# Patient Record
Sex: FEMALE | Race: WHITE | NOT HISPANIC OR LATINO | Employment: UNEMPLOYED | URBAN - METROPOLITAN AREA
[De-identification: names, ages, dates, MRNs, and addresses within clinical notes are randomized per-mention and may not be internally consistent; named-entity substitution may affect disease eponyms.]

---

## 2018-04-09 ENCOUNTER — APPOINTMENT (OUTPATIENT)
Dept: ULTRASOUND IMAGING | Facility: CLINIC | Age: 48
End: 2018-04-09
Attending: INTERNAL MEDICINE
Payer: MEDICAID

## 2018-04-09 ENCOUNTER — HOSPITAL ENCOUNTER (INPATIENT)
Facility: CLINIC | Age: 48
LOS: 6 days | Discharge: HOME | End: 2018-04-15
Attending: EMERGENCY MEDICINE | Admitting: INTERNAL MEDICINE
Payer: MEDICAID

## 2018-04-09 ENCOUNTER — APPOINTMENT (OUTPATIENT)
Dept: GENERAL RADIOLOGY | Facility: CLINIC | Age: 48
End: 2018-04-09
Attending: EMERGENCY MEDICINE
Payer: MEDICAID

## 2018-04-09 DIAGNOSIS — L03.119 CELLULITIS OF HAND: Primary | ICD-10-CM

## 2018-04-09 DIAGNOSIS — L03.114 CELLULITIS OF LEFT UPPER EXTREMITY: ICD-10-CM

## 2018-04-09 LAB
AMPHETAMINES UR QL SCN: POSITIVE
ANION GAP SERPL CALCULATED.3IONS-SCNC: 7 MMOL/L (ref 3–14)
BACTERIA SPEC CULT: NORMAL
BARBITURATES UR QL: NEGATIVE
BASOPHILS # BLD AUTO: 0 10E9/L (ref 0–0.2)
BASOPHILS NFR BLD AUTO: 0.1 %
BENZODIAZ UR QL: NEGATIVE
BUN SERPL-MCNC: 14 MG/DL (ref 7–30)
CALCIUM SERPL-MCNC: 8.8 MG/DL (ref 8.5–10.1)
CANNABINOIDS UR QL SCN: NEGATIVE
CHLORIDE SERPL-SCNC: 106 MMOL/L (ref 94–109)
CO2 SERPL-SCNC: 27 MMOL/L (ref 20–32)
COCAINE UR QL: NEGATIVE
CREAT SERPL-MCNC: 0.62 MG/DL (ref 0.52–1.04)
CRP SERPL-MCNC: 11.7 MG/L (ref 0–8)
DIFFERENTIAL METHOD BLD: ABNORMAL
EOSINOPHIL # BLD AUTO: 0.2 10E9/L (ref 0–0.7)
EOSINOPHIL NFR BLD AUTO: 1.2 %
ERYTHROCYTE [DISTWIDTH] IN BLOOD BY AUTOMATED COUNT: 13.4 % (ref 10–15)
ERYTHROCYTE [SEDIMENTATION RATE] IN BLOOD BY WESTERGREN METHOD: 28 MM/H (ref 0–20)
ETHANOL UR QL SCN: NEGATIVE
GFR SERPL CREATININE-BSD FRML MDRD: >90 ML/MIN/1.7M2
GLUCOSE SERPL-MCNC: 107 MG/DL (ref 70–99)
GRAM STN SPEC: ABNORMAL
GRAM STN SPEC: ABNORMAL
HCT VFR BLD AUTO: 39.3 % (ref 35–47)
HGB BLD-MCNC: 13 G/DL (ref 11.7–15.7)
IMM GRANULOCYTES # BLD: 0 10E9/L (ref 0–0.4)
IMM GRANULOCYTES NFR BLD: 0.1 %
LYMPHOCYTES # BLD AUTO: 1.9 10E9/L (ref 0.8–5.3)
LYMPHOCYTES NFR BLD AUTO: 13.5 %
Lab: ABNORMAL
Lab: NORMAL
MCH RBC QN AUTO: 30.3 PG (ref 26.5–33)
MCHC RBC AUTO-ENTMCNC: 33.1 G/DL (ref 31.5–36.5)
MCV RBC AUTO: 92 FL (ref 78–100)
MONOCYTES # BLD AUTO: 1.1 10E9/L (ref 0–1.3)
MONOCYTES NFR BLD AUTO: 7.7 %
NEUTROPHILS # BLD AUTO: 10.6 10E9/L (ref 1.6–8.3)
NEUTROPHILS NFR BLD AUTO: 77.4 %
NRBC # BLD AUTO: 0 10*3/UL
NRBC BLD AUTO-RTO: 0 /100
OPIATES UR QL SCN: POSITIVE
PLATELET # BLD AUTO: 283 10E9/L (ref 150–450)
POTASSIUM SERPL-SCNC: 4.2 MMOL/L (ref 3.4–5.3)
RBC # BLD AUTO: 4.29 10E12/L (ref 3.8–5.2)
SODIUM SERPL-SCNC: 140 MMOL/L (ref 133–144)
SPECIMEN SOURCE: ABNORMAL
SPECIMEN SOURCE: NORMAL
WBC # BLD AUTO: 13.7 10E9/L (ref 4–11)

## 2018-04-09 PROCEDURE — 76882 US LMTD JT/FCL EVL NVASC XTR: CPT | Mod: LT

## 2018-04-09 PROCEDURE — 85025 COMPLETE CBC W/AUTO DIFF WBC: CPT | Performed by: EMERGENCY MEDICINE

## 2018-04-09 PROCEDURE — 25000132 ZZH RX MED GY IP 250 OP 250 PS 637: Performed by: INTERNAL MEDICINE

## 2018-04-09 PROCEDURE — 90715 TDAP VACCINE 7 YRS/> IM: CPT | Performed by: EMERGENCY MEDICINE

## 2018-04-09 PROCEDURE — 87075 CULTR BACTERIA EXCEPT BLOOD: CPT | Performed by: STUDENT IN AN ORGANIZED HEALTH CARE EDUCATION/TRAINING PROGRAM

## 2018-04-09 PROCEDURE — 96367 TX/PROPH/DG ADDL SEQ IV INF: CPT | Performed by: EMERGENCY MEDICINE

## 2018-04-09 PROCEDURE — 87070 CULTURE OTHR SPECIMN AEROBIC: CPT | Performed by: STUDENT IN AN ORGANIZED HEALTH CARE EDUCATION/TRAINING PROGRAM

## 2018-04-09 PROCEDURE — 25000125 ZZHC RX 250: Performed by: STUDENT IN AN ORGANIZED HEALTH CARE EDUCATION/TRAINING PROGRAM

## 2018-04-09 PROCEDURE — 86140 C-REACTIVE PROTEIN: CPT | Performed by: EMERGENCY MEDICINE

## 2018-04-09 PROCEDURE — 87205 SMEAR GRAM STAIN: CPT | Performed by: STUDENT IN AN ORGANIZED HEALTH CARE EDUCATION/TRAINING PROGRAM

## 2018-04-09 PROCEDURE — 87077 CULTURE AEROBIC IDENTIFY: CPT | Performed by: STUDENT IN AN ORGANIZED HEALTH CARE EDUCATION/TRAINING PROGRAM

## 2018-04-09 PROCEDURE — 80048 BASIC METABOLIC PNL TOTAL CA: CPT | Performed by: EMERGENCY MEDICINE

## 2018-04-09 PROCEDURE — 25000128 H RX IP 250 OP 636: Performed by: INTERNAL MEDICINE

## 2018-04-09 PROCEDURE — 12000001 ZZH R&B MED SURG/OB UMMC

## 2018-04-09 PROCEDURE — 96365 THER/PROPH/DIAG IV INF INIT: CPT | Performed by: EMERGENCY MEDICINE

## 2018-04-09 PROCEDURE — 85652 RBC SED RATE AUTOMATED: CPT | Performed by: EMERGENCY MEDICINE

## 2018-04-09 PROCEDURE — 87186 SC STD MICRODIL/AGAR DIL: CPT | Performed by: STUDENT IN AN ORGANIZED HEALTH CARE EDUCATION/TRAINING PROGRAM

## 2018-04-09 PROCEDURE — 80307 DRUG TEST PRSMV CHEM ANLYZR: CPT | Performed by: EMERGENCY MEDICINE

## 2018-04-09 PROCEDURE — 99285 EMERGENCY DEPT VISIT HI MDM: CPT | Mod: 25 | Performed by: EMERGENCY MEDICINE

## 2018-04-09 PROCEDURE — 99207 ZZC APP CREDIT; MD BILLING SHARED VISIT: CPT | Performed by: INTERNAL MEDICINE

## 2018-04-09 PROCEDURE — 90471 IMMUNIZATION ADMIN: CPT | Performed by: EMERGENCY MEDICINE

## 2018-04-09 PROCEDURE — 25000128 H RX IP 250 OP 636: Performed by: EMERGENCY MEDICINE

## 2018-04-09 PROCEDURE — 99285 EMERGENCY DEPT VISIT HI MDM: CPT | Mod: Z6 | Performed by: EMERGENCY MEDICINE

## 2018-04-09 PROCEDURE — 99223 1ST HOSP IP/OBS HIGH 75: CPT | Mod: AI | Performed by: INTERNAL MEDICINE

## 2018-04-09 PROCEDURE — 73130 X-RAY EXAM OF HAND: CPT | Mod: LT

## 2018-04-09 PROCEDURE — 80320 DRUG SCREEN QUANTALCOHOLS: CPT | Performed by: EMERGENCY MEDICINE

## 2018-04-09 PROCEDURE — 0HDGXZZ EXTRACTION OF LEFT HAND SKIN, EXTERNAL APPROACH: ICD-10-PCS | Performed by: INTERNAL MEDICINE

## 2018-04-09 PROCEDURE — 87040 BLOOD CULTURE FOR BACTERIA: CPT | Performed by: EMERGENCY MEDICINE

## 2018-04-09 RX ORDER — CEFAZOLIN SODIUM 2 G/100ML
2 INJECTION, SOLUTION INTRAVENOUS ONCE
Status: COMPLETED | OUTPATIENT
Start: 2018-04-09 | End: 2018-04-09

## 2018-04-09 RX ORDER — SODIUM CHLORIDE, SODIUM LACTATE, POTASSIUM CHLORIDE, CALCIUM CHLORIDE 600; 310; 30; 20 MG/100ML; MG/100ML; MG/100ML; MG/100ML
INJECTION, SOLUTION INTRAVENOUS CONTINUOUS
Status: DISCONTINUED | OUTPATIENT
Start: 2018-04-09 | End: 2018-04-11

## 2018-04-09 RX ORDER — LIDOCAINE HYDROCHLORIDE 10 MG/ML
10-20 INJECTION, SOLUTION EPIDURAL; INFILTRATION; INTRACAUDAL; PERINEURAL ONCE
Status: COMPLETED | OUTPATIENT
Start: 2018-04-09 | End: 2018-04-09

## 2018-04-09 RX ORDER — OXYCODONE HYDROCHLORIDE 5 MG/1
5-10 TABLET ORAL
Status: DISCONTINUED | OUTPATIENT
Start: 2018-04-09 | End: 2018-04-14

## 2018-04-09 RX ORDER — ONDANSETRON 4 MG/1
4 TABLET, ORALLY DISINTEGRATING ORAL EVERY 6 HOURS PRN
Status: DISCONTINUED | OUTPATIENT
Start: 2018-04-09 | End: 2018-04-15 | Stop reason: HOSPADM

## 2018-04-09 RX ORDER — ACETAMINOPHEN 325 MG/1
650 TABLET ORAL EVERY 4 HOURS PRN
Status: DISCONTINUED | OUTPATIENT
Start: 2018-04-09 | End: 2018-04-15 | Stop reason: HOSPADM

## 2018-04-09 RX ORDER — VANCOMYCIN HYDROCHLORIDE 1 G/200ML
1000 INJECTION, SOLUTION INTRAVENOUS EVERY 12 HOURS
Status: DISCONTINUED | OUTPATIENT
Start: 2018-04-09 | End: 2018-04-11

## 2018-04-09 RX ORDER — ONDANSETRON 2 MG/ML
4 INJECTION INTRAMUSCULAR; INTRAVENOUS EVERY 6 HOURS PRN
Status: DISCONTINUED | OUTPATIENT
Start: 2018-04-09 | End: 2018-04-15 | Stop reason: HOSPADM

## 2018-04-09 RX ORDER — NALOXONE HYDROCHLORIDE 0.4 MG/ML
.1-.4 INJECTION, SOLUTION INTRAMUSCULAR; INTRAVENOUS; SUBCUTANEOUS
Status: DISCONTINUED | OUTPATIENT
Start: 2018-04-09 | End: 2018-04-15 | Stop reason: HOSPADM

## 2018-04-09 RX ADMIN — OXYCODONE HYDROCHLORIDE 10 MG: 5 TABLET ORAL at 17:30

## 2018-04-09 RX ADMIN — LIDOCAINE HYDROCHLORIDE 10 ML: 10 INJECTION, SOLUTION EPIDURAL; INFILTRATION; INTRACAUDAL; PERINEURAL at 15:39

## 2018-04-09 RX ADMIN — CLOSTRIDIUM TETANI TOXOID ANTIGEN (FORMALDEHYDE INACTIVATED), CORYNEBACTERIUM DIPHTHERIAE TOXOID ANTIGEN (FORMALDEHYDE INACTIVATED), BORDETELLA PERTUSSIS TOXOID ANTIGEN (GLUTARALDEHYDE INACTIVATED), BORDETELLA PERTUSSIS FILAMENTOUS HEMAGGLUTININ ANTIGEN (FORMALDEHYDE INACTIVATED), BORDETELLA PERTUSSIS PERTACTIN ANTIGEN, AND BORDETELLA PERTUSSIS FIMBRIAE 2/3 ANTIGEN 0.5 ML: 5; 2; 2.5; 5; 3; 5 INJECTION, SUSPENSION INTRAMUSCULAR at 03:31

## 2018-04-09 RX ADMIN — OXYCODONE HYDROCHLORIDE 10 MG: 5 TABLET ORAL at 14:25

## 2018-04-09 RX ADMIN — VANCOMYCIN HYDROCHLORIDE 1000 MG: 1 INJECTION, SOLUTION INTRAVENOUS at 15:48

## 2018-04-09 RX ADMIN — OXYCODONE HYDROCHLORIDE 5 MG: 5 TABLET ORAL at 08:11

## 2018-04-09 RX ADMIN — ACETAMINOPHEN 650 MG: 325 TABLET ORAL at 11:29

## 2018-04-09 RX ADMIN — OXYCODONE HYDROCHLORIDE 10 MG: 5 TABLET ORAL at 22:25

## 2018-04-09 RX ADMIN — OXYCODONE HYDROCHLORIDE 5 MG: 5 TABLET ORAL at 06:51

## 2018-04-09 RX ADMIN — OXYCODONE HYDROCHLORIDE 10 MG: 5 TABLET ORAL at 11:29

## 2018-04-09 RX ADMIN — ACETAMINOPHEN 650 MG: 325 TABLET ORAL at 15:48

## 2018-04-09 RX ADMIN — CEFAZOLIN SODIUM 2 G: 2 INJECTION, SOLUTION INTRAVENOUS at 03:53

## 2018-04-09 RX ADMIN — SODIUM CHLORIDE, POTASSIUM CHLORIDE, SODIUM LACTATE AND CALCIUM CHLORIDE: 600; 310; 30; 20 INJECTION, SOLUTION INTRAVENOUS at 19:43

## 2018-04-09 RX ADMIN — SODIUM CHLORIDE, POTASSIUM CHLORIDE, SODIUM LACTATE AND CALCIUM CHLORIDE: 600; 310; 30; 20 INJECTION, SOLUTION INTRAVENOUS at 08:08

## 2018-04-09 RX ADMIN — SODIUM CHLORIDE, POTASSIUM CHLORIDE, SODIUM LACTATE AND CALCIUM CHLORIDE: 600; 310; 30; 20 INJECTION, SOLUTION INTRAVENOUS at 14:20

## 2018-04-09 RX ADMIN — VANCOMYCIN HYDROCHLORIDE 1000 MG: 1 INJECTION, SOLUTION INTRAVENOUS at 04:48

## 2018-04-09 ASSESSMENT — ACTIVITIES OF DAILY LIVING (ADL)
TRANSFERRING: 0-->INDEPENDENT
BATHING: 1-->ASSISTIVE EQUIPMENT
FALL_HISTORY_WITHIN_LAST_SIX_MONTHS: NO
TRANSFERRING: 0 - INDEPENDENT
TOILETING: 1-->ASSISTIVE EQUIPMENT
TOILETING: 0 - INDEPENDENT
COGNITION: 0 - NO COGNITION ISSUES REPORTED
DRESS: 0 - INDEPENDENT
BATHING: 0 - INDEPENDENT
DRESS: 1-->ASSISTIVE EQUIPMENT
AMBULATION: 0-->INDEPENDENT
EATING: 0 - INDEPENDENT
AMBULATION: 0 - INDEPENDENT
RETIRED_COMMUNICATION: 0-->UNDERSTANDS/COMMUNICATES WITHOUT DIFFICULTY
SWALLOWING: 0 - SWALLOWS FOODS/LIQUIDS WITHOUT DIFFICULTY
RETIRED_EATING: 1-->ASSISTIVE EQUIPMENT
COMMUNICATION: 0 - UNDERSTANDS/COMMUNICATES WITHOUT DIFFICULTY
SWALLOWING: 0-->SWALLOWS FOODS/LIQUIDS WITHOUT DIFFICULTY

## 2018-04-09 ASSESSMENT — ENCOUNTER SYMPTOMS
CONFUSION: 0
WEAKNESS: 0
CHILLS: 0
BRUISES/BLEEDS EASILY: 0
FEVER: 0
EYE DISCHARGE: 0
MYALGIAS: 0
RHINORRHEA: 0
DYSURIA: 0
NAUSEA: 0
COLOR CHANGE: 1
WOUND: 1
SORE THROAT: 0
HEADACHES: 0
BACK PAIN: 0
FLANK PAIN: 0
ABDOMINAL PAIN: 0
COUGH: 0
VOMITING: 0
SHORTNESS OF BREATH: 0
DIARRHEA: 0

## 2018-04-09 NOTE — PLAN OF CARE
Problem: Patient Care Overview  Goal: Plan of Care/Patient Progress Review  Outcome: No Change  Pt is A&O. VSS and WNL. Taking 10 mg PRN oxycodone for L hand pain which is moderately effective and using ice for comfort. LUE elevated throughout shift. L hand remains red, swollen, and warm to the touch. NADIA with no drainage. Reports slight tingling to fingers which has not changed during shift. US completed this AM. Denies any chest pain or SOB. Lungs CTA. BS present and active x 4. Remains NPO with plan for bedside I&D this afternoon. IVF infusing @ 100/hr. Up independently. Has call light in reach and is able to make needs known. Continue with plan of care.

## 2018-04-09 NOTE — IP AVS SNAPSHOT
MRN:[id number]                      After Visit Summary   4/9/2018    Shelly Cha    MRN: [id number]           Thank you!     Thank you for choosing [facility] for your care. Our goal is always to provide you with excellent care. Hearing back from our patients is one way we can continue to improve our services. Please take a few minutes to complete the written survey that you may receive in the mail after you visit with us. Thank you!        Patient Information     Date Of Birth          1970        Designated Caregiver       Most Recent Value    Caregiver    Will someone help with your care after discharge? yes    Name of designated caregiver Siva    Phone number of caregiver [phone]    Caregiver address [address] [place], [place] [pts home address]      About your hospital stay     You were admitted on:  April 9, 2018 You last received care in the:   8A    You were discharged on:  April 15, 2018        Reason for your hospital stay       Hand Cellulitis                  Who to Call     For medical emergencies, please call 911.  For non-urgent questions about your medical care, please call your primary care provider or clinic, [phone]          Attending Provider     Provider Specialty    Pauline Harrison MD Emergency Medicine    Pierce Durant MD Internal Medicine       Primary Care Provider Office Phone # Fax #    Clinic [facility] [phone]      After Care Instructions     Activity       Your activity upon discharge: activity as tolerated            Diet       Follow this diet upon discharge: Orders Placed This Encounter      Regular Diet Adult            Discharge Instructions       Please call or come back to the hospital for new fevers, chills, increasing pain/swelling, new or increasing drainage from your hand incision, or new numbness. Keep hand elevated as much as possible. Keep hand would clean and dry. Keep dressing clean, dry and intact. Okay to  shower with dressing covered and kept dry.                  Follow-up Appointments     Adult [facility] Follow-up and recommended labs and tests       Please follow up with Hand clinic in 1 week. ( Dr Kirkpatrick)     Appointments on [facility] and/or [facility] (with [facility] provider or service). Call [phone] if you haven't heard regarding these appointments within 7 days of discharge.                  Additional Services     Home infusion referral       Your provider has referred you to: [facility]: [facility] [phone]   [email or web address]    Local Address (if different from home address): N/A    Anticipated Length of Therapy: 6 weeks    Home Infusion Pharmacist to adjust therapy based on labs and clinical assessments: Yes    Labs:  May draw labs from Venous Catheter: Yes  Home Infusion Pharmacist to order labs based on therapy type and clinical assessments: Yes  Call/Fax Lab Results to: Dr. Salazar    Agency Staff to assess nursing needs for Infusion Therapy.    Access Device Management:  IV Access Type: PICC  Flush with Heparin and Normal Saline IVP PRN and routine site care (per agency protocol) to maintain access device? Yes                  Pending Results     Date and Time Order Name Status Description    4/9/2018 1535 Anaerobic bacterial culture Preliminary     4/9/2018 0235 Blood culture Preliminary     4/9/2018 0235 Blood culture Preliminary             Statement of Approval     Ordered          04/14/18 1021  I have reviewed and agree with all the recommendations and orders detailed in this document.  EFFECTIVE NOW     Approved and electronically signed by:  Misti Isaacs MD             Admission Information     Date & Time Provider Department Dept. Phone    4/9/2018 Pierce Durant MD [facility] 8A [phone]      Your Vitals Were     Blood Pressure Pulse Temperature Respirations Height Weight    98/56 79 97.8  F (36.6  C)  "(Oral) 16 1.6 m (5' 3\") 62.3 kg (137 lb 6.4 oz)    Pulse Oximetry BMI (Body Mass Index)                97% 24.34 kg/m2          [employer] Information     [employer] lets you send messages to your doctor, view your test results, renew your prescriptions, schedule appointments and more. To sign up, go to [email or web address] . Click on \"Log in\" on the left side of the screen, which will take you to the Welcome page. Then click on \"Sign up Now\" on the right side of the page.     You will be asked to enter the access code listed below, as well as some personal information. Please follow the directions to create your username and password.     Your access code is: [email or web address]  Expires: 2018 10:53 AM     Your access code will  in 90 days. If you need help or a new code, please call your [facility] clinic or [phone].        Care EveryWhere ID     This is your Care EveryWhere ID. This could be used by other organizations to access your [facility] medical records  [id number]        Equal Access to Services     ANJU YARBROUGH : Hadii estuardo Garsia, warubenda jessica, qaana maríata kaalroya blair, paxton silva . So [facility] [phone].    ATENCIÓN: Si habla español, tiene a kennedy disposición servicios gratuitos de asistencia lingüística. Llame al [phone].    We comply with applicable federal civil rights laws and [place] laws. We do not discriminate on the basis of race, color, national origin, age, disability, sex, sexual orientation, or gender identity.               Review of your medicines      START taking        Dose / Directions    amoxicillin-clavulanate 875-125 MG per tablet   Commonly known as:  AUGMENTIN   Indication:  Infection of the Skin and/or Related Soft Tissue        Dose:  1 tablet   Take 1 tablet by mouth every 12 hours   Quantity:  14 tablet   Refills:  0       oxyCODONE IR 5 MG tablet   Commonly known as:  ROXICODONE        Dose:  5-10 mg   Take 1-2 tablets " (5-10 mg) by mouth every 6 hours as needed for other (pain control or improvement in physical function. Hold dose for analgesic side effects.)   Quantity:  50 tablet   Refills:  0            Where to get your medicines      These medications were sent to [facility] [place], [place] - [address]  [address], [facility] [address]     Phone:  [phone]     amoxicillin-clavulanate 875-125 MG per tablet         Some of these will need a paper prescription and others can be bought over the counter. Ask your nurse if you have questions.     Bring a paper prescription for each of these medications     oxyCODONE IR 5 MG tablet                Protect others around you: Learn how to safely use, store and throw away your medicines at www.disposemymeds.org.        ANTIBIOTIC INSTRUCTION     You've Been Prescribed an Antibiotic - Now What?  Your healthcare team thinks that you or your loved one might have an infection. Some infections can be treated with antibiotics, which are powerful, life-saving drugs. Like all medications, antibiotics have side effects and should only be used when necessary. There are some important things you should know about your antibiotic treatment.      Your healthcare team may run tests before you start taking an antibiotic.    Your team may take samples (e.g., from your blood, urine or other areas) to run tests to look for bacteria. These test can be important to determine if you need an antibiotic at all and, if you do, which antibiotic will work best.      Within a few days, your healthcare team might change or even stop your antibiotic.    Your team may start you on an antibiotic while they are working to find out what is making you sick.    Your team might change your antibiotic because test results show that a different antibiotic would be better to treat your infection.    In some cases, once your team has more information, they learn that you do not need an  antibiotic at all. They may find out that you don't have an infection, or that the antibiotic you're taking won't work against your infection. For example, an infection caused by a virus can't be treated with antibiotics. Staying on an antibiotic when you don't need it is more likely to be harmful than helpful.      You may experience side effects from your antibiotic.    Like all medications, antibiotics have side effects. Some of these can be serious.    Let you healthcare team know if you have any known allergies when you are admitted to the hospital.    One significant side effect of nearly all antibiotics is the risk of severe and sometimes deadly diarrhea caused by Clostridium difficile (C. Difficile). This occurs when a person takes antibiotics because some good germs are destroyed. Antibiotic use allows C. diificile to take over, putting patients at high risk for this serious infection.    As a patient or caregiver, it is important to understand your or your loved one's antibiotic treatment. It is especially important for caregivers to speak up when patients can't speak for themselves. Here are some important questions to ask your healthcare team.    What infection is this antibiotic treating and how do you know I have that infection?    What side effects might occur from this antibiotic?    How long will I need to take this antibiotic?    Is it safe to take this antibiotic with other medications or supplements (e.g., vitamins) that I am taking?     Are there any special directions I need to know about taking this antibiotic? For example, should I take it with food?    How will I be monitored to know whether my infection is responding to the antibiotic?    What tests may help to make sure the right antibiotic is prescribed for me?      Information provided by:  www.cdc.gov/getsmart  U.S. Department of Health and Human Services  Centers for disease Control and Prevention  National Center for Emerging and  Zoonotic Infectious Diseases  Division of Healthcare Quality Promotion        Information about OPIOIDS     PRESCRIPTION OPIOIDS: WHAT YOU NEED TO KNOW    Prescription opioids can be used to help relieve moderate to severe pain and are often prescribed following a surgery or injury, or for certain health conditions. These medications can be an important part of treatment but also come with serious risks. It is important to work with your health care provider to make sure you are getting the safest, most effective care.    WHAT ARE THE RISKS AND SIDE EFFECTS OF OPIOID USE?  Prescription opioids carry serious risks of addiction and overdose, especially with prolonged use. An opioid overdose, often marked by slowed breathing can cause sudden death. The use of prescription opioids can have a number of side effects as well, even when taken as directed:      Tolerance - meaning you might need to take more of a medication for the same pain relief    Physical dependence - meaning you have symptoms of withdrawal when a medication is stopped    Increased sensitivity to pain    Constipation    Nausea, vomiting, and dry mouth    Sleepiness and dizziness    Confusion    Depression    Low levels of testosterone that can result in lower sex drive, energy, and strength    Itching and sweating    RISKS ARE GREATER WITH:    History of drug misuse, substance use disorder, or overdose    Mental health conditions (such as depression or anxiety)    Sleep apnea    Older age (65 years or older)    Pregnancy    Avoid alcohol while taking prescription opioids.   Also, unless specifically advised by your health care provider, medications to avoid include:    Benzodiazepines (such as Xanax or Valium)    Muscle relaxants (such as Soma or Flexeril)    Hypnotics (such as Ambien or Lunesta)    Other prescription opioids    KNOW YOUR OPTIONS:  Talk to your health care provider about ways to manage your pain that do not involve prescription opioids.  Some of these options may actually work better and have fewer risks and side effects:    Pain relievers such as acetaminophen, ibuprofen, and naproxen    Some medications that are also used for depression or seizures    Physical therapy and exercise    Cognitive behavioral therapy, a psychological, goal-directed approach, in which patients learn how to modify physical, behavioral, and emotional triggers of pain and stress    IF YOU ARE PRESCRIBED OPIOIDS FOR PAIN:    Never take opioids in greater amounts or more often than prescribed    Follow up with your primary health care provider and work together to create a plan on how to manage your pain.    Talk about ways to help manage your pain that do not involve prescription opioids    Talk about all concerns and side effects    Help prevent misuse and abuse    Never sell or share prescription opioids    Never use another person's prescription opioids    Store prescription opioids in a secure place and out of reach of others (this may include visitors, children, friends, and family)    Visit www.cdc.gov/drugoverdose to learn about risks of opioid abuse and overdose    If you believe you may be struggling with addiction, tell your health care provider and ask for guidance or call [facility]'s National Helpline at 8-163-773-HELP    LEARN MORE / www.cdc.gov/drugoverdose/prescribing/guideline.html    Safely dispose of unused prescription opioids: Find your local drug take-back programs and more information about the importance of safe disposal at www.doseofreality.[place].gov             Medication List: This is a list of all your medications and when to take them. Check marks below indicate your daily home schedule. Keep this list as a reference.      Medications           Morning Afternoon Evening Bedtime As Needed    amoxicillin-clavulanate 875-125 MG per tablet   Commonly known as:  AUGMENTIN   Take 1 tablet by mouth every 12 hours   Last time this was given:  1 tablet on  4/14/2018  8:35 PM                                oxyCODONE IR 5 MG tablet   Commonly known as:  ROXICODONE   Take 1-2 tablets (5-10 mg) by mouth every 6 hours as needed for other (pain control or improvement in physical function. Hold dose for analgesic side effects.)   Last time this was given:  5 mg on 4/15/2018  2:52 AM

## 2018-04-09 NOTE — PROCEDURES
Bedside irrigation and debridement of left hand abscess    Indication: Left hand abscess    Procedure: After discussing the risks benefits and alternatives to bedside left hand irrigation and debridement under local anesthesia, the patient provided verbal and written consent to proceed with the procedure.  All questions answered.  Skin was prepped with 4 chlorhexidine swabs.  8mL of 1% lidocaine was injected into the dorsum of the patient's right hand to provide local anesthesia via dorsal cutaneous branch blocks.  15 blade was used to make a stab incision over the area of fluctuance on the patient's dorsal left thumb.  There was immediate return of purulent fluid, which was cultured with a sterile culture swab.  All purulent fluid was expressed from the wound manually.  The wound was explored and deloculated with a Ximena clamp.  The wound was irrigated copiously using sterile saline.  The wound was dressed with 4 x 4 gauze and then wrapped with Kerlix followed by an Ace bandage.  The patient tolerated the procedure well with no immediate complications.    Signed,  Ced Odom MD  PGY-1, Orthopaedic Surgery

## 2018-04-09 NOTE — CONSULTS
[facility], Orthopaedic Surgery Consultation    Shelly Cha MRN# [id number]   Age: 48 year old YOB: 1970     Date of Admission:  4/9/2018    Reason for consult:  Left hand cellulitis       Requesting physician:  Dr. Conrad         Assessment and Plan:   Assessment:  48-year-old right-hand-dominant female with left hand cellulitis. No signs of compartment syndrome at the current time. Subjective improvement of symptoms since initiation of IV abx.    Plan:  -Continue IV antibiotics per primary team.  -Will discuss need for operative intervention with on-call hand staff  -Continue n.p.o. status until final plan determined.  -Rest and elevate left upper extremity on pillow.   -Patient advised to immediately report increasing pain, new numbness, or paresthesias of left hand.          History of Present Illness:   Patient was seen and examined by me. History, PMH, Meds, SH, complete ROS (10 organ systems) and PE reviewed with patient and prior medical records.      48-year-old right-hand-dominant female with no significant past medical history who presents with a several hour history of left hand swelling and pain.  Patient states one day ago she was cleaning a bathroom when she scraped her left hand on a sharon metal can.  She denies laceration of the skin or bleeding from the scrape on her hand.  She woke up during the night with left hand pain and swelling, and she presented to the emergency department for further evaluation.  She was admitted to the medicine team for further treatment of left hand cellulitis and was placed on IV vancomycin.  Pain is most prominent on the dorsal aspect of her hand in the vicinity of the thumb MCP.  She states that her hand swelling and range of motion of her index through small fingers has improved since admission.  Endorses some tingling in the tips of all 5 fingers.  Decreased range of motion of her left thumb has been stable since admission.  Denies increasing  "pain, new numbness or paresthesias.            Past Medical History:   History reviewed. No pertinent past medical history.          Past Surgical History:     Past Surgical History:   Procedure Laterality Date     BIOPSY      right breast     CHOLECYSTECTOMY       GYN SURGERY      , tubal             Social History:     Social History     Social History     Marital status: Single     Spouse name: N/A     Number of children: N/A     Years of education: N/A     Social History Main Topics     Smoking status: Light Tobacco Smoker     Smokeless tobacco: Never Used     Alcohol use No     Drug use: No     Sexual activity: Yes     Partners: Male     Other Topics Concern     None     Social History Narrative     None             Family History:   No family history on file.           Medications:     Current Facility-Administered Medications   Medication     vancomycin (VANCOCIN) 1000 mg in dextrose 5% 200 mL PREMIX     naloxone (NARCAN) injection 0.1-0.4 mg     melatonin tablet 1 mg     acetaminophen (TYLENOL) tablet 650 mg     oxyCODONE IR (ROXICODONE) tablet 5-10 mg     ondansetron (ZOFRAN-ODT) ODT tab 4 mg    Or     ondansetron (ZOFRAN) injection 4 mg     lactated ringers infusion             Allergies:      Allergies   Allergen Reactions     Ibuprofen Swelling     Eyes swelling            Review of Systems:   A comprehensive 10 point review of systems (constitutional, ENT, cardiac, peripheral vascular, respiratory, GI, , Musculoskeletal, skin, Neurological) was performed and found to be negative except as described in this note.           Physical Exam:   COMPLETE EXAMINATION:   VITAL SIGNS: /77  Pulse 88  Temp 98.1  F (36.7  C) (Oral)  Resp 16  Ht 1.6 m (5' 3\")  Wt 62.3 kg (137 lb 6.4 oz)  SpO2 98%  BMI 24.34 kg/m2  GENERAL:  No acute distress, calm and cooperative, laying comfortably in bed  RESP: Non labored breathing  ABD: Benign  SKIN: Grossly normal   LYMPHATIC:  Grossly normal  NEURO:  Grossly " normal   VASCULAR: All 4 extremity pulses 2+ present  MUSCULOSKELETAL:   Left hand:  Inspection: Diffuse blanching erythema and swelling about the left hand involving all fingers -most prominently around base of thumb.  Pinpoint excoriation at the dorsal base of the thumb with small overlying scab.  No other abrasions or lacerations noted.  Motor:  Strength 4/5 with FDS/FDP, EDC, FPL, EPL, interossei; testing mostly limited by pain and decreased range of motion. Strength 5/5 with deltoid, biceps, triceps, wrist extensors, wrist flexors.    ROM: Painless flexion/extension of thumb approximately 20 . Painless flexion/extension of index through small fingers approximately 45deg. Able to make approximately 40% of fist  Sensory: SILT to axillary, musculocutaneous, median, ulnar, and radial nerve distributions. Two-point discrimination in radial and ulnar digital nerves of the thumb approximately 5-7 mm, symmetric to contralateral thumb.  Circulation: palpable radial pulse, fingers warm and well perfused            Data:   All pertinent laboratory data reviewed  All imaging studies reviewed by me.    Recent Labs   Lab Test  04/09/18   0308   HGB  13.0   SED  28*   CRP  11.7*   WBC  13.7*     No results for input(s): FTYP, FNEU, FOTH, FCOL, FAPR, FWBC in the last 03690 hours.    Signed:    This consultation has been discussed with Dr. Joel, PGY-4 and will be discussed with Dr. Kirkpatrick, Attending Physician.    Signed,  Ced Odom MD  PGY-1, Orthopaedic Surgery  #: [phone]

## 2018-04-09 NOTE — ED PROVIDER NOTES
"  History     Chief Complaint   Patient presents with     Wound Infection     HPI  Shelly Cha is a 48 year old female who has no significant past medical history who presents emergency department from home with the complaint of left hand pain and swelling since last night.  Patient is a right-handed female, reports that last night she was at a friend's house helping clean and she notes that she scraped her left hand on a piece of metal.  Patient reports she went to bed and then woke up with her hand swollen, warm to touch, and painful.  Patient concerned for an infection.  Patient denies any fever, chills, nausea, vomiting, chest pain, paresthesias.  No other injuries or complaints.  Patient denies any trauma, denies any tobacco, drug, alcohol abuse.  Patient denies any IV drug abuse.    I have reviewed the Medications, Allergies, Past Medical and Surgical History, and Social History in the Epic system.    Review of Systems   Constitutional: Negative for chills and fever.   HENT: Negative for congestion, rhinorrhea and sore throat.    Eyes: Negative for discharge.   Respiratory: Negative for cough and shortness of breath.    Cardiovascular: Negative for chest pain and leg swelling.   Gastrointestinal: Negative for abdominal pain, diarrhea, nausea and vomiting.   Endocrine: Negative for polyuria.   Genitourinary: Negative for dysuria and flank pain.   Musculoskeletal: Negative for back pain and myalgias.   Skin: Positive for color change and wound. Negative for rash.   Allergic/Immunologic: Negative for immunocompromised state.   Neurological: Negative for weakness and headaches.   Hematological: Does not bruise/bleed easily.   Psychiatric/Behavioral: Negative for confusion and suicidal ideas.       Physical Exam   BP: (!) 142/98  Pulse: 95  Heart Rate: 111  Temp: 98.2  F (36.8  C)  Resp: 16  Height: 160 cm (5' 3\")  Weight: 62.3 kg (137 lb 6.4 oz)  SpO2: 98 %      Physical Exam   Constitutional: She is oriented to " person, place, and time. She appears well-developed. No distress.   HENT:   Head: Normocephalic and atraumatic.   Right Ear: External ear normal.   Left Ear: External ear normal.   Mouth/Throat: Oropharynx is clear and moist.   Eyes: Conjunctivae and EOM are normal. Pupils are equal, round, and reactive to light.   Neck: Normal range of motion. Neck supple.   Cardiovascular: Normal rate, regular rhythm and normal heart sounds.    Pulmonary/Chest: Effort normal and breath sounds normal. No respiratory distress. She has no wheezes. She has no rales.   Abdominal: Soft. She exhibits no distension. There is no tenderness. There is no rebound and no guarding.   Musculoskeletal: Normal range of motion. She exhibits no tenderness or deformity.   Neurological: She is alert and oriented to person, place, and time. No cranial nerve deficit. Coordination normal.   Skin: Skin is warm and dry. No rash noted. There is erythema.   Left hand with significant swallowing over the palmar and dorsal aspect of the first phalanx, first and second Metacarpal, no laceration however some superficial abrasions/scraps, decrease ROM 2/2 to swelling, no TTP on examination, radial pulse present bilaterally, compartments soft, no evidence of streaking however patient does have some erythema and warmth on the distal aspect of her left forearm as well   Psychiatric: She has a normal mood and affect. Her behavior is normal.       ED Course     ED Course     Procedures             Critical Care time:  none             Labs Ordered and Resulted from Time of ED Arrival Up to the Time of Departure from the ED   CBC WITH PLATELETS DIFFERENTIAL - Abnormal; Notable for the following:        Result Value    WBC 13.7 (*)     Absolute Neutrophil 10.6 (*)     All other components within normal limits   BASIC METABOLIC PANEL - Abnormal; Notable for the following:     Glucose 107 (*)     All other components within normal limits   CRP INFLAMMATION - Abnormal;  Notable for the following:     CRP Inflammation 11.7 (*)     All other components within normal limits   ERYTHROCYTE SEDIMENTATION RATE AUTO - Abnormal; Notable for the following:     Sed Rate 28 (*)     All other components within normal limits   PERIPHERAL IV CATHETER     .  Results for orders placed or performed during the hospital encounter of 04/09/18   XR Hand Left 3 Views    Narrative    XR HAND LT G/E 3 VW   4/9/2018 3:16 AM     HISTORY: Swelling, infection.     COMPARISON: None.       Impression    IMPRESSION: No acute fracture or dislocation. No radiopaque foreign  body. No soft tissue gas.    NEYMAR TRENT MD   CBC with platelets differential   Result Value Ref Range    WBC 13.7 (H) 4.0 - 11.0 10e9/L    RBC Count 4.29 3.8 - 5.2 10e12/L    Hemoglobin 13.0 11.7 - 15.7 g/dL    Hematocrit 39.3 35.0 - 47.0 %    MCV 92 78 - 100 fl    MCH 30.3 26.5 - 33.0 pg    MCHC 33.1 31.5 - 36.5 g/dL    RDW 13.4 10.0 - 15.0 %    Platelet Count 283 150 - 450 10e9/L    Diff Method Automated Method     % Neutrophils 77.4 %    % Lymphocytes 13.5 %    % Monocytes 7.7 %    % Eosinophils 1.2 %    % Basophils 0.1 %    % Immature Granulocytes 0.1 %    Nucleated RBCs 0 0 /100    Absolute Neutrophil 10.6 (H) 1.6 - 8.3 10e9/L    Absolute Lymphocytes 1.9 0.8 - 5.3 10e9/L    Absolute Monocytes 1.1 0.0 - 1.3 10e9/L    Absolute Eosinophils 0.2 0.0 - 0.7 10e9/L    Absolute Basophils 0.0 0.0 - 0.2 10e9/L    Abs Immature Granulocytes 0.0 0 - 0.4 10e9/L    Absolute Nucleated RBC 0.0    Basic metabolic panel   Result Value Ref Range    Sodium 140 133 - 144 mmol/L    Potassium 4.2 3.4 - 5.3 mmol/L    Chloride 106 94 - 109 mmol/L    Carbon Dioxide 27 20 - 32 mmol/L    Anion Gap 7 3 - 14 mmol/L    Glucose 107 (H) 70 - 99 mg/dL    Urea Nitrogen 14 7 - 30 mg/dL    Creatinine 0.62 0.52 - 1.04 mg/dL    GFR Estimate >90 >60 mL/min/1.7m2    GFR Estimate If Black >90 >60 mL/min/1.7m2    Calcium 8.8 8.5 - 10.1 mg/dL   CRP inflammation   Result Value Ref  Range    CRP Inflammation 11.7 (H) 0.0 - 8.0 mg/L   Erythrocyte sedimentation rate auto   Result Value Ref Range    Sed Rate 28 (H) 0 - 20 mm/h   Drug abuse screen 6 urine (chem dep)   Result Value Ref Range    Amphetamine Qual Urine Positive (A) NEG^Negative    Barbiturates Qual Urine Negative NEG^Negative    Benzodiazepine Qual Urine Negative NEG^Negative    Cannabinoids Qual Urine Negative NEG^Negative    Cocaine Qual Urine Negative NEG^Negative    Ethanol Qual Urine Negative NEG^Negative    Opiates Qualitative Urine Positive (A) NEG^Negative   Blood culture   Result Value Ref Range    Specimen Description Blood LARM     Culture Micro No growth after 3 hours    Blood culture   Result Value Ref Range    Specimen Description Blood Left Arm     Special Requests Aerobic and anaerobic bottles received     Culture Micro No growth after 3 hours             Assessments & Plan (with Medical Decision Making)   Shelly Cha is a 48 year old female who has no significant past medical history who presents emergency department from home with the complaint of left hand pain and swelling since last night after cutting her hand on a metal can.  She denies any trauma and denies any drug abuse or IV drug abuse.  On examination Left hand with significant swallowing over the palmar and dorsal aspect of the first phalanx, first and second Metacarpal, no laceration however some superficial abrasions/scraps, decrease ROM 2/2 to swelling, no TTP on examination, radial pulse present bilaterally, compartments soft, no evidence of streaking however patient does have some erythema and warmth on the distal aspect of her left forearm as well.  Concern for cellulitis or deep space infection in the hands at this time will get labs, blood cultures, x-ray, and will start antibiotics. Reviewed competence of labs which remarkable for leukocytosis of 13.7, CRP of 11.7, ESR 28, x-ray with soft tissue swelling but no signs of foreign body or  osteomyelitis. Will give the patient Ancef and vancomycin in the emergency department and plan on observation admission.  Will get a urine tox due to concern for IV drug abuse.  Understands and agrees with the plan.  Discussed with hospitalist.    I have reviewed the nursing notes.    I have reviewed the findings, diagnosis, plan and need for follow up with the patient.    There are no discharge medications for this patient.      Final diagnoses:   Cellulitis of left upper extremity       4/9/2018   [facility], [place], EMERGENCY DEPARTMENT     Pauline Harrison MD  04/09/18 0735

## 2018-04-09 NOTE — PROGRESS NOTES
Care Coordinator- Discharge Planning     Admission Date/Time:  4/9/2018  Attending MD:  Pierce Durant MD     Data  Date of initial CC assessment:  4/9/2018  Chart reviewed, discussed with interdisciplinary team.   Patient was admitted for:   1. Cellulitis of left upper extremity         Assessment  Concerns with insurance coverage for discharge needs: no insurance coverage.  Current Living Situation: Patient lives alone.  Support System: Involved  Services Involved: TBD  Transportation: Family or Friend will provide  Barriers to Discharge: financial support inadequate and lack of support system/caregiver      Coordination of Care and Referrals: Patient will meet with a financial counselor this afternoon after surgery to fill out MA paperwork. Contacted [facility] for benefit check. I will watch for MA pending status. Patient will need PICC placement prior to dc home and IV infusion teaching. RNCC will continue to follow patient progress and update [facility] with home status and IV antibiotic.       Plan  Anticipated Discharge Date:  TBD  Anticipated Discharge Plan:  Home with home infusion    CTS Handoff completed:  NO    Rafia Delgado RN, BSN  Care Coordinator, 8A  Phone [phone]  Pager [phone]

## 2018-04-09 NOTE — ED NOTES
[facility], [place]   ED Nurse to Floor Handoff     Shelly Cha is a 48 year old female who speaks English and lives with a spouse,  in a home  They arrived in the ED by car from home    ED Chief Complaint: Wound Infection    ED Dx;   Final diagnoses:   Cellulitis of left upper extremity         Needed?: No    Allergies:   Allergies   Allergen Reactions     Ibuprofen Swelling     Eyes swelling   .  Past Medical Hx: History reviewed. No pertinent past medical history.   Baseline Mental status: WDL  Current Mental Status changes: at basesline    Infection present or suspected this encounter: cultures pending  Sepsis suspected: No  Isolation type: No active isolations     Activity level - Baseline/Home:  Independent  Activity Level - Current:   Independent    Bariatric equipment needed?: No    In the ED these meds were given:   Medications   vancomycin (VANCOCIN) 1000 mg in dextrose 5% 200 mL PREMIX (1,000 mg Intravenous New Bag 4/9/18 2644)   Tdap (tetanus-diphtheria-acell pertussis) (ADACEL) injection 0.5 mL (0.5 mLs Intramuscular Given 4/9/18 3379)   ceFAZolin (ANCEF) intermittent infusion 2 g in 100 mL dextrose PRE-MIX (2 g Intravenous Given 4/9/18 3908)       Drips running?  Yes    Home pump  No    Current LDAs  Peripheral IV 04/09/18 Right Lower forearm (Active)   Site Assessment WDL 4/9/2018  3:09 AM   Line Status Saline locked 4/9/2018  3:09 AM   Number of days:0       Labs results:   Labs Ordered and Resulted from Time of ED Arrival Up to the Time of Departure from the ED   CBC WITH PLATELETS DIFFERENTIAL - Abnormal; Notable for the following:        Result Value    WBC 13.7 (*)     Absolute Neutrophil 10.6 (*)     All other components within normal limits   BASIC METABOLIC PANEL - Abnormal; Notable for the following:     Glucose 107 (*)     All other components within normal limits   CRP INFLAMMATION - Abnormal; Notable for the following:     CRP Inflammation 11.7 (*)   "   All other components within normal limits   ERYTHROCYTE SEDIMENTATION RATE AUTO - Abnormal; Notable for the following:     Sed Rate 28 (*)     All other components within normal limits   DRUG ABUSE SCREEN 6 CHEM DEP URINE ([facility])   PERIPHERAL IV CATHETER   BLOOD CULTURE   BLOOD CULTURE       Imaging Studies:   Recent Results (from the past 24 hour(s))   XR Hand Left 3 Views    Narrative    XR HAND LT G/E 3 VW   4/9/2018 3:16 AM     HISTORY: Swelling, infection.     COMPARISON: None.       Impression    IMPRESSION: No acute fracture or dislocation. No radiopaque foreign  body. No soft tissue gas.       Recent vital signs:   /78  Pulse 95  Temp 98.2  F (36.8  C) (Oral)  Resp 18  Ht 1.6 m (5' 3\")  Wt 62.3 kg (137 lb 6.4 oz)  SpO2 100%  BMI 24.34 kg/m2    Cardiac Rhythm: Normal Sinus  Pt needs tele? No  Skin/wound Issues: left arm pricked by metal, swelling, no drainage noted    Code Status: Full Code    Pain control: good    Nausea control: good    Abnormal labs/tests/findings requiring intervention: pending results    Family present during ED course? Yes   Family Comments/Social Situation comments: n/a    Tasks needing completion: None    Catalina Zamorano RN  ascom-- [phone] [facility]  [phone] [facility]    "

## 2018-04-09 NOTE — PROGRESS NOTES
Pt arrived to the unit from ED at 0605 via cart. Pt alert and oriented x4. Pt oriented to room and call light system. Urine collected and sent to lab. Oxycodone given for left hand pan. Hand is swollen and painful. Elevated on the pillow and ice pack applied.  Able slightly wiggle fingers. Denies numbness.

## 2018-04-09 NOTE — PROVIDER NOTIFICATION
07:56  Text pg sent to MALLY Odom MD, ortho resident on call:  ED admit early this morning,  Ortho consult orded.  thank you  campbell reed RN [phone] [facility] [phone]

## 2018-04-09 NOTE — IP AVS SNAPSHOT
[facility] [address]    [address]    [place] [address]    Phone:  [phone]                                       After Visit Summary   4/9/2018    Shelly Cha    MRN: [id number]           After Visit Summary Signature Page     I have received my discharge instructions, and my questions have been answered. I have discussed any challenges I see with this plan with the nurse or doctor.    ..........................................................................................................................................  Patient/Patient Representative Signature      ..........................................................................................................................................  Patient Representative Print Name and Relationship to Patient    ..................................................               ................................................  Date                                            Time    ..........................................................................................................................................  Reviewed by Signature/Title    ...................................................              ..............................................  Date                                                            Time

## 2018-04-09 NOTE — ED NOTES
Scraped hand on piece of metal yesterday.  Now left hand very swollen and painful.  Reddened to touch.  Pt. unsure of tetanus status.

## 2018-04-09 NOTE — H&P
History and Physical     Shelly Cha MRN# [id number]   YOB: 1970 Age: 48 year old      Date of Admission:  4/9/2018      CC:  L hand pain and swelling.     HPI: Obtained from the patient, chart review, ED provider     Shelly Cha is a 48 year old female who has no significant past medical history who presents emergency department from home with the complaint of left hand pain and swelling since last night.  Patient is a right-handed female, reports that last night she was at a friend's house helping clean and she notes that she scraped her left hand on a piece of metal.  Patient reports she went to bed and then woke up with her hand swollen, warm to touch, and painful. Concerning for an infection. Pain and swelling- progressive. Extending towards L forearm. Difficulty to move her fingers. Sight numbness tips of fingers.   Patient denies any fever, chills, nausea, vomiting, chest pain or sob.  No other injuries or complaints.    Patient denies any trauma, denies any tobacco, drug, alcohol abuse.    Patient denies any IV drug abuse.  No h/o diabetes.        Past Medical History:  History reviewed. No pertinent past medical history.    Past Surgical History:  Past Surgical History:   Procedure Laterality Date     BIOPSY      right breast     CHOLECYSTECTOMY       GYN SURGERY      , tubal       Allergies:     Allergies   Allergen Reactions     Ibuprofen Swelling     Eyes swelling       Medications:  None    Social History:  Social History     Social History     Marital status: Single     Spouse name: N/A     Number of children: N/A     Years of education: N/A     Occupational History     Not on file.     Social History Main Topics     Smoking status: Light Tobacco Smoker     Smokeless tobacco: Never Used     Alcohol use No     Drug use: No     Sexual activity: Yes     Partners: Male     Other Topics Concern     Not on file     Social History Narrative     No narrative on file       Family History:    Reviewed.   No pertinent hx reported.     ROS:  The remainder of the complete ROS was negative unless noted in the HPI.      Exam:    /78  Pulse 95  Temp 98.2  F (36.8  C) (Oral)  Resp 18  Wt 62.3 kg (137 lb 6.4 oz)  SpO2 100%    Temp (24hrs), Av.2  F (36.8  C), Min:98.2  F (36.8  C), Max:98.2  F (36.8  C)      Wt Readings from Last 5 Encounters:   18 62.3 kg (137 lb 6.4 oz)       General: Alert, interactive, NAD  HEENT: AT/NC, sclera anicteric, PERRL,moist MM  Neck: Supple, no JVD or lymphadenopathy  Resp/chest: clear to auscultation bilaterally, no crackles or wheezes  Cardiac/Heart: s1s2 regular rate and rhythm, no murmur  GI/Abdomen: Soft, nontender, nondistended. +BS.  No rebound or guarding.  MSK/Extremities: L hand: diffuse tender swelling, more on thenar area, TTP. Mild erythema. Extending to L forearm. Decrease rom of wrist and fingers 2/2 pain, swelling. Distal pulses well felt. Faint superficial scratch+  Skin: Warm and dry, no jaundice or other rash on exposed areas.   Neuro: Alert & oriented x 3, Cns 2-12 intact,   Psychiatry: stable mood.       Labs:    BMP  Recent Labs  Lab 18  0308      POTASSIUM 4.2   CHLORIDE 106   LETTY 8.8   CO2 27   BUN 14   CR 0.62   *     CBC  Recent Labs  Lab 18  0308   WBC 13.7*   RBC 4.29   HGB 13.0   HCT 39.3   MCV 92   MCH 30.3   MCHC 33.1   RDW 13.4        INRNo lab results found in last 7 days.  LFTsNo lab results found in last 7 days.   PANCNo lab results found in last 7 days.    Imaging:   Recent Results (from the past 24 hour(s))   XR Hand Left 3 Views    Narrative    XR HAND LT G/E 3 VW   2018 3:16 AM     HISTORY: Swelling, infection.     COMPARISON: None.       Impression    IMPRESSION: No acute fracture or dislocation. No radiopaque foreign  body. No soft tissue gas.       BC sent: pending.   Utox: positive for Opiate, amphetamine+  CRP: 11.7  Sed rate: 28        Assessment/ Plan:  Admitted with L hand,  forearm acute progressive painful erythematous swelling x 1 day.     # L hand, forearm cellulitis:   ? From metal scraping. Denies ivda. No h/o diabetes or other PMH.   XR as above.   Utox + Opiate (No opiate in ED).   Crp, sed rate mildly elevated.   BC sent, pending.   Hemodynamics stable.     - Empirically given iv Cefazolin and Vancomycin in ED. Continue iv Vancomycin.   - NPO except meds  - Orthopedic surgery consult asap  - US non vascular to look for abscess  - elevate L hand.   - serial check.   - monitor vitals. Trend crp. Wbc.     Pain control: acetaminophen prn, Oxycodone prn.     Denies any other PMH. Not on any medication at home.       # Pain Assessment:   - Shelly is experiencing pain due to above. Pain management was discussed and the plan was created in a collaborative fashion.  Shelly's response to the current recommendations: engaged  - Please see the plan for pain management as documented above        FEN: NPO. Ivf.   Prophylaxis: mechanical   IV Access: piv  CODE: full     Disposition: Disposition Plan   Expected discharge in 2-3 days to prior living arrangement once medically ready.     Entered: Fredy Conrad 04/09/2018, 4:30 AM         D/w RN     Fredy Conrad MD  House Physician  Pager: [phone]    4/9/2018

## 2018-04-10 LAB
ANION GAP SERPL CALCULATED.3IONS-SCNC: 7 MMOL/L (ref 3–14)
BASOPHILS # BLD AUTO: 0 10E9/L (ref 0–0.2)
BASOPHILS NFR BLD AUTO: 0.3 %
BUN SERPL-MCNC: 12 MG/DL (ref 7–30)
CALCIUM SERPL-MCNC: 7.5 MG/DL (ref 8.5–10.1)
CHLORIDE SERPL-SCNC: 109 MMOL/L (ref 94–109)
CK SERPL-CCNC: 47 U/L (ref 30–225)
CO2 SERPL-SCNC: 25 MMOL/L (ref 20–32)
CREAT SERPL-MCNC: 0.51 MG/DL (ref 0.52–1.04)
CRP SERPL-MCNC: 29.8 MG/L (ref 0–8)
DIFFERENTIAL METHOD BLD: ABNORMAL
EOSINOPHIL # BLD AUTO: 0.5 10E9/L (ref 0–0.7)
EOSINOPHIL NFR BLD AUTO: 5.5 %
ERYTHROCYTE [DISTWIDTH] IN BLOOD BY AUTOMATED COUNT: 13.4 % (ref 10–15)
GFR SERPL CREATININE-BSD FRML MDRD: >90 ML/MIN/1.7M2
GLUCOSE SERPL-MCNC: 125 MG/DL (ref 70–99)
HCT VFR BLD AUTO: 34 % (ref 35–47)
HGB BLD-MCNC: 11.5 G/DL (ref 11.7–15.7)
IMM GRANULOCYTES # BLD: 0 10E9/L (ref 0–0.4)
IMM GRANULOCYTES NFR BLD: 0.2 %
LYMPHOCYTES # BLD AUTO: 2.9 10E9/L (ref 0.8–5.3)
LYMPHOCYTES NFR BLD AUTO: 33.3 %
MCH RBC QN AUTO: 30.8 PG (ref 26.5–33)
MCHC RBC AUTO-ENTMCNC: 33.8 G/DL (ref 31.5–36.5)
MCV RBC AUTO: 91 FL (ref 78–100)
MONOCYTES # BLD AUTO: 0.9 10E9/L (ref 0–1.3)
MONOCYTES NFR BLD AUTO: 10 %
NEUTROPHILS # BLD AUTO: 4.4 10E9/L (ref 1.6–8.3)
NEUTROPHILS NFR BLD AUTO: 50.7 %
NRBC # BLD AUTO: 0 10*3/UL
NRBC BLD AUTO-RTO: 0 /100
PLATELET # BLD AUTO: 258 10E9/L (ref 150–450)
POTASSIUM SERPL-SCNC: 4.4 MMOL/L (ref 3.4–5.3)
RBC # BLD AUTO: 3.73 10E12/L (ref 3.8–5.2)
SODIUM SERPL-SCNC: 141 MMOL/L (ref 133–144)
WBC # BLD AUTO: 8.7 10E9/L (ref 4–11)

## 2018-04-10 PROCEDURE — 25000128 H RX IP 250 OP 636: Performed by: EMERGENCY MEDICINE

## 2018-04-10 PROCEDURE — 80048 BASIC METABOLIC PNL TOTAL CA: CPT | Performed by: INTERNAL MEDICINE

## 2018-04-10 PROCEDURE — 36415 COLL VENOUS BLD VENIPUNCTURE: CPT | Performed by: INTERNAL MEDICINE

## 2018-04-10 PROCEDURE — 25000128 H RX IP 250 OP 636: Performed by: INTERNAL MEDICINE

## 2018-04-10 PROCEDURE — 99232 SBSQ HOSP IP/OBS MODERATE 35: CPT | Performed by: INTERNAL MEDICINE

## 2018-04-10 PROCEDURE — 25000132 ZZH RX MED GY IP 250 OP 250 PS 637: Performed by: INTERNAL MEDICINE

## 2018-04-10 PROCEDURE — 12000001 ZZH R&B MED SURG/OB UMMC

## 2018-04-10 PROCEDURE — 85025 COMPLETE CBC W/AUTO DIFF WBC: CPT | Performed by: INTERNAL MEDICINE

## 2018-04-10 PROCEDURE — 86140 C-REACTIVE PROTEIN: CPT | Performed by: INTERNAL MEDICINE

## 2018-04-10 PROCEDURE — 82550 ASSAY OF CK (CPK): CPT | Performed by: INTERNAL MEDICINE

## 2018-04-10 RX ADMIN — OXYCODONE HYDROCHLORIDE 10 MG: 5 TABLET ORAL at 14:25

## 2018-04-10 RX ADMIN — ACETAMINOPHEN 650 MG: 325 TABLET ORAL at 21:25

## 2018-04-10 RX ADMIN — VANCOMYCIN HYDROCHLORIDE 1000 MG: 1 INJECTION, SOLUTION INTRAVENOUS at 04:42

## 2018-04-10 RX ADMIN — SODIUM CHLORIDE, POTASSIUM CHLORIDE, SODIUM LACTATE AND CALCIUM CHLORIDE: 600; 310; 30; 20 INJECTION, SOLUTION INTRAVENOUS at 07:54

## 2018-04-10 RX ADMIN — OXYCODONE HYDROCHLORIDE 10 MG: 5 TABLET ORAL at 11:08

## 2018-04-10 RX ADMIN — OXYCODONE HYDROCHLORIDE 10 MG: 5 TABLET ORAL at 21:25

## 2018-04-10 RX ADMIN — OXYCODONE HYDROCHLORIDE 10 MG: 5 TABLET ORAL at 01:39

## 2018-04-10 RX ADMIN — OXYCODONE HYDROCHLORIDE 10 MG: 5 TABLET ORAL at 07:42

## 2018-04-10 RX ADMIN — OXYCODONE HYDROCHLORIDE 10 MG: 5 TABLET ORAL at 04:42

## 2018-04-10 RX ADMIN — ACETAMINOPHEN 650 MG: 325 TABLET ORAL at 14:25

## 2018-04-10 RX ADMIN — VANCOMYCIN HYDROCHLORIDE 1000 MG: 1 INJECTION, SOLUTION INTRAVENOUS at 16:13

## 2018-04-10 RX ADMIN — OXYCODONE HYDROCHLORIDE 10 MG: 5 TABLET ORAL at 17:54

## 2018-04-10 NOTE — PROGRESS NOTES
"Saw and examined patient.  Feels well this morning.  No fevers or chills./ Eating and drinking well  Underwent bedside debridement by orthopedics yesterday  /67  Pulse 80  Temp 98  F (36.7  C)  Resp 16  Ht 1.6 m (5' 3\")  Wt 62.3 kg (137 lb 6.4 oz)  SpO2 97%  BMI 24.34 kg/m2  CVS: Normal Heart sounds   RS: Clear breath sounds bilaterally   Abdomen: Soft and non tender. Normal bowel sounds.   Neuro: Alert and orientated X 3   Labs reviewed. CRP to 29.8, WBC to 8.7  Impression:  Infection/ abscess of the left hand   S/O bed side I&D (4/9) with dressing changes . Gram stain with rare gram positive cocci  Continue Iv vancomycin till definite cultures are available   Pain control with Oxycodone  Elevation   There is a likely chance that patient may need to go to the OR for further exploration  Appreciate orthopedic input     Dr INOCENCIA Deal MD, [facility]  Hospitalist ( Internal medicine)  Pager: [phone]      "

## 2018-04-10 NOTE — PROGRESS NOTES
Orthopaedic Surgery Progress Note 04/10/2018    E: No acute events overnight.    S: Pain well controlled. Feels swelling and ROM slightly improved. Denies numbness or tingling, chest pain, SOB. Plan of care reviewed and questions answered    O:  Temp: 98  F (36.7  C) Temp src: Oral BP: 112/67 Pulse: 80 Heart Rate: 82 Resp: 16 SpO2: 97 % O2 Device: None (Room air)      Exam:  Gen: No acute distress, resting comfortably in bed.  Resp: Non-labored breathing    MSK:   Left hand:  Inspection: Diffuse blanching erythema and swelling about the left hand involving all fingers -most prominently around dorsal base of thumb.  I&D site with some sanguinous output.   Palpation: diffusely TTP. Expressed few cc of purulent material from I&D site this AM.  Motor: Strength 4/5 with FDS/FDP, EDC, FPL, EPL, interossei; testing mostly limited by pain and decreased range of motion. Strength 5/5 with deltoid, biceps, triceps, wrist extensors, wrist flexors.    ROM: Painless flexion/extension of thumb approximately 30 . Painless flexion/extension of index through small fingers approximately 60deg. Able to make approximately 50% of fist  Sensory: SILT to axillary, musculocutaneous, median, ulnar, and radial nerve distributions.   Circulation: palpable radial pulse, fingers warm and well perfused        Recent Labs  Lab 04/10/18  0525 04/09/18  0308   WBC 8.7 13.7*   HGB 11.5* 13.0    283   CRP 29.8* 11.7*       Culture results:  Abscess cultures: pending  Gram stain: GPCs    Assessment: Shelly Cha is a 48 year old female with left 1st webspace dorsal abscess and cellulitis s/p bedside I&D on 4/9. Continued purulent drainage this morning, although decreasing output.      Plan:  Medicine Primary  Activity: Up with assist. Elevate LUE.  Antibiotics: continue IV abx per medicine .  Diet: regular diet  DVT prophylaxis: recommend mechanical   Elevation: Elevate LUE on pillows as much as possible.  Wound Care: Dressing changed at bedside  today. Will change again tomorrow. Reinforce prn. Notify ortho if significant continued drainage.  Pain management: per primary team  Cultures: Pending, will follow culture results    Ced Odom MD 04/10/2018  Orthopaedic Surgery Resident, PGY-1  Pager: [phone]    For questions about this patient, please attempt to contact me at my pager prior to contacting the orthopaedics resident on call. Thank you!

## 2018-04-10 NOTE — PLAN OF CARE
Problem: Patient Care Overview  Goal: Plan of Care/Patient Progress Review  Outcome: Improving  Pt is A&O. VSS and WNL. Pain well controlled with 10 mg PRN oxycodone q 3 hrs. CMS intact to LUE aside from some ongoing tingling to fingers. Drsg CDI and was changed by ortho this AM. Elevation encouraged. Denies any chest pain or SOB. Lungs CTA. BS present and active x 4. Tolerating diet with no n/v. Eating and drinking well. PIV infusing LR @ 100/hr and can likely be stopped after current bag finishes. Up independently. Has call light in reach and is able to make needs known. Will likely dc home with PICC and home infusion for abx. Continue with plan of care.

## 2018-04-10 NOTE — PLAN OF CARE
Problem: Patient Care Overview  Goal: Plan of Care/Patient Progress Review  Outcome: No Change  Patient A/Ox4. VSS. Denies CP, SOB, dizziness/LH. LSCTA. +fl/BS. Voiding well independently in bathroom. CMS intact ex some tingling in hand. Dressing to left hand CDI. Tolerating regular diet without NV. Activity level is good, pt up independently in room and to bathroom, pt outside to smoke on evenings per report. IV infusing fluids in between antibiotics. Pain rated as comfortably managed throughout shift, pt taking 10mg oxycodone Q3H. Patient has demonstrated ability to call appropriately. Patient is resting with call light within reach. Will continue to monitor.

## 2018-04-10 NOTE — PLAN OF CARE
Problem: Infection, Risk/Actual (Adult)  Goal: Identify Related Risk Factors and Signs and Symptoms  Related risk factors and signs and symptoms are identified upon initiation of Human Response Clinical Practice Guideline (CPG).   Outcome: Improving    VS: Stable.   O2: On RA and stable.   Output: Voiding adequate without difficulty.   Last BM: 4/8 and passing gas.   Activity: Up independently. Pt went outside X2 this shift.   Skin: intact except wound on left hand.   Pain: Pain well managed with prn oxycodone.   CMS: Intact.   Dressing: Has dressing on left hand from I&D. Dressing has moist drainage and reinforced with 4X4 and kerlix.   Diet: On regular diet and tolerating well.   LDA: PIV running at 100 ml/he into right forearm.   Equipment: IV pole.   Plan: TBD. Will continue to monitor.   Additional Info:

## 2018-04-11 LAB
ALBUMIN SERPL-MCNC: 3.5 G/DL (ref 3.4–5)
ALP SERPL-CCNC: 71 U/L (ref 40–150)
ALT SERPL W P-5'-P-CCNC: 11 U/L (ref 0–50)
ANION GAP SERPL CALCULATED.3IONS-SCNC: 5 MMOL/L (ref 3–14)
AST SERPL W P-5'-P-CCNC: 29 U/L (ref 0–45)
BILIRUB SERPL-MCNC: 0.3 MG/DL (ref 0.2–1.3)
BUN SERPL-MCNC: 15 MG/DL (ref 7–30)
CALCIUM SERPL-MCNC: 9 MG/DL (ref 8.5–10.1)
CHLORIDE SERPL-SCNC: 106 MMOL/L (ref 94–109)
CO2 SERPL-SCNC: 30 MMOL/L (ref 20–32)
CREAT SERPL-MCNC: 0.75 MG/DL (ref 0.52–1.04)
GFR SERPL CREATININE-BSD FRML MDRD: 83 ML/MIN/1.7M2
GLUCOSE SERPL-MCNC: 110 MG/DL (ref 70–99)
POTASSIUM SERPL-SCNC: 4.2 MMOL/L (ref 3.4–5.3)
PROT SERPL-MCNC: 7.8 G/DL (ref 6.8–8.8)
SODIUM SERPL-SCNC: 141 MMOL/L (ref 133–144)
VANCOMYCIN SERPL-MCNC: 6.3 MG/L

## 2018-04-11 PROCEDURE — 36415 COLL VENOUS BLD VENIPUNCTURE: CPT | Performed by: INTERNAL MEDICINE

## 2018-04-11 PROCEDURE — 25000132 ZZH RX MED GY IP 250 OP 250 PS 637: Performed by: INTERNAL MEDICINE

## 2018-04-11 PROCEDURE — 12000001 ZZH R&B MED SURG/OB UMMC

## 2018-04-11 PROCEDURE — 25000128 H RX IP 250 OP 636: Performed by: EMERGENCY MEDICINE

## 2018-04-11 PROCEDURE — 80202 ASSAY OF VANCOMYCIN: CPT | Performed by: INTERNAL MEDICINE

## 2018-04-11 PROCEDURE — 25000128 H RX IP 250 OP 636

## 2018-04-11 PROCEDURE — 99232 SBSQ HOSP IP/OBS MODERATE 35: CPT | Performed by: INTERNAL MEDICINE

## 2018-04-11 PROCEDURE — 80053 COMPREHEN METABOLIC PANEL: CPT | Performed by: INTERNAL MEDICINE

## 2018-04-11 RX ORDER — VANCOMYCIN HYDROCHLORIDE 1 G/200ML
1000 INJECTION, SOLUTION INTRAVENOUS EVERY 8 HOURS
Status: DISCONTINUED | OUTPATIENT
Start: 2018-04-11 | End: 2018-04-13

## 2018-04-11 RX ADMIN — OXYCODONE HYDROCHLORIDE 10 MG: 5 TABLET ORAL at 03:42

## 2018-04-11 RX ADMIN — OXYCODONE HYDROCHLORIDE 10 MG: 5 TABLET ORAL at 00:32

## 2018-04-11 RX ADMIN — OXYCODONE HYDROCHLORIDE 10 MG: 5 TABLET ORAL at 13:36

## 2018-04-11 RX ADMIN — VANCOMYCIN HYDROCHLORIDE 1000 MG: 1 INJECTION, SOLUTION INTRAVENOUS at 05:25

## 2018-04-11 RX ADMIN — OXYCODONE HYDROCHLORIDE 10 MG: 5 TABLET ORAL at 23:55

## 2018-04-11 RX ADMIN — VANCOMYCIN HYDROCHLORIDE 1000 MG: 1 INJECTION, SOLUTION INTRAVENOUS at 22:30

## 2018-04-11 RX ADMIN — OXYCODONE HYDROCHLORIDE 10 MG: 5 TABLET ORAL at 10:34

## 2018-04-11 RX ADMIN — VANCOMYCIN HYDROCHLORIDE 1000 MG: 1 INJECTION, SOLUTION INTRAVENOUS at 13:28

## 2018-04-11 RX ADMIN — OXYCODONE HYDROCHLORIDE 10 MG: 5 TABLET ORAL at 07:31

## 2018-04-11 RX ADMIN — OXYCODONE HYDROCHLORIDE 10 MG: 5 TABLET ORAL at 20:44

## 2018-04-11 RX ADMIN — OXYCODONE HYDROCHLORIDE 10 MG: 5 TABLET ORAL at 17:00

## 2018-04-11 NOTE — PHARMACY-ADMISSION MEDICATION HISTORY
Admission medication history interview status for the 4/9/2018 admission is complete. See Epic admission navigator for allergy information, pharmacy, prior to admission medications and immunization status.     Medication history interview sources:  patient, chart review, Care Everywhere review    Changes made to PTA medication list (reason)  Added: none  Deleted: none  Changed: none    Additional medication history information (including reliability of information, actions taken by pharmacist):  Patient seemed to be a reliable historian. She denied taking any prescription medications, OTC meds, birth control, inhalers, creams, vitamins, supplements, etc.    Prior to Admission medications    Not on File     Medication history completed by: Morena Booth, RonaldD, BCPS

## 2018-04-11 NOTE — PROGRESS NOTES
Orthopaedic Surgery Progress Note 04/11/2018    E: No acute events overnight.    S: Pain well controlled. Feels swelling and ROM slightly improved. Denies numbness or tingling, chest pain, SOB. Plan of care reviewed and questions answered    O:  Temp: 96  F (35.6  C) Temp src: Oral BP: 118/73 Pulse: 70 Heart Rate: 67 Resp: 16 SpO2: 99 % O2 Device: None (Room air)      Exam:  Gen: No acute distress, resting comfortably in bed.  Resp: Non-labored breathing    MSK:   Left hand:  Inspection: Diffuse blanching erythema and swelling about the left hand involving all fingers -most prominently around dorsal base of thumb. Skin wrinkles. Swelling improved today.  I&D site with few drops purulent output - improved from prior exam.   Palpation: diffusely TTP improved from prior exam. Expressed few drops of purulent material from I&D site this AM. Woody induration of dorsal first webspace  Motor: Strength 4/5 with FDS/FDP, EDC, FPL, EPL, interossei; testing mostly limited by pain and decreased range of motion. Strength 5/5 with deltoid, biceps, triceps, wrist extensors, wrist flexors.    ROM: Painless flexion/extension of thumb approximately 30 . Painless flexion/extension of index through small fingers approximately 70deg. Able to make approximately 60% of fist  Sensory: SILT to axillary, musculocutaneous, median, ulnar, and radial nerve distributions.   Circulation: palpable radial pulse, fingers warm and well perfused        Recent Labs  Lab 04/10/18  0525 04/09/18  0308   WBC 8.7 13.7*   HGB 11.5* 13.0    283   CRP 29.8* 11.7*       Culture results:  Abscess cultures: pending  Gram stain: GPCs    Assessment: Shelly Cha is a 48 year old female with left 1st webspace dorsal abscess and cellulitis s/p bedside I&D on 4/9. Continued purulent drainage this morning, although decreasing output.      Plan:  Medicine Primary  Activity: Up with assist. Elevate ELIZA.  Antibiotics: continue IV abx per medicine .  Diet: regular  diet  DVT prophylaxis: recommend mechanical   Elevation: Elevate LUE on pillows as much as possible.  Wound Care: Dressing changed at bedside today. Will change again tomorrow. Reinforce prn. Notify ortho if significant continued drainage.  Pain management: per primary team  Cultures: Pending, will follow culture results    Ced Odom MD 04/11/2018  Orthopaedic Surgery Resident, PGY-1  Pager: [phone]    For questions about this patient, please attempt to contact me at my pager prior to contacting the orthopaedics resident on call. Thank you!

## 2018-04-11 NOTE — PLAN OF CARE
Problem: Patient Care Overview  Goal: Plan of Care/Patient Progress Review  Outcome: No Change    VS:    O2:    Output:    Last BM:    Activity:    Skin:    Pain:    CMS:    Dressing:    Diet:    LDA:    Equipment:    Plan:    Additional Info:

## 2018-04-11 NOTE — PLAN OF CARE
Problem: Patient Care Overview  Goal: Plan of Care/Patient Progress Review  Pt A&O x's 4. VSS. Afebrile. 02 sats in the 90s on RA. Lungs clear. Denies SOB, CP and nausea. Tolerating regular diet. Bowel sound active in all quadrants. No BM during the shift but passing gas. Voiding into the toilet without any issues.  Pain well managed with prn oxycodone.  CMS intact, denies N/T. Left hand dressing clean, dry and intact.  PIV patent and infusing. Pt slept between care and is able to make needs known, call light with in reach. Will continue to monitor.

## 2018-04-11 NOTE — PLAN OF CARE
Problem: Patient Care Overview  Goal: Plan of Care/Patient Progress Review  Outcome: No Change  Pt declined INR draw.

## 2018-04-11 NOTE — PROGRESS NOTES
"Saw and examined patient.  Feels well this morning.  No fevers or chills./ Eating and drinking well  2 family members in the room  Patient had her wound examined by the orthopedic team today       /74  Pulse 66  Temp 96.8  F (36  C)  Resp 16  Ht 1.6 m (5' 3\")  Wt 62.3 kg (137 lb 6.4 oz)  SpO2 98%  BMI 24.34 kg/m2  CVS: Normal Heart sounds   RS: Clear breath sounds bilaterally   Abdomen: Soft and non tender. Normal bowel sounds.   Neuro: Alert and orientated X 3   Labs reviewed. CRP to 29.8, WBC to 8.7  Impression:  Infection/ abscess of the left hand   S/P bed side I&D (4/9) with dressing changes . Gram stain with rare gram positive cocci  Ortho would like to do daily wound examination for now and daily dressing changes. There is still some purulent drainage from the incision when examined today ( per orthopedics notes)   Continue IV vancomycin till definite cultures are available. Pharmacist managing Vancomycin   Pain control with Oxycodone  Elevation       Dr INOCENCIA Deal MD, [facility]  Hospitalist ( Internal medicine)  Pager: [phone]      "

## 2018-04-11 NOTE — PHARMACY-VANCOMYCIN DOSING SERVICE
Pharmacy Vancomycin Note  Date of Service 2018  Patient's  1970   48 year old, female    Indication: Skin and Soft Tissue Infection  Goal Trough Level: 10-15 mg/L  Day of Therapy: Started 18  Current Vancomycin regimen:  1000 mg IV q12h    Current estimated CrCl = Estimated Creatinine Clearance: 135.6 mL/min (based on Cr of 0.51).    Creatinine for last 3 days  2018:  3:08 AM Creatinine 0.62 mg/dL  4/10/2018:  5:25 AM Creatinine 0.51 mg/dL    Recent Vancomycin Levels (past 3 days)  2018:  4:50 AM Vancomycin Level 6.3 mg/L    Vancomycin IV Administrations (past 72 hours)                   vancomycin (VANCOCIN) 1000 mg in dextrose 5% 200 mL PREMIX (mg) 1,000 mg New Bag 18 0525     1,000 mg New Bag 04/10/18 1613     1,000 mg New Bag  0442     1,000 mg New Bag 18 1548     1,000 mg New Bag  0448                Nephrotoxins and other renal medications (Future)    Start     Dose/Rate Route Frequency Ordered Stop    18 0431  vancomycin (VANCOCIN) 1000 mg in dextrose 5% 200 mL PREMIX      1,000 mg  200 mL/hr over 1 Hours Intravenous EVERY 12 HOURS 18 0430               Contrast Orders - past 72 hours     None          Interpretation of levels and current regimen:  Trough level is  Subtherapeutic    Has serum creatinine changed > 50% in last 72 hours: No    Urine output:  unable to determine    Renal Function: Stable    Plan:  1.  Increase Dose to 1000 mg IV q 8 hours  2.  Pharmacy will check trough levels as appropriate in 1-3 Days.    3. Serum creatinine levels will be ordered daily for the first week of therapy and at least twice weekly for subsequent weeks.      Patricia Cardona        .

## 2018-04-11 NOTE — PLAN OF CARE
Problem: Patient Care Overview  Goal: Plan of Care/Patient Progress Review  Outcome: Improving    VS: stable   O2: RA   Output: adequate   Last BM: 4/11/18   Activity: Up ad masoud   Skin: Intact except incision   Pain: Managed with Roxicodone 10 mg po    CMS: intact   Dressing: intact   Diet: regular   LDA: IV infusing   Equipment: IV pole   Plan: TBD   Additional Info: Up independently. . Roxicodone 10 mg po for pain. CMS intact.Tolerating diet . Able to make needs known. Will cont. To monitor.

## 2018-04-11 NOTE — PLAN OF CARE
Problem: Infection, Risk/Actual (Adult)  Goal: Identify Related Risk Factors and Signs and Symptoms  Related risk factors and signs and symptoms are identified upon initiation of Human Response Clinical Practice Guideline (CPG).   Outcome: Improving     VS: Stable.   O2: On RA and stable.   Output: Voiding adequate without difficulty.   Last BM: 4/8 and passing gas.   Activity: Up independently. Pt went outside X2 this shift.   Skin: intact except wound on left hand.   Pain: Pain well managed with prn oxycodone and tylenol.   CMS: Has some numbness in left hand after injury per pt report.   Dressing: Has dressing on left hand from I&D. Dressing CDI. Pt has been elevating L hand on a pillow.   Diet: On regular diet and tolerating well.   LDA: PIV SL.    Equipment: IV pole.   Plan: TBD. Will continue to monitor.   Additional Info:

## 2018-04-12 ENCOUNTER — HOME INFUSION (PRE-WILLOW HOME INFUSION) (OUTPATIENT)
Dept: PHARMACY | Facility: CLINIC | Age: 48
End: 2018-04-12

## 2018-04-12 LAB
CREAT SERPL-MCNC: 0.65 MG/DL (ref 0.52–1.04)
CRP SERPL-MCNC: 9.6 MG/L (ref 0–8)
ERYTHROCYTE [DISTWIDTH] IN BLOOD BY AUTOMATED COUNT: 13.3 % (ref 10–15)
GFR SERPL CREATININE-BSD FRML MDRD: >90 ML/MIN/1.7M2
HCT VFR BLD AUTO: 36.9 % (ref 35–47)
HGB BLD-MCNC: 12.2 G/DL (ref 11.7–15.7)
MCH RBC QN AUTO: 30.4 PG (ref 26.5–33)
MCHC RBC AUTO-ENTMCNC: 33.1 G/DL (ref 31.5–36.5)
MCV RBC AUTO: 92 FL (ref 78–100)
PLATELET # BLD AUTO: 309 10E9/L (ref 150–450)
RBC # BLD AUTO: 4.01 10E12/L (ref 3.8–5.2)
WBC # BLD AUTO: 7.9 10E9/L (ref 4–11)

## 2018-04-12 PROCEDURE — 25000128 H RX IP 250 OP 636

## 2018-04-12 PROCEDURE — 86140 C-REACTIVE PROTEIN: CPT | Performed by: INTERNAL MEDICINE

## 2018-04-12 PROCEDURE — 12000001 ZZH R&B MED SURG/OB UMMC

## 2018-04-12 PROCEDURE — 85027 COMPLETE CBC AUTOMATED: CPT | Performed by: INTERNAL MEDICINE

## 2018-04-12 PROCEDURE — 99232 SBSQ HOSP IP/OBS MODERATE 35: CPT | Performed by: INTERNAL MEDICINE

## 2018-04-12 PROCEDURE — 82565 ASSAY OF CREATININE: CPT | Performed by: INTERNAL MEDICINE

## 2018-04-12 PROCEDURE — 36415 COLL VENOUS BLD VENIPUNCTURE: CPT | Performed by: INTERNAL MEDICINE

## 2018-04-12 PROCEDURE — 25000132 ZZH RX MED GY IP 250 OP 250 PS 637: Performed by: INTERNAL MEDICINE

## 2018-04-12 RX ADMIN — OXYCODONE HYDROCHLORIDE 10 MG: 5 TABLET ORAL at 09:45

## 2018-04-12 RX ADMIN — OXYCODONE HYDROCHLORIDE 10 MG: 5 TABLET ORAL at 06:07

## 2018-04-12 RX ADMIN — OXYCODONE HYDROCHLORIDE 10 MG: 5 TABLET ORAL at 13:20

## 2018-04-12 RX ADMIN — VANCOMYCIN HYDROCHLORIDE 1000 MG: 1 INJECTION, SOLUTION INTRAVENOUS at 23:03

## 2018-04-12 RX ADMIN — OXYCODONE HYDROCHLORIDE 10 MG: 5 TABLET ORAL at 03:01

## 2018-04-12 RX ADMIN — OXYCODONE HYDROCHLORIDE 10 MG: 5 TABLET ORAL at 19:28

## 2018-04-12 RX ADMIN — VANCOMYCIN HYDROCHLORIDE 1000 MG: 1 INJECTION, SOLUTION INTRAVENOUS at 06:08

## 2018-04-12 RX ADMIN — OXYCODONE HYDROCHLORIDE 10 MG: 5 TABLET ORAL at 16:09

## 2018-04-12 RX ADMIN — VANCOMYCIN HYDROCHLORIDE 1000 MG: 1 INJECTION, SOLUTION INTRAVENOUS at 13:36

## 2018-04-12 RX ADMIN — OXYCODONE HYDROCHLORIDE 10 MG: 5 TABLET ORAL at 22:43

## 2018-04-12 ASSESSMENT — PAIN DESCRIPTION - DESCRIPTORS: DESCRIPTORS: ACHING

## 2018-04-12 NOTE — PROGRESS NOTES
Therapy: IV abx   [place]  Ded: $3.15 monthly    Co-Insurance: 100%    In reference to admission on 4/9/18 to check IV abx coverage.    Please contact Intake with any questions, [phone] or In Basket pool, [facility] ([phone]).

## 2018-04-12 NOTE — PLAN OF CARE
Problem: Infection, Risk/Actual (Adult)  Goal: Identify Related Risk Factors and Signs and Symptoms  Related risk factors and signs and symptoms are identified upon initiation of Human Response Clinical Practice Guideline (CPG).     VS: VS stable, afebrile, denies CP   O2: 98% on RA, denies SOB   Output: Voiding adequately in bathroom   Last BM: 4/11/2018   Activity: Independent   Skin: Intact except for L hand    Pain: Managed with 10 mg oxycodone q3h   CMS: Intact   Dressing: CDI   Diet: Regular   LDA: PIV in R arm SL b/w abx   Equipment:    Plan: Continue to monitor.    Additional Info:

## 2018-04-12 NOTE — PROGRESS NOTES
"Saw and examined patient.  Feels well this morning.  Continues to feel well. Pain much better  No fevers or chills        /62  Pulse 70  Temp 97.9  F (36.6  C)  Resp 16  Ht 1.6 m (5' 3\")  Wt 62.3 kg (137 lb 6.4 oz)  SpO2 97%  BMI 24.34 kg/m2  CVS: Normal Heart sounds   RS: Clear breath sounds bilaterally   Abdomen: Soft and non tender. Normal bowel sounds.   Neuro: Alert and orientated X 3   Labs reviewed. CRP to 29.8, WBC to 8.7  Impression:  Infection/ abscess of the left hand   S/P bed side I&D (4/9) with dressing changes . Culture with strep intermedius, Awaiting sensitivity   CRP down to 9.6 from 29.8  Ortho would like to do daily wound examination for now and daily dressing changes. There is still some purulent drainage from the incision when examined today ( per orthopedics notes) . They want to re-evaluate the wound tomorrow   Continue IV vancomycin till sensitivities are available. Pharmacist managing Vancomycin   Pain control with Oxycodone  Elevation       Dr INOCENCIA Deal MD, [facility]  Hospitalist ( Internal medicine)  Pager: [phone]      "

## 2018-04-12 NOTE — PLAN OF CARE
Problem: Patient Care Overview  Goal: Plan of Care/Patient Progress Review  Patient A&O x4, denied CP, lightheadedness, dizziness, numbness, tingling and SOB, right arm  PIV SL in between abx, drinking well and voiding without difficulties, CMS intact, pain on left hand tolerable and taking oxycodone, self repositioned and turned in bed, dressing CDI, demonstrates the ability to use call light appropriately, will continue to monitor patient as POC.

## 2018-04-12 NOTE — PROGRESS NOTES
Orthopaedic Surgery Progress Note 04/12/2018    E: No acute events overnight.    S: Pain well controlled. Feels swelling and ROM slightly improved. Denies numbness or tingling, chest pain, SOB. Plan of care reviewed and questions answered    O:  Temp: 97.9  F (36.6  C) Temp src: Oral BP: 101/62 Pulse: 70 Heart Rate: 72 Resp: 16 SpO2: 97 % O2 Device: None (Room air)      Exam:  Gen: No acute distress, resting comfortably in bed.  Resp: Non-labored breathing    MSK:   Left hand:  Inspection: Diffuse blanching erythema and swelling about the left hand involving all fingers -most prominently around dorsal base of thumb. Skin wrinkles. Swelling improved today.  I&D site with minimal purulent output - improved from prior exam.   Palpation: diffusely TTP improved from prior exam. Expressed couple drops of purulent material from I&D site this AM. Induration of dorsal first webspace  Motor: Strength 4/5 with FDS/FDP, EDC, FPL, EPL, interossei; testing mostly limited by pain and decreased range of motion. Strength 5/5 with deltoid, biceps, triceps, wrist extensors, wrist flexors.    ROM: Painless flexion/extension of thumb approximately 40 . Painless flexion/extension of index through small fingers approximately 80deg. Able to make approximately 70% of fist  Sensory: SILT to axillary, musculocutaneous, median, ulnar, and radial nerve distributions.   Circulation: palpable radial pulse, fingers warm and well perfused        Recent Labs  Lab 04/12/18  0533 04/10/18  0525 04/09/18  0308   WBC 7.9 8.7 13.7*   HGB 12.2 11.5* 13.0    258 283   CRP 9.6* 29.8* 11.7*       Culture results:  Abscess cultures: strep intermedius, sensitivities pending  Gram stain: GPCs    Assessment: Shelly Cha is a 48 year old female with left 1st webspace dorsal abscess and cellulitis s/p bedside I&D on 4/9. Continued purulent drainage this morning, although decreasing output.      Plan:  Medicine Primary  Activity: Up with assist. Elevate  LUE.  Antibiotics: recommend narrowing abx to cultures when possible.  Diet: regular diet  DVT prophylaxis: recommend mechanical   Elevation: Elevate LUE on pillows as much as possible.  Wound Care: Dressing changed at bedside today. Will change again tomorrow. Reinforce prn. Notify ortho if significant continued drainage.  Pain management: per primary team  Cultures: Strep intermedius, sensitivities pending    Ced Odom MD 04/12/2018  Orthopaedic Surgery Resident, PGY-1  Pager: [phone]    For questions about this patient, please attempt to contact me at my pager prior to contacting the orthopaedics resident on call. Thank you!

## 2018-04-12 NOTE — PLAN OF CARE
Problem: Patient Care Overview  Goal: Individualization & Mutuality  Pt A/O X 4. Afebrile. VSS. Lungs- Clear bilaterally with both anterior and posterior. Bowels- Hyperactive in all four quadrants. CMS and Neuro's are intact. Denies numbness and tingling in all extremities. Denies nausea, shortness of breath, and chest pain. Has pain in the left hand and given 10mg PO Oxycodone, and is tolerating well. Voids spontaneously without difficulty in the bathroom. Is on a Regular diet and appetite was Good this shift. Left hand incisional dressing is C/D/I and hand is elevated on pillows. Pt up in room independently. PIV is patent in the right arm and saline locked. Bilateral heels are elevated off the bed. Pt is able to make needs known and the call light is within the pt's reach. Continue to monitor.

## 2018-04-13 LAB
BACTERIA SPEC CULT: ABNORMAL
CREAT SERPL-MCNC: 0.49 MG/DL (ref 0.52–1.04)
GFR SERPL CREATININE-BSD FRML MDRD: >90 ML/MIN/1.7M2
Lab: ABNORMAL
SPECIMEN SOURCE: ABNORMAL
VANCOMYCIN SERPL-MCNC: 18.2 MG/L

## 2018-04-13 PROCEDURE — 82565 ASSAY OF CREATININE: CPT | Performed by: INTERNAL MEDICINE

## 2018-04-13 PROCEDURE — 25000128 H RX IP 250 OP 636

## 2018-04-13 PROCEDURE — 80202 ASSAY OF VANCOMYCIN: CPT | Performed by: INTERNAL MEDICINE

## 2018-04-13 PROCEDURE — 12000001 ZZH R&B MED SURG/OB UMMC

## 2018-04-13 PROCEDURE — 99231 SBSQ HOSP IP/OBS SF/LOW 25: CPT | Performed by: INTERNAL MEDICINE

## 2018-04-13 PROCEDURE — 36416 COLLJ CAPILLARY BLOOD SPEC: CPT | Performed by: INTERNAL MEDICINE

## 2018-04-13 PROCEDURE — 25000132 ZZH RX MED GY IP 250 OP 250 PS 637: Performed by: INTERNAL MEDICINE

## 2018-04-13 RX ADMIN — VANCOMYCIN HYDROCHLORIDE 1250 MG: 10 INJECTION, POWDER, LYOPHILIZED, FOR SOLUTION INTRAVENOUS at 10:09

## 2018-04-13 RX ADMIN — OXYCODONE HYDROCHLORIDE 10 MG: 5 TABLET ORAL at 10:15

## 2018-04-13 RX ADMIN — OXYCODONE HYDROCHLORIDE 10 MG: 5 TABLET ORAL at 16:53

## 2018-04-13 RX ADMIN — ACETAMINOPHEN 650 MG: 325 TABLET ORAL at 16:09

## 2018-04-13 RX ADMIN — OXYCODONE HYDROCHLORIDE 10 MG: 5 TABLET ORAL at 06:43

## 2018-04-13 RX ADMIN — OXYCODONE HYDROCHLORIDE 10 MG: 5 TABLET ORAL at 20:25

## 2018-04-13 RX ADMIN — OXYCODONE HYDROCHLORIDE 10 MG: 5 TABLET ORAL at 03:16

## 2018-04-13 RX ADMIN — VANCOMYCIN HYDROCHLORIDE 1250 MG: 10 INJECTION, POWDER, LYOPHILIZED, FOR SOLUTION INTRAVENOUS at 22:58

## 2018-04-13 RX ADMIN — OXYCODONE HYDROCHLORIDE 10 MG: 5 TABLET ORAL at 13:25

## 2018-04-13 NOTE — PLAN OF CARE
Problem: Patient Care Overview  Goal: Plan of Care/Patient Progress Review  Outcome: Improving    VS: stable   O2: RA   Output: adequate   Last BM: 4/12/18   Activity: Up ad masoud   Skin: Intact except incision   Pain: Managed with Roxicodone 10 mg po   CMS: intact   Dressing: CDI   Diet: regular   LDA: IV SL   Equipment: IV pole   Plan: TBD.   Additional Info: Will call for pain medications.

## 2018-04-13 NOTE — PROGRESS NOTES
"Saw and examined patient.  Feels well this morning.  Continues to feel well.   No fevers or chills    No new complaints     /79 (BP Location: Right arm)  Pulse 70  Temp 96.9  F (36.1  C) (Oral)  Resp 16  Ht 1.6 m (5' 3\")  Wt 62.3 kg (137 lb 6.4 oz)  SpO2 99%  BMI 24.34 kg/m2  CVS: Normal Heart sounds   RS: Clear breath sounds bilaterally   Abdomen: Soft and non tender. Normal bowel sounds.   Neuro: Alert and orientated X 3   Labs reviewed. CRP to 29.8, WBC to 8.7  Impression:  Infection/ abscess of the left hand   S/P bed side I&D (4/9) with dressing changes . Culture with strep intermedius, Awaiting sensitivity   CRP down to 9.6 from 29.8  Ortho evaluated patient and comfortable for patient to go on oral antibiotics as soon as the sensitivities are availabl;e  Continue IV vancomycin till sensitivities are available. Pharmacist managing Vancomycin   Pain control with Oxycodone  Elevation  Patient to see Dr Kirkpatrick in 1 week time       Dr INOCENCIA Deal MD, [facility]  Hospitalist ( Internal medicine)  Pager: [phone]      "

## 2018-04-13 NOTE — PROGRESS NOTES
Orthopaedic Surgery Progress Note 04/13/2018    E: No acute events overnight.    S: Pain well controlled. Feels swelling, pain, and ROM continues to improve daily. Denies numbness or tingling, chest pain, SOB. Plan of care reviewed and questions answered    O:  Temp: 96.9  F (36.1  C) Temp src: Oral BP: 125/79   Heart Rate: 82 Resp: 16 SpO2: 99 % O2 Device: None (Room air)      Exam:  Gen: No acute distress, resting comfortably in bed.  Resp: Non-labored breathing    MSK:   Left hand:  Inspection: Mild swelling around dorsal base of thumb. Skin wrinkles. Swelling improved from prior exam.  I&D site granulating with minimal purulent output - improved from prior exam.   Palpation: TTP about base of thumb - improved from prior exam. Expressed couple drops of purulent material from I&D site this AM. Induration of dorsal first webspace  Motor: Strength 5/5 with FDS/FDP, EDC, interossei; 4/5 with FPL, EPL testing mostly limited by pain and decreased range of motion. Strength 5/5 with deltoid, biceps, triceps, wrist extensors, wrist flexors.    ROM: Painless flexion/extension of thumb approximately 50 , flexes thumb MCP today. Painless flexion/extension of index through small fingers approximately 100deg. Able to make approximately 80% of fist  Sensory: SILT to axillary, musculocutaneous, median, ulnar, and radial nerve distributions.   Circulation: palpable radial pulse, fingers warm and well perfused        Recent Labs  Lab 04/12/18  0533 04/10/18  0525 04/09/18  0308   WBC 7.9 8.7 13.7*   HGB 12.2 11.5* 13.0    258 283   CRP 9.6* 29.8* 11.7*       Culture results:   Abscess cultures: strep intermedius, sensitivities pending  Gram stain: GPCs    Assessment: Shelly Cha is a 48 year old female with left 1st webspace dorsal abscess and cellulitis s/p bedside I&D on 4/9. Continued purulent drainage this morning, although decreasing output.      Plan:  Medicine Primary  Activity: Up with assist. Elevate  LUE.  Antibiotics: recommend narrowing abx to cultures and transitioning to PO abx when possible.  Diet: regular diet  DVT prophylaxis: recommend mechanical   Elevation: Elevate LUE on pillows as much as possible.  Wound Care: Dressing changed at bedside today. Order for nursing dressing changes placed (4x4 gauze, followed by loosely wrapped kerlix, followed by ACE bandage - same a currently applied dressing) to continue while in house. Expect scant ss/minimally purulent drainage.  Pain management: per primary team  Cultures: Strep intermedius, sensitivities pending  Dispo: okay to d/c home from ortho perspective once abx transitioned to PO and narrowed to sensitivities. Please provide patient supplies for prn home dressing changes.  Follow-up: in 1 week with Dr. Kirkpatrick. Schedulers emailed.    Ortho will follow peripherally. Please call with any questions or concerns.    Ced Odom MD 04/13/2018  Orthopaedic Surgery Resident, PGY-1  Pager: [phone]    For questions about this patient, please attempt to contact me at my pager prior to contacting the orthopaedics resident on call. Thank you!

## 2018-04-13 NOTE — PLAN OF CARE
Problem: Infection, Risk/Actual (Adult)  Goal: Identify Related Risk Factors and Signs and Symptoms  Related risk factors and signs and symptoms are identified upon initiation of Human Response Clinical Practice Guideline (CPG).   Outcome: No Change  Pt A/O X 4. Afebrile. VSS, except for soft BP(104/55), will continue to monitor. Lungs clear bilaterally. PP+ DP+. CMS and Neuro's are intact. Denies numbness and tingling in all extremities. Denies nausea, SOB, and CP. Pain controlled with 10mg oxycodone. Voids without difficulty in the BR. LBM yesterday. L hand incisional dressing is c/d/i, ortho will change dressing today. Pt up independently. PIV patent in and R hand SL. Pt is able to make needs known and the call light is within the pt's reach. Continue to monitor.

## 2018-04-13 NOTE — PLAN OF CARE
Problem: Patient Care Overview  Goal: Plan of Care/Patient Progress Review  A/O x 4.soft BP at shift change,now improved.reports tolerable pain and adequate pain control with 10mg oxycodone q3hrs.left hand dressing CDI.cms intact.voiding without difficulty.up ad masoud.goes out to smoke.denies other acute complaints.follow POC.

## 2018-04-14 LAB
CREAT SERPL-MCNC: 0.6 MG/DL (ref 0.52–1.04)
GFR SERPL CREATININE-BSD FRML MDRD: >90 ML/MIN/1.7M2

## 2018-04-14 PROCEDURE — 12000001 ZZH R&B MED SURG/OB UMMC

## 2018-04-14 PROCEDURE — 36416 COLLJ CAPILLARY BLOOD SPEC: CPT | Performed by: INTERNAL MEDICINE

## 2018-04-14 PROCEDURE — 25000132 ZZH RX MED GY IP 250 OP 250 PS 637: Performed by: INTERNAL MEDICINE

## 2018-04-14 PROCEDURE — 99238 HOSP IP/OBS DSCHRG MGMT 30/<: CPT | Performed by: INTERNAL MEDICINE

## 2018-04-14 PROCEDURE — 82565 ASSAY OF CREATININE: CPT | Performed by: INTERNAL MEDICINE

## 2018-04-14 RX ORDER — OXYCODONE HYDROCHLORIDE 5 MG/1
5 TABLET ORAL EVERY 6 HOURS PRN
Qty: 25 TABLET | Refills: 0 | Status: SHIPPED | OUTPATIENT
Start: 2018-04-14 | End: 2018-04-14

## 2018-04-14 RX ORDER — OXYCODONE HYDROCHLORIDE 5 MG/1
5-10 TABLET ORAL EVERY 6 HOURS PRN
Qty: 50 TABLET | Refills: 0 | Status: SHIPPED | OUTPATIENT
Start: 2018-04-14 | End: 2021-02-03

## 2018-04-14 RX ORDER — OXYCODONE HYDROCHLORIDE 5 MG/1
5 TABLET ORAL EVERY 6 HOURS PRN
Status: DISCONTINUED | OUTPATIENT
Start: 2018-04-14 | End: 2018-04-15 | Stop reason: HOSPADM

## 2018-04-14 RX ADMIN — OXYCODONE HYDROCHLORIDE 10 MG: 5 TABLET ORAL at 00:03

## 2018-04-14 RX ADMIN — OXYCODONE HYDROCHLORIDE 5 MG: 5 TABLET ORAL at 20:45

## 2018-04-14 RX ADMIN — OXYCODONE HYDROCHLORIDE 10 MG: 5 TABLET ORAL at 09:52

## 2018-04-14 RX ADMIN — AMOXICILLIN AND CLAVULANATE POTASSIUM 1 TABLET: 875; 125 TABLET, FILM COATED ORAL at 20:35

## 2018-04-14 RX ADMIN — OXYCODONE HYDROCHLORIDE 10 MG: 5 TABLET ORAL at 06:55

## 2018-04-14 RX ADMIN — AMOXICILLIN AND CLAVULANATE POTASSIUM 1 TABLET: 875; 125 TABLET, FILM COATED ORAL at 09:59

## 2018-04-14 RX ADMIN — OXYCODONE HYDROCHLORIDE 10 MG: 5 TABLET ORAL at 03:03

## 2018-04-14 RX ADMIN — ACETAMINOPHEN 650 MG: 325 TABLET ORAL at 20:45

## 2018-04-14 NOTE — PLAN OF CARE
VS:       Pt A/O X 4. Afebrile. VSS. Lungs- clear and equal bilaterally with both anterior and posterior. Denies nausea, shortness of breath, and chest pain.     Output:       Bowels- audible and active in all four quadrants. Voids spontaneously without   difficulty in the bathroom.      Activity:       Pt up independently in room. Steady gait.      Skin:   No skin issues observed .     Pain:       Has pain in the left hand and given oxycodone 10mg, and is tolerating well.      CMS:       CMS and Neuro's are intact. Denies numbness and tingling in all extremities.      Dressing:       Left hand dressing is CDI with ACE wrap.      Diet:       Pt is on a Regular diet and appetite was good this shift.       LDA:       PIV is patent in the right forearm and currently infusing IV abx.      Plan:       Pt is able to make needs known and the call light is within the pt's reach. Family at bedside. Continue to monitor.

## 2018-04-14 NOTE — DISCHARGE SUMMARY
[facility] Discharge Summary    Shelly Cha MRN# [id number]   Age: 48 year old YOB: 1970     Date of Admission:  4/9/2018  Date of Discharge::  4/14/2018  Admitting Physician:  Fredy Conrad MD  Discharge Physician:         Dr Wayne Deal MD   Primary Physician: [facility]  Transferring Facility: [facility]            Discharge Diagnosis:   Principle diagnosis:   Left hand cellulitis/ Abscess               Procedures:    Incision and drainage of the left hand abscess          Allergies:      Allergies   Allergen Reactions     Ibuprofen Swelling     Eyes swelling               Discharge Medications:     Current Discharge Medication List      START taking these medications    Details   amoxicillin-clavulanate (AUGMENTIN) 875-125 MG per tablet Take 1 tablet by mouth every 12 hours  Qty: 14 tablet, Refills: 0    Associated Diagnoses: Cellulitis of hand      oxyCODONE IR (ROXICODONE) 5 MG tablet Take 1-2 tablets (5-10 mg) by mouth every 6 hours as needed for other (pain control or improvement in physical function. Hold dose for analgesic side effects.)  Qty: 50 tablet, Refills: 0    Associated Diagnoses: Cellulitis of hand                   Consultations:   Consultation during this admission received from Orthopedics           Brief History of Presenting Illness:   Shelly Cha is a 48 year old female who has no significant past medical history who presented to the  emergency department from home with the complaint of left hand pain and swelling .  Patient is a right-handed female, reported that the  night  Before she was at a friend's house helping clean and she notes that she scraped her left hand on a piece of metal.  Patient reports she went to bed and then woke up with her hand swollen, warm to touch, and painful. Concerning for an infection.   Please see detailed H&P by Dr Conrad on 4/9 for further details        Hospital Course:   S/P bed side  "I&D (4/9) with dressing changes . Culture with strep intermedius, pan sensitive CRP down to 9.6 from 29.8  Patient was on IV vancomycin from 4/9 -4/13  Ortho evaluated patient and comfortable for patient to go on oral antibiotics as soon as the sensitivities are available  On the day of discharge, antibiotics changed to Augmentin for a further 7 days   Pain control with Oxycodone  Elevation  Patient to see Dr Kirkpatrick ( Hand surgery) in 1 week time       PROGRESS ON DISCHARGE DAY     Patient feels well today  Feels ready to go home  No fever or chills  Eating and drinking well     /53 (BP Location: Right arm)  Pulse 91  Temp 97.4  F (36.3  C) (Oral)  Resp 18  Ht 1.6 m (5' 3\")  Wt 62.3 kg (137 lb 6.4 oz)  SpO2 98%  BMI 24.34 kg/m2  CVS: Normal Heart sounds   RS: Clear breath sounds bilaterally   Abdomen: Soft and non tender. Normal bowel sounds.   Neuro: Alert and orientated X 3   MSK: Left hand in dressings. No distal neurovascular deficit          Pending Tests at Discharge:     Unresulted Labs Ordered in the Past 30 Days of this Admission     Date and Time Order Name Status Description    4/9/2018 1535 Anaerobic bacterial culture Preliminary     4/9/2018 0235 Blood culture Preliminary     4/9/2018 0235 Blood culture Preliminary                  Discharge instructions and Follow up:       Discharge Procedure Orders  Home infusion referral     Discharge Instructions   Order Comments: Please call or come back to the hospital for new fevers, chills, increasing pain/swelling, new or increasing drainage from your hand incision, or new numbness. Keep hand elevated as much as possible. Keep hand would clean and dry. Keep dressing clean, dry and intact. Okay to shower with dressing covered and kept dry.     Reason for your hospital stay   Order Comments: Hand Cellulitis     Activity   Order Comments: Your activity upon discharge: activity as tolerated   Order Specific Question Answer Comments   Is discharge " order? Yes      Adult [facility] Follow-up and recommended labs and tests   Order Comments: Please follow up with Hand clinic in 1 week. ( Dr Kirkpatrick)     Appointments on [place] and/or [facility] (with [facility] or [facility] provider or service). Call [phone] if you haven't heard regarding these appointments within 7 days of discharge.     Full Code     Diet   Order Comments: Follow this diet upon discharge: Orders Placed This Encounter     Regular Diet Adult   Order Specific Question Answer Comments   Is discharge order? Yes                Discharge Disposition:   Discharged to home           Time spent : 25 mts total with patient and coordination of care       Dr INOCENCIA Deal MD  Hospitalist ( Internal medicine)  Pager: [phone]

## 2018-04-14 NOTE — PLAN OF CARE
Problem: Patient Care Overview  Goal: Individualization & Mutuality  Patient A&O x4, denied CP, lightheadedness, dizziness, numbness, tingling and SOB, abx changed from iv to oral at 1000 am, pharmacy consulted for 1000 am vancomycin and said it is okay not to give iv instead pt. received oral abx during the shift, drinking well and voiding without difficulties, CMS intact, pain on left hand tolerable and taking oxycodone, self repositioned and turned in bed, dressing changed and CDI, right arm  PIV pulled, discharge paper work printed and passed to the evening nurse, demonstrates the ability to use call light appropriately, will continue to monitor patient as POC.

## 2018-04-14 NOTE — PLAN OF CARE
Problem: Infection, Risk/Actual (Adult)  Goal: Identify Related Risk Factors and Signs and Symptoms  Related risk factors and signs and symptoms are identified upon initiation of Human Response Clinical Practice Guideline (CPG).   Outcome: No Change  Pt A/O X 4. Afebrile. VSS. Lungs clear bilaterally. PP+ DP+. CMS and Neuro's are intact. Denies numbness and tingling in all extremities. Denies nausea, SOB, and CP. Pain controlled with 10mg oxycodone. Voids without difficulty in the BR. LBM 4/12/2018. L hand incisional dressing is c/d/i, ACE wrapped, daily dressing change. Pt up independently. PIV patent in L hand and SL. Bilateral heels are elevated off the bed. Pt is able to make needs known and the call light is within the pt's reach. Continue to monitor.

## 2018-04-15 VITALS
HEART RATE: 79 BPM | TEMPERATURE: 97.8 F | OXYGEN SATURATION: 97 % | DIASTOLIC BLOOD PRESSURE: 56 MMHG | BODY MASS INDEX: 24.34 KG/M2 | HEIGHT: 63 IN | SYSTOLIC BLOOD PRESSURE: 98 MMHG | RESPIRATION RATE: 16 BRPM | WEIGHT: 137.4 LBS

## 2018-04-15 LAB
BACTERIA SPEC CULT: NO GROWTH
BACTERIA SPEC CULT: NO GROWTH
Lab: NORMAL
Lab: NORMAL
SPECIMEN SOURCE: NORMAL
SPECIMEN SOURCE: NORMAL

## 2018-04-15 PROCEDURE — 25000132 ZZH RX MED GY IP 250 OP 250 PS 637: Performed by: INTERNAL MEDICINE

## 2018-04-15 PROCEDURE — 99231 SBSQ HOSP IP/OBS SF/LOW 25: CPT | Performed by: INTERNAL MEDICINE

## 2018-04-15 RX ADMIN — AMOXICILLIN AND CLAVULANATE POTASSIUM 1 TABLET: 875; 125 TABLET, FILM COATED ORAL at 08:25

## 2018-04-15 RX ADMIN — OXYCODONE HYDROCHLORIDE 5 MG: 5 TABLET ORAL at 02:52
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
## 2018-04-15 NOTE — PLAN OF CARE
Problem: Patient Care Overview  Goal: Plan of Care/Patient Progress Review  Pt. A&Ox4. VSS. Afebrile. Lung sounds CTA. Maintaining sats on RA. IS encouraged. Bowel sounds active, LBM 4/14 per pt report. PP+ DP+. CMS and neuro's are intact. Denies numbness and tingling in all extremities. Denies nausea, shortness of breath, and chest pain. Pain managed w/ prn oxycodone. Voids spontaneously without difficulty. Tolerating regular diet. LUE incisional dressing is CDI. Pt up independently. No IV access. PCD's on BLE's. Bilateral heels are elevated off the bed. Call light is within reach, pt able to make needs known. Outside to smoke a few times this shift. Per report provider was aware pt was unable to leave 4/14 as she was waiting on a ride. Will continue to monitor.

## 2018-04-15 NOTE — PLAN OF CARE
Problem: Infection, Risk/Actual (Adult)  Goal: Identify Related Risk Factors and Signs and Symptoms  Related risk factors and signs and symptoms are identified upon initiation of Human Response Clinical Practice Guideline (CPG).   Outcome: Adequate for Discharge Date Met: 04/15/18  VS: VSS   O2: Stable in mid to upper 90's on room air   Output: Voids adequate amounts without issue.    Last BM: 4/14/18   Activity: Independent    Skin: Intact ex incisions and wound scabs   Pain: Declines pain this evening    CMS: Intact no numbness or tingling   Dressing  CDI   Diet: Regular appetite is good - tolerates oral intake   LDA: NA   Equipment:     Plan: DC   Additional Info:       Pt. discharged at 0920 to home. Pt. was accompanied by 2 family members/friends, and left with personal belongings. Prior to discharge, PIV was removed. Pt. received complete discharge paperwork and 2 medications as filled by discharge pharmacy (antibiotic and oxycodone). Pt. was given times of last dose for all discharge medications in writing on discharge medication sheets.  Discharge teaching included medication, pain management, activity restrictions, dressing changes, and signs and symptoms of infection.  Pt. had no further questions at the time of discharge and no unmet needs were identified.
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
## 2018-04-16 ENCOUNTER — TELEPHONE (OUTPATIENT)
Dept: ORTHOPEDICS | Facility: CLINIC | Age: 48
End: 2018-04-16

## 2018-04-16 LAB
BACTERIA SPEC CULT: NORMAL
Lab: NORMAL
SPECIMEN SOURCE: NORMAL

## 2018-04-16 NOTE — TELEPHONE ENCOUNTER
Attempted to reach patient to schedule a follow up appointment with Dr. Kirkpatrick for her left hand cellulitis next Tuesday 4/2418. Unable to leave voicemail.  Message sent to clinic coordinators to continue to try and reach patient to schedule.

## 2018-04-17 ENCOUNTER — TELEPHONE (OUTPATIENT)
Dept: ORTHOPEDICS | Facility: CLINIC | Age: 48
End: 2018-04-17

## 2020-12-22 ENCOUNTER — ANCILLARY PROCEDURE (OUTPATIENT)
Dept: ULTRASOUND IMAGING | Facility: CLINIC | Age: 50
End: 2020-12-22
Attending: PEDIATRICS
Payer: COMMERCIAL

## 2020-12-22 DIAGNOSIS — R10.11 RUQ ABDOMINAL PAIN: ICD-10-CM

## 2020-12-22 PROCEDURE — 76705 ECHO EXAM OF ABDOMEN: CPT | Mod: GC | Performed by: RADIOLOGY

## 2021-01-11 ENCOUNTER — TELEPHONE (OUTPATIENT)
Dept: GASTROENTEROLOGY | Facility: CLINIC | Age: 51
End: 2021-01-11

## 2021-01-11 ENCOUNTER — TRANSCRIBE ORDERS (OUTPATIENT)
Dept: OTHER | Age: 51
End: 2021-01-11

## 2021-01-11 DIAGNOSIS — B18.2 CHRONIC HEPATITIS C WITHOUT HEPATIC COMA (H): ICD-10-CM

## 2021-01-11 DIAGNOSIS — R19.7 DIARRHEA, UNSPECIFIED TYPE: Primary | ICD-10-CM

## 2021-01-11 NOTE — TELEPHONE ENCOUNTER
Health Call Center    Phone Message    May a detailed message be left on voicemail: yes     Reason for Call: Appointment Intake    Referring Provider Name: Referred by Romeo Landin at [facility]  Diagnosis and/or Symptoms: chronic watery diarrhea with intermittent blood of unclear etiology and possibly resolving hepatitis C.    Patient is being referred urgently for above referral. She is scheduled for next available video on 02/9 with Dr. Nolasco, however would like to be seen much sooner. Please call patient to discuss possible sooner options at [phone]. Referral/recs were sent to clinic for review. Thanks!    Action Taken: Message routed to:  [facility] G[facility]enterology    Travel Screening: Not Applicable

## 2021-01-20 NOTE — TELEPHONE ENCOUNTER
REFERRAL INFORMATION:    Referring Provider: Dr. Romeo Landin    Referring Clinic:  [facility]     Reason for Visit/Diagnosis: Chronic watery diarrhea, intermittent blood     FUTURE VISIT INFORMATION:    Appointment Date: 1/26/2021    Appointment Time: 2:20 PM      NOTES STATUS DETAILS   OFFICE NOTE from Referring Provider Care Everywhere 1/6/2021, 12/24/2020 Office visit with Dr. Landin      OFFICE NOTE from Other Specialist Care Everywhere 12/16/2020 Office visit with Dr. Kimberlyn Mcdermott ([facility])     12/15/2020 Office visit with JAMIE Medina CNP ([facility])    HOSPITAL DISCHARGE SUMMARY/  ED VISITS N/A    OPERATIVE REPORT N/A    MEDICATION LIST Internal/ Care Everywhere         ENDOSCOPY  N/A    COLONOSCOPY N/A    ERCP N/A    EUS N/A    STOOL TESTING Care Everywhere 12/17/2020   PERTINENT LABS Care Everywhere    PATHOLOGY REPORTS (RELATED) N/A    IMAGING (CT, MRI, EGD, MRCP, Small Bowel Follow Through/SBT, MR/CT Enterography) Internal US Abdomen: 12/22/2020

## 2021-01-22 ENCOUNTER — HOSPITAL ENCOUNTER (EMERGENCY)
Facility: CLINIC | Age: 51
Discharge: HOME | End: 2021-01-22
Attending: STUDENT IN AN ORGANIZED HEALTH CARE EDUCATION/TRAINING PROGRAM | Admitting: STUDENT IN AN ORGANIZED HEALTH CARE EDUCATION/TRAINING PROGRAM
Payer: COMMERCIAL

## 2021-01-22 VITALS
SYSTOLIC BLOOD PRESSURE: 125 MMHG | OXYGEN SATURATION: 97 % | HEART RATE: 101 BPM | RESPIRATION RATE: 16 BRPM | TEMPERATURE: 97.4 F | DIASTOLIC BLOOD PRESSURE: 88 MMHG

## 2021-01-22 DIAGNOSIS — R19.7 DIARRHEA, UNSPECIFIED TYPE: ICD-10-CM

## 2021-01-22 LAB
ALBUMIN SERPL-MCNC: 1.4 G/DL (ref 3.4–5)
ALP SERPL-CCNC: 123 U/L (ref 40–150)
ALT SERPL W P-5'-P-CCNC: <6 U/L (ref 0–50)
ANION GAP SERPL CALCULATED.3IONS-SCNC: 7 MMOL/L (ref 3–14)
AST SERPL W P-5'-P-CCNC: 13 U/L (ref 0–45)
BASOPHILS # BLD AUTO: 0.1 10E9/L (ref 0–0.2)
BASOPHILS NFR BLD AUTO: 0.8 %
BILIRUB SERPL-MCNC: 0.2 MG/DL (ref 0.2–1.3)
BUN SERPL-MCNC: 9 MG/DL (ref 7–30)
C COLI+JEJUNI+LARI FUSA STL QL NAA+PROBE: NOT DETECTED
C DIFF TOX B STL QL: NEGATIVE
CALCIUM SERPL-MCNC: 7.6 MG/DL (ref 8.5–10.1)
CHLORIDE SERPL-SCNC: 102 MMOL/L (ref 94–109)
CO2 SERPL-SCNC: 30 MMOL/L (ref 20–32)
CREAT SERPL-MCNC: 0.83 MG/DL (ref 0.52–1.04)
DIFFERENTIAL METHOD BLD: ABNORMAL
EC STX1 GENE STL QL NAA+PROBE: NOT DETECTED
EC STX2 GENE STL QL NAA+PROBE: NOT DETECTED
ENTERIC PATHOGEN COMMENT: NORMAL
EOSINOPHIL # BLD AUTO: 0.8 10E9/L (ref 0–0.7)
EOSINOPHIL NFR BLD AUTO: 5.5 %
ERYTHROCYTE [DISTWIDTH] IN BLOOD BY AUTOMATED COUNT: 15.9 % (ref 10–15)
GFR SERPL CREATININE-BSD FRML MDRD: 83 ML/MIN/{1.73_M2}
GLUCOSE SERPL-MCNC: 104 MG/DL (ref 70–99)
HCT VFR BLD AUTO: 34.7 % (ref 35–47)
HGB BLD-MCNC: 11.3 G/DL (ref 11.7–15.7)
IMM GRANULOCYTES # BLD: 0.1 10E9/L (ref 0–0.4)
IMM GRANULOCYTES NFR BLD: 0.6 %
LYMPHOCYTES # BLD AUTO: 2.6 10E9/L (ref 0.8–5.3)
LYMPHOCYTES NFR BLD AUTO: 19 %
MCH RBC QN AUTO: 29.2 PG (ref 26.5–33)
MCHC RBC AUTO-ENTMCNC: 32.6 G/DL (ref 31.5–36.5)
MCV RBC AUTO: 90 FL (ref 78–100)
MONOCYTES # BLD AUTO: 1.1 10E9/L (ref 0–1.3)
MONOCYTES NFR BLD AUTO: 7.9 %
NEUTROPHILS # BLD AUTO: 9.1 10E9/L (ref 1.6–8.3)
NEUTROPHILS NFR BLD AUTO: 66.2 %
NOROV GI+II ORF1-ORF2 JNC STL QL NAA+PR: NOT DETECTED
NRBC # BLD AUTO: 0 10*3/UL
NRBC BLD AUTO-RTO: 0 /100
PLATELET # BLD AUTO: 596 10E9/L (ref 150–450)
POTASSIUM SERPL-SCNC: 3 MMOL/L (ref 3.4–5.3)
PROT SERPL-MCNC: 6 G/DL (ref 6.8–8.8)
RBC # BLD AUTO: 3.87 10E12/L (ref 3.8–5.2)
RVA NSP5 STL QL NAA+PROBE: NOT DETECTED
SALMONELLA SP RPOD STL QL NAA+PROBE: NOT DETECTED
SHIGELLA SP+EIEC IPAH STL QL NAA+PROBE: NOT DETECTED
SODIUM SERPL-SCNC: 139 MMOL/L (ref 133–144)
SPECIMEN SOURCE: NORMAL
V CHOL+PARA RFBL+TRKH+TNAA STL QL NAA+PR: NOT DETECTED
WBC # BLD AUTO: 13.7 10E9/L (ref 4–11)
Y ENTERO RECN STL QL NAA+PROBE: NOT DETECTED

## 2021-01-22 PROCEDURE — 96361 HYDRATE IV INFUSION ADD-ON: CPT | Performed by: STUDENT IN AN ORGANIZED HEALTH CARE EDUCATION/TRAINING PROGRAM

## 2021-01-22 PROCEDURE — 87506 IADNA-DNA/RNA PROBE TQ 6-11: CPT | Performed by: STUDENT IN AN ORGANIZED HEALTH CARE EDUCATION/TRAINING PROGRAM

## 2021-01-22 PROCEDURE — 99283 EMERGENCY DEPT VISIT LOW MDM: CPT | Mod: 25 | Performed by: STUDENT IN AN ORGANIZED HEALTH CARE EDUCATION/TRAINING PROGRAM

## 2021-01-22 PROCEDURE — 80053 COMPREHEN METABOLIC PANEL: CPT | Performed by: STUDENT IN AN ORGANIZED HEALTH CARE EDUCATION/TRAINING PROGRAM

## 2021-01-22 PROCEDURE — 250N000013 HC RX MED GY IP 250 OP 250 PS 637: Performed by: STUDENT IN AN ORGANIZED HEALTH CARE EDUCATION/TRAINING PROGRAM

## 2021-01-22 PROCEDURE — 99283 EMERGENCY DEPT VISIT LOW MDM: CPT | Performed by: STUDENT IN AN ORGANIZED HEALTH CARE EDUCATION/TRAINING PROGRAM

## 2021-01-22 PROCEDURE — 258N000003 HC RX IP 258 OP 636: Performed by: STUDENT IN AN ORGANIZED HEALTH CARE EDUCATION/TRAINING PROGRAM

## 2021-01-22 PROCEDURE — 96360 HYDRATION IV INFUSION INIT: CPT | Performed by: STUDENT IN AN ORGANIZED HEALTH CARE EDUCATION/TRAINING PROGRAM

## 2021-01-22 PROCEDURE — 87493 C DIFF AMPLIFIED PROBE: CPT | Mod: GZ | Performed by: STUDENT IN AN ORGANIZED HEALTH CARE EDUCATION/TRAINING PROGRAM

## 2021-01-22 PROCEDURE — 85025 COMPLETE CBC W/AUTO DIFF WBC: CPT | Performed by: STUDENT IN AN ORGANIZED HEALTH CARE EDUCATION/TRAINING PROGRAM

## 2021-01-22 RX ORDER — CALCIUM GLUCONATE 94 MG/ML
1 INJECTION, SOLUTION INTRAVENOUS ONCE
Status: DISCONTINUED | OUTPATIENT
Start: 2021-01-22 | End: 2021-01-22

## 2021-01-22 RX ORDER — MULTIVIT,TX WITH IRON,MINERALS
500 TABLET, EXTENDED RELEASE ORAL ONCE
Status: COMPLETED | OUTPATIENT
Start: 2021-01-22 | End: 2021-01-22

## 2021-01-22 RX ORDER — LOPERAMIDE HYDROCHLORIDE 2 MG/1
2 TABLET ORAL 4 TIMES DAILY PRN
Qty: 20 TABLET | Refills: 0 | Status: SHIPPED | OUTPATIENT
Start: 2021-01-22 | End: 2021-02-03

## 2021-01-22 RX ORDER — POTASSIUM CHLORIDE 1.5 G/1.58G
40 POWDER, FOR SOLUTION ORAL ONCE
Status: COMPLETED | OUTPATIENT
Start: 2021-01-22 | End: 2021-01-22

## 2021-01-22 RX ORDER — SODIUM CHLORIDE 9 MG/ML
INJECTION, SOLUTION INTRAVENOUS CONTINUOUS
Status: DISCONTINUED | OUTPATIENT
Start: 2021-01-22 | End: 2021-01-22 | Stop reason: HOSPADM

## 2021-01-22 RX ORDER — AZITHROMYCIN 500 MG/1
500 TABLET, FILM COATED ORAL DAILY
Qty: 3 TABLET | Refills: 0 | Status: SHIPPED | OUTPATIENT
Start: 2021-01-22 | End: 2021-01-25

## 2021-01-22 RX ORDER — GABAPENTIN 300 MG/1
300 CAPSULE ORAL 4 TIMES DAILY
COMMUNITY

## 2021-01-22 RX ORDER — MAGNESIUM SULFATE HEPTAHYDRATE 40 MG/ML
4 INJECTION, SOLUTION INTRAVENOUS ONCE
Status: DISCONTINUED | OUTPATIENT
Start: 2021-01-22 | End: 2021-01-22

## 2021-01-22 RX ADMIN — Medication 1 TABLET: at 19:27

## 2021-01-22 RX ADMIN — Medication 500 MG: at 19:27

## 2021-01-22 RX ADMIN — SODIUM CHLORIDE 1000 ML: 9 INJECTION, SOLUTION INTRAVENOUS at 17:15

## 2021-01-22 RX ADMIN — POTASSIUM CHLORIDE 40 MEQ: 1.5 POWDER, FOR SOLUTION ORAL at 18:38

## 2021-01-22 NOTE — DISCHARGE INSTRUCTIONS
You were seen here for diarrhea.  Your labs look okay today but your stool studies will come back later.  Please take the medications I prescribed to try to help with your symptoms and follow-up with your primary care provider.  If you have worsening abdominal pain, vomiting, blood in your stool please come back to the emergency department.

## 2021-01-22 NOTE — ED PROVIDER NOTES
ED Provider Note    History     Chief Complaint   Patient presents with     Diarrhea     Onset for over one month with diarrhea and abdominal pain.     ANTONIO Cha is a 51 year old female with PMH notable for opioid dependence, HCV who presents to the ED with diarrhea for 1 month.    The patient says that she has had diarrhea with several episodes a day of watery nonbloody diarrhea since 1.5 months ago.  She thinks that this is due to some cream and coffee that she was in taking at that time.  She says that she initially had some abdominal pain but that this has since gone away.  She denies any new foods, travel, job, move or any other new exposures that could explain her symptoms.  She denies any vomiting or nausea.  She has no chest pain or shortness of breath or cough or chills.  She does not have any pain with defecation.  She does not have any incontinence.  She says that the diarrhea happens pretty much every time after she eats.    She has been seeing her primary care provider and had a stool sample done which I see in the chart as well as abdominal ultrasound and some blood tests all of which have been fairly unremarkable.  She is unclear why her symptoms keep continuing.  She has never had this before in the past.    Past Medical History  History reviewed. No pertinent past medical history.  Past Surgical History:   Procedure Laterality Date     BIOPSY      right breast     CHOLECYSTECTOMY       GYN SURGERY      , tubal          azithromycin (ZITHROMAX) 500 MG tablet       gabapentin (NEURONTIN) 300 MG capsule       loperamide (IMODIUM A-D) 2 MG tablet       METHADONE HCL PO       amoxicillin-clavulanate (AUGMENTIN) 875-125 MG per tablet       oxyCODONE IR (ROXICODONE) 5 MG tablet      Allergies   Allergen Reactions     Ibuprofen Swelling     Eyes swelling     Past medical history, past surgical history, medications, and allergies were reviewed with the patient. Additional pertinent items:  None    Family History  No family history on file.  Family history was reviewed with the patient. Additional pertinent items: None    Social History  Social History     Tobacco Use     Smoking status: Light Tobacco Smoker     Smokeless tobacco: Never Used   Substance Use Topics     Alcohol use: No     Drug use: No      Social history was reviewed with the patient. Additional pertinent items: None    Review of Systems  A complete review of systems was performed with pertinent positives and negatives noted in the HPI, and all other systems negative.     Physical Exam   BP: 125/88  Pulse: 99  Temp: 97.4  F (36.3  C)  Resp: 16  SpO2: 97 %(Simultaneous filing. User may not have seen previous data.)    Physical Exam  General: no acute distress. Appears stated age.   HENT: MMM, no oropharyngeal lesions  Eyes: PERRL, normal sclerae  Neck: non-tender, supple  Cardio: reg rate. Regular rhythm. Extremities well perfused  Resp: Normal work of breathing, normal respiratory rate.  Chest/Back: no visual signs of trauma, no CVA tenderness  Abdomen: no tenderness, non-distended, no rebound, no guarding  Neuro: alert and fully oriented. CN II-XII grossly intact. Grossly normal strength and sensation in all extremities.   MSK: no deformities. Grossly normal ROM in extremities.   Integumentary/Skin: no rash visualized, normal color  Psych: normal affect, normal behavior    ED Course        Labs Ordered and Resulted from Time of ED Arrival Up to the Time of Departure from the ED   COMPREHENSIVE METABOLIC PANEL - Abnormal; Notable for the following components:       Result Value    Potassium 3.0 (*)     Glucose 104 (*)     Calcium 7.6 (*)     Albumin 1.4 (*)     Protein Total 6.0 (*)     All other components within normal limits   CBC WITH PLATELETS DIFFERENTIAL - Abnormal; Notable for the following components:    WBC 13.7 (*)     Hemoglobin 11.3 (*)     Hematocrit 34.7 (*)     RDW 15.9 (*)     Platelet Count 596 (*)     Absolute  Neutrophil 9.1 (*)     Absolute Eosinophils 0.8 (*)     All other components within normal limits   ENTERIC BACTERIA AND VIRUS PANEL BY MARLENA STOOL   CLOSTRIDIUM DIFFICILE TOXIN B     No orders to display     Assessments & Plan (with Medical Decision Making)     Shelly Cha is a 51 year old female with chronic diarrhea.  Unclear etiology of her symptoms at this time.  Vitals are stable and she has moist mucous membranes.  1 thing that was not checked has been a C. difficile which patient is not high risk for but given that her symptoms are continuing at this point I think we should check.  We obtained a stool sample and I resent an enteric sample as well.  She is not very high risk and has negative testing for Shiga toxin at this time so I think is fairly reasonable to start an antimotility agent as well as a course of antibiotics to see if this helps her symptoms.  She will follow-up with her primary care provider.    C diff came back negative, enteric panel pending still.    Hypokalemic, hypocalcemic. Given electrolyte replacement and she will f/u with GI in 4 days. RTED precautions given.    Impression:  Diarrhea    Dima Beckford MD  Emergency Medicine     Dima Beckford MD  01/22/21 2124

## 2021-01-23 NOTE — ED NOTES
"Sepsis BPA alert has fired and lactate was ordered No   If not, the reason was \" no need, not septic\" and Dr. Beckford was notified.  Vitals 24 hr (last day)     Date/Time Temp Resp Pulse Pulse Rate Source BP    01/22/21 1931  --  16  101  Monitor  --    01/22/21 1516  97.4 (36.3)  16  99  Monitor  125/88            WBC   Date Value Ref Range Status   01/22/2021 13.7 (H) 4.0 - 11.0 10e9/L Final     "

## 2021-01-26 ENCOUNTER — PRE VISIT (OUTPATIENT)
Dept: GASTROENTEROLOGY | Facility: CLINIC | Age: 51
End: 2021-01-26

## 2021-01-26 ENCOUNTER — VIRTUAL VISIT (OUTPATIENT)
Dept: GASTROENTEROLOGY | Facility: CLINIC | Age: 51
End: 2021-01-26
Payer: COMMERCIAL

## 2021-01-26 VITALS — HEIGHT: 63 IN | BODY MASS INDEX: 24.34 KG/M2

## 2021-01-26 DIAGNOSIS — B18.2 CHRONIC HEPATITIS C WITHOUT HEPATIC COMA (H): ICD-10-CM

## 2021-01-26 DIAGNOSIS — R19.7 DIARRHEA, UNSPECIFIED TYPE: ICD-10-CM

## 2021-01-26 PROCEDURE — 99205 OFFICE O/P NEW HI 60 MIN: CPT | Mod: 95 | Performed by: INTERNAL MEDICINE

## 2021-01-26 ASSESSMENT — PATIENT HEALTH QUESTIONNAIRE - PHQ9: SUM OF ALL RESPONSES TO PHQ QUESTIONS 1-9: 18

## 2021-01-26 NOTE — NURSING NOTE
"Chief Complaint   Patient presents with     New Patient     new patient consult       Vitals:    01/26/21 1408   Height: 1.6 m (5' 3\")       Body mass index is 24.34 kg/m .    Domitila Gonzalez CMA            Depression Response    Patient completed the PHQ-9 assessment for depression and scored >9? Yes  Question 9 on the PHQ-9 was positive for suicidality? No  Does patient have current mental health provider? No    Is this a virtual visit? Yes   Does patient have suicidal ideation (positive question 9)? No - offer to place Mental Health Referral.  Patient declined referral/not needed    I personally notified the following: visit provider           "

## 2021-01-26 NOTE — PATIENT INSTRUCTIONS
It was very nice to meet you today during your video visit.  As we discussed we will work-up your symptoms of diarrhea as outlined below.    My office will contact you about getting some additional lab work.    My office will contact you about scheduling some stool studies to look for further evidence of infection or inflammation.    My office will contact you about scheduling an upper endoscopy and colonoscopy with biopsies to further evaluate your symptoms.    In the meantime you can continue to take the Imodium 2 to 4 pills/day.    I would also like you to start Citrucel 3 teaspoons (1 tablespoon daily in a glass of water.    We will have you follow-up in 4 to 6 weeks.      Please call with questions

## 2021-01-26 NOTE — PROGRESS NOTES
"Shelly Cha is a 51 year old female who is being evaluated via a billable video visit.      Please send link to patient's aj phone,  [phone]    The patient has been notified of following:     \"This video visit will be conducted via a call between you and your physician/provider. We have found that certain health care needs can be provided without the need for an in-person physical exam.  This service lets us provide the care you need with a video conversation.  If a prescription is necessary we can send it directly to your pharmacy.  If lab work is needed we can place an order for that and you can then stop by our lab to have the test done at a later time.    If during the course of the call the physician/provider feels a video visit is not appropriate, you will not be charged for this service.\"     Patient confirmed that they are in [place] for today's visit yes    If the video visit is dropped, please send link to:   Text to cell phone: as above    Video-Visit Details  Type of service:  Video Visit    Video Start Time: 2:19 PM  Video End Time:  2:46 PM    Originating Location (pt. Location): Home    Distant Location (provider location):  [facility] [place]     Platform used: Augusto          "

## 2021-01-26 NOTE — PROGRESS NOTES
GI CLINIC VISIT    CC/REFERRING MD:  No ref. provider found  REASON FOR CONSULTATION:   Diarrhea  Hepatitis C    ASSESSMENT/PLAN:    1. Chronic Diarrhea -duration for 6 weeks.  Stool characterized as watery and strictly related to p.o. intake.  No relation to food intake and cessation with fasting does suggest an underlying osmotic diarrhea.  However I am concerned that she is anemic, has a low-grade leukocytosis, and a thrombocytosis.  We will investigate further by rounding out infectious stool studies.  Given that she did receive some antibiotics in the ER we will recheck a C. difficile however I think this is unlikely.  We will check a stool ova and parasites.  That should be good for infectious evaluation.  We will check a stool study for fecal calprotectin to look for any evidence of inflammation.  We will also check a stool study for fecal elastase to see if there is any evidence of pancreatic insufficiency.  We will move forward with a colonoscopy to rule out any evidence of inflammatory bowel disease or microscopic colitis.  I will ask for an evaluation of the ileum as well as colon biopsies of the right colon, left colon, and rectum.  At the time of the colonoscopy we will obtain an upper endoscopy and request duodenal biopsies.  We will round out the laboratory evaluation checking for celiac markers, TSH, iron studies, B12, folate.    If the above evaluation shows any abnormalities on this we dealt with appropriately.  If the above evaluation is unremarkable then she could have underlying irritable bowel syndrome with diarrhea and this will be treated with fiber and antimotility agents.    In the meantime she can continue cautious use of Imodium.  I recommended that she trial Citrucel and a prescription for this was given.    I think we can hold off on any cross-sectional imaging of the abdomen for now.  This can be reconsidered in the future.    2.  Hepatitis C-she did have a positive hepatitis C  antibody with undetectable viral load this did appear low.  We will check a hepatitis C genotype and refer her to hepatology clinic for further discussion.  Her ultrasound showed no evidence of ascites.  Her platelets are not low.  She did have a fatty liver on the ultrasound.      RTC 4-6 weeks    Thank you for this consultation.  It was a pleasure to participate in the care of this patient; please contact us with any further questions.     This note was created with voice recognition software, and while reviewed for accuracy, typos may remain.     Kwasi Nolasco MD  Inflammatory Bowel Disease Program  Division of Gastroenterology, Hepatology and Nutrition  [facility]  Pager: [phone]      HPI:    Ms. Cha is a pleasant 51-year-old female with history of heroin abuse now on methadone 90 mg daily as well as history of hepatitis C who is referred to the GI clinic to discuss 6 weeks of watery diarrhea as well as her history of hepatitis C.    1.  6 weeks of watery diarrhea-patient reports the symptoms came on rather abruptly 6 weeks ago.  Prior to that she has had more irregular bowel habits and actually even tended towards constipation while on the methadone.  Approximate 6 weeks ago she shifted towards having watery stools.  She reports that she will have loose watery stools every time she eats.  If she does not eat at all she essentially will not have any bowel movements.  If she eats 3 times a day she will have 3 watery bowel movements.  Initially she had some epigastric abdominal discomfort and some swelling of her abdomen but this is completely resolved.  She no longer has any of this discomfort.  Because of the association with food she is actually been hesitant to eat.  She thinks she may have lost some weight however she does not have a scale so she has not weighed herself.  She has had no significant nausea, GERD, vomiting or any upper GI symptoms.    After her diarrhea started she is works to get  off the methadone and is tapered the methadone down from 130 mg a day down to 90 mg a day.    She is been seen by her primary care provider.  Enteric stool studies were checked and were negative.  She has been tried on a brat diet which has not offered any benefit.  She denies ever trying any fiber.    Last week her symptoms became so concerning to her she went to the emergency room.  C. difficile and enteric panel were checked and were negative.  The patient did have a mild leukocytosis of 13.  She had a mild anemia with a hemoglobin in the 11's.  Her platelets are mildly increased in the 500s.  Her albumin was quite low at 1.4.  Her total protein was low as well at 6.  The rest of her liver tests were normal.  Her potassium was low at 3.0.  She was given fluids and her electrolytes were replaced.  She was given a 3-day course of azithromycin as well as a trial of Imodium.  She does not think the antibiotics helped her at all.  She thinks the Imodium has slowed her stools down and caused him to be a little more sticky but otherwise her symptoms are unchanged.    She denies any other new medications.  She denies any significant NSAID use.    She denies any family history of colon cancer, colon polyps, ulcerative colitis, or Crohn's disease.    Her only other complaint is that she feels like she is had some swelling in her lower extremities since the ER visit.    ROS:    No fevers or chills  No weight loss  No blurry vision, double vision or change in vision  No sore throat  No lymphadenopathy  No headache, paraesthesias, or weakness in a limb  No shortness of breath or wheezing  No chest pain or pressure  No arthralgias or myalgias  No rashes or skin changes  No odynophagia or dysphagia  No BRBPR, hematochezia, melena  No dysuria, frequency or urgency  No hot/cold intolerance or polyria  No anxiety or depression    PREVIOUS ENDOSCOPY:  None    PERTINENT RELEVANT IMAGING OR LABS:  Labs as outlined above.  Abdominal  ultrasound unremarkable with no ascites.  Some fatty liver seen.    ALLERGIES:     Allergies   Allergen Reactions     Ibuprofen Swelling     Eyes swelling       PERTINENT MEDICATIONS:    Current Outpatient Medications:      amoxicillin-clavulanate (AUGMENTIN) 875-125 MG per tablet, Take 1 tablet by mouth every 12 hours, Disp: 14 tablet, Rfl: 0     gabapentin (NEURONTIN) 300 MG capsule, Take 300 mg by mouth 3 times daily, Disp: , Rfl:      loperamide (IMODIUM A-D) 2 MG tablet, Take 1 tablet (2 mg) by mouth 4 times daily as needed for diarrhea, Disp: 20 tablet, Rfl: 0     METHADONE HCL PO, Take 90 mg by mouth daily, Disp: , Rfl:      oxyCODONE IR (ROXICODONE) 5 MG tablet, Take 1-2 tablets (5-10 mg) by mouth every 6 hours as needed for other (pain control or improvement in physical function. Hold dose for analgesic side effects.), Disp: 50 tablet, Rfl: 0    PROBLEM LIST  Patient Active Problem List    Diagnosis Date Noted     Cellulitis of hand 04/09/2018     Priority: Medium       PERTINENT PAST MEDICAL HISTORY:  No past medical history on file.    PREVIOUS SURGERIES:  Past Surgical History:   Procedure Laterality Date     BIOPSY      right breast     CHOLECYSTECTOMY       GYN SURGERY      , tubal       SOCIAL HISTORY:  Social History     Socioeconomic History     Marital status: Single     Spouse name: Not on file     Number of children: Not on file     Years of education: Not on file     Highest education level: Not on file   Occupational History     Not on file   Social Needs     Financial resource strain: Not on file     Food insecurity     Worry: Not on file     Inability: Not on file     Transportation needs     Medical: Not on file     Non-medical: Not on file   Tobacco Use     Smoking status: Light Tobacco Smoker     Smokeless tobacco: Never Used   Substance and Sexual Activity     Alcohol use: No     Drug use: No     Sexual activity: Yes     Partners: Male   Lifestyle     Physical activity     Days per week:  Not on file     Minutes per session: Not on file     Stress: Not on file   Relationships     Social connections     Talks on phone: Not on file     Gets together: Not on file     Attends [employer] service: Not on file     Active member of club or organization: Not on file     Attends meetings of clubs or organizations: Not on file     Relationship status: Not on file     Intimate partner violence     Fear of current or ex partner: Not on file     Emotionally abused: Not on file     Physically abused: Not on file     Forced sexual activity: Not on file   Other Topics Concern     Not on file   Social History Narrative     Not on file       FAMILY HISTORY:  No family history on file.    Past/family/social history reviewed and no changes    PHYSICAL EXAMINATION:  Constitutional: aaox3, cooperative, pleasant, not dyspneic/diaphoretic, no acute distress  Vitals reviewed: There were no vitals taken for this visit.  Wt:   Wt Readings from Last 2 Encounters:   04/09/18 62.3 kg (137 lb 6.4 oz)      Eyes: Sclera anicteric/injected  Respiratory: Unlabored breathing  Skin: no jaundice  Psych: Normal affect

## 2021-01-26 NOTE — LETTER
1/26/2021         RE: Shelly Cha  [address]  [place] [address]        Dear Colleague,    Thank you for referring your patient, Shelly Cha, to the [facility] [place]. Please see a copy of my visit note below.    GI CLINIC VISIT    CC/REFERRING MD:  No ref. provider found  REASON FOR CONSULTATION:   Diarrhea  Hepatitis C    ASSESSMENT/PLAN:    1. Chronic Diarrhea -duration for 6 weeks.  Stool characterized as watery and strictly related to p.o. intake.  No relation to food intake and cessation with fasting does suggest an underlying osmotic diarrhea.  However I am concerned that she is anemic, has a low-grade leukocytosis, and a thrombocytosis.  We will investigate further by rounding out infectious stool studies.  Given that she did receive some antibiotics in the ER we will recheck a C. difficile however I think this is unlikely.  We will check a stool ova and parasites.  That should be good for infectious evaluation.  We will check a stool study for fecal calprotectin to look for any evidence of inflammation.  We will also check a stool study for fecal elastase to see if there is any evidence of pancreatic insufficiency.  We will move forward with a colonoscopy to rule out any evidence of inflammatory bowel disease or microscopic colitis.  I will ask for an evaluation of the ileum as well as colon biopsies of the right colon, left colon, and rectum.  At the time of the colonoscopy we will obtain an upper endoscopy and request duodenal biopsies.  We will round out the laboratory evaluation checking for celiac markers, TSH, iron studies, B12, folate.    If the above evaluation shows any abnormalities on this we dealt with appropriately.  If the above evaluation is unremarkable then she could have underlying irritable bowel syndrome with diarrhea and this will be treated with fiber and antimotility agents.    In the meantime she can continue cautious use  of Imodium.  I recommended that she trial Citrucel and a prescription for this was given.    I think we can hold off on any cross-sectional imaging of the abdomen for now.  This can be reconsidered in the future.    2.  Hepatitis C-she did have a positive hepatitis C antibody with undetectable viral load this did appear low.  We will check a hepatitis C genotype and refer her to hepatology clinic for further discussion.  Her ultrasound showed no evidence of ascites.  Her platelets are not low.  She did have a fatty liver on the ultrasound.      RTC 4-6 weeks    Thank you for this consultation.  It was a pleasure to participate in the care of this patient; please contact us with any further questions.     This note was created with voice recognition software, and while reviewed for accuracy, typos may remain.     Kwasi Nolasco MD  Inflammatory Bowel Disease Program  Division of Gastroenterology, Hepatology and Nutrition  [facility]  Pager: [phone]      HPI:    Ms. Cha is a pleasant 51-year-old female with history of heroin abuse now on methadone 90 mg daily as well as history of hepatitis C who is referred to the GI clinic to discuss 6 weeks of watery diarrhea as well as her history of hepatitis C.    1.  6 weeks of watery diarrhea-patient reports the symptoms came on rather abruptly 6 weeks ago.  Prior to that she has had more irregular bowel habits and actually even tended towards constipation while on the methadone.  Approximate 6 weeks ago she shifted towards having watery stools.  She reports that she will have loose watery stools every time she eats.  If she does not eat at all she essentially will not have any bowel movements.  If she eats 3 times a day she will have 3 watery bowel movements.  Initially she had some epigastric abdominal discomfort and some swelling of her abdomen but this is completely resolved.  She no longer has any of this discomfort.  Because of the association with food she is  actually been hesitant to eat.  She thinks she may have lost some weight however she does not have a scale so she has not weighed herself.  She has had no significant nausea, GERD, vomiting or any upper GI symptoms.    After her diarrhea started she is works to get off the methadone and is tapered the methadone down from 130 mg a day down to 90 mg a day.    She is been seen by her primary care provider.  Enteric stool studies were checked and were negative.  She has been tried on a brat diet which has not offered any benefit.  She denies ever trying any fiber.    Last week her symptoms became so concerning to her she went to the emergency room.  C. difficile and enteric panel were checked and were negative.  The patient did have a mild leukocytosis of 13.  She had a mild anemia with a hemoglobin in the 11's.  Her platelets are mildly increased in the 500s.  Her albumin was quite low at 1.4.  Her total protein was low as well at 6.  The rest of her liver tests were normal.  Her potassium was low at 3.0.  She was given fluids and her electrolytes were replaced.  She was given a 3-day course of azithromycin as well as a trial of Imodium.  She does not think the antibiotics helped her at all.  She thinks the Imodium has slowed her stools down and caused him to be a little more sticky but otherwise her symptoms are unchanged.    She denies any other new medications.  She denies any significant NSAID use.    She denies any family history of colon cancer, colon polyps, ulcerative colitis, or Crohn's disease.    Her only other complaint is that she feels like she is had some swelling in her lower extremities since the ER visit.    ROS:    No fevers or chills  No weight loss  No blurry vision, double vision or change in vision  No sore throat  No lymphadenopathy  No headache, paraesthesias, or weakness in a limb  No shortness of breath or wheezing  No chest pain or pressure  No arthralgias or myalgias  No rashes or skin  changes  No odynophagia or dysphagia  No BRBPR, hematochezia, melena  No dysuria, frequency or urgency  No hot/cold intolerance or polyria  No anxiety or depression    PREVIOUS ENDOSCOPY:  None    PERTINENT RELEVANT IMAGING OR LABS:  Labs as outlined above.  Abdominal ultrasound unremarkable with no ascites.  Some fatty liver seen.    ALLERGIES:     Allergies   Allergen Reactions     Ibuprofen Swelling     Eyes swelling       PERTINENT MEDICATIONS:    Current Outpatient Medications:      amoxicillin-clavulanate (AUGMENTIN) 875-125 MG per tablet, Take 1 tablet by mouth every 12 hours, Disp: 14 tablet, Rfl: 0     gabapentin (NEURONTIN) 300 MG capsule, Take 300 mg by mouth 3 times daily, Disp: , Rfl:      loperamide (IMODIUM A-D) 2 MG tablet, Take 1 tablet (2 mg) by mouth 4 times daily as needed for diarrhea, Disp: 20 tablet, Rfl: 0     METHADONE HCL PO, Take 90 mg by mouth daily, Disp: , Rfl:      oxyCODONE IR (ROXICODONE) 5 MG tablet, Take 1-2 tablets (5-10 mg) by mouth every 6 hours as needed for other (pain control or improvement in physical function. Hold dose for analgesic side effects.), Disp: 50 tablet, Rfl: 0    PROBLEM LIST  Patient Active Problem List    Diagnosis Date Noted     Cellulitis of hand 04/09/2018     Priority: Medium       PERTINENT PAST MEDICAL HISTORY:  No past medical history on file.    PREVIOUS SURGERIES:  Past Surgical History:   Procedure Laterality Date     BIOPSY      right breast     CHOLECYSTECTOMY       GYN SURGERY      , tubal       SOCIAL HISTORY:  Social History     Socioeconomic History     Marital status: Single     Spouse name: Not on file     Number of children: Not on file     Years of education: Not on file     Highest education level: Not on file   Occupational History     Not on file   Social Needs     Financial resource strain: Not on file     Food insecurity     Worry: Not on file     Inability: Not on file     Transportation needs     Medical: Not on file      "Non-medical: Not on file   Tobacco Use     Smoking status: Light Tobacco Smoker     Smokeless tobacco: Never Used   Substance and Sexual Activity     Alcohol use: No     Drug use: No     Sexual activity: Yes     Partners: Male   Lifestyle     Physical activity     Days per week: Not on file     Minutes per session: Not on file     Stress: Not on file   Relationships     Social connections     Talks on phone: Not on file     Gets together: Not on file     Attends [employer] service: Not on file     Active member of club or organization: Not on file     Attends meetings of clubs or organizations: Not on file     Relationship status: Not on file     Intimate partner violence     Fear of current or ex partner: Not on file     Emotionally abused: Not on file     Physically abused: Not on file     Forced sexual activity: Not on file   Other Topics Concern     Not on file   Social History Narrative     Not on file       FAMILY HISTORY:  No family history on file.    Past/family/social history reviewed and no changes    PHYSICAL EXAMINATION:  Constitutional: aaox3, cooperative, pleasant, not dyspneic/diaphoretic, no acute distress  Vitals reviewed: There were no vitals taken for this visit.  Wt:   Wt Readings from Last 2 Encounters:   04/09/18 62.3 kg (137 lb 6.4 oz)      Eyes: Sclera anicteric/injected  Respiratory: Unlabored breathing  Skin: no jaundice  Psych: Normal affect      Shelly Cha is a 51 year old female who is being evaluated via a billable video visit.      Please send link to patient's aj phone,  [phone]    The patient has been notified of following:     \"This video visit will be conducted via a call between you and your physician/provider. We have found that certain health care needs can be provided without the need for an in-person physical exam.  This service lets us provide the care you need with a video conversation.  If a prescription is necessary we can send it directly to your pharmacy.  If lab " "work is needed we can place an order for that and you can then stop by our lab to have the test done at a later time.    If during the course of the call the physician/provider feels a video visit is not appropriate, you will not be charged for this service.\"     Patient confirmed that they are in [place] for today's visit yes    If the video visit is dropped, please send link to:   Text to cell phone: as above    Video-Visit Details  Type of service:  Video Visit    Video Start Time: 2:19 PM  Video End Time:  2:46 PM    Originating Location (pt. Location): Home    Distant Location (provider location):  [facility]STROENTEROLOG[facility] [place]     Platform used: [employer]      Again, thank you for allowing me to participate in the care of your patient.        Sincerely,        Kwasi Nolasco MD    "

## 2021-01-29 ENCOUNTER — PATIENT OUTREACH (OUTPATIENT)
Dept: GASTROENTEROLOGY | Facility: CLINIC | Age: 51
End: 2021-01-29

## 2021-01-29 DIAGNOSIS — B18.2 CHRONIC HEPATITIS C WITHOUT HEPATIC COMA (H): Primary | ICD-10-CM

## 2021-01-29 NOTE — PROGRESS NOTES
My office will contact you about getting some additional lab work.     Contacted patient to discuss importance of completing labs and stool studies asap.   First openings for mac is March 22  Patient has follow up appt  Patient had no questions about Citrucel   Referral to hepatology placed.                My office will contact you about scheduling some stool studies to look for further evidence of infection or inflammation.     My office will contact you about scheduling an upper endoscopy and colonoscopy with biopsies to further evaluate your symptoms.     In the meantime you can continue to take the Imodium 2 to 4 pills/day.     I would also like you to start Citrucel 3 teaspoons (1 tablespoon daily in a glass of water.     We will have you follow-up in 4 to 6 weeks.

## 2021-02-01 DIAGNOSIS — B18.2 CHRONIC HEPATITIS C WITHOUT HEPATIC COMA (H): ICD-10-CM

## 2021-02-01 DIAGNOSIS — R19.7 DIARRHEA, UNSPECIFIED TYPE: ICD-10-CM

## 2021-02-01 LAB
CRP SERPL-MCNC: 16 MG/L (ref 0–8)
FERRITIN SERPL-MCNC: 50 NG/ML (ref 8–252)
FOLATE SERPL-MCNC: 6.9 NG/ML
HAV IGG SER QL IA: REACTIVE
HBV CORE AB SERPL QL IA: NONREACTIVE
HBV SURFACE AB SERPL IA-ACNC: 1.25 M[IU]/ML
HBV SURFACE AG SERPL QL IA: NONREACTIVE
IRON SATN MFR SERPL: 18 % (ref 15–46)
IRON SERPL-MCNC: 31 UG/DL (ref 35–180)
TIBC SERPL-MCNC: 170 UG/DL (ref 240–430)
TSH SERPL DL<=0.005 MIU/L-ACNC: 3.95 MU/L (ref 0.4–4)
VIT B12 SERPL-MCNC: 3092 PG/ML (ref 193–986)

## 2021-02-01 PROCEDURE — 86140 C-REACTIVE PROTEIN: CPT | Performed by: INTERNAL MEDICINE

## 2021-02-01 PROCEDURE — 82656 EL-1 FECAL QUAL/SEMIQ: CPT | Performed by: INTERNAL MEDICINE

## 2021-02-01 PROCEDURE — 87522 HEPATITIS C REVRS TRNSCRPJ: CPT | Performed by: INTERNAL MEDICINE

## 2021-02-01 PROCEDURE — 86704 HEP B CORE ANTIBODY TOTAL: CPT | Performed by: INTERNAL MEDICINE

## 2021-02-01 PROCEDURE — 87902 NFCT AGT GNTYP ALYS HEP C: CPT | Mod: 90 | Performed by: INTERNAL MEDICINE

## 2021-02-01 PROCEDURE — 83540 ASSAY OF IRON: CPT | Performed by: INTERNAL MEDICINE

## 2021-02-01 PROCEDURE — 84443 ASSAY THYROID STIM HORMONE: CPT | Performed by: INTERNAL MEDICINE

## 2021-02-01 PROCEDURE — 82728 ASSAY OF FERRITIN: CPT | Performed by: INTERNAL MEDICINE

## 2021-02-01 PROCEDURE — 99000 SPECIMEN HANDLING OFFICE-LAB: CPT | Performed by: INTERNAL MEDICINE

## 2021-02-01 PROCEDURE — 83516 IMMUNOASSAY NONANTIBODY: CPT | Performed by: INTERNAL MEDICINE

## 2021-02-01 PROCEDURE — 82746 ASSAY OF FOLIC ACID SERUM: CPT | Performed by: INTERNAL MEDICINE

## 2021-02-01 PROCEDURE — 87209 SMEAR COMPLEX STAIN: CPT | Performed by: INTERNAL MEDICINE

## 2021-02-01 PROCEDURE — 82784 ASSAY IGA/IGD/IGG/IGM EACH: CPT | Performed by: INTERNAL MEDICINE

## 2021-02-01 PROCEDURE — 82705 FATS/LIPIDS FECES QUAL: CPT | Mod: 90 | Performed by: INTERNAL MEDICINE

## 2021-02-01 PROCEDURE — 36415 COLL VENOUS BLD VENIPUNCTURE: CPT | Performed by: INTERNAL MEDICINE

## 2021-02-01 PROCEDURE — 86708 HEPATITIS A ANTIBODY: CPT | Performed by: INTERNAL MEDICINE

## 2021-02-01 PROCEDURE — 87340 HEPATITIS B SURFACE AG IA: CPT | Performed by: INTERNAL MEDICINE

## 2021-02-01 PROCEDURE — 86706 HEP B SURFACE ANTIBODY: CPT | Performed by: INTERNAL MEDICINE

## 2021-02-01 PROCEDURE — 82607 VITAMIN B-12: CPT | Performed by: INTERNAL MEDICINE

## 2021-02-01 PROCEDURE — 83993 ASSAY FOR CALPROTECTIN FECAL: CPT | Performed by: INTERNAL MEDICINE

## 2021-02-01 PROCEDURE — 87177 OVA AND PARASITES SMEARS: CPT | Performed by: INTERNAL MEDICINE

## 2021-02-01 PROCEDURE — 83550 IRON BINDING TEST: CPT | Performed by: INTERNAL MEDICINE

## 2021-02-02 DIAGNOSIS — R19.7 DIARRHEA, UNSPECIFIED TYPE: ICD-10-CM

## 2021-02-02 LAB
IGA SERPL-MCNC: 243 MG/DL (ref 84–499)
TTG IGA SER-ACNC: 1 U/ML
TTG IGG SER-ACNC: 2 U/ML

## 2021-02-02 NOTE — TELEPHONE ENCOUNTER
RECORDS RECEIVED FROM: Internal   Appt Date: 02.03.2021   NOTES STATUS DETAILS   OFFICE NOTE from referring provider Internal 01.29.2021 Kwasi Nolasco MD   OFFICE NOTES from other specialists Care Everywhere 01.06.2021 Romeo Landin MD  [facility]     DISCHARGE SUMMARY from hospital N/A    MEDICATION LIST Internal    LIVER BIOSPY (IF APPLICABLE)      PATHOLOGY REPORTS  N/A    IMAGING     ENDOSCOPY (IF AVAILABLE) N/A    COLONOSCOPY (IF AVAILABLE) N/A    ULTRASOUND LIVER N/A    CT OF ABDOMEN N/A    MRI OF LIVER N/A    FIBROSCAN, US ELASTOGRAPHY, FIBROSIS SCAN, MR ELASTOGRAPHY N/A    LABS     HEPATIC PANEL (LIVER PANEL) N/A    BASIC METABOLIC PANEL Internal 04.10.2018   COMPLETE METABOLIC PANEL Internal 01.22.2021   COMPLETE BLOOD COUNT (CBC) Internal 01.22.2021   INTERNATIONAL NORMALIZED RATIO (INR) N/A    HEPATITIS C ANTIBODY N/A    HEPATITIS C VIRAL LOAD/PCR N/A    HEPATITIS C GENOTYPE N/A    HEPATITIS B SURFACE ANTIGEN Internal 02.01.2021   HEPATITIS B SURFACE ANTIBODY Internal 02.01.2021   HEPATITIS B DNA QUANT LEVEL N/A    HEPATITIS B CORE ANTIBODY Internal 02.01.2021

## 2021-02-03 ENCOUNTER — VIRTUAL VISIT (OUTPATIENT)
Dept: GASTROENTEROLOGY | Facility: CLINIC | Age: 51
End: 2021-02-03
Attending: INTERNAL MEDICINE
Payer: COMMERCIAL

## 2021-02-03 ENCOUNTER — PRE VISIT (OUTPATIENT)
Dept: GASTROENTEROLOGY | Facility: CLINIC | Age: 51
End: 2021-02-03

## 2021-02-03 DIAGNOSIS — B18.2 CHRONIC HEPATITIS C WITHOUT HEPATIC COMA (H): ICD-10-CM

## 2021-02-03 LAB
CALPROTECTIN STL-MCNT: 5430 MG/KG (ref 0–49.9)
ELASTASE PANC STL-MCNT: 110 UG/G
FAT STL QL: NORMAL
NEUTRAL FAT STL QL: NORMAL
O+P STL MICRO: NORMAL
SPECIMEN SOURCE: NORMAL

## 2021-02-03 PROCEDURE — 99214 OFFICE O/P EST MOD 30 MIN: CPT | Mod: 95 | Performed by: STUDENT IN AN ORGANIZED HEALTH CARE EDUCATION/TRAINING PROGRAM

## 2021-02-03 ASSESSMENT — PAIN SCALES - GENERAL: PAINLEVEL: NO PAIN (0)

## 2021-02-03 NOTE — PROGRESS NOTES
"Shelly is a 51 year old who is being evaluated via a billable video visit.      How would you like to obtain your AVS? Mail a copy  If the video visit is dropped, the invitation should be resent by: Send to e-mail at: No e-mail address on record  Will anyone else be joining your video visit? No  {If patient encounters technical issues they should call [phone] :[id number]}    Video Start Time: {video visit start/end time for provider to select:152948}  Video-Visit Details    Type of service:  Video Visit    Video End Time:{video visit start/end time for provider to select:152948}    Originating Location (pt. Location): {video visit patient location:[id number]::\"Home\"}    Distant Location (provider location):  [facility]PATOLOGY [facility] [place]     Platform used for Video Visit: {Virtual Visit Platforms:497123::\"[employer]\"}    "

## 2021-02-03 NOTE — PROGRESS NOTES
Shelly is a 51 year old who is being evaluated via a billable video visit.      How would you like to obtain your AVS? Mail a copy  If the video visit is dropped, the invitation should be resent by: Text to cell phone: [phone] Please send Twiigg link  Will anyone else be joining your video visit? No      Video Start Time: 9:05 AM  Video-Visit Details    Type of service:  Video Visit    Video End Time:9:20    Originating Location (pt. Location): Home    Distant Location (provider location):  [facility] [place]     Platform used for Video Visit: [employer]    [facility] New Patient Visit    Date of Visit: February 3, 2021    Reason for referral: Hepatitis C    Subjective: Ms. Cha is a 51 year old woman with a history of IVDU, who presents for evaluation of hepatitis C.     Follows with Dr. Nolasco for her diarrhea. Planning lab work up for and colonoscopy with random bx, EGD with duodenal bx.     She was first told she had hepatitis C last summer. No history of jaundice, known liver disease, elevated LFTs. She states she last used IVDU 1 year ago, had an exposure to a friend with HCV 3 years ago. Last drink of alcohol 20 years ago, denies heavy use prior to this.     No s/s of liver decompensation.     ROS: 14 point ROS negative except for positives noted in HPI.    PMHx:  - History of IVDU  - HCV    PSHx:  Past Surgical History:   Procedure Laterality Date     BIOPSY      right breast     CHOLECYSTECTOMY       GYN SURGERY      , tubal     FamHx:  No family history of liver disease    SocHx:  Social History     Socioeconomic History     Marital status: Single     Spouse name: Not on file     Number of children: Not on file     Years of education: Not on file     Highest education level: Not on file   Occupational History     Not on file   Social Needs     Financial resource strain: Not on file     Food insecurity     Worry: Not on file     Inability: Not on  file     Transportation needs     Medical: Not on file     Non-medical: Not on file   Tobacco Use     Smoking status: Light Tobacco Smoker     Smokeless tobacco: Never Used   Substance and Sexual Activity     Alcohol use: No     Drug use: No     Sexual activity: Yes     Partners: Male   Lifestyle     Physical activity     Days per week: Not on file     Minutes per session: Not on file     Stress: Not on file   Relationships     Social connections     Talks on phone: Not on file     Gets together: Not on file     Attends Mandaen service: Not on file     Active member of club or organization: Not on file     Attends meetings of clubs or organizations: Not on file     Relationship status: Not on file     Intimate partner violence     Fear of current or ex partner: Not on file     Emotionally abused: Not on file     Physically abused: Not on file     Forced sexual activity: Not on file   Other Topics Concern     Not on file   Social History Narrative     Not on file   Lives alone  Last drink 20 years ago, denies heavy use  IVDU 1 year ago, no other drug use    Medications:  Current Outpatient Medications   Medication     METHADONE HCL PO     methylcellulose (CITRUCEL) powder     gabapentin (NEURONTIN) 300 MG capsule     No current facility-administered medications for this visit.        Allergies:  Allergies   Allergen Reactions     Ibuprofen Swelling     Eyes swelling     Tylenol [Acetaminophen]        Objective:  There were no vitals taken for this visit.  Constitutional: pleasant woman in NAD  Eyes: non icteric  Respiratory: Normal respiratory excursion   MSK: normal range of motion of visualized extremities  Abd: Non distended  Skin: No jaundice  Psychiatric: normal mood and orientation    Labs:  Last Comprehensive Metabolic Panel:  Sodium   Date Value Ref Range Status   01/22/2021 139 133 - 144 mmol/L Final     Potassium   Date Value Ref Range Status   01/22/2021 3.0 (L) 3.4 - 5.3 mmol/L Final     Chloride   Date  Value Ref Range Status   01/22/2021 102 94 - 109 mmol/L Final     Carbon Dioxide   Date Value Ref Range Status   01/22/2021 30 20 - 32 mmol/L Final     Anion Gap   Date Value Ref Range Status   01/22/2021 7 3 - 14 mmol/L Final     Glucose   Date Value Ref Range Status   01/22/2021 104 (H) 70 - 99 mg/dL Final     Urea Nitrogen   Date Value Ref Range Status   01/22/2021 9 7 - 30 mg/dL Final     Creatinine   Date Value Ref Range Status   01/22/2021 0.83 0.52 - 1.04 mg/dL Final     GFR Estimate   Date Value Ref Range Status   01/22/2021 83 >60 mL/min/[1.73_m2] Final     Comment:     Non  GFR Calc  Starting 12/18/2018, serum creatinine based estimated GFR (eGFR) will be   calculated using the Chronic Kidney Disease Epidemiology Collaboration   (CKD-EPI) equation.       Calcium   Date Value Ref Range Status   01/22/2021 7.6 (L) 8.5 - 10.1 mg/dL Final     Bilirubin Total   Date Value Ref Range Status   01/22/2021 0.2 0.2 - 1.3 mg/dL Final     Alkaline Phosphatase   Date Value Ref Range Status   01/22/2021 123 40 - 150 U/L Final     ALT   Date Value Ref Range Status   01/22/2021 <6 0 - 50 U/L Final     AST   Date Value Ref Range Status   01/22/2021 13 0 - 45 U/L Final       Lab Results   Component Value Date    WBC 13.7 01/22/2021     Lab Results   Component Value Date    RBC 3.87 01/22/2021     Lab Results   Component Value Date    HGB 11.3 01/22/2021     Lab Results   Component Value Date    HCT 34.7 01/22/2021     Lab Results   Component Value Date    MCV 90 01/22/2021     Lab Results   Component Value Date    MCH 29.2 01/22/2021     Lab Results   Component Value Date    MCHC 32.6 01/22/2021     Lab Results   Component Value Date    RDW 15.9 01/22/2021     Lab Results   Component Value Date     01/22/2021     Hepatitis B Core Total Ab, Sag negative  Hepatitis B Sab NR    11/2020  Hepatitis C rna 415    10/2020  HIV negative    Imaging:    RUQ US 12/2020    FINDINGS:   Fluid: No evidence of ascites  or pleural effusions.     Liver: The liver demonstrates slightly increased echotexture,  suggestive of mild diffuse hepatic steatosis. There is no focal mass.      Gallbladder: Cholecystectomy.     Bile Ducts: Both the intra- and extrahepatic biliary system are of  normal caliber.  The common bile duct measures 7.9 mm in diameter.     Pancreas: Visualized portions of the head and body of the pancreas are  unremarkable.      Kidney: The right kidney measures 10.5 cm long. There is no  hydronephrosis or hydroureter, no shadowing renal calculi, cystic  lesion or mass.                                                                       IMPRESSION:   1.  Cholecystectomy. No intrahepatic or extrahepatic biliary  dilatation.  2.  Mild diffuse hepatic steatosis.    CT 2015    Findings:  The lung bases are clear.  The liver, spleen, pancreas, and adrenal glands are normal.  The gallbladder is surgically absent.  There is no dilatation of the biliary system.  No bile duct stones are identified.  There is no evidence of bile leak.  There are no inflammatory changes in the gallbladder fossa.  The kidneys are normal in size, shape, and position.  There is no hydronephrosis.  There are no renal masses or focal parenchymal abnormalities.  The abdominal aorta is normal in caliber.  There is no periaortic or retrocrural lymphadenopathy.  The bowel gas pattern is normal.  There are a few diverticula in the sigmoid colon.  Findings of diverticulitis are not present.  The appendix is normal.  The urinary bladder has a smooth contour.  There is no free fluid in the abdomen and pelvis.  There are no acute bony abnormalities.    Impression:  The gallbladder is surgically absent.  There is no evidence of complication following cholecystectomy.  There are a few diverticula in the sigmoid colon.  Results called to Dr. Valenzuela @ 6034 on 5/20/15.    Assessment/Plan: Ms. Cha is a 51 year old woman with a history of IVDU, hepatitis C who  presents to discuss her hepatitis C. She has a very low viral load. Last IVDU 1 year. Her low viral load potentially represents natural clearance of the virus, although this is a longer time frame than I would expect. She does have fatty liver without signs of advanced fibrosis on imaging. APRI low, making advanced fibrosis unlikely. Hepatitis B and HIV negative.     Recommend repeating HCV rna (added on) and if still detected, will recommended treatment. Genotype pending.    Discussed natural history of hepatitis C, risk factors for progression of liver disease, reinfection.     Independently reviewed labs and imaging.     Continue to avoid alcohol.     Orders Placed This Encounter   Procedures     Hepatitis C RNA, quantitative     RTC 3 months.     Cara Guerrero MD MS  Hepatology/Liver Transplant  [facility]

## 2021-02-03 NOTE — LETTER
2/3/2021         RE: Shelly Cha  [address]  [facility] [place] [place] [address]        Dear Colleague,    Thank you for referring your patient, Shelly Cha, to the [facility] [place]. Please see a copy of my visit note below.    Shelly is a 51 year old who is being evaluated via a billable video visit.      How would you like to obtain your AVS? Mail a copy  If the video visit is dropped, the invitation should be resent by: Text to cell phone: [phone] Please send Grow link  Will anyone else be joining your video visit? No      Video Start Time: 9:05 AM  Video-Visit Details    Type of service:  Video Visit    Video End Time:9:20    Originating Location (pt. Location): Home    Distant Location (provider location):  [facility] [place]     Platform used for Video Visit: [employer]    [facility] New Patient Visit    Date of Visit: February 3, 2021    Reason for referral: Hepatitis C    Subjective: Ms. Cha is a 51 year old woman with a history of IVDU, who presents for evaluation of hepatitis C.     Follows with Dr. Nolasco for her diarrhea. Planning lab work up for and colonoscopy with random bx, EGD with duodenal bx.     She was first told she had hepatitis C last summer. No history of jaundice, known liver disease, elevated LFTs. She states she last used IVDU 1 year ago, had an exposure to a friend with HCV 3 years ago. Last drink of alcohol 20 years ago, denies heavy use prior to this.     No s/s of liver decompensation.     ROS: 14 point ROS negative except for positives noted in HPI.    PMHx:  - History of IVDU  - HCV    PSHx:  Past Surgical History:   Procedure Laterality Date     BIOPSY      right breast     CHOLECYSTECTOMY       GYN SURGERY      , tubal     FamHx:  No family history of liver disease    SocHx:  Social History     Socioeconomic History     Marital status: Single     Spouse name: Not on file      Number of children: Not on file     Years of education: Not on file     Highest education level: Not on file   Occupational History     Not on file   Social Needs     Financial resource strain: Not on file     Food insecurity     Worry: Not on file     Inability: Not on file     Transportation needs     Medical: Not on file     Non-medical: Not on file   Tobacco Use     Smoking status: Light Tobacco Smoker     Smokeless tobacco: Never Used   Substance and Sexual Activity     Alcohol use: No     Drug use: No     Sexual activity: Yes     Partners: Male   Lifestyle     Physical activity     Days per week: Not on file     Minutes per session: Not on file     Stress: Not on file   Relationships     Social connections     Talks on phone: Not on file     Gets together: Not on file     Attends Sikh service: Not on file     Active member of club or organization: Not on file     Attends meetings of clubs or organizations: Not on file     Relationship status: Not on file     Intimate partner violence     Fear of current or ex partner: Not on file     Emotionally abused: Not on file     Physically abused: Not on file     Forced sexual activity: Not on file   Other Topics Concern     Not on file   Social History Narrative     Not on file   Lives alone  Last drink 20 years ago, denies heavy use  IVDU 1 year ago, no other drug use    Medications:  Current Outpatient Medications   Medication     METHADONE HCL PO     methylcellulose (CITRUCEL) powder     gabapentin (NEURONTIN) 300 MG capsule     No current facility-administered medications for this visit.        Allergies:  Allergies   Allergen Reactions     Ibuprofen Swelling     Eyes swelling     Tylenol [Acetaminophen]        Objective:  There were no vitals taken for this visit.  Constitutional: pleasant woman in NAD  Eyes: non icteric  Respiratory: Normal respiratory excursion   MSK: normal range of motion of visualized extremities  Abd: Non distended  Skin: No  jaundice  Psychiatric: normal mood and orientation    Labs:  Last Comprehensive Metabolic Panel:  Sodium   Date Value Ref Range Status   01/22/2021 139 133 - 144 mmol/L Final     Potassium   Date Value Ref Range Status   01/22/2021 3.0 (L) 3.4 - 5.3 mmol/L Final     Chloride   Date Value Ref Range Status   01/22/2021 102 94 - 109 mmol/L Final     Carbon Dioxide   Date Value Ref Range Status   01/22/2021 30 20 - 32 mmol/L Final     Anion Gap   Date Value Ref Range Status   01/22/2021 7 3 - 14 mmol/L Final     Glucose   Date Value Ref Range Status   01/22/2021 104 (H) 70 - 99 mg/dL Final     Urea Nitrogen   Date Value Ref Range Status   01/22/2021 9 7 - 30 mg/dL Final     Creatinine   Date Value Ref Range Status   01/22/2021 0.83 0.52 - 1.04 mg/dL Final     GFR Estimate   Date Value Ref Range Status   01/22/2021 83 >60 mL/min/[1.73_m2] Final     Comment:     Non  GFR Calc  Starting 12/18/2018, serum creatinine based estimated GFR (eGFR) will be   calculated using the Chronic Kidney Disease Epidemiology Collaboration   (CKD-EPI) equation.       Calcium   Date Value Ref Range Status   01/22/2021 7.6 (L) 8.5 - 10.1 mg/dL Final     Bilirubin Total   Date Value Ref Range Status   01/22/2021 0.2 0.2 - 1.3 mg/dL Final     Alkaline Phosphatase   Date Value Ref Range Status   01/22/2021 123 40 - 150 U/L Final     ALT   Date Value Ref Range Status   01/22/2021 <6 0 - 50 U/L Final     AST   Date Value Ref Range Status   01/22/2021 13 0 - 45 U/L Final       Lab Results   Component Value Date    WBC 13.7 01/22/2021     Lab Results   Component Value Date    RBC 3.87 01/22/2021     Lab Results   Component Value Date    HGB 11.3 01/22/2021     Lab Results   Component Value Date    HCT 34.7 01/22/2021     Lab Results   Component Value Date    MCV 90 01/22/2021     Lab Results   Component Value Date    MCH 29.2 01/22/2021     Lab Results   Component Value Date    MCHC 32.6 01/22/2021     Lab Results   Component Value  Date    RDW 15.9 01/22/2021     Lab Results   Component Value Date     01/22/2021     Hepatitis B Core Total Ab, Sag negative  Hepatitis B Sab NR    11/2020  Hepatitis C rna 415    10/2020  HIV negative    Imaging:    RUQ US 12/2020    FINDINGS:   Fluid: No evidence of ascites or pleural effusions.     Liver: The liver demonstrates slightly increased echotexture,  suggestive of mild diffuse hepatic steatosis. There is no focal mass.      Gallbladder: Cholecystectomy.     Bile Ducts: Both the intra- and extrahepatic biliary system are of  normal caliber.  The common bile duct measures 7.9 mm in diameter.     Pancreas: Visualized portions of the head and body of the pancreas are  unremarkable.      Kidney: The right kidney measures 10.5 cm long. There is no  hydronephrosis or hydroureter, no shadowing renal calculi, cystic  lesion or mass.                                                                       IMPRESSION:   1.  Cholecystectomy. No intrahepatic or extrahepatic biliary  dilatation.  2.  Mild diffuse hepatic steatosis.    CT 2015    Findings:  The lung bases are clear.  The liver, spleen, pancreas, and adrenal glands are normal.  The gallbladder is surgically absent.  There is no dilatation of the biliary system.  No bile duct stones are identified.  There is no evidence of bile leak.  There are no inflammatory changes in the gallbladder fossa.  The kidneys are normal in size, shape, and position.  There is no hydronephrosis.  There are no renal masses or focal parenchymal abnormalities.  The abdominal aorta is normal in caliber.  There is no periaortic or retrocrural lymphadenopathy.  The bowel gas pattern is normal.  There are a few diverticula in the sigmoid colon.  Findings of diverticulitis are not present.  The appendix is normal.  The urinary bladder has a smooth contour.  There is no free fluid in the abdomen and pelvis.  There are no acute bony abnormalities.    Impression:  The gallbladder  is surgically absent.  There is no evidence of complication following cholecystectomy.  There are a few diverticula in the sigmoid colon.  Results called to Dr. Valenzuela @ 1875 on 5/20/15.    Assessment/Plan: Ms. Cha is a 51 year old woman with a history of IVDU, hepatitis C who presents to discuss her hepatitis C. She has a very low viral load. Last IVDU 1 year. Her low viral load potentially represents natural clearance of the virus, although this is a longer time frame than I would expect. She does have fatty liver without signs of advanced fibrosis on imaging. APRI low, making advanced fibrosis unlikely. Hepatitis B and HIV negative.     Recommend repeating HCV rna (added on) and if still detected, will recommended treatment. Genotype pending.    Discussed natural history of hepatitis C, risk factors for progression of liver disease, reinfection.     Independently reviewed labs and imaging.     Continue to avoid alcohol.     Orders Placed This Encounter   Procedures     Hepatitis C RNA, quantitative     RTC 3 months.     Cara Guerrero MD MS  Hepatology/Liver Transplant  [facility]

## 2021-02-04 LAB
HCV RNA SERPL NAA+PROBE-ACNC: NORMAL [IU]/ML
HCV RNA SERPL NAA+PROBE-LOG IU: NORMAL LOG IU/ML

## 2021-02-05 ENCOUNTER — PATIENT OUTREACH (OUTPATIENT)
Dept: GASTROENTEROLOGY | Facility: CLINIC | Age: 51
End: 2021-02-05

## 2021-02-05 ENCOUNTER — TELEPHONE (OUTPATIENT)
Dept: GASTROENTEROLOGY | Facility: OUTPATIENT CENTER | Age: 51
End: 2021-02-05

## 2021-02-05 DIAGNOSIS — Z11.59 ENCOUNTER FOR SCREENING FOR OTHER VIRAL DISEASES: Primary | ICD-10-CM

## 2021-02-05 NOTE — PROGRESS NOTES
Contacted endoscopy scheduling and will call pt to schedule egd and colonoscopy   next week.    In basket message to Dr. Nolasco.

## 2021-02-05 NOTE — TELEPHONE ENCOUNTER
Caller :[facility]    Reason for cancel? Cx 3/22-Gaurav [facility] due to it being to far out    Rescheduled? Y: 2/12-Kary; confirmed with patient along w/ covid test scheduled    Additional Comments: None

## 2021-02-08 ENCOUNTER — TELEPHONE (OUTPATIENT)
Dept: GASTROENTEROLOGY | Facility: OUTPATIENT CENTER | Age: 51
End: 2021-02-08

## 2021-02-08 DIAGNOSIS — R19.7 DIARRHEA: Primary | ICD-10-CM

## 2021-02-08 RX ORDER — BISACODYL 5 MG
5 TABLET, DELAYED RELEASE (ENTERIC COATED) ORAL ONCE
Qty: 2 TABLET | Refills: 0 | Status: SHIPPED | OUTPATIENT
Start: 2021-02-08 | End: 2021-02-08

## 2021-02-08 NOTE — TELEPHONE ENCOUNTER
Patient taking any blood thinners ? No      Heart disease ? Denies      Lung disease ? Denies      Sleep apnea ? Denies      Diabetic ? Denies      Kidney disease ? Denies      Electronic implanted medical devices ? Denies      PTSD ? N/a      Prep instructions reviewed with patient ? Instructions, policy,MAC sedation plan reviewed. Advised patient to have someone stay with them post exam.     Yes    Pharmacy : [facility]     Indication for procedure : Diarrhea, unspecified type     Referring provider : Romeo Landin      Arrival Time : 8 AM

## 2021-02-09 DIAGNOSIS — Z11.59 ENCOUNTER FOR SCREENING FOR OTHER VIRAL DISEASES: ICD-10-CM

## 2021-02-09 LAB
HCV GENTYP SERPL NAA+PROBE: NORMAL
SARS-COV-2 RNA RESP QL NAA+PROBE: NORMAL
SPECIMEN SOURCE: NORMAL

## 2021-02-09 PROCEDURE — 99207 PR NO CHARGE LOS: CPT

## 2021-02-09 PROCEDURE — U0005 INFEC AGEN DETEC AMPLI PROBE: HCPCS | Performed by: INTERNAL MEDICINE

## 2021-02-09 PROCEDURE — U0003 INFECTIOUS AGENT DETECTION BY NUCLEIC ACID (DNA OR RNA); SEVERE ACUTE RESPIRATORY SYNDROME CORONAVIRUS 2 (SARS-COV-2) (CORONAVIRUS DISEASE [COVID-19]), AMPLIFIED PROBE TECHNIQUE, MAKING USE OF HIGH THROUGHPUT TECHNOLOGIES AS DESCRIBED BY CMS-2020-01-R: HCPCS | Performed by: INTERNAL MEDICINE

## 2021-02-10 LAB
LABORATORY COMMENT REPORT: NORMAL
SARS-COV-2 RNA RESP QL NAA+PROBE: NEGATIVE
SPECIMEN SOURCE: NORMAL

## 2021-02-11 ENCOUNTER — TELEPHONE (OUTPATIENT)
Dept: GASTROENTEROLOGY | Facility: OUTPATIENT CENTER | Age: 51
End: 2021-02-11

## 2021-02-11 NOTE — TELEPHONE ENCOUNTER
Golytely colonoscopy prep instructions reviewed again with patient. Questions answered. After hours number given if questions after hours.

## 2021-02-12 ENCOUNTER — TRANSFERRED RECORDS (OUTPATIENT)
Dept: HEALTH INFORMATION MANAGEMENT | Facility: CLINIC | Age: 51
End: 2021-02-12

## 2021-02-12 ENCOUNTER — APPOINTMENT (OUTPATIENT)
Dept: GASTROENTEROLOGY | Facility: OUTPATIENT CENTER | Age: 51
End: 2021-02-12
Payer: COMMERCIAL

## 2021-02-12 ENCOUNTER — DOCUMENTATION ONLY (OUTPATIENT)
Dept: GASTROENTEROLOGY | Facility: OUTPATIENT CENTER | Age: 51
End: 2021-02-12
Payer: COMMERCIAL

## 2021-02-15 ENCOUNTER — PATIENT OUTREACH (OUTPATIENT)
Dept: GASTROENTEROLOGY | Facility: CLINIC | Age: 51
End: 2021-02-15

## 2021-02-15 DIAGNOSIS — K52.9 IBD (INFLAMMATORY BOWEL DISEASE): ICD-10-CM

## 2021-02-15 DIAGNOSIS — R19.7 DIARRHEA, UNSPECIFIED TYPE: ICD-10-CM

## 2021-02-15 DIAGNOSIS — K52.9 COLITIS: ICD-10-CM

## 2021-02-15 DIAGNOSIS — R19.7 DIARRHEA, UNSPECIFIED TYPE: Primary | ICD-10-CM

## 2021-02-15 PROCEDURE — 86704 HEP B CORE ANTIBODY TOTAL: CPT | Performed by: INTERNAL MEDICINE

## 2021-02-15 PROCEDURE — 86481 TB AG RESPONSE T-CELL SUSP: CPT | Performed by: INTERNAL MEDICINE

## 2021-02-15 PROCEDURE — 99000 SPECIMEN HANDLING OFFICE-LAB: CPT | Performed by: INTERNAL MEDICINE

## 2021-02-15 PROCEDURE — 36415 COLL VENOUS BLD VENIPUNCTURE: CPT | Performed by: INTERNAL MEDICINE

## 2021-02-15 PROCEDURE — 87340 HEPATITIS B SURFACE AG IA: CPT | Performed by: INTERNAL MEDICINE

## 2021-02-15 PROCEDURE — 86706 HEP B SURFACE ANTIBODY: CPT | Performed by: INTERNAL MEDICINE

## 2021-02-15 PROCEDURE — 82657 ENZYME CELL ACTIVITY: CPT | Mod: 90 | Performed by: INTERNAL MEDICINE

## 2021-02-15 RX ORDER — LORAZEPAM 2 MG/1
TABLET ORAL
Qty: 1 TABLET | Refills: 0
Start: 2021-02-15 | End: 2021-03-12

## 2021-02-15 RX ORDER — PREDNISONE 5 MG/1
TABLET ORAL
Qty: 182 TABLET | Refills: 0 | Status: SHIPPED | OUTPATIENT
Start: 2021-02-15 | End: 2021-03-12

## 2021-02-15 NOTE — PROGRESS NOTES
Contacted to discuss labs and mre   Patient informed to discuss prednisone, imaging and labs.   Stressed the importance of scheduling lab appointment at a  [place] lab near her and also to schedule the mre  Order ativan will be called in  Called pharmacist to request that the prednisone taper be discussed in detail with the patient.  Patient aware she will need a  to mre as will bring ativan to the appointment.          Called one time order for ativan.        Please call the patient today and start her on prednisone. Usual taper.     Needs prebiologic labs ASAP.     Needs follow up with me to discuss biologics.     Also needs MRE

## 2021-02-17 ENCOUNTER — TELEPHONE (OUTPATIENT)
Dept: GASTROENTEROLOGY | Facility: CLINIC | Age: 51
End: 2021-02-17

## 2021-02-17 LAB
GAMMA INTERFERON BACKGROUND BLD IA-ACNC: 0.11 IU/ML
HBV CORE AB SERPL QL IA: NONREACTIVE
HBV SURFACE AB SERPL IA-ACNC: 0.37 M[IU]/ML
HBV SURFACE AG SERPL QL IA: NONREACTIVE
M TB IFN-G CD4+ BCKGRND COR BLD-ACNC: 9.89 IU/ML
M TB TUBERC IFN-G BLD QL: NEGATIVE
MITOGEN IGNF BCKGRD COR BLD-ACNC: 0.02 IU/ML
MITOGEN IGNF BCKGRD COR BLD-ACNC: 0.08 IU/ML

## 2021-02-17 NOTE — TELEPHONE ENCOUNTER
[facility] Call Center    Phone Message    May a detailed message be left on voicemail: yes     Reason for Call: Medication Question or concern regarding medication   Prescription Clarification  Name of Medication: LORazepam (ATIVAN) 2 MG tablet  Prescribing Provider: Dr. Nolasco    Pharmacy: [facility]    What on the order needs clarification? Pt states she arrived at the pharmacy and they did not have the Rx for her, pharmacy told her they have not received an Rx from the clinic. Please advise. Thank you!          Action Taken: Message routed to:  [facility] GI    Travel Screening: Not Applicable

## 2021-02-18 LAB — COPATH REPORT: NORMAL

## 2021-02-19 ENCOUNTER — HOSPITAL ENCOUNTER (OUTPATIENT)
Dept: MRI IMAGING | Facility: CLINIC | Age: 51
Discharge: HOME | End: 2021-02-19
Attending: INTERNAL MEDICINE | Admitting: INTERNAL MEDICINE
Payer: COMMERCIAL

## 2021-02-19 DIAGNOSIS — K52.9 COLITIS: ICD-10-CM

## 2021-02-19 DIAGNOSIS — R19.7 DIARRHEA, UNSPECIFIED TYPE: ICD-10-CM

## 2021-02-19 LAB — TPMT BLD-CCNC: 27.4 U/ML (ref 24–44)

## 2021-02-19 PROCEDURE — 74183 MRI ABD W/O CNTR FLWD CNTR: CPT

## 2021-02-19 PROCEDURE — 74183 MRI ABD W/O CNTR FLWD CNTR: CPT | Mod: 26 | Performed by: RADIOLOGY

## 2021-02-19 PROCEDURE — A9585 GADOBUTROL INJECTION: HCPCS | Performed by: INTERNAL MEDICINE

## 2021-02-19 PROCEDURE — 255N000002 HC RX 255 OP 636: Performed by: INTERNAL MEDICINE

## 2021-02-19 PROCEDURE — 250N000011 HC RX IP 250 OP 636: Performed by: INTERNAL MEDICINE

## 2021-02-19 PROCEDURE — 72197 MRI PELVIS W/O & W/DYE: CPT | Mod: 26 | Performed by: RADIOLOGY

## 2021-02-19 RX ORDER — GADOBUTROL 604.72 MG/ML
6 INJECTION INTRAVENOUS ONCE
Status: COMPLETED | OUTPATIENT
Start: 2021-02-19 | End: 2021-02-19

## 2021-02-19 RX ADMIN — GLUCAGON HYDROCHLORIDE 1 MG: 1 INJECTION, POWDER, FOR SOLUTION INTRAMUSCULAR; INTRAVENOUS; SUBCUTANEOUS at 13:58

## 2021-02-19 RX ADMIN — GADOBUTROL 6 ML: 604.72 INJECTION INTRAVENOUS at 13:57

## 2021-03-11 ENCOUNTER — TELEPHONE (OUTPATIENT)
Dept: GASTROENTEROLOGY | Facility: CLINIC | Age: 51
End: 2021-03-11

## 2021-03-11 ENCOUNTER — VIRTUAL VISIT (OUTPATIENT)
Dept: GASTROENTEROLOGY | Facility: CLINIC | Age: 51
End: 2021-03-11
Payer: COMMERCIAL

## 2021-03-11 VITALS — WEIGHT: 162 LBS | BODY MASS INDEX: 28.7 KG/M2 | HEIGHT: 63 IN

## 2021-03-11 DIAGNOSIS — K50.10 CROHN'S COLITIS, WITHOUT COMPLICATIONS (H): Primary | ICD-10-CM

## 2021-03-11 DIAGNOSIS — B96.81 HELICOBACTER PYLORI GASTRITIS: ICD-10-CM

## 2021-03-11 DIAGNOSIS — K51.00 PANCOLITIS (H): ICD-10-CM

## 2021-03-11 DIAGNOSIS — B18.2 CHRONIC HEPATITIS C WITHOUT HEPATIC COMA (H): ICD-10-CM

## 2021-03-11 DIAGNOSIS — K29.70 HELICOBACTER PYLORI GASTRITIS: ICD-10-CM

## 2021-03-11 PROCEDURE — 99215 OFFICE O/P EST HI 40 MIN: CPT | Mod: 95 | Performed by: INTERNAL MEDICINE

## 2021-03-11 RX ORDER — DIPHENHYDRAMINE HCL 25 MG
25 CAPSULE ORAL ONCE
Status: CANCELLED
Start: 2021-03-11 | End: 2021-03-11

## 2021-03-11 RX ORDER — METHYLPREDNISOLONE SODIUM SUCCINATE 125 MG/2ML
125 INJECTION, POWDER, LYOPHILIZED, FOR SOLUTION INTRAMUSCULAR; INTRAVENOUS ONCE
Status: CANCELLED
Start: 2021-03-11 | End: 2021-03-11

## 2021-03-11 RX ORDER — ACETAMINOPHEN 325 MG/1
650 TABLET ORAL ONCE
Status: CANCELLED
Start: 2021-03-11 | End: 2021-03-11

## 2021-03-11 ASSESSMENT — MIFFLIN-ST. JEOR: SCORE: 1328.96

## 2021-03-11 NOTE — LETTER
"    3/11/2021         RE: Shelly Cha  [address]  [facility] [address]        Dear Colleague,    Thank you for referring your patient, Shelly Cha, to the [facility] [place]. Please see a copy of my visit note below.    Shelly Cha is a 51 year old female who is being evaluated via a billable video visit.      Please send link to cell phone:  [phone]    The patient has been notified of following:     \"This video visit will be conducted via a call between you and your physician/provider. We have found that certain health care needs can be provided without the need for an in-person physical exam.  This service lets us provide the care you need with a video conversation.  If a prescription is necessary we can send it directly to your pharmacy.  If lab work is needed we can place an order for that and you can then stop by our lab to have the test done at a later time.    If during the course of the call the physician/provider feels a video visit is not appropriate, you will not be charged for this service.\"     Patient confirmed that they are in [place] for today's visit yes.    Video-Visit Details  Type of service:  Video Visit    Video Start Time: 8:06 AM  Video End Time:  8:26 AM    Originating Location (pt. Location): Home    Distant Location (provider location):  [facility] [place]     Platform used: [employer]      IBD CLINIC VISIT    CC/REFERRING MD:  No ref. provider found    REASON FOR CONSULTATION: follow up severe colitis    ASSESSMENT/PLAN:    1. Severe inflammatory bowel disease (colitis) -colonoscopy showed severe colitis.  There was sparing of the rectosigmoid area.  Biopsies of the rectosigmoid showed chronic changes.  At this time she could have Crohn's colitis but it also could be an atypical variant of ulcerative colitis.  The treatment for either of these will be the same at this time.  I recommend that we " move forward with infliximab infusions.  Risks and benefits and the reasoning of this treatment was discussed in detail with the patient.  All questions were answered to her stated satisfaction.  She agrees with this plan.  We will work to start infliximab 5 mg/kg induction and maintenance.  We will recheck some blood work to see how her inflammatory markers are.  I do note that she had a relatively recent CRP done at the beginning of March which had normalized.  If her inflammatory markers show significant elevation or if her symptoms return we may yet start prednisone to bridge until we can get the eczema have approved.    I recommend that we repeat a flexible sigmoidoscopy versus a full colonoscopy in 6 months to check for mucosal healing.  I do note that she had a CT of pseudopolyps which will make ongoing colon cancer surveillance and polyp surveillance quite difficult.  We will see how things respond to treatment.  Also, given the number of pseudopolyps it is likely that she has had ongoing colitis for several years.  I would likely recommend that we move forward with dysplasia surveillance on her next colonoscopy.    2.  H. pylori gastritis-this is seen on her gastric biopsies.  This will require treatment because of the risk for stomach cancer with chronic H. pylori infections.  However, this is not likely contributing to her symptoms at this time and this is likely an longstanding process.  We will wait for her colitis to be under better control and then we will discuss with our pharmacy team about treating with quadruple therapy.    3.  Hepatitis C-her most recent hepatitis C viral load was undetectable.  This indicates spontaneous clearance.  This therefore means she does not need any treatment.      IBD HISTORY  Age at diagnosis: 51  Extent of disease: colon (pan-colitis - sparing of rectosigmoid - distal 15 cm)  Disease phenotype: inflammatory  Oneida-anal disease: none  Current CD  medications:  -prednisone  Prior IBD surgeries: none  Prior IBD Medications: none    DRUG MONITORING  TPMT enzyme activity: adequate    6-TGN/6-MMPN levels: NA    Biologic concentration: NA    DISEASE ASSESSMENT  Labs  Recent Labs   Lab Test 02/01/21  1513 04/12/18  0533 04/09/18  0308 04/09/18  0308   CRP 16.0* 9.6*   < > 11.7*   SED  --   --   --  28*    < > = values in this interval not displayed.     CRP outside EMR: 5  Fecal calprotectin: 5000s  Endoscopy:   -colonoscopy - severe inflammation colon (distal 15 cm spared). Ileum not seen  - normal EGD but gastric biopsis + h pylori  Enterography: MRE with normal small bowel   C diff: negative 1/22/21    sIBDQ:   No flowsheet data found.    IBD Health Care Maintenance:    Vaccinations:  All patients on biologics should avoid live vaccines.    -- Influenza (every year)  -- TdaP (every 10 years)  -- Pneumococcal Pneumonia (once plus booster at 5 years)  -- Yearly assessment for latent Tb (verbal screening and exam, PPD or QuantiFERON-Tb testing)    One time confirmation of immunity or serologies:  -- Hepatitis A (serologies or immunizations)  -- Hepatitis B (serologies or immunizations)  -- Varicella  -- MMR  -- HPV (all aged 18-26)  -- Meningococcal meningitis (all patients at risk for meningitis)  -- Due to the immunosuppression in this patient, I would not advise administration of live vaccines such as varicella/VZV, intranasal influenza, MMR, or yellow fever vaccine (if travelling).      Bone mineral density screening   -- Recommend all patients supplement with calcium and vitamin D      Cancer Screening:  Colon cancer screening:  Given panncolonic disease, recommend patient undergo regular dysplasia surveillance   Next dysplasia screening is recommended: given pseudopolyps I recommend we start dysplasia surveillance on next colonoscopy in 6-12 months.  Dysplasia surveillance will be difficult given the number of pseudopolyps that she is having.    Cervical  cancer screening: Annual due to immunosupression    Skin cancer screening: Annual visual exam of skin by dermatologist since patient is immunocompromised    Depression Screening:  -- Over the last month, have you felt down, depressed, or hopeless? no  -- Over the last month, have you felt little interest or pleasure doing things? no    Misc:  -- Avoid tobacco use  -- Avoid NSAIDs as there is potentially a 25% chance of causing an IBD flare    Return to clinic in 2 months    Thank you for this consultation.  It was a pleasure to participate in the care of this patient; please contact us with any further questions.      This note was created with voice recognition software, and while reviewed for accuracy, typos may remain.     Kwasi Nolasco MD  Division of Gastroenterology, Hepatology and Nutrition  [facility]  Pager: [phone]      HPI:   Currently, patient is here today to follow-up in IBD clinic.  Her colonoscopy showed severe colitis throughout the cecum/ascending, transverse colon, descending colon, and sigmoid colon.  The distal 15 cm (rectosigmoid region) was spared.  Biopsies showed chronic active colitis.  Biopsies of the rectum showed chronic changes only.  There was a see of pseudopolyps and exudate throughout the entire colon.  This was consistent with inflammatory bowel disease.    We prescribed the patient prednisone, however, she was worried about the side effects of this and did not start it.  She notes that her symptoms have resolved.  She is now having 1 bowel movement every day to every other day.  She gets occasional abdominal discomfort leading to a bowel movement but otherwise is doing well.  She has no blood in her stool.  She has no extraintestinal manifestations of disease.  She has no fevers, chills, night sweats.    MR enterography showed changes consistent with colitis but no evidence of small bowel disease.    She is very interested in starting a maintenance treatment plan for  her inflammatory bowel disease.    She also followed up with hepatology clinic.  Her repeat hepatitis C viral load was undetectable which indicates clearing of the virus.    ROS:    No fevers or chills  No weight loss  No blurry vision, double vision or change in vision  No sore throat  No lymphadenopathy  No headache, paraesthesias, or weakness in a limb  No shortness of breath or wheezing  No chest pain or pressure  No arthralgias or myalgias  No rashes or skin changes  No odynophagia or dysphagia  No BRBPR, hematochezia, melena  No dysuria, frequency or urgency  No hot/cold intolerance or polyria  No anxiety or depression    Extra intestinal manifestations of IBD:  No uveitis/episcleritis  No aphthous ulcers   No arthritis   No erythema nodosum/pyoderma gangrenosum.     PERTINENT PAST MEDICAL HISTORY:  No past medical history on file.    PREVIOUS SURGERIES:  Past Surgical History:   Procedure Laterality Date     BIOPSY      right breast     CHOLECYSTECTOMY       GYN SURGERY      , tubal       PREVIOUS ENDOSCOPY:  No results found for this or any previous visit.]    ALLERGIES:     Allergies   Allergen Reactions     Ibuprofen Swelling     Eyes swelling     Tylenol [Acetaminophen]        PERTINENT MEDICATIONS:    Current Outpatient Medications:      gabapentin (NEURONTIN) 300 MG capsule, Take 300 mg by mouth 3 times daily, Disp: , Rfl:      LORazepam (ATIVAN) 2 MG tablet, Bring to imaging appt. You will be directed when to take by imaging tech YOU WILL NEED A , Disp: 1 tablet, Rfl: 0     METHADONE HCL PO, Take 90 mg by mouth daily, Disp: , Rfl:      methylcellulose (CITRUCEL) powder, Take 0.85 g (3 teaspoonful) by mouth daily, Disp: 90 g, Rfl: 3     polyethylene glycol (GOLYTELY) 236 g suspension, Take 4,000 mLs by mouth See Admin Instructions Mix with water to make 1 gallon day before exam. At 6 PM drink 1 glass every 15 minutes until half gone. Drink the remainder at 3 AM the day of exam, Disp: 4000 mL, Rfl:  "0     predniSONE (DELTASONE) 5 MG tablet, Take 8 tablets (40 mg) by mouth daily for 14 days, THEN 6 tablets (30 mg) daily for 7 days, THEN 4 tablets (20 mg) daily for 7 days., Disp: 182 tablet, Rfl: 0    SOCIAL HISTORY:  Social History     Socioeconomic History     Marital status: Single     Spouse name: Not on file     Number of children: Not on file     Years of education: Not on file     Highest education level: Not on file   Occupational History     Not on file   Social Needs     Financial resource strain: Not on file     Food insecurity     Worry: Not on file     Inability: Not on file     Transportation needs     Medical: Not on file     Non-medical: Not on file   Tobacco Use     Smoking status: Light Tobacco Smoker     Smokeless tobacco: Never Used   Substance and Sexual Activity     Alcohol use: No     Drug use: No     Sexual activity: Yes     Partners: Male   Lifestyle     Physical activity     Days per week: Not on file     Minutes per session: Not on file     Stress: Not on file   Relationships     Social connections     Talks on phone: Not on file     Gets together: Not on file     Attends [employer] service: Not on file     Active member of club or organization: Not on file     Attends meetings of clubs or organizations: Not on file     Relationship status: Not on file     Intimate partner violence     Fear of current or ex partner: Not on file     Emotionally abused: Not on file     Physically abused: Not on file     Forced sexual activity: Not on file   Other Topics Concern     Not on file   Social History Narrative     Not on file       FAMILY HISTORY:  No family history on file.    Past/family/social history reviewed and no changes    PHYSICAL EXAMINATION:  Constitutional: aaox3, cooperative, pleasant, not dyspneic/diaphoretic, no acute distress  Vitals reviewed: Ht 1.6 m (5' 3\")   Wt 73.5 kg (162 lb)   BMI 28.70 kg/m    Wt:   Wt Readings from Last 2 Encounters:   03/11/21 73.5 kg (162 lb) "   04/09/18 62.3 kg (137 lb 6.4 oz)      Eyes: Sclera anicteric/injected  Respiratory: Unlabored breathing  Skin: w no jaundice  Psych: Normal affect      PERTINENT STUDIES:  Most recent CBC:  Recent Labs   Lab Test 01/22/21  1712 04/12/18  0533   WBC 13.7* 7.9   HGB 11.3* 12.2   HCT 34.7* 36.9   * 309     Most recent hepatic panel:  Recent Labs   Lab Test 01/22/21  1712 04/11/18  1810   ALT <6 11   AST 13 29     Most recent creatinine:  Recent Labs   Lab Test 01/22/21 1712 04/14/18  0522   CR 0.83 0.60     Again, thank you for allowing me to participate in the care of your patient.      Sincerely,    Kwasi Nolasco MD

## 2021-03-11 NOTE — PROGRESS NOTES
"Shelly Cha is a 51 year old female who is being evaluated via a billable video visit.      Please send link to cell phone:  [phone]    The patient has been notified of following:     \"This video visit will be conducted via a call between you and your physician/provider. We have found that certain health care needs can be provided without the need for an in-person physical exam.  This service lets us provide the care you need with a video conversation.  If a prescription is necessary we can send it directly to your pharmacy.  If lab work is needed we can place an order for that and you can then stop by our lab to have the test done at a later time.    If during the course of the call the physician/provider feels a video visit is not appropriate, you will not be charged for this service.\"     Patient confirmed that they are in [place] for today's visit yes.    Video-Visit Details  Type of service:  Video Visit    Video Start Time: 8:06 AM  Video End Time:  8:26 AM    Originating Location (pt. Location): Home    Distant Location (provider location):  [facility] [place]     Platform used: [email or web address]            "

## 2021-03-11 NOTE — TELEPHONE ENCOUNTER
Called patient to update her on plan and schedule for a [facility] pharmacy visit with Edwige Goel.   Patient also aware labs are due and will call to make an appointment.

## 2021-03-11 NOTE — TELEPHONE ENCOUNTER
----- Message from Kwasi Nolasco MD sent at 3/11/2021  2:33 PM CST -----  Regarding: RE: start IFX ASAP  That would be great. Can you help arrange?    Also, can we give her info about the [place] study?  ----- Message -----  From: Rosa Redmond RN  Sent: 3/11/2021  11:51 AM CST  To: Kwasi Nolasco MD  Subject: RE: start IFX ASAP                               Orders are in requested an urgent PA she will be infusing in [place], do you have them see [place] prior to the start?    Rosa  ----- Message -----  From: Kwasi Nolasco MD  Sent: 3/11/2021   8:47 AM CST  To: Devora Butler, RN, Rosa Redmond RN  Subject: start IFX ASAP                                   HI gumelania.    THis patient needs to start infliximab 5 mg/kg ASAP. Please help arrange    Also, needs repeat labd (ordered0    See avs    Follow-up 2 months

## 2021-03-11 NOTE — PROGRESS NOTES
IBD CLINIC VISIT    CC/REFERRING MD:  No ref. provider found    REASON FOR CONSULTATION: follow up severe colitis    ASSESSMENT/PLAN:    1. Severe inflammatory bowel disease (colitis) -colonoscopy showed severe colitis.  There was sparing of the rectosigmoid area.  Biopsies of the rectosigmoid showed chronic changes.  At this time she could have Crohn's colitis but it also could be an atypical variant of ulcerative colitis.  The treatment for either of these will be the same at this time.  I recommend that we move forward with infliximab infusions.  Risks and benefits and the reasoning of this treatment was discussed in detail with the patient.  All questions were answered to her stated satisfaction.  She agrees with this plan.  We will work to start infliximab 5 mg/kg induction and maintenance.  We will recheck some blood work to see how her inflammatory markers are.  I do note that she had a relatively recent CRP done at the beginning of March which had normalized.  If her inflammatory markers show significant elevation or if her symptoms return we may yet start prednisone to bridge until we can get the eczema have approved.    I recommend that we repeat a flexible sigmoidoscopy versus a full colonoscopy in 6 months to check for mucosal healing.  I do note that she had a CT of pseudopolyps which will make ongoing colon cancer surveillance and polyp surveillance quite difficult.  We will see how things respond to treatment.  Also, given the number of pseudopolyps it is likely that she has had ongoing colitis for several years.  I would likely recommend that we move forward with dysplasia surveillance on her next colonoscopy.    2.  H. pylori gastritis-this is seen on her gastric biopsies.  This will require treatment because of the risk for stomach cancer with chronic H. pylori infections.  However, this is not likely contributing to her symptoms at this time and this is likely an longstanding process.  We will  wait for her colitis to be under better control and then we will discuss with our pharmacy team about treating with quadruple therapy.    3.  Hepatitis C-her most recent hepatitis C viral load was undetectable.  This indicates spontaneous clearance.  This therefore means she does not need any treatment.      IBD HISTORY  Age at diagnosis: 51  Extent of disease: colon (pan-colitis - sparing of rectosigmoid - distal 15 cm)  Disease phenotype: inflammatory  Oneida-anal disease: none  Current CD medications:  -prednisone  Prior IBD surgeries: none  Prior IBD Medications: none    DRUG MONITORING  TPMT enzyme activity: adequate    6-TGN/6-MMPN levels: NA    Biologic concentration: NA    DISEASE ASSESSMENT  Labs  Recent Labs   Lab Test 02/01/21  1513 04/12/18  0533 04/09/18  0308 04/09/18  0308   CRP 16.0* 9.6*   < > 11.7*   SED  --   --   --  28*    < > = values in this interval not displayed.     CRP outside EMR: 5  Fecal calprotectin: 5000s  Endoscopy:   -colonoscopy - severe inflammation colon (distal 15 cm spared). Ileum not seen  - normal EGD but gastric biopsis + h pylori  Enterography: MRE with normal small bowel   C diff: negative 1/22/21    sIBDQ:   No flowsheet data found.    IBD Health Care Maintenance:    Vaccinations:  All patients on biologics should avoid live vaccines.    -- Influenza (every year)  -- TdaP (every 10 years)  -- Pneumococcal Pneumonia (once plus booster at 5 years)  -- Yearly assessment for latent Tb (verbal screening and exam, PPD or QuantiFERON-Tb testing)    One time confirmation of immunity or serologies:  -- Hepatitis A (serologies or immunizations)  -- Hepatitis B (serologies or immunizations)  -- Varicella  -- MMR  -- HPV (all aged 18-26)  -- Meningococcal meningitis (all patients at risk for meningitis)  -- Due to the immunosuppression in this patient, I would not advise administration of live vaccines such as varicella/VZV, intranasal influenza, MMR, or yellow fever vaccine (if  travelling).      Bone mineral density screening   -- Recommend all patients supplement with calcium and vitamin D      Cancer Screening:  Colon cancer screening:  Given panncolonic disease, recommend patient undergo regular dysplasia surveillance   Next dysplasia screening is recommended: given pseudopolyps I recommend we start dysplasia surveillance on next colonoscopy in 6-12 months.  Dysplasia surveillance will be difficult given the number of pseudopolyps that she is having.    Cervical cancer screening: Annual due to immunosupression    Skin cancer screening: Annual visual exam of skin by dermatologist since patient is immunocompromised    Depression Screening:  -- Over the last month, have you felt down, depressed, or hopeless? no  -- Over the last month, have you felt little interest or pleasure doing things? no    Misc:  -- Avoid tobacco use  -- Avoid NSAIDs as there is potentially a 25% chance of causing an IBD flare    Return to clinic in 2 months    Thank you for this consultation.  It was a pleasure to participate in the care of this patient; please contact us with any further questions.      This note was created with voice recognition software, and while reviewed for accuracy, typos may remain.     Kwasi Nolasco MD  Division of Gastroenterology, Hepatology and Nutrition  [facility]  Pager: [phone]      HPI:   Currently, patient is here today to follow-up in IBD clinic.  Her colonoscopy showed severe colitis throughout the cecum/ascending, transverse colon, descending colon, and sigmoid colon.  The distal 15 cm (rectosigmoid region) was spared.  Biopsies showed chronic active colitis.  Biopsies of the rectum showed chronic changes only.  There was a see of pseudopolyps and exudate throughout the entire colon.  This was consistent with inflammatory bowel disease.    We prescribed the patient prednisone, however, she was worried about the side effects of this and did not start it.  She notes  that her symptoms have resolved.  She is now having 1 bowel movement every day to every other day.  She gets occasional abdominal discomfort leading to a bowel movement but otherwise is doing well.  She has no blood in her stool.  She has no extraintestinal manifestations of disease.  She has no fevers, chills, night sweats.    MR enterography showed changes consistent with colitis but no evidence of small bowel disease.    She is very interested in starting a maintenance treatment plan for her inflammatory bowel disease.    She also followed up with hepatology clinic.  Her repeat hepatitis C viral load was undetectable which indicates clearing of the virus.    ROS:    No fevers or chills  No weight loss  No blurry vision, double vision or change in vision  No sore throat  No lymphadenopathy  No headache, paraesthesias, or weakness in a limb  No shortness of breath or wheezing  No chest pain or pressure  No arthralgias or myalgias  No rashes or skin changes  No odynophagia or dysphagia  No BRBPR, hematochezia, melena  No dysuria, frequency or urgency  No hot/cold intolerance or polyria  No anxiety or depression    Extra intestinal manifestations of IBD:  No uveitis/episcleritis  No aphthous ulcers   No arthritis   No erythema nodosum/pyoderma gangrenosum.     PERTINENT PAST MEDICAL HISTORY:  No past medical history on file.    PREVIOUS SURGERIES:  Past Surgical History:   Procedure Laterality Date     BIOPSY      right breast     CHOLECYSTECTOMY       GYN SURGERY      , tubal       PREVIOUS ENDOSCOPY:  No results found for this or any previous visit.]    ALLERGIES:     Allergies   Allergen Reactions     Ibuprofen Swelling     Eyes swelling     Tylenol [Acetaminophen]        PERTINENT MEDICATIONS:    Current Outpatient Medications:      gabapentin (NEURONTIN) 300 MG capsule, Take 300 mg by mouth 3 times daily, Disp: , Rfl:      LORazepam (ATIVAN) 2 MG tablet, Bring to imaging appt. You will be directed when to take  by [employer] tech YOU WILL NEED A , Disp: 1 tablet, Rfl: 0     METHADONE HCL PO, Take 90 mg by mouth daily, Disp: , Rfl:      methylcellulose (CITRUCEL) powder, Take 0.85 g (3 teaspoonful) by mouth daily, Disp: 90 g, Rfl: 3     polyethylene glycol (GOLYTELY) 236 g suspension, Take 4,000 mLs by mouth See Admin Instructions Mix with water to make 1 gallon day before exam. At 6 PM drink 1 glass every 15 minutes until half gone. Drink the remainder at 3 AM the day of exam, Disp: 4000 mL, Rfl: 0     predniSONE (DELTASONE) 5 MG tablet, Take 8 tablets (40 mg) by mouth daily for 14 days, THEN 6 tablets (30 mg) daily for 7 days, THEN 4 tablets (20 mg) daily for 7 days., Disp: 182 tablet, Rfl: 0    SOCIAL HISTORY:  Social History     Socioeconomic History     Marital status: Single     Spouse name: Not on file     Number of children: Not on file     Years of education: Not on file     Highest education level: Not on file   Occupational History     Not on file   Social Needs     Financial resource strain: Not on file     Food insecurity     Worry: Not on file     Inability: Not on file     Transportation needs     Medical: Not on file     Non-medical: Not on file   Tobacco Use     Smoking status: Light Tobacco Smoker     Smokeless tobacco: Never Used   Substance and Sexual Activity     Alcohol use: No     Drug use: No     Sexual activity: Yes     Partners: Male   Lifestyle     Physical activity     Days per week: Not on file     Minutes per session: Not on file     Stress: Not on file   Relationships     Social connections     Talks on phone: Not on file     Gets together: Not on file     Attends Zoroastrianism service: Not on file     Active member of club or organization: Not on file     Attends meetings of clubs or organizations: Not on file     Relationship status: Not on file     Intimate partner violence     Fear of current or ex partner: Not on file     Emotionally abused: Not on file     Physically abused: Not on file  "    Forced sexual activity: Not on file   Other Topics Concern     Not on file   Social History Narrative     Not on file       FAMILY HISTORY:  No family history on file.    Past/family/social history reviewed and no changes    PHYSICAL EXAMINATION:  Constitutional: aaox3, cooperative, pleasant, not dyspneic/diaphoretic, no acute distress  Vitals reviewed: Ht 1.6 m (5' 3\")   Wt 73.5 kg (162 lb)   BMI 28.70 kg/m    Wt:   Wt Readings from Last 2 Encounters:   03/11/21 73.5 kg (162 lb)   04/09/18 62.3 kg (137 lb 6.4 oz)      Eyes: Sclera anicteric/injected  Respiratory: Unlabored breathing  Skin: w no jaundice  Psych: Normal affect      PERTINENT STUDIES:  Most recent CBC:  Recent Labs   Lab Test 01/22/21  1712 04/12/18  0533   WBC 13.7* 7.9   HGB 11.3* 12.2   HCT 34.7* 36.9   * 309     Most recent hepatic panel:  Recent Labs   Lab Test 01/22/21  1712 04/11/18  1810   ALT <6 11   AST 13 29     Most recent creatinine:  Recent Labs   Lab Test 01/22/21  1712 04/14/18  0522   CR 0.83 0.60           "

## 2021-03-11 NOTE — TELEPHONE ENCOUNTER
Called patient and explained that a prior authorization will be requested through her insurance for the start of the infliximab infusions.  Also discussed timing of first infusion with the patient.  Informed her that she will be contacted to schedule her infusion when there is insurance approval. Patient lives in [place] so she has requested to have her infusion in [place]. Orders have been placed and standing lab orders also entered in the patients chart.

## 2021-03-11 NOTE — PATIENT INSTRUCTIONS
It was very nice to see you again during your virtual visit.  I am glad you are feeling better.    As we discussed your colonoscopy showed you have severe colitis consistent with inflammatory bowel disease.  This needs to be treated.    I recommend that we move forward with treatment with infliximab infusions.  My office will contact you about scheduling this.    My office will also contact you about repeating some labs.    If it takes too long to get your insurance to improve this infliximab we may still yet recommend starting the prednisone.  We will be in touch about this.  Please keep us updated on your symptoms.    The biopsies of your stomach also did show that there is an infection called H. pylori.  This infection can cause ulcers (which you do not have) and over time even lead to stomach cancer.  We do recommend treating this but I recommend that we treat your colitis first.  Once we have that under better control we can talk about treating the H. pylori infection with antibiotics.    Follow-up in 2 months.

## 2021-03-11 NOTE — TELEPHONE ENCOUNTER
----- Message from Kwasi Nolasco MD sent at 3/11/2021  8:47 AM CST -----  Regarding: start IFX ASAP  HI guys.    THis patient needs to start infliximab 5 mg/kg ASAP. Please help arrange    Also, needs repeat labd (ordered0    See avs    Follow-up 2 months

## 2021-03-11 NOTE — NURSING NOTE
"Chief Complaint   Patient presents with     Follow Up     IBD       Vitals:    03/11/21 0736   Weight: 73.5 kg (162 lb)   Height: 1.6 m (5' 3\")       Body mass index is 28.7 kg/m .    Domitila Gonzalez CMA    "

## 2021-03-12 ENCOUNTER — VIRTUAL VISIT (OUTPATIENT)
Dept: PHARMACY | Facility: CLINIC | Age: 51
End: 2021-03-12
Payer: COMMERCIAL

## 2021-03-12 ENCOUNTER — PATIENT OUTREACH (OUTPATIENT)
Dept: GASTROENTEROLOGY | Facility: CLINIC | Age: 51
End: 2021-03-12

## 2021-03-12 DIAGNOSIS — F11.90 CHRONIC, CONTINUOUS USE OF OPIOIDS: ICD-10-CM

## 2021-03-12 DIAGNOSIS — G62.9 NEUROPATHY: ICD-10-CM

## 2021-03-12 DIAGNOSIS — K50.10 CROHN'S COLITIS, WITHOUT COMPLICATIONS (H): Primary | ICD-10-CM

## 2021-03-12 DIAGNOSIS — K50.10 CROHN'S COLITIS, WITHOUT COMPLICATIONS (H): ICD-10-CM

## 2021-03-12 DIAGNOSIS — K50.10 CROHN'S DISEASE OF COLON WITHOUT COMPLICATION (H): Primary | ICD-10-CM

## 2021-03-12 LAB
ALBUMIN SERPL-MCNC: 3.1 G/DL (ref 3.4–5)
ALP SERPL-CCNC: 81 U/L (ref 40–150)
ALT SERPL W P-5'-P-CCNC: 17 U/L (ref 0–50)
ANION GAP SERPL CALCULATED.3IONS-SCNC: 2 MMOL/L (ref 3–14)
AST SERPL W P-5'-P-CCNC: 24 U/L (ref 0–45)
BASOPHILS # BLD AUTO: 0 10E9/L (ref 0–0.2)
BASOPHILS NFR BLD AUTO: 0.1 %
BILIRUB SERPL-MCNC: 0.3 MG/DL (ref 0.2–1.3)
BUN SERPL-MCNC: 11 MG/DL (ref 7–30)
CALCIUM SERPL-MCNC: 8.9 MG/DL (ref 8.5–10.1)
CHLORIDE SERPL-SCNC: 108 MMOL/L (ref 94–109)
CO2 SERPL-SCNC: 27 MMOL/L (ref 20–32)
CREAT SERPL-MCNC: 0.84 MG/DL (ref 0.52–1.04)
CRP SERPL-MCNC: 5.4 MG/L (ref 0–8)
DIFFERENTIAL METHOD BLD: ABNORMAL
EOSINOPHIL # BLD AUTO: 0.4 10E9/L (ref 0–0.7)
EOSINOPHIL NFR BLD AUTO: 4.9 %
ERYTHROCYTE [DISTWIDTH] IN BLOOD BY AUTOMATED COUNT: 16.2 % (ref 10–15)
ERYTHROCYTE [SEDIMENTATION RATE] IN BLOOD BY WESTERGREN METHOD: 46 MM/H (ref 0–20)
GFR SERPL CREATININE-BSD FRML MDRD: 82 ML/MIN/{1.73_M2}
GLUCOSE SERPL-MCNC: 87 MG/DL (ref 70–99)
HCT VFR BLD AUTO: 35 % (ref 35–47)
HGB BLD-MCNC: 10.6 G/DL (ref 11.7–15.7)
LYMPHOCYTES # BLD AUTO: 2.9 10E9/L (ref 0.8–5.3)
LYMPHOCYTES NFR BLD AUTO: 33.4 %
MCH RBC QN AUTO: 28.6 PG (ref 26.5–33)
MCHC RBC AUTO-ENTMCNC: 30.3 G/DL (ref 31.5–36.5)
MCV RBC AUTO: 94 FL (ref 78–100)
MONOCYTES # BLD AUTO: 0.9 10E9/L (ref 0–1.3)
MONOCYTES NFR BLD AUTO: 10.6 %
NEUTROPHILS # BLD AUTO: 4.4 10E9/L (ref 1.6–8.3)
NEUTROPHILS NFR BLD AUTO: 51 %
PLATELET # BLD AUTO: 493 10E9/L (ref 150–450)
POTASSIUM SERPL-SCNC: 4.4 MMOL/L (ref 3.4–5.3)
PROT SERPL-MCNC: 7.7 G/DL (ref 6.8–8.8)
RBC # BLD AUTO: 3.71 10E12/L (ref 3.8–5.2)
SODIUM SERPL-SCNC: 137 MMOL/L (ref 133–144)
WBC # BLD AUTO: 8.7 10E9/L (ref 4–11)

## 2021-03-12 PROCEDURE — 86140 C-REACTIVE PROTEIN: CPT | Performed by: INTERNAL MEDICINE

## 2021-03-12 PROCEDURE — 85025 COMPLETE CBC W/AUTO DIFF WBC: CPT | Performed by: INTERNAL MEDICINE

## 2021-03-12 PROCEDURE — 80053 COMPREHEN METABOLIC PANEL: CPT | Performed by: INTERNAL MEDICINE

## 2021-03-12 PROCEDURE — 36415 COLL VENOUS BLD VENIPUNCTURE: CPT | Performed by: INTERNAL MEDICINE

## 2021-03-12 PROCEDURE — 99607 MTMS BY PHARM ADDL 15 MIN: CPT | Performed by: PHARMACIST

## 2021-03-12 PROCEDURE — 85652 RBC SED RATE AUTOMATED: CPT | Performed by: INTERNAL MEDICINE

## 2021-03-12 PROCEDURE — 99605 MTMS BY PHARM NP 15 MIN: CPT | Performed by: PHARMACIST

## 2021-03-12 NOTE — Clinical Note
Hi Dr. Gaurav Bravo reported some heartburn which is helped by omeprazole. I saw you had noted H pylori gastritis with plan to treat once her colitis is addressed. Given this, would you be okay with a prescription for at least the PPI for now? If so, I can order. Thanks!

## 2021-03-12 NOTE — PROGRESS NOTES
Medication Therapy Management (MTM) Encounter    ASSESSMENT:                            Medication Adherence/Access: No issues identified    Crohn's Colitis: Shelly may benefit from PPI therapy given noted H pylori gastritis until this can be treated to help with symptoms, will discuss with Dr. Nolasco. Given plan for immunosuppression we discussed completion of her hep A/B series, COVID-19 vaccine when able, both pneumonia vaccines, as well as consideration for Shingrix. We discussed the off-label nature of the Shingrix vaccine and implications for this. She will likely need an order for this if she is interested. Also reviewed recommendation that COVID-19 should be spaced from other vaccines by at least 14 days on either side.    Neuropathy: Unchanged.    Opioid Use: Unchanged.    PLAN:                            1. Edwige to confirm Prilosec with Dr. Nolasco    -- prefers order sent to [facility] ([place]    2. Consider the following vaccines:   - Twinirix completion   - COVID-19 when able   - Prevnar-13 followed by Pneumovax-23 at least eight weeks later    - Shingrix (off-label)    Follow-up: 1 month after medication start, 3/2022 for health maintenance review    SUBJECTIVE/OBJECTIVE:                          Shelly Cha is a 51 year old female called for an initial visit. She was referred to me from Dr. Nolasco.      Reason for visit: Potential infliximab start    Allergies/ADRs: None  Tobacco: She reports that she has been smoking. She has never used smokeless tobacco.Tobacco Cessation Action Plan:   Information offered: Patient not interested at this time denies  Alcohol: not currently using    Medication Adherence/Access: no issues reported    Crohn's colitis:   Fiber daily (1 tablet daily)   Infliximab (pending start)    Not taking vitamins right now because she didn't want them to affect her stomach. Reports friend recommended Prilosec for her given her heartburn. She finds this helpful and  would appreciate an order to her pharmacy if Dr. Nolasco is agreeable. Chart notes indicate she has H pylori gastritis which is in the plans for treatment after her colitis is under control. She recently saw Dr. Nolasco on 3/11 for a virtual visit. Recent colonoscopy showed severe colitis. Given this, Dr. Nolasco is recommending she start infliximab therapy. The therapy plan is entered at this time, but not authorized.    We reviewed infliximab today including general dosing, administration, coverage, side effects (both common/serious), as well as precautions and monitoring for both safety and efficacy. Discussed pre-medication to help mitigate infusion related reactions which can be common with infliximab. Reviewed mechanism of action and immunosuppression in general. Provided very broad overview of biosimilars and anti-drug antibodies. Reviewed recommendation to consider holding for signs/symptoms of infection which were reviewed today. Discussed recommended screening and health maintenance as detailed below and avoidance of LIVE vaccines. Notes indicate she had a recent hepatitis C viral load which was undetectable, therefore treatment is not needed at this time.    IBD Health Care Maintenance:    Vaccinations:  All patients on biologics should avoid live vaccines.    -- Influenza (every year) declined by patient   -- TdaP (every 10 years) last 4/2018, due 2028   -- Pneumococcal Pneumonia    -Prevnar-13 not on file   -Pneumovax-23 10/10/2013  -- Yearly assessment for latent Tb (verbal screening and exam, PPD or QuantiFERON-Tb testing)   -negative 2/15/21    One time confirmation of immunity or serologies:  -- Hepatitis A (serologies or immunizations) Twinirix 2/10/21  -- Hepatitis B (serologies or immunizations) Twinirix 2/10/21, serologies 2/15/21 do not indicate immunity  -- Varicella/Zoster did have the chicken pox, consider Shingrix (off-label recommendation)    Due to the immunosuppression in this patient, I  "would not advise administration of live vaccines such as varicella/VZV, intranasal influenza, MMR, or yellow fever vaccine (if traveling).      Bone mineral density screening   -- Recommend all patients supplement with calcium and vitamin D    Cancer Screening:  Colon cancer screening:  Per Dr. Nolasco's note \"I recommend that we repeat a flexible sigmoidoscopy versus a full colonoscopy in 6 months to check for mucosal healing.  I do note that she had a CT of pseudopolyps which will make ongoing colon cancer surveillance and polyp surveillance quite difficult.  We will see how things respond to treatment.  Also, given the number of pseudopolyps it is likely that she has had ongoing colitis for several years.  I would likely recommend that we move forward with dysplasia surveillance on her next colonoscopy.\"    Cervical cancer screening: Annual due to planned immunosupression    Skin cancer screening: Annual visual exam of skin by dermatologist since patient is planning immunosuppression     Misc:  -- Avoid tobacco use  -- Avoid NSAIDs as there is potentially a 25% chance of causing an IBD flare    Neuropathy:   Gabapentin 300 mg three times daily     No reported side effects or concerns.    Opioid Use:   48 mg daily methadone      No reported concerns or side effects reported. Prior dose on file 90 mg and she notes she continues to taper down.    ----------------    I spent 52 minutes with this patient today. I offer these suggestions for consideration by her care team. A copy of the visit note was provided to the patient's referring provider.    The patient was sent via [employer] a summary of these recommendations.     Edwige CardenasD, BCACP  MTM Pharmacist   [facility] Gastroenterology and Rheumatology  Phone: [phone]    Telemedicine Visit Details  Type of service:  Telephone visit  Start Time: 1:09 PM  End Time: 2:01 PM  Originating Location (patient location): Home  Distant Location (provider location):  " [facility]      Medication Therapy Recommendations  Crohn's disease of colon without complication (H)    Rationale: Preventive therapy - Needs additional medication therapy - Indication   Recommendation: Start Medication - pneumococcal Susp injection   Status: Patient Agreed - Adherence/Education   Note: Consider Twinirix completion, COVID-19 vaccine, Prevnar-13 followed by Pneumovax-23 and Shingrix (off-label).          Rationale: Untreated condition - Needs additional medication therapy - Indication   Recommendation: Start Medication - omeprazole 20 MG tablet   Status: Contact Provider - Awaiting Response   Note: Consider starting omeprazole 20 mg daily.

## 2021-03-12 NOTE — LETTER
Patient:  Shelly Cah  :   1970  MRN:     [id number]        Ms.Kerri KEV Cha  [address]  [facility] [address]        March 15, 2021    Dear ,    I am covering for Dr. Nolasco while he is out of office.     Your labs are improved from before, but are not yet normal. No changes are needed at this time. Please make sure to follow-up in the GI office as scheduled.     Glen Stoner MD    [facility]  Inflammatory Bowel Disease Program  Division of Gastroenterology, Hepatology and Nutrition            Resulted Orders   CRP inflammation   Result Value Ref Range    CRP Inflammation 5.4 0.0 - 8.0 mg/L   Erythrocyte sedimentation rate auto   Result Value Ref Range    Sed Rate 46 (H) 0 - 20 mm/h   Comprehensive metabolic panel   Result Value Ref Range    Sodium 137 133 - 144 mmol/L    Potassium 4.4 3.4 - 5.3 mmol/L    Chloride 108 94 - 109 mmol/L    Carbon Dioxide 27 20 - 32 mmol/L    Anion Gap 2 (L) 3 - 14 mmol/L    Glucose 87 70 - 99 mg/dL    Urea Nitrogen 11 7 - 30 mg/dL    Creatinine 0.84 0.52 - 1.04 mg/dL    GFR Estimate 82 >60 mL/min/[1.73_m2]      Comment:      Non  GFR Calc  Starting 2018, serum creatinine based estimated GFR (eGFR) will be   calculated using the Chronic Kidney Disease Epidemiology Collaboration   (CKD-EPI) equation.      GFR Estimate If Black >90 >60 mL/min/[1.73_m2]      Comment:       GFR Calc  Starting 2018, serum creatinine based estimated GFR (eGFR) will be   calculated using the Chronic Kidney Disease Epidemiology Collaboration   (CKD-EPI) equation.      Calcium 8.9 8.5 - 10.1 mg/dL    Bilirubin Total 0.3 0.2 - 1.3 mg/dL    Albumin 3.1 (L) 3.4 - 5.0 g/dL    Protein Total 7.7 6.8 - 8.8 g/dL    Alkaline Phosphatase 81 40 - 150 U/L    ALT 17 0 - 50 U/L    AST 24 0 - 45 U/L   CBC with platelets differential   Result Value Ref Range    WBC 8.7 4.0 - 11.0 10e9/L    RBC Count 3.71 (L) 3.8 -  5.2 10e12/L    Hemoglobin 10.6 (L) 11.7 - 15.7 g/dL    Hematocrit 35.0 35.0 - 47.0 %    MCV 94 78 - 100 fl    MCH 28.6 26.5 - 33.0 pg    MCHC 30.3 (L) 31.5 - 36.5 g/dL    RDW 16.2 (H) 10.0 - 15.0 %    Platelet Count 493 (H) 150 - 450 10e9/L    % Neutrophils 51.0 %    % Lymphocytes 33.4 %    % Monocytes 10.6 %    % Eosinophils 4.9 %    % Basophils 0.1 %    Absolute Neutrophil 4.4 1.6 - 8.3 10e9/L    Absolute Lymphocytes 2.9 0.8 - 5.3 10e9/L    Absolute Monocytes 0.9 0.0 - 1.3 10e9/L    Absolute Eosinophils 0.4 0.0 - 0.7 10e9/L    Absolute Basophils 0.0 0.0 - 0.2 10e9/L    Diff Method Automated Method

## 2021-03-17 ENCOUNTER — PATIENT OUTREACH (OUTPATIENT)
Dept: GASTROENTEROLOGY | Facility: CLINIC | Age: 51
End: 2021-03-17

## 2021-03-17 ENCOUNTER — ANCILLARY PROCEDURE (OUTPATIENT)
Dept: MAMMOGRAPHY | Facility: CLINIC | Age: 51
End: 2021-03-17
Attending: FAMILY MEDICINE
Payer: COMMERCIAL

## 2021-03-17 DIAGNOSIS — K50.10 CROHN'S COLITIS, WITHOUT COMPLICATIONS (H): Primary | ICD-10-CM

## 2021-03-17 DIAGNOSIS — Z12.31 ENCOUNTER FOR SCREENING MAMMOGRAM FOR BREAST CANCER: ICD-10-CM

## 2021-03-17 DIAGNOSIS — Z12.31 VISIT FOR SCREENING MAMMOGRAM: ICD-10-CM

## 2021-03-17 PROCEDURE — 77067 SCR MAMMO BI INCL CAD: CPT | Mod: TC | Performed by: RADIOLOGY

## 2021-03-17 PROCEDURE — 77063 BREAST TOMOSYNTHESIS BI: CPT | Mod: TC | Performed by: RADIOLOGY

## 2021-03-17 RX ORDER — PREDNISONE 5 MG/1
TABLET ORAL
Qty: 182 TABLET | Refills: 0 | Status: SHIPPED | OUTPATIENT
Start: 2021-03-17 | End: 2021-04-14

## 2021-03-17 NOTE — PROGRESS NOTES
Order for prednisone taper sent to pharmacy   Contacted pharmacist to make sure the patient is consulted on how to take the prednisone   Contacted patient to inform that she will be consulted by pharmacist on how to take the prednsione    Patient states she is having bowel movement about evry other day   No blood in stools  Some abdominal pain when eating.            From: [place], Ethan   Sent: 3/15/2021   3:57 PM CDT   To: Devora Butler RN, *   Subject: RE: ANY UPDATE ON THIS PT APPROVAL               Hi Devora,     We have been informed that coverage of Remicade has been DENIED. The rationale is that the patient has not tried/failed any other therapies.

## 2021-03-20 NOTE — PATIENT INSTRUCTIONS
Recommendations from today's MTM visit:                                                    MTM (medication therapy management) is a service provided by a clinical pharmacist designed to help you get the most of out of your medicines.   Today we reviewed what your medicines are for, how to know if they are working, that your medicines are safe and how to make your medicine regimen as easy as possible.      1. Edwige to confirm omeprazole order with Dr. Nolasco    -- prefers order sent to [facility] ([place])    2. Consider the following vaccines:   - Twinirix completion (hepatitis A/B vaccine)   - COVID-19 when able   - Prevnar-13 followed by Pneumovax-23 at least eight weeks later    - Shingrix (this is an off-label recommendation for the Shingles vaccine which is indicated for age 50 years and older)      It was great to speak with you today.  I value your experience and would be very thankful for your time with providing feedback on our clinic survey. You may receive a survey via email or text message in the next few days.     Next MTM visit: 1 month after medication start for check-in, 1 year for health maintenance review    To schedule another MTM appointment, please call the clinic directly or you may call the MTM scheduling line at [phone] or toll-free at [phone].     My Clinical Pharmacist's contact information:                                                      It was a pleasure talking with you today!  Please feel free to contact me with any questions or concerns you have.      Edwige CardenasD, BCACP  MTM Pharmacist   [facility]  Phone: [phone]

## 2021-03-22 ENCOUNTER — PATIENT OUTREACH (OUTPATIENT)
Dept: GASTROENTEROLOGY | Facility: CLINIC | Age: 51
End: 2021-03-22

## 2021-03-22 NOTE — CONFIDENTIAL NOTE
Changed therapy plan to inflectra  Awaiting letter of necessity to submit for inflectra        spoke with the infusion finance team to review the options since Remicade is denied:     1) Her plan has very clear step therapy requirements saying she must try and fail Humira, so we can switch to this if you would like.     2) We can change to Inflectra/Renflexis and try to appeal stating why she should not try Humira first. Remicade itself is step 3, so it is unlikely we can get this covered at this point.       --We are concerned that Shelly may have difficulty with self-injections. Devora/Rosa/ROEL all noted difficulty with tracking and suspect she has a low health literacy which may affect her adherence to a self-injectable

## 2021-03-26 NOTE — PROGRESS NOTES
Kwasi Nolasco MD [place], Edwige Meier, [facility] Edwige,     Yes I am ok with PPI for now. We will treat her h pylori once we get her colitis under better control. I just dont want the antibiotics (which are known to cause GI upset muddy the alvarez.     Thanks!   J

## 2021-04-07 ENCOUNTER — VIRTUAL VISIT (OUTPATIENT)
Dept: GASTROENTEROLOGY | Facility: CLINIC | Age: 51
End: 2021-04-07
Attending: STUDENT IN AN ORGANIZED HEALTH CARE EDUCATION/TRAINING PROGRAM
Payer: COMMERCIAL

## 2021-04-07 DIAGNOSIS — K76.0 HEPATIC STEATOSIS: ICD-10-CM

## 2021-04-07 DIAGNOSIS — K76.0 NAFLD (NONALCOHOLIC FATTY LIVER DISEASE): ICD-10-CM

## 2021-04-07 DIAGNOSIS — B19.20 HEPATITIS C VIRUS INFECTION WITHOUT HEPATIC COMA, UNSPECIFIED CHRONICITY: Primary | ICD-10-CM

## 2021-04-07 PROCEDURE — 99214 OFFICE O/P EST MOD 30 MIN: CPT | Mod: 95 | Performed by: STUDENT IN AN ORGANIZED HEALTH CARE EDUCATION/TRAINING PROGRAM

## 2021-04-07 NOTE — LETTER
4/7/2021         RE: Shelly Cha  [address]  [place] [address]        Dear Colleague,    Thank you for referring your patient, Shelly Cha, to the [facility] [place]. Please see a copy of my visit note below.    [facility] New Patient Visit    Date of Visit: March 7th, 2021    Reason for referral: Hepatitis C    Subjective: Ms. Cha is a 51 year old woman with a history of IVDU, who presents for evaluation of hepatitis C.     She was first told she had hepatitis C last summer. No history of jaundice, known liver disease, elevated LFTs. She states she last used IVDU 1 year ago, had an exposure to a friend with HCV 3 years ago. Last drink of alcohol 20 years ago, denies heavy use prior to this.     No s/s of liver decompensation.     Interval Events:   - Diagnosed with IBD in the interim, plan for remicade, denied by insurance. On prednisone now, feels the best she has felt in a long while  - HCV rna not detected 2/1 - indicating spontaneous clearance, rna was 415 11/2020  - LFTs normal 3/12/2021  - No alcohol use  - Weighs 160 lbs    ROS: 14 point ROS negative except for positives noted in HPI.    PMHx:  - History of IVDU  - HCV    PSHx:  Past Surgical History:   Procedure Laterality Date     BIOPSY      right breast     CHOLECYSTECTOMY       GYN SURGERY      , tubal     FamHx:  No family history of liver disease    SocHx:  Social History     Socioeconomic History     Marital status: Single     Spouse name: Not on file     Number of children: Not on file     Years of education: Not on file     Highest education level: Not on file   Occupational History     Not on file   Social Needs     Financial resource strain: Not on file     Food insecurity     Worry: Not on file     Inability: Not on file     Transportation needs     Medical: Not on file     Non-medical: Not on file   Tobacco Use     Smoking status: Light Tobacco Smoker      Smokeless tobacco: Never Used   Substance and Sexual Activity     Alcohol use: No     Drug use: No     Sexual activity: Yes     Partners: Male   Lifestyle     Physical activity     Days per week: Not on file     Minutes per session: Not on file     Stress: Not on file   Relationships     Social connections     Talks on phone: Not on file     Gets together: Not on file     Attends Jewish service: Not on file     Active member of club or organization: Not on file     Attends meetings of clubs or organizations: Not on file     Relationship status: Not on file     Intimate partner violence     Fear of current or ex partner: Not on file     Emotionally abused: Not on file     Physically abused: Not on file     Forced sexual activity: Not on file   Other Topics Concern     Not on file   Social History Narrative     Not on file   Lives alone  Last drink 20 years ago, denies heavy use  IVDU 1 year ago, no other drug use    Medications:  Current Outpatient Medications   Medication     gabapentin (NEURONTIN) 300 MG capsule     METHADONE HCL PO     methylcellulose (CITRUCEL) powder     omeprazole (PRILOSEC) 20 MG DR capsule     predniSONE (DELTASONE) 5 MG tablet     No current facility-administered medications for this visit.        Allergies:  Allergies   Allergen Reactions     Ibuprofen Swelling     Eyes swelling     Tylenol [Acetaminophen]      Feels like someone punched her in the stomach       Objective:  There were no vitals taken for this visit.  Constitutional: pleasant woman in NAD  Eyes: non icteric  Respiratory: Normal respiratory excursion   MSK: normal range of motion of visualized extremities  Abd: Non distended  Skin: No jaundice  Psychiatric: normal mood and orientation    Labs:  Last Comprehensive Metabolic Panel:  Sodium   Date Value Ref Range Status   03/12/2021 137 133 - 144 mmol/L Final     Potassium   Date Value Ref Range Status   03/12/2021 4.4 3.4 - 5.3 mmol/L Final     Chloride   Date Value Ref  Range Status   03/12/2021 108 94 - 109 mmol/L Final     Carbon Dioxide   Date Value Ref Range Status   03/12/2021 27 20 - 32 mmol/L Final     Anion Gap   Date Value Ref Range Status   03/12/2021 2 (L) 3 - 14 mmol/L Final     Glucose   Date Value Ref Range Status   03/12/2021 87 70 - 99 mg/dL Final     Urea Nitrogen   Date Value Ref Range Status   03/12/2021 11 7 - 30 mg/dL Final     Creatinine   Date Value Ref Range Status   03/12/2021 0.84 0.52 - 1.04 mg/dL Final     GFR Estimate   Date Value Ref Range Status   03/12/2021 82 >60 mL/min/[1.73_m2] Final     Comment:     Non  GFR Calc  Starting 12/18/2018, serum creatinine based estimated GFR (eGFR) will be   calculated using the Chronic Kidney Disease Epidemiology Collaboration   (CKD-EPI) equation.       Calcium   Date Value Ref Range Status   03/12/2021 8.9 8.5 - 10.1 mg/dL Final     Bilirubin Total   Date Value Ref Range Status   03/12/2021 0.3 0.2 - 1.3 mg/dL Final     Alkaline Phosphatase   Date Value Ref Range Status   03/12/2021 81 40 - 150 U/L Final     ALT   Date Value Ref Range Status   03/12/2021 17 0 - 50 U/L Final     AST   Date Value Ref Range Status   03/12/2021 24 0 - 45 U/L Final       Lab Results   Component Value Date    WBC 13.7 01/22/2021     Lab Results   Component Value Date    RBC 3.87 01/22/2021     Lab Results   Component Value Date    HGB 11.3 01/22/2021     Lab Results   Component Value Date    HCT 34.7 01/22/2021     Lab Results   Component Value Date    MCV 90 01/22/2021     Lab Results   Component Value Date    MCH 29.2 01/22/2021     Lab Results   Component Value Date    MCHC 32.6 01/22/2021     Lab Results   Component Value Date    RDW 15.9 01/22/2021     Lab Results   Component Value Date     01/22/2021     Hepatitis B Core Total Ab, Sag negative  Hepatitis B Sab NR    11/2020  Hepatitis C rna 415    10/2020  HIV negative    Imaging:    RUQ US 12/2020    FINDINGS:   Fluid: No evidence of ascites or pleural  effusions.     Liver: The liver demonstrates slightly increased echotexture,  suggestive of mild diffuse hepatic steatosis. There is no focal mass.      Gallbladder: Cholecystectomy.     Bile Ducts: Both the intra- and extrahepatic biliary system are of  normal caliber.  The common bile duct measures 7.9 mm in diameter.     Pancreas: Visualized portions of the head and body of the pancreas are  unremarkable.      Kidney: The right kidney measures 10.5 cm long. There is no  hydronephrosis or hydroureter, no shadowing renal calculi, cystic  lesion or mass.                                                                       IMPRESSION:   1.  Cholecystectomy. No intrahepatic or extrahepatic biliary  dilatation.  2.  Mild diffuse hepatic steatosis.    CT 2015    Findings:  The lung bases are clear.  The liver, spleen, pancreas, and adrenal glands are normal.  The gallbladder is surgically absent.  There is no dilatation of the biliary system.  No bile duct stones are identified.  There is no evidence of bile leak.  There are no inflammatory changes in the gallbladder fossa.  The kidneys are normal in size, shape, and position.  There is no hydronephrosis.  There are no renal masses or focal parenchymal abnormalities.  The abdominal aorta is normal in caliber.  There is no periaortic or retrocrural lymphadenopathy.  The bowel gas pattern is normal.  There are a few diverticula in the sigmoid colon.  Findings of diverticulitis are not present.  The appendix is normal.  The urinary bladder has a smooth contour.  There is no free fluid in the abdomen and pelvis.  There are no acute bony abnormalities.    Impression:  The gallbladder is surgically absent.  There is no evidence of complication following cholecystectomy.  There are a few diverticula in the sigmoid colon.    Assessment/Plan: Ms. Cha is a 51 year old woman with a history of IVDU, hepatitis C exposure, who presents for follow up.     At her initial visit, she  was noted to have a very low viral load. Last IVDU 1 year. Repeat hcv rna negative, indicating spontaneous clearance.     She did have fatty liver on imaging 12/2020 while she had a detectable viral load. LFTs normal in the interim.     APRI score 0.089 (low), and FIB-4 score 0.48 low - making advanced fibrosis unlikely.     Hepatic steatosis on her last imaging likely related to active hepatitis C at the time v. NAFLD. Do not think she has fibrosis at this time, discussed risk factors for progression of this related to NAFLD.     - Recommend 5-10% weight loss for presumed NAFLD, discussed importance of maintaining healthy weight for NAFLD. Recommend Dr. Nolasco or PCP intermittently check LFTs  - Complete vaccination series for hepatitis A/B  - Discussed her hepatitis C antibody will always be positive, if there is concern for hepatitis C, rna should be rechecked. She is not immune to hepatitis C and should     RTC PRN     Cara Guerrero MD MS  Hepatology/Liver Transplant  [facility]      Shelly is a 51 year old who is being evaluated via a billable video visit.      Use [email or web address], patient does not have access to [employer]. Text link to [phone]    How would you like to obtain your AVS? Mail a copy  If the video visit is dropped, the invitation should be resent by: Text to cell phone: [phone]  Will anyone else be joining your video visit? No      Video Start Time: 2:35 PM  Video-Visit Details    Type of service:  Video Visit    Video End Time:2:51 PM    Originating Location (pt. Location): Home    Distant Location (provider location):  [facility] [place]     Platform used for Video Visit: [email or web address]      Ricarda IRWIN CMA      Again, thank you for allowing me to participate in the care of your patient.        Sincerely,        Cara Guerrero MD

## 2021-04-07 NOTE — PROGRESS NOTES
[facility] New Patient Visit    Date of Visit: March 7th, 2021    Reason for referral: Hepatitis C    Subjective: Ms. Cha is a 51 year old woman with a history of IVDU, who presents for evaluation of hepatitis C.     She was first told she had hepatitis C last summer. No history of jaundice, known liver disease, elevated LFTs. She states she last used IVDU 1 year ago, had an exposure to a friend with HCV 3 years ago. Last drink of alcohol 20 years ago, denies heavy use prior to this.     No s/s of liver decompensation.     Interval Events:   - Diagnosed with IBD in the interim, plan for remicade, denied by insurance. On prednisone now, feels the best she has felt in a long while  - HCV rna not detected 2/1 - indicating spontaneous clearance, rna was 415 11/2020  - LFTs normal 3/12/2021  - No alcohol use  - Weighs 160 lbs    ROS: 14 point ROS negative except for positives noted in HPI.    PMHx:  - History of IVDU  - HCV    PSHx:  Past Surgical History:   Procedure Laterality Date     BIOPSY      right breast     CHOLECYSTECTOMY       GYN SURGERY      , tubal     FamHx:  No family history of liver disease    SocHx:  Social History     Socioeconomic History     Marital status: Single     Spouse name: Not on file     Number of children: Not on file     Years of education: Not on file     Highest education level: Not on file   Occupational History     Not on file   Social Needs     Financial resource strain: Not on file     Food insecurity     Worry: Not on file     Inability: Not on file     Transportation needs     Medical: Not on file     Non-medical: Not on file   Tobacco Use     Smoking status: Light Tobacco Smoker     Smokeless tobacco: Never Used   Substance and Sexual Activity     Alcohol use: No     Drug use: No     Sexual activity: Yes     Partners: Male   Lifestyle     Physical activity     Days per week: Not on file     Minutes per session: Not on file     Stress: Not on file    Relationships     Social connections     Talks on phone: Not on file     Gets together: Not on file     Attends Denominational service: Not on file     Active member of club or organization: Not on file     Attends meetings of clubs or organizations: Not on file     Relationship status: Not on file     Intimate partner violence     Fear of current or ex partner: Not on file     Emotionally abused: Not on file     Physically abused: Not on file     Forced sexual activity: Not on file   Other Topics Concern     Not on file   Social History Narrative     Not on file   Lives alone  Last drink 20 years ago, denies heavy use  IVDU 1 year ago, no other drug use    Medications:  Current Outpatient Medications   Medication     gabapentin (NEURONTIN) 300 MG capsule     METHADONE HCL PO     methylcellulose (CITRUCEL) powder     omeprazole (PRILOSEC) 20 MG DR capsule     predniSONE (DELTASONE) 5 MG tablet     No current facility-administered medications for this visit.        Allergies:  Allergies   Allergen Reactions     Ibuprofen Swelling     Eyes swelling     Tylenol [Acetaminophen]      Feels like someone punched her in the stomach       Objective:  There were no vitals taken for this visit.  Constitutional: pleasant woman in NAD  Eyes: non icteric  Respiratory: Normal respiratory excursion   MSK: normal range of motion of visualized extremities  Abd: Non distended  Skin: No jaundice  Psychiatric: normal mood and orientation    Labs:  Last Comprehensive Metabolic Panel:  Sodium   Date Value Ref Range Status   03/12/2021 137 133 - 144 mmol/L Final     Potassium   Date Value Ref Range Status   03/12/2021 4.4 3.4 - 5.3 mmol/L Final     Chloride   Date Value Ref Range Status   03/12/2021 108 94 - 109 mmol/L Final     Carbon Dioxide   Date Value Ref Range Status   03/12/2021 27 20 - 32 mmol/L Final     Anion Gap   Date Value Ref Range Status   03/12/2021 2 (L) 3 - 14 mmol/L Final     Glucose   Date Value Ref Range Status    03/12/2021 87 70 - 99 mg/dL Final     Urea Nitrogen   Date Value Ref Range Status   03/12/2021 11 7 - 30 mg/dL Final     Creatinine   Date Value Ref Range Status   03/12/2021 0.84 0.52 - 1.04 mg/dL Final     GFR Estimate   Date Value Ref Range Status   03/12/2021 82 >60 mL/min/[1.73_m2] Final     Comment:     Non  GFR Calc  Starting 12/18/2018, serum creatinine based estimated GFR (eGFR) will be   calculated using the Chronic Kidney Disease Epidemiology Collaboration   (CKD-EPI) equation.       Calcium   Date Value Ref Range Status   03/12/2021 8.9 8.5 - 10.1 mg/dL Final     Bilirubin Total   Date Value Ref Range Status   03/12/2021 0.3 0.2 - 1.3 mg/dL Final     Alkaline Phosphatase   Date Value Ref Range Status   03/12/2021 81 40 - 150 U/L Final     ALT   Date Value Ref Range Status   03/12/2021 17 0 - 50 U/L Final     AST   Date Value Ref Range Status   03/12/2021 24 0 - 45 U/L Final       Lab Results   Component Value Date    WBC 13.7 01/22/2021     Lab Results   Component Value Date    RBC 3.87 01/22/2021     Lab Results   Component Value Date    HGB 11.3 01/22/2021     Lab Results   Component Value Date    HCT 34.7 01/22/2021     Lab Results   Component Value Date    MCV 90 01/22/2021     Lab Results   Component Value Date    MCH 29.2 01/22/2021     Lab Results   Component Value Date    MCHC 32.6 01/22/2021     Lab Results   Component Value Date    RDW 15.9 01/22/2021     Lab Results   Component Value Date     01/22/2021     Hepatitis B Core Total Ab, Sag negative  Hepatitis B Sab NR    11/2020  Hepatitis C rna 415    10/2020  HIV negative    Imaging:    RUQ US 12/2020    FINDINGS:   Fluid: No evidence of ascites or pleural effusions.     Liver: The liver demonstrates slightly increased echotexture,  suggestive of mild diffuse hepatic steatosis. There is no focal mass.      Gallbladder: Cholecystectomy.     Bile Ducts: Both the intra- and extrahepatic biliary system are of  normal  caliber.  The common bile duct measures 7.9 mm in diameter.     Pancreas: Visualized portions of the head and body of the pancreas are  unremarkable.      Kidney: The right kidney measures 10.5 cm long. There is no  hydronephrosis or hydroureter, no shadowing renal calculi, cystic  lesion or mass.                                                                       IMPRESSION:   1.  Cholecystectomy. No intrahepatic or extrahepatic biliary  dilatation.  2.  Mild diffuse hepatic steatosis.    CT 2015    Findings:  The lung bases are clear.  The liver, spleen, pancreas, and adrenal glands are normal.  The gallbladder is surgically absent.  There is no dilatation of the biliary system.  No bile duct stones are identified.  There is no evidence of bile leak.  There are no inflammatory changes in the gallbladder fossa.  The kidneys are normal in size, shape, and position.  There is no hydronephrosis.  There are no renal masses or focal parenchymal abnormalities.  The abdominal aorta is normal in caliber.  There is no periaortic or retrocrural lymphadenopathy.  The bowel gas pattern is normal.  There are a few diverticula in the sigmoid colon.  Findings of diverticulitis are not present.  The appendix is normal.  The urinary bladder has a smooth contour.  There is no free fluid in the abdomen and pelvis.  There are no acute bony abnormalities.    Impression:  The gallbladder is surgically absent.  There is no evidence of complication following cholecystectomy.  There are a few diverticula in the sigmoid colon.    Assessment/Plan: Ms. Cha is a 51 year old woman with a history of IVDU, hepatitis C exposure, who presents for follow up.     At her initial visit, she was noted to have a very low viral load. Last IVDU 1 year. Repeat hcv rna negative, indicating spontaneous clearance.     She did have fatty liver on imaging 12/2020 while she had a detectable viral load. LFTs normal in the interim.     APRI score 0.089 (low),  and FIB-4 score 0.48 low - making advanced fibrosis unlikely.     Hepatic steatosis on her last imaging likely related to active hepatitis C at the time v. NAFLD. Do not think she has fibrosis at this time, discussed risk factors for progression of this related to NAFLD.     - Recommend 5-10% weight loss for presumed NAFLD, discussed importance of maintaining healthy weight for NAFLD. Recommend Dr. Nolasco or PCP intermittently check LFTs  - Complete vaccination series for hepatitis A/B  - Discussed her hepatitis C antibody will always be positive, if there is concern for hepatitis C, rna should be rechecked. She is not immune to hepatitis C and should     RTC PRN     Cara Guerrero MD MS  Hepatology/Liver Transplant  [facility]

## 2021-04-07 NOTE — PROGRESS NOTES
Shelly is a 51 year old who is being evaluated via a billable video visit.      Use [employer], patient does not have access to [address]. Text link to [phone]    How would you like to obtain your AVS? Mail a copy  If the video visit is dropped, the invitation should be resent by: Text to cell phone: [phone]  Will anyone else be joining your video visit? No      Video Start Time: 2:35 PM  Video-Visit Details    Type of service:  Video Visit    Video End Time:2:51 PM    Originating Location (pt. Location): Home    Distant Location (provider location):  [facility] [place]     Platform used for Video Visit: [employer]      Ricarda IRWIN CMA

## 2021-04-22 ENCOUNTER — TRANSCRIBE ORDERS (OUTPATIENT)
Dept: GASTROENTEROLOGY | Facility: CLINIC | Age: 51
End: 2021-04-22

## 2021-04-22 ENCOUNTER — PATIENT OUTREACH (OUTPATIENT)
Dept: GASTROENTEROLOGY | Facility: CLINIC | Age: 51
End: 2021-04-22

## 2021-04-22 DIAGNOSIS — Z20.822 ENCOUNTER FOR LABORATORY TESTING FOR COVID-19 VIRUS: Primary | ICD-10-CM

## 2021-04-22 NOTE — CONFIDENTIAL NOTE
Patient left a message that she tried calling the number that showed up on her phone and not the number that was left to call infusion   Patient given direct number to call.     Patient  now scheduled on May 5.

## 2021-04-22 NOTE — CONFIDENTIAL NOTE
Gina Fowler, Kwasi Jo MD; Devora Butler RN; [facility]    Cc: [facility]              Prior authorization approved.  recommends moving forward with dosing     Left a message on her voice mail as she was in a cab and did not have anything to write the number down  Also send a my chart message  Also will have infusion center reach out to her.

## 2021-05-01 DIAGNOSIS — Z20.822 ENCOUNTER FOR LABORATORY TESTING FOR COVID-19 VIRUS: ICD-10-CM

## 2021-05-01 LAB
SARS-COV-2 RNA RESP QL NAA+PROBE: NORMAL
SPECIMEN SOURCE: NORMAL

## 2021-05-01 PROCEDURE — U0003 INFECTIOUS AGENT DETECTION BY NUCLEIC ACID (DNA OR RNA); SEVERE ACUTE RESPIRATORY SYNDROME CORONAVIRUS 2 (SARS-COV-2) (CORONAVIRUS DISEASE [COVID-19]), AMPLIFIED PROBE TECHNIQUE, MAKING USE OF HIGH THROUGHPUT TECHNOLOGIES AS DESCRIBED BY CMS-2020-01-R: HCPCS | Performed by: INTERNAL MEDICINE

## 2021-05-01 PROCEDURE — U0005 INFEC AGEN DETEC AMPLI PROBE: HCPCS | Performed by: INTERNAL MEDICINE

## 2021-05-05 ENCOUNTER — INFUSION THERAPY VISIT (OUTPATIENT)
Dept: INFUSION THERAPY | Facility: CLINIC | Age: 51
End: 2021-05-05
Attending: INTERNAL MEDICINE
Payer: COMMERCIAL

## 2021-05-05 VITALS
RESPIRATION RATE: 16 BRPM | TEMPERATURE: 98.1 F | DIASTOLIC BLOOD PRESSURE: 85 MMHG | SYSTOLIC BLOOD PRESSURE: 130 MMHG | HEART RATE: 74 BPM | WEIGHT: 165.6 LBS | OXYGEN SATURATION: 97 % | BODY MASS INDEX: 29.33 KG/M2

## 2021-05-05 DIAGNOSIS — K51.00 PANCOLITIS (H): Primary | ICD-10-CM

## 2021-05-05 LAB
ALBUMIN SERPL-MCNC: 3.6 G/DL (ref 3.4–5)
ALP SERPL-CCNC: 95 U/L (ref 40–150)
ALT SERPL W P-5'-P-CCNC: 15 U/L (ref 0–50)
AST SERPL W P-5'-P-CCNC: 16 U/L (ref 0–45)
BASOPHILS # BLD AUTO: 0 10E9/L (ref 0–0.2)
BASOPHILS NFR BLD AUTO: 0.4 %
BILIRUB DIRECT SERPL-MCNC: <0.1 MG/DL (ref 0–0.2)
BILIRUB SERPL-MCNC: 0.3 MG/DL (ref 0.2–1.3)
CRP SERPL-MCNC: 4 MG/L (ref 0–8)
DIFFERENTIAL METHOD BLD: ABNORMAL
EOSINOPHIL # BLD AUTO: 0.3 10E9/L (ref 0–0.7)
EOSINOPHIL NFR BLD AUTO: 3.8 %
ERYTHROCYTE [DISTWIDTH] IN BLOOD BY AUTOMATED COUNT: 14.2 % (ref 10–15)
ERYTHROCYTE [SEDIMENTATION RATE] IN BLOOD BY WESTERGREN METHOD: 50 MM/H (ref 0–20)
HCT VFR BLD AUTO: 35.5 % (ref 35–47)
HGB BLD-MCNC: 11.3 G/DL (ref 11.7–15.7)
IMM GRANULOCYTES # BLD: 0 10E9/L (ref 0–0.4)
IMM GRANULOCYTES NFR BLD: 0.2 %
LYMPHOCYTES # BLD AUTO: 2.4 10E9/L (ref 0.8–5.3)
LYMPHOCYTES NFR BLD AUTO: 27.7 %
MCH RBC QN AUTO: 26.7 PG (ref 26.5–33)
MCHC RBC AUTO-ENTMCNC: 31.8 G/DL (ref 31.5–36.5)
MCV RBC AUTO: 84 FL (ref 78–100)
MONOCYTES # BLD AUTO: 0.7 10E9/L (ref 0–1.3)
MONOCYTES NFR BLD AUTO: 7.9 %
NEUTROPHILS # BLD AUTO: 5.1 10E9/L (ref 1.6–8.3)
NEUTROPHILS NFR BLD AUTO: 60 %
NRBC # BLD AUTO: 0 10*3/UL
NRBC BLD AUTO-RTO: 0 /100
PLATELET # BLD AUTO: 416 10E9/L (ref 150–450)
PROT SERPL-MCNC: 8 G/DL (ref 6.8–8.8)
RBC # BLD AUTO: 4.23 10E12/L (ref 3.8–5.2)
WBC # BLD AUTO: 8.5 10E9/L (ref 4–11)

## 2021-05-05 PROCEDURE — 85025 COMPLETE CBC W/AUTO DIFF WBC: CPT | Performed by: INTERNAL MEDICINE

## 2021-05-05 PROCEDURE — 250N000011 HC RX IP 250 OP 636: Performed by: INTERNAL MEDICINE

## 2021-05-05 PROCEDURE — 85652 RBC SED RATE AUTOMATED: CPT | Performed by: INTERNAL MEDICINE

## 2021-05-05 PROCEDURE — 96365 THER/PROPH/DIAG IV INF INIT: CPT

## 2021-05-05 PROCEDURE — 86140 C-REACTIVE PROTEIN: CPT | Performed by: INTERNAL MEDICINE

## 2021-05-05 PROCEDURE — 96366 THER/PROPH/DIAG IV INF ADDON: CPT

## 2021-05-05 PROCEDURE — 258N000003 HC RX IP 258 OP 636: Performed by: INTERNAL MEDICINE

## 2021-05-05 PROCEDURE — 80076 HEPATIC FUNCTION PANEL: CPT | Performed by: INTERNAL MEDICINE

## 2021-05-05 RX ORDER — METHYLPREDNISOLONE SODIUM SUCCINATE 125 MG/2ML
125 INJECTION, POWDER, LYOPHILIZED, FOR SOLUTION INTRAMUSCULAR; INTRAVENOUS ONCE
Status: CANCELLED
Start: 2021-05-19 | End: 2021-05-19

## 2021-05-05 RX ORDER — ACETAMINOPHEN 325 MG/1
650 TABLET ORAL ONCE
Status: CANCELLED
Start: 2021-05-19 | End: 2021-05-19

## 2021-05-05 RX ORDER — DIPHENHYDRAMINE HCL 25 MG
25 CAPSULE ORAL ONCE
Status: CANCELLED
Start: 2021-05-19 | End: 2021-05-19

## 2021-05-05 RX ADMIN — SODIUM CHLORIDE 400 MG: 9 INJECTION, SOLUTION INTRAVENOUS at 14:50

## 2021-05-05 ASSESSMENT — PAIN SCALES - GENERAL: PAINLEVEL: NO PAIN (0)

## 2021-05-05 NOTE — PROGRESS NOTES
Nursing Note  Shelly Cha presents today to [facility] for:   Chief Complaint   Patient presents with     Infusion     remicade     During [facility]'s [facility] appointment, orders from Dr. Kwasi Nolasco were completed.  Frequency: weeks 0, 2, 6 then every 8 weeks. Today is patient's first dose.    Progress note:  Patient identification verified by name and date of birth.  Assessment completed.  Vitals recorded in Doc Flowsheets.  Patient was provided with education regarding medication/procedure and possible side effects.  Patient verbalized understanding.     present during visit today: Not Applicable.    Treatment Conditions: See note per LEO Hughes RN. Negative bio checklist.    Premedications: were not administered, as today is patient's first dose.  Drug Waste Record: No  Infusion length and rate:  infusion given over approximately 2 hours at 157 ml/hr.  Labs: were drawn per orders.   Vascular access: peripheral IV placed today.    Is the next appt scheduled? 5/19  Asymptomatic COVID test completed? Completed 5/1    Report given to KAMERON Hughes at 1515. Care transferred at that time.  Nurse report given to KAMERON Bryan at 1612. Care transferred at this time.    Discharge Plan:   Follow up plan of care with: ongoing infusions at [facility]., ordering provider as scheduled. and after visit summary given to patient  Discharge instructions were reviewed with patient.  Patient/representative verbalized understanding of discharge instructions and all questions answered.  Patient discharged from [facility] in stable condition.    Zaynab Manzo RN    Administrations This Visit     inFLIXimab-dyyb (INFLECTRA) 400 mg in sodium chloride 0.9 % 315 mL infusion     Admin Date  05/05/2021 Action  New Bag Dose  400 mg Rate  157.5 mL/hr Route  Intravenous Administered By  Estella Reynolds, KAMERON                Administrations This Visit     inFLIXimab-dyyb (INFLECTRA) 400 mg in sodium chloride 0.9 % 315 mL infusion     Admin Date  05/05/2021 Action  New Bag Dose  400 mg Rate  157.5 mL/hr Route  Intravenous Administered By  Estella Reynolds, RN                /85   Pulse 74   Temp 98.1  F (36.7  C) (Oral)   Resp 16   Wt 75.1 kg (165 lb 9.6 oz)   SpO2 97%   BMI 29.33 kg/m

## 2021-05-05 NOTE — PATIENT INSTRUCTIONS
Dear Shelly Cha    Thank you for choosing [facility] ([facility]) for your Infliximab (inflectra) infusion.  The following information is a summary of our appointment as well as important reminders.      Patient Education     Infliximab Solution for injection  What is this medicine?  INFLIXIMAB (in FLIX i mab) is used to treat Crohn's disease and ulcerative colitis. It is also used to treat ankylosing spondylitis, psoriasis, and some forms of arthritis.  This medicine may be used for other purposes; ask your health care provider or pharmacist if you have questions.  What should I tell my health care provider before I take this medicine?  They need to know if you have any of these conditions:    diabetes    exposure to tuberculosis    heart failure    hepatitis or liver disease    immune system problems    infection    lung or breathing disease, like COPD    multiple sclerosis    current or past resident of [place]    seizure disorder    an unusual or allergic reaction to infliximab, mouse proteins, other medicines, foods, dyes, or preservatives    pregnant or trying to get pregnant    breast-feeding  How should I use this medicine?  This medicine is for injection into a vein. It is usually given by a health care professional in a hospital or clinic setting.  A special MedGuide will be given to you by the pharmacist with each prescription and refill. Be sure to read this information carefully each time.  Talk to your pediatrician regarding the use of this medicine in children. Special care may be needed.  Overdosage: If you think you have taken too much of this medicine contact a poison control center or emergency room at once.  NOTE: This medicine is only for you. Do not share this medicine with others.  What if I miss a dose?  It is important not to miss your dose. Call your doctor or health care professional if you are unable to  keep an appointment.  What may interact with this medicine?  Do not take this medicine with any of the following medications:    anakinra    rilonacept  This medicine may also interact with the following medications:    vaccines  This list may not describe all possible interactions. Give your health care provider a list of all the medicines, herbs, non-prescription drugs, or dietary supplements you use. Also tell them if you smoke, drink alcohol, or use illegal drugs. Some items may interact with your medicine.  What should I watch for while using this medicine?  Visit your doctor or health care professional for regular checks on your progress.  If you get a cold or other infection while receiving this medicine, call your doctor or health care professional. Do not treat yourself. This medicine may decrease your body's ability to fight infections. Before beginning therapy, your doctor may do a test to see if you have been exposed to tuberculosis.  This medicine may make the symptoms of heart failure worse in some patients. If you notice symptoms such as increased shortness of breath or swelling of the ankles or legs, contact your health care provider right away.  If you are going to have surgery or dental work, tell your health care professional or dentist that you have received this medicine.  If you take this medicine for plaque psoriasis, stay out of the sun. If you cannot avoid being in the sun, wear protective clothing and use sunscreen. Do not use sun lamps or tanning beds/booths.  What side effects may I notice from receiving this medicine?  Side effects that you should report to your doctor or health care professional as soon as possible:    allergic reactions like skin rash, itching or hives, swelling of the face, lips, or tongue    chest pain    fever or chills, usually related to the infusion    muscle or joint pain    red, scaly patches or raised bumps on the skin    signs of infection - fever or chills,  cough, sore throat, pain or difficulty passing urine    swollen lymph nodes in the neck, underarm, or groin areas    unexplained weight loss    unusual bleeding or bruising    unusually weak or tired    yellowing of the eyes or skin  Side effects that usually do not require medical attention (report to your doctor or health care professional if they continue or are bothersome):    headache    heartburn or stomach pain    nausea, vomiting  This list may not describe all possible side effects. Call your doctor for medical advice about side effects. You may report side effects to FDA at 9-754-ZPT-1849.  Where should I keep my medicine?  This drug is given in a hospital or clinic and will not be stored at home.  NOTE:This sheet is a summary. It may not cover all possible information. If you have questions about this medicine, talk to your doctor, pharmacist, or health care provider. Copyright  2016 Gold Standard    EDUCATION POST BIOLOGICAL/CHEMOTHERAPY INFUSION  Call the triage nurse at your clinic or seek medical attention if you have chills and/or temperature greater than or equal to 100.5, uncontrolled nausea/vomiting, diarrhea, constipation, dizziness, shortness of breath, chest pain, heart palpitations, weakness or any other new or concerning symptoms, questions or concerns.  You can not have any live virus vaccines prior to or during treatment or up to 6 months post infusion.  If you have an upcoming surgery, medical procedure or dental procedure during treatment, this should be discussed with your ordering physician and your surgeon/dentist.  If you are having any concerning symptom, if you are unsure if you should get your next infusion or wish to speak to a provider before your next infusion, please call your care coordinator or triage nurse at your clinic to notify them so we can adequately serve you.         We look forward in seeing you on your next appointment here at [facility] ([facility]).  Please don t hesitate to call us at [phone] to reschedule any of your appointments or to speak with one of the [facility] registered nurses.  It was a pleasure taking care of you today.    Sincerely,    [facility]  [facility]  [address] [place] [address]  [address]  Phone:  [phone]

## 2021-05-05 NOTE — PROGRESS NOTES
Nursing Note  Shelly Cha presents today to [facility] for:   Chief Complaint   Patient presents with     Infusion     remicade     During [facility]'s [facility] appointment, orders from Dr. Kwasi Nolasco were completed.  Frequency: weeks 0, 2, 6 then every 8 weeks. Today is patient's first dose.    Progress note:  Patient identification verified by name and date of birth.  Assessment completed.  Vitals recorded in Doc Flowsheets.  Patient was provided with education regarding medication/procedure and possible side effects.  Patient verbalized understanding.     present during visit today: Not Applicable.    Treatment Conditions: See note per LEO Hughes RN. Negative bio checklist.    Premedications: were not administered, as today is patient's first dose.  Drug Waste Record: No  Infusion length and rate:  infusion given over approximately 2 hours at 157 ml/hr.  Labs: were drawn per orders.   Vascular access: peripheral IV placed today.    Is the next appt scheduled? 5/19  Asymptomatic COVID test completed? Completed 5/1    Report given to KAMERON Hughes at 1515. Care transferred at that time.  Nurse report given to KAMERON Bryan at 1612. Care transferred at this time.    Discharge Plan:   Follow up plan of care with: ongoing infusions at [facility]., ordering provider as scheduled. and after visit summary given to patient  Discharge instructions were reviewed with patient.  Patient/representative verbalized understanding of discharge instructions and all questions answered.  Patient discharged from [facility] in stable condition.    Estella Jensen RN       Administrations This Visit     inFLIXimab-dyyb (INFLECTRA) 400 mg in sodium chloride 0.9 % 315 mL infusion     Admin Date  05/05/2021 Action  New Bag Dose  400 mg Rate  157.5 mL/hr Route  Intravenous Administered By  Estella Jensen, KAMERON                 /85   Pulse 74   Temp 98.1  F (36.7  C) (Oral)   Resp 16   Wt 75.1 kg (165 lb 9.6 oz)   SpO2 97%   BMI 29.33 kg/m

## 2021-05-05 NOTE — PROGRESS NOTES
Treatment Conditions: ~~~ NOTE: If the patient answers yes to any of the questions below, hold the infusion and contact ordering provider or on-call provider.    1. Have you recently had an elevated temperature, fever, chills, productive cough, coughing for 3 weeks or longer or hemoptysis, abnormal vital signs, night sweats,  chest pain or have you noticed a decrease in your appetite, unexplained weight loss or fatigue? No  2. Do you have any open wounds or new incisions? No  3. Do you have any recent or upcoming hospitalizations, surgeries or dental procedures? No  4. Do you currently have or recently have had any signs of illness or infection or are you on any antibiotics? No  5. Have you had any new, sudden or worsening abdominal pain? No  6. Have you or anyone in your household received a live vaccination in the past 4 weeks? Please note:  No live vaccines while on biologic/chemotherapy until 6 months after the last treatment.  Patient can receive the flu vaccine (shot only) and the pneumovax.  It is optimal for the patient to get these vaccines mid cycle, but they can be given at any time as long as it is not on the day of the infusion. No  7. Have you recently been diagnosed with any new nervous system diseases (ie. Multiple sclerosis, Guillain [place], seizures, neurological changes) or cancer diagnosis? No  8. Are you on any form of radiation or chemotherapy? No  9. Are you pregnant or breast feeding or do you have plans of pregnancy in the future? No  10. Have you been having any signs of worsening depression or suicidal ideations?  (benlysta only) N/A  11. Have there been any other new onset medical symptoms? No

## 2021-05-05 NOTE — LETTER
5/5/2021         RE: Shelly Cha  [address]  [place] [address]        Dear Colleague,    Thank you for referring your patient, Shelly Cha, to the [facility]. Please see a copy of my visit note below.    Treatment Conditions: ~~~ NOTE: If the patient answers yes to any of the questions below, hold the infusion and contact ordering provider or on-call provider.    1. Have you recently had an elevated temperature, fever, chills, productive cough, coughing for 3 weeks or longer or hemoptysis, abnormal vital signs, night sweats,  chest pain or have you noticed a decrease in your appetite, unexplained weight loss or fatigue? No  2. Do you have any open wounds or new incisions? No  3. Do you have any recent or upcoming hospitalizations, surgeries or dental procedures? No  4. Do you currently have or recently have had any signs of illness or infection or are you on any antibiotics? No  5. Have you had any new, sudden or worsening abdominal pain? No  6. Have you or anyone in your household received a live vaccination in the past 4 weeks? Please note:  No live vaccines while on biologic/chemotherapy until 6 months after the last treatment.  Patient can receive the flu vaccine (shot only) and the pneumovax.  It is optimal for the patient to get these vaccines mid cycle, but they can be given at any time as long as it is not on the day of the infusion. No  7. Have you recently been diagnosed with any new nervous system diseases (ie. Multiple sclerosis, Guillain [place], seizures, neurological changes) or cancer diagnosis? No  8. Are you on any form of radiation or chemotherapy? No  9. Are you pregnant or breast feeding or do you have plans of pregnancy in the future? No  10. Have you been having any signs of worsening depression or suicidal ideations?  (benlysta only) N/A  11. Have there been any other new onset medical symptoms? No    Nursing Note  Shelly ANGULO  Semaj presents [facility] for:   Chief Complaint   Patient presents with     Infusion     remicade     During [facility] appointment, orders from Dr. Kwasi Nolasco were completed.  Frequency: weeks 0, 2, 6 then every 8 weeks. Today is patient's first dose.    Progress note:  Patient identification verified by name and date of birth.  Assessment completed.  Vitals recorded in Doc Flowsheets.  Patient was provided with education regarding medication/procedure and possible side effects.  Patient verbalized understanding.     present during visit today: Not Applicable.    Treatment Conditions: See note per LEO Hughes RN. Negative bio checklist.    Premedications: were not administered, as today is patient's first dose.  Drug Waste Record: No  Infusion length and rate:  infusion given over approximately 2 hours at 157 ml/hr.  Labs: were drawn per orders.   Vascular access: peripheral IV placed today.    Is the next appt scheduled? 5/19  Asymptomatic COVID test completed? Completed 5/1    Report given to KAMERON Hughes at 1515. Care transferred at that time.  Nurse report given to KAMERON Bryan at 1612. Care transferred at this time.    Discharge Plan:   Follow up plan of care with: ongoing infusions at [facility]., ordering provider as scheduled. and after visit summary given to patient  Discharge instructions were reviewed with patient.  Patient/representative verbalized understanding of discharge instructions and all questions answered.  Patient discharged from [facility] in stable condition.    Estella Jensen RN       Administrations This Visit     inFLIXimab-dyyb (INFLECTRA) 400 mg in sodium chloride 0.9 % 315 mL infusion     Admin Date  05/05/2021 Action  New Bag Dose  400 mg Rate  157.5 mL/hr Route  Intravenous Administered By  Estella Jensen RN                /85   Pulse  74   Temp 98.1  F (36.7  C) (Oral)   Resp 16   Wt 75.1 kg (165 lb 9.6 oz)   SpO2 97%   BMI 29.33 kg/m          Again, thank you for allowing me to participate in the care of your patient.        Sincerely,        [facility]

## 2021-05-08 ENCOUNTER — HEALTH MAINTENANCE LETTER (OUTPATIENT)
Age: 51
End: 2021-05-08

## 2021-05-19 ENCOUNTER — INFUSION THERAPY VISIT (OUTPATIENT)
Dept: INFUSION THERAPY | Facility: CLINIC | Age: 51
End: 2021-05-19
Attending: INTERNAL MEDICINE
Payer: COMMERCIAL

## 2021-05-19 VITALS
DIASTOLIC BLOOD PRESSURE: 77 MMHG | WEIGHT: 165.7 LBS | SYSTOLIC BLOOD PRESSURE: 118 MMHG | RESPIRATION RATE: 16 BRPM | TEMPERATURE: 98.3 F | HEART RATE: 78 BPM | BODY MASS INDEX: 29.35 KG/M2 | OXYGEN SATURATION: 98 %

## 2021-05-19 DIAGNOSIS — K51.00 PANCOLITIS (H): Primary | ICD-10-CM

## 2021-05-19 LAB
ALBUMIN SERPL-MCNC: 3.6 G/DL (ref 3.4–5)
ALP SERPL-CCNC: 90 U/L (ref 40–150)
ALT SERPL W P-5'-P-CCNC: 13 U/L (ref 0–50)
AST SERPL W P-5'-P-CCNC: 18 U/L (ref 0–45)
BASOPHILS # BLD AUTO: 0 10E9/L (ref 0–0.2)
BASOPHILS NFR BLD AUTO: 0.4 %
BILIRUB DIRECT SERPL-MCNC: <0.1 MG/DL (ref 0–0.2)
BILIRUB SERPL-MCNC: 0.4 MG/DL (ref 0.2–1.3)
CRP SERPL-MCNC: <2.9 MG/L (ref 0–8)
DIFFERENTIAL METHOD BLD: ABNORMAL
EOSINOPHIL # BLD AUTO: 0.6 10E9/L (ref 0–0.7)
EOSINOPHIL NFR BLD AUTO: 7 %
ERYTHROCYTE [DISTWIDTH] IN BLOOD BY AUTOMATED COUNT: 14.7 % (ref 10–15)
ERYTHROCYTE [SEDIMENTATION RATE] IN BLOOD BY WESTERGREN METHOD: 29 MM/H (ref 0–20)
HCT VFR BLD AUTO: 35.5 % (ref 35–47)
HGB BLD-MCNC: 11.3 G/DL (ref 11.7–15.7)
IMM GRANULOCYTES # BLD: 0 10E9/L (ref 0–0.4)
IMM GRANULOCYTES NFR BLD: 0.2 %
LYMPHOCYTES # BLD AUTO: 3.4 10E9/L (ref 0.8–5.3)
LYMPHOCYTES NFR BLD AUTO: 40.7 %
MCH RBC QN AUTO: 26.7 PG (ref 26.5–33)
MCHC RBC AUTO-ENTMCNC: 31.8 G/DL (ref 31.5–36.5)
MCV RBC AUTO: 84 FL (ref 78–100)
MONOCYTES # BLD AUTO: 0.7 10E9/L (ref 0–1.3)
MONOCYTES NFR BLD AUTO: 8.6 %
NEUTROPHILS # BLD AUTO: 3.6 10E9/L (ref 1.6–8.3)
NEUTROPHILS NFR BLD AUTO: 43.1 %
NRBC # BLD AUTO: 0 10*3/UL
NRBC BLD AUTO-RTO: 0 /100
PLATELET # BLD AUTO: 367 10E9/L (ref 150–450)
PLATELET # BLD EST: ABNORMAL 10*3/UL
PROT SERPL-MCNC: 7.6 G/DL (ref 6.8–8.8)
RBC # BLD AUTO: 4.24 10E12/L (ref 3.8–5.2)
WBC # BLD AUTO: 8.4 10E9/L (ref 4–11)

## 2021-05-19 PROCEDURE — 86140 C-REACTIVE PROTEIN: CPT | Performed by: INTERNAL MEDICINE

## 2021-05-19 PROCEDURE — 258N000003 HC RX IP 258 OP 636: Performed by: INTERNAL MEDICINE

## 2021-05-19 PROCEDURE — 85652 RBC SED RATE AUTOMATED: CPT | Performed by: INTERNAL MEDICINE

## 2021-05-19 PROCEDURE — 96365 THER/PROPH/DIAG IV INF INIT: CPT

## 2021-05-19 PROCEDURE — 96366 THER/PROPH/DIAG IV INF ADDON: CPT

## 2021-05-19 PROCEDURE — 250N000011 HC RX IP 250 OP 636: Performed by: INTERNAL MEDICINE

## 2021-05-19 PROCEDURE — 85025 COMPLETE CBC W/AUTO DIFF WBC: CPT | Performed by: INTERNAL MEDICINE

## 2021-05-19 PROCEDURE — 80076 HEPATIC FUNCTION PANEL: CPT | Performed by: INTERNAL MEDICINE

## 2021-05-19 RX ORDER — METHYLPREDNISOLONE SODIUM SUCCINATE 125 MG/2ML
125 INJECTION, POWDER, LYOPHILIZED, FOR SOLUTION INTRAMUSCULAR; INTRAVENOUS ONCE
Status: CANCELLED
Start: 2021-06-02 | End: 2021-06-02

## 2021-05-19 RX ORDER — DIPHENHYDRAMINE HCL 25 MG
25 CAPSULE ORAL ONCE
Status: CANCELLED
Start: 2021-06-02 | End: 2021-06-02

## 2021-05-19 RX ORDER — ACETAMINOPHEN 325 MG/1
650 TABLET ORAL ONCE
Status: CANCELLED
Start: 2021-06-02 | End: 2021-06-02

## 2021-05-19 RX ADMIN — SODIUM CHLORIDE 400 MG: 9 INJECTION, SOLUTION INTRAVENOUS at 14:11

## 2021-05-19 NOTE — LETTER
5/19/2021         RE: Shelly Cha  [address]  [place] [address]        Dear Colleague,    Thank you for referring your patient, Shelly Cha, to the [facility]. Please see a copy of my visit note below.    Nursing Note  Shelly Cha presents [facility] for:   Chief Complaint   Patient presents with     Infusion     Remicade     During [facility]'s [facility] appointment, orders from Dr. Kwasi Nolasco were completed.  Frequency: this is dose 2, then at week 6 then every 8 weeks    Progress note:  Patient identification verified by name and date of birth.  Assessment completed.  Vitals recorded in Doc Flowsheets.  Patient was provided with education regarding medication/procedure and possible side effects.  Patient verbalized understanding.     present during visit today: Not Applicable.    Treatment Conditions: ~~~ NOTE: If the patient answers yes to any of the questions below, hold the infusion and contact ordering provider or on-call provider.    1. Have you recently had an elevated temperature, fever, chills, productive cough, coughing for 3 weeks or longer or hemoptysis, abnormal vital signs, night sweats,  chest pain or have you noticed a decrease in your appetite, unexplained weight loss or fatigue? No  2. Do you have any open wounds or new incisions? No  3. Do you have any recent or upcoming hospitalizations, surgeries or dental procedures? No  4. Do you currently have or recently have had any signs of illness or infection or are you on any antibiotics? No  5. Have you had any new, sudden or worsening abdominal pain? No  6. Have you or anyone in your household received a live vaccination in the past 4 weeks? Please note:  No live vaccines while on biologic/chemotherapy until 6 months after the last treatment.  Patient can receive the flu vaccine (shot only) and the  pneumovax.  It is optimal for the patient to get these vaccines mid cycle, but they can be given at any time as long as it is not on the day of the infusion. No  7. Have you recently been diagnosed with any new nervous system diseases (ie. Multiple sclerosis, Guillain [place], seizures, neurological changes) or cancer diagnosis? No  8. Are you on any form of radiation or chemotherapy? No  9. Are you pregnant or breast feeding or do you have plans of pregnancy in the future? No  10. Have you been having any signs of worsening depression or suicidal ideations?  (benlysta only) No  11. Have there been any other new onset medical symptoms? No      Premedications: historically patient has not taken pre-medications prior to infusion.    Drug Waste Record: No    Infusion length and rate:  infusion given over approximately 2 hours    Labs: were drawn per orders.     Vascular access: peripheral IV placed today.    Is the next appt scheduled? Message sent to scheduling team  Asymptomatic COVID test completed? No    Report given to Sirena JAUREGUI Rn at 1525. Care transferred at this time      POST-INFUSION OF BIOLOGICAL MEDICATION:     Reviewed with patient.  Given biologic medication or medication hand-out. Inform patient if any fever, chills or signs of infection, new symptoms, abdominal pain, heart palpitations, shortness of breath, reaction, weakness, neurological changes, seek medical attention immediately and should not receive infusions. No live virus vaccines prior to or during treatment or up to 6 months post infusion. If the patient has an upcoming procedure or surgery, this should be discussed with the rheumatologist and surgeon or provider.    Post Infusion Assessment:  Patient tolerated infusion without incident.  Blood return noted pre and post infusion.  Site patent and intact, free from redness, edema or discomfort.  No evidence of extravasations.  Access discontinued per protocol.     Discharge Plan:   Follow up plan  of care with: ongoing infusions at [facility].  Discharge instructions were reviewed with patient.  Patient/representative verbalized understanding of discharge instructions and all questions answered.  Patient discharged from [facility] in stable condition.    Abby Aviles RN       Administrations This Visit     inFLIXimab-dyyb (INFLECTRA) 400 mg in sodium chloride 0.9 % 315 mL infusion     Admin Date  05/19/2021 Action  New Bag Dose  400 mg Rate  157.5 mL/hr Route  Intravenous Administered By  Abby Aviles RN                /77   Pulse 78   Temp 98.3  F (36.8  C) (Oral)   Resp 16   Wt 75.2 kg (165 lb 11.2 oz)   SpO2 98%   BMI 29.35 kg/m          Again, thank you for allowing me to participate in the care of your patient.        Sincerely,        [facility] [place]

## 2021-05-19 NOTE — PATIENT INSTRUCTIONS
Dear Shelly Cha    Thank you for choosing [facility] ([facility]) for your infusion.  The following information is a summary of our appointment as well as important reminders.      EDUCATION POST BIOLOGICAL/CHEMOTHERAPY INFUSION  Call the triage nurse at your clinic or seek medical attention if you have chills and/or temperature greater than or equal to 100.5, uncontrolled nausea/vomiting, diarrhea, constipation, dizziness, shortness of breath, chest pain, heart palpitations, weakness or any other new or concerning symptoms, questions or concerns.  You can not have any live virus vaccines prior to or during treatment or up to 6 months post infusion.  If you have an upcoming surgery, medical procedure or dental procedure during treatment, this should be discussed with your ordering physician and your surgeon/dentist.  If you are having any concerning symptom, if you are unsure if you should get your next infusion or wish to speak to a provider before your next infusion, please call your care coordinator or triage nurse at your clinic to notify them so we can adequately serve you.    Patient Education     Inflectra Injection  Uses  This medicine is used for the following purposes:    arthritis    inflammatory bowel disease    psoriasis    sarcoidosis    skin wound  Instructions  This is an IV medicine. It is given through a sterile tube directly into the vein by a healthcare provider.  This medicine is given gradually through the IV line.  Carefully follow the instructions for preparing this medicine before injection.  Dilute with 0.9% sodium chloride, as directed.  Do not dilute the medicine until ready to use.  Do not mix this medicine with other solutions.  Always inspect the medicine before using.  The liquid should be clear or light yellow.  Do not use the medicine if it contains any particles or if it has changed color.  Check the medicine before each  use. If the liquid medicine has any particles in it, appears discolored, or if the vial appears damaged, do not use it.  Do not shake the medicine before using.  Keep this medicine in the refrigerator. Do not freeze.  Keep the medicine in its original container.  Speak with your nurse or pharmacist about how long the medicine can be stored safely at room temperature or in the refrigerator before it needs to be discarded.  Never use any medicine that has .  Please ask your doctor, nurse, or pharmacist how to discard unused medicines safely.  This medicine should be given by a trained health care provider.  Wash your hands before and after handling this medicine.  This medicine is to be given continuously. Contact your doctor or home care provider right away if the medicine is interrupted.  It is important that you keep taking each dose of this medicine on time even if you are feeling well.  If you miss a dose, contact your doctor for instructions.  Please tell your doctor and pharmacist about all the medicines you take. Include both prescription and over-the-counter medicines. Also tell them about any vitamins, herbal medicines, or anything else you take for your health.  If your symptoms do not improve or they worsen while on this medicine, contact your doctor.  Your doctor may prescribe other medications to reduce side effects. Follow instructions carefully.  Talk to your doctor before taking other medicines, including aspirins and ibuprofen containing products. Speak to your doctor about which medicines are safe to use while you are on this medicine.  Do not suddenly stop taking this medicine. Check with your doctor before stopping.  It is very important that you follow your doctor's instructions for all blood tests.  It is very important that you keep all appointments for medical exams and tests while on this medicine.  Do not take the medicine more than once during 24 hours.  Cautions  Tell your doctor  and pharmacist if you ever had an allergic reaction to a medicine. Symptoms of an allergic reaction can include trouble breathing, skin rash, itching, swelling, or severe dizziness.  Your doctor should check you for tuberculosis (TB) before you start this medicine and while you are using it. Tell your doctor if you are being treated for TB or any other infection.  Some patients with weak hearts may have worsening of symptoms. If you notice difficulty breathing, weight gain, or swelling of your legs or ankles, let your doctor know right away.  Some patients on this medicine have developed severe, life-threatening infections. Please speak with your doctor about the risks and benefits of using this medicine.  Some patients taking this medicine have experienced serious side effects. Please speak with your doctor to understand the risks and benefits associated with this medicine.  This medicine may increase the risk of some types of cancer. Please speak with your doctor about the risks and benefits of using this medicine.  This medicine is associated with a rare, but serious problem of the liver. Speak to your doctor about the early signs of liver problems and the benefits and risks of using this medicine.  This medicine may reduce your body's ability to fight infections. Try to avoid contact with people with colds, flu or other infections.  Contact your doctor if you develop any signs of a new infection such as fever, cough, sore throat, or chills.  Wash your hands often and avoid close contact with people with infections such as colds and flu.  Speak with your health care provider before receiving any vaccinations.  Tell the doctor or pharmacist if you are pregnant, planning to be pregnant, or breastfeeding.  Ask your pharmacist if this medicine can interact with any of your other medicines. Be sure to tell them about all the medicines you take.  Always carry an ID card or wear a medical alert bracelet indicating your  medical condition.  Please tell all your doctors and dentists that you are on this medicine before they provide care.  Do not start or stop any other medicines without first speaking to your doctor or pharmacist.  Do not share this medicine with anyone who has not been prescribed this medicine.  This medicine can cause serious side effects in some patients. Important information from the [place] Food and Drug Administration (FDA) is available from your pharmacist. Please review it carefully with your pharmacist to understand the risks associated with this medicine.  Side Effects  The following is a list of some common side effects from this medicine. Please speak with your doctor about what you should do if you experience these or other side effects.    headaches    nausea    stomach pain  Call your doctor or get medical help right away if you notice any of these more serious side effects:    increased risk of bleeding    unusual bruising or discoloration on skin    increased risk of cancer    chest pain    confusion    feeling of heat or flushing    fever or chills    pain, redness, swelling or blistering on hands, feet or elbows    fast or irregular heart beats    slow heartbeat    pain in the joints    symptoms of liver damage (such as yellowing of skin or eyes, dark urine, unusual tiredness or weakness; severe stomach or back pain)    muscle pain    muscle weakness    feeling of numbness or tingling in your hands and feet    pain near injection site    skin irritation such as redness, itching, rash, or burning    butterfly-shaped rash on nose and cheeks    seizures    shortness of breath    sweating at night    blurring or changes of vision  A few people may have an allergic reactions to this medicine. Symptoms can include difficulty breathing, skin rash, itching, swelling, or severe dizziness. If you notice any of these symptoms, seek medical help quickly.  Extra  Please speak with your doctor, nurse, or  pharmacist if you have any questions about this medicine.  [email or web address]  IMPORTANT NOTE: This document tells you briefly how to take your medicine, but it does not tell you all there is to know about it.Your doctor or pharmacist may give you other documents about your medicine. Please talk to them if you have any questions.Always follow their advice. There is a more complete description of this medicine available in English.Scan this code on your smartphone or tablet or use the web address below. You can also ask your pharmacist for a printout. If you have any questions, please ask your pharmacist.     2021 [employer].             We look forward in seeing you on your next appointment here at [facility] ([facility]).  Please don t hesitate to call us at [phone] to reschedule any of your appointments or to speak with one of the [facility] registered nurses.  It was a pleasure taking care of you today.    Sincerely,    [facility]  [facility]  [address] [place] [address]  [address]  Phone:  [phone]

## 2021-05-19 NOTE — PROGRESS NOTES
Nursing Note  Shelly Cha presents [facility] for:   Chief Complaint   Patient presents with     Infusion     Remicade     During [facility]'s [facility] appointment, orders from Dr. Kwasi Nolasco were completed.  Frequency: this is dose 2, then at week 6 then every 8 weeks    Progress note:  Patient identification verified by name and date of birth.  Assessment completed.  Vitals recorded in Doc Flowsheets.  Patient was provided with education regarding medication/procedure and possible side effects.  Patient verbalized understanding.     present during visit today: Not Applicable.    Treatment Conditions: ~~~ NOTE: If the patient answers yes to any of the questions below, hold the infusion and contact ordering provider or on-call provider.    1. Have you recently had an elevated temperature, fever, chills, productive cough, coughing for 3 weeks or longer or hemoptysis, abnormal vital signs, night sweats,  chest pain or have you noticed a decrease in your appetite, unexplained weight loss or fatigue? No  2. Do you have any open wounds or new incisions? No  3. Do you have any recent or upcoming hospitalizations, surgeries or dental procedures? No  4. Do you currently have or recently have had any signs of illness or infection or are you on any antibiotics? No  5. Have you had any new, sudden or worsening abdominal pain? No  6. Have you or anyone in your household received a live vaccination in the past 4 weeks? Please note:  No live vaccines while on biologic/chemotherapy until 6 months after the last treatment.  Patient can receive the flu vaccine (shot only) and the pneumovax.  It is optimal for the patient to get these vaccines mid cycle, but they can be given at any time as long as it is not on the day of the infusion. No  7. Have you recently been diagnosed with any new nervous system diseases (ie. Multiple sclerosis, Guillain [place],  seizures, neurological changes) or cancer diagnosis? No  8. Are you on any form of radiation or chemotherapy? No  9. Are you pregnant or breast feeding or do you have plans of pregnancy in the future? No  10. Have you been having any signs of worsening depression or suicidal ideations?  (benlysta only) No  11. Have there been any other new onset medical symptoms? No      Premedications: historically patient has not taken pre-medications prior to infusion.    Drug Waste Record: No    Infusion length and rate:  infusion given over approximately 2 hours    Labs: were drawn per orders.     Vascular access: peripheral IV placed today.    Is the next appt scheduled? Message sent to scheduling team  Asymptomatic COVID test completed? No    Report given to Sirena JAUREGUI Rn at 1525. Care transferred at this time      POST-INFUSION OF BIOLOGICAL MEDICATION:     Reviewed with patient.  Given biologic medication or medication hand-out. Inform patient if any fever, chills or signs of infection, new symptoms, abdominal pain, heart palpitations, shortness of breath, reaction, weakness, neurological changes, seek medical attention immediately and should not receive infusions. No live virus vaccines prior to or during treatment or up to 6 months post infusion. If the patient has an upcoming procedure or surgery, this should be discussed with the rheumatologist and surgeon or provider.    Post Infusion Assessment:  Patient tolerated infusion without incident.  Blood return noted pre and post infusion.  Site patent and intact, free from redness, edema or discomfort.  No evidence of extravasations.  Access discontinued per protocol.     Discharge Plan:   Follow up plan of care with: ongoing infusions at [facility].  Discharge instructions were reviewed with patient.  Patient/representative verbalized understanding of discharge instructions and all questions answered.  [facility] in stable condition.    Abby Aviles RN       Administrations This Visit     inFLIXimab-dyyb (INFLECTRA) 400 mg in sodium chloride 0.9 % 315 mL infusion     Admin Date  05/19/2021 Action  New Bag Dose  400 mg Rate  157.5 mL/hr Route  Intravenous Administered By  Abby Aviles RN                /77   Pulse 78   Temp 98.3  F (36.8  C) (Oral)   Resp 16   Wt 75.2 kg (165 lb 11.2 oz)   SpO2 98%   BMI 29.35 kg/m

## 2021-05-21 ENCOUNTER — CARE COORDINATION (OUTPATIENT)
Dept: GASTROENTEROLOGY | Facility: CLINIC | Age: 51
End: 2021-05-21

## 2021-05-26 DIAGNOSIS — G47.00 INSOMNIA: Primary | ICD-10-CM

## 2021-06-16 ENCOUNTER — INFUSION THERAPY VISIT (OUTPATIENT)
Dept: INFUSION THERAPY | Facility: CLINIC | Age: 51
End: 2021-06-16
Attending: INTERNAL MEDICINE
Payer: COMMERCIAL

## 2021-06-16 VITALS — WEIGHT: 171.1 LBS | BODY MASS INDEX: 30.31 KG/M2

## 2021-06-16 DIAGNOSIS — K51.00 PANCOLITIS (H): Primary | ICD-10-CM

## 2021-06-16 LAB
ALBUMIN SERPL-MCNC: 3.6 G/DL (ref 3.4–5)
ALP SERPL-CCNC: 78 U/L (ref 40–150)
ALT SERPL W P-5'-P-CCNC: 19 U/L (ref 0–50)
AST SERPL W P-5'-P-CCNC: 26 U/L (ref 0–45)
BASOPHILS # BLD AUTO: 0 10E9/L (ref 0–0.2)
BASOPHILS NFR BLD AUTO: 0.4 %
BILIRUB DIRECT SERPL-MCNC: 0.1 MG/DL (ref 0–0.2)
BILIRUB SERPL-MCNC: 0.5 MG/DL (ref 0.2–1.3)
CRP SERPL-MCNC: <2.9 MG/L (ref 0–8)
DIFFERENTIAL METHOD BLD: ABNORMAL
EOSINOPHIL # BLD AUTO: 0.6 10E9/L (ref 0–0.7)
EOSINOPHIL NFR BLD AUTO: 5.4 %
ERYTHROCYTE [DISTWIDTH] IN BLOOD BY AUTOMATED COUNT: 16.7 % (ref 10–15)
ERYTHROCYTE [SEDIMENTATION RATE] IN BLOOD BY WESTERGREN METHOD: 26 MM/H (ref 0–20)
HCT VFR BLD AUTO: 34.8 % (ref 35–47)
HGB BLD-MCNC: 11.2 G/DL (ref 11.7–15.7)
IMM GRANULOCYTES # BLD: 0 10E9/L (ref 0–0.4)
IMM GRANULOCYTES NFR BLD: 0.3 %
LYMPHOCYTES # BLD AUTO: 3.4 10E9/L (ref 0.8–5.3)
LYMPHOCYTES NFR BLD AUTO: 32.3 %
MCH RBC QN AUTO: 26.9 PG (ref 26.5–33)
MCHC RBC AUTO-ENTMCNC: 32.2 G/DL (ref 31.5–36.5)
MCV RBC AUTO: 84 FL (ref 78–100)
MONOCYTES # BLD AUTO: 1.2 10E9/L (ref 0–1.3)
MONOCYTES NFR BLD AUTO: 11.9 %
NEUTROPHILS # BLD AUTO: 5.2 10E9/L (ref 1.6–8.3)
NEUTROPHILS NFR BLD AUTO: 49.7 %
NRBC # BLD AUTO: 0 10*3/UL
NRBC BLD AUTO-RTO: 0 /100
PLATELET # BLD AUTO: 328 10E9/L (ref 150–450)
PROT SERPL-MCNC: 7.7 G/DL (ref 6.8–8.8)
RBC # BLD AUTO: 4.17 10E12/L (ref 3.8–5.2)
WBC # BLD AUTO: 10.4 10E9/L (ref 4–11)

## 2021-06-16 PROCEDURE — 85652 RBC SED RATE AUTOMATED: CPT | Performed by: INTERNAL MEDICINE

## 2021-06-16 PROCEDURE — 80076 HEPATIC FUNCTION PANEL: CPT | Performed by: INTERNAL MEDICINE

## 2021-06-16 PROCEDURE — 85025 COMPLETE CBC W/AUTO DIFF WBC: CPT | Performed by: INTERNAL MEDICINE

## 2021-06-16 PROCEDURE — 96365 THER/PROPH/DIAG IV INF INIT: CPT

## 2021-06-16 PROCEDURE — 250N000011 HC RX IP 250 OP 636: Performed by: INTERNAL MEDICINE

## 2021-06-16 PROCEDURE — 86140 C-REACTIVE PROTEIN: CPT | Performed by: INTERNAL MEDICINE

## 2021-06-16 PROCEDURE — 258N000003 HC RX IP 258 OP 636: Performed by: INTERNAL MEDICINE

## 2021-06-16 PROCEDURE — 96366 THER/PROPH/DIAG IV INF ADDON: CPT

## 2021-06-16 RX ORDER — METHYLPREDNISOLONE SODIUM SUCCINATE 125 MG/2ML
125 INJECTION, POWDER, LYOPHILIZED, FOR SOLUTION INTRAMUSCULAR; INTRAVENOUS ONCE
Status: CANCELLED
Start: 2021-06-30 | End: 2021-06-30

## 2021-06-16 RX ORDER — ACETAMINOPHEN 325 MG/1
650 TABLET ORAL ONCE
Status: CANCELLED
Start: 2021-06-30 | End: 2021-06-30

## 2021-06-16 RX ORDER — DIPHENHYDRAMINE HCL 25 MG
25 CAPSULE ORAL ONCE
Status: CANCELLED
Start: 2021-06-30 | End: 2021-06-30

## 2021-06-16 RX ADMIN — SODIUM CHLORIDE 400 MG: 9 INJECTION, SOLUTION INTRAVENOUS at 13:07

## 2021-06-16 NOTE — PATIENT INSTRUCTIONS
Dear Shelly Cha    Thank you for choosing [facility] ([facility]) for your infusion.  The following information is a summary of our appointment as well as important reminders.      We look forward in seeing you on your next appointment here at [facility] ([facility]).  Please don t hesitate to call us at [phone] to reschedule any of your appointments or to speak with one of the [facility] registered nurses.  It was a pleasure taking care of you today.    Sincerely,    [facility]  [facility]  [address] [place] [address]  [address]  Phone:  [phone]

## 2021-06-16 NOTE — PROGRESS NOTES
Nursing Note  Shelly Cha presents [facility] for: Remicade  During today's [facility] appointment, orders from Dr. Nolasco were completed.  Frequency: dose #6; next dose in 8 weeks    Progress note:  Patient identification verified by name and date of birth.  Assessment completed.  Vitals recorded in Doc Flowsheets.  Patient was provided with education regarding medication/procedure and possible side effects.  Patient verbalized understanding.     present during visit today: Not Applicable.    Treatment Conditions: ~~~ NOTE: If the patient answers yes to any of the questions below, hold the infusion and contact ordering provider or on-call provider.    1. Have you recently had an elevated temperature, fever, chills, productive cough, coughing for 3 weeks or longer or hemoptysis, abnormal vital signs, night sweats,  chest pain or have you noticed a decrease in your appetite, unexplained weight loss or fatigue? No  2. Do you have any open wounds or new incisions? No  3. Do you have any recent or upcoming hospitalizations, surgeries or dental procedures? No  4. Do you currently have or recently have had any signs of illness or infection or are you on any antibiotics? No  5. Have you had any new, sudden or worsening abdominal pain? No  6. Have you or anyone in your household received a live vaccination in the past 4 weeks? Please note:  No live vaccines while on biologic/chemotherapy until 6 months after the last treatment.  Patient can receive the flu vaccine (shot only) and the pneumovax.  It is optimal for the patient to get these vaccines mid cycle, but they can be given at any time as long as it is not on the day of the infusion. No  7. Have you recently been diagnosed with any new nervous system diseases (ie. Multiple sclerosis, Guillain [place], seizures, neurological changes) or cancer diagnosis? No  8. Are you on any form of radiation  or chemotherapy? No  9. Are you pregnant or breast feeding or do you have plans of pregnancy in the future? No  10. Have you been having any signs of worsening depression or suicidal ideations?  (benlysta only) No  11. Have there been any other new onset medical symptoms? No      Premedications: were not ordered.    Drug Waste Record: No    Infusion length and rate:  infusion given over approximately 2 hours    Labs: were drawn per orders.     Vascular access: peripheral IV placed today.    Is the next appt scheduled? Yes; sent message to scheduling  Asymptomatic COVID test completed? no    Post Infusion Assessment:  Patient tolerated infusion without incident.     Discharge Plan:   Follow up plan of care with: ongoing infusions at [facility].  Discharge instructions were reviewed with patient.  Patient/representative verbalized understanding of discharge instructions and all questions answered.  Patient discharged from [facility] in stable condition.    Sirena Melgoza RN      Administrations This Visit     inFLIXimab-dyyb (INFLECTRA) 400 mg in sodium chloride 0.9 % 315 mL infusion     Admin Date  06/16/2021 Action  New Bag Dose  400 mg Rate  157.5 mL/hr Route  Intravenous Administered By  Sirena Melgoza RN

## 2021-06-16 NOTE — LETTER
6/16/2021         RE: Shelly Cha  [address]  [place] [address]        Dear Colleague,    Thank you for referring your patient, Shelly Cha, to the [facility]. Please see a copy of my visit note below.    Nursing Note  Shelly Cha presents [facility] for: Remicade  During [facility]'s [facility] appointment, orders from Dr. Nloasco were completed.  Frequency: dose #6; next dose in 8 weeks    Progress note:  Patient identification verified by name and date of birth.  Assessment completed.  Vitals recorded in Doc Flowsheets.  Patient was provided with education regarding medication/procedure and possible side effects.  Patient verbalized understanding.     present during visit today: Not Applicable.    Treatment Conditions: ~~~ NOTE: If the patient answers yes to any of the questions below, hold the infusion and contact ordering provider or on-call provider.    1. Have you recently had an elevated temperature, fever, chills, productive cough, coughing for 3 weeks or longer or hemoptysis, abnormal vital signs, night sweats,  chest pain or have you noticed a decrease in your appetite, unexplained weight loss or fatigue? No  2. Do you have any open wounds or new incisions? No  3. Do you have any recent or upcoming hospitalizations, surgeries or dental procedures? No  4. Do you currently have or recently have had any signs of illness or infection or are you on any antibiotics? No  5. Have you had any new, sudden or worsening abdominal pain? No  6. Have you or anyone in your household received a live vaccination in the past 4 weeks? Please note:  No live vaccines while on biologic/chemotherapy until 6 months after the last treatment.  Patient can receive the flu vaccine (shot only) and the pneumovax.  It is optimal for the patient to get these vaccines mid cycle, but they can be  given at any time as long as it is not on the day of the infusion. No  7. Have you recently been diagnosed with any new nervous system diseases (ie. Multiple sclerosis, Guillain [place], seizures, neurological changes) or cancer diagnosis? No  8. Are you on any form of radiation or chemotherapy? No  9. Are you pregnant or breast feeding or do you have plans of pregnancy in the future? No  10. Have you been having any signs of worsening depression or suicidal ideations?  (benlysta only) No  11. Have there been any other new onset medical symptoms? No      Premedications: were not ordered.    Drug Waste Record: No    Infusion length and rate:  infusion given over approximately 2 hours    Labs: were drawn per orders.     Vascular access: peripheral IV placed today.    Is the next appt scheduled? Yes; sent message to scheduling  Asymptomatic COVID test completed? no    Post Infusion Assessment:  Patient tolerated infusion without incident.     Discharge Plan:   Follow up plan of care with: ongoing infusions at [facility].  Discharge instructions were reviewed with patient.  Patient/representative verbalized understanding of discharge instructions and all questions answered.  Patient discharged from [facility] in stable condition.    Sirena Melgoza RN      Administrations This Visit     inFLIXimab-dyyb (INFLECTRA) 400 mg in sodium chloride 0.9 % 315 mL infusion     Admin Date  06/16/2021 Action  New Bag Dose  400 mg Rate  157.5 mL/hr Route  Intravenous Administered By  Sirena Melgoza RN                Again, thank you for allowing me to participate in the care of your patient.        Sincerely,        [facility]

## 2021-06-17 ENCOUNTER — PATIENT OUTREACH (OUTPATIENT)
Dept: GASTROENTEROLOGY | Facility: CLINIC | Age: 51
End: 2021-06-17

## 2021-06-17 NOTE — PROGRESS NOTES
Contacted patient per Dr Nolasco to arrange follow up appt   Pt scheduled on June 28 with Ms Brooke

## 2021-06-28 ENCOUNTER — VIRTUAL VISIT (OUTPATIENT)
Dept: GASTROENTEROLOGY | Facility: CLINIC | Age: 51
End: 2021-06-28
Payer: COMMERCIAL

## 2021-06-28 VITALS — HEIGHT: 62 IN | BODY MASS INDEX: 31.29 KG/M2

## 2021-06-28 DIAGNOSIS — K50.10 CROHN'S COLITIS, WITHOUT COMPLICATIONS (H): Primary | ICD-10-CM

## 2021-06-28 PROCEDURE — 99215 OFFICE O/P EST HI 40 MIN: CPT | Mod: 95 | Performed by: PHYSICIAN ASSISTANT

## 2021-06-28 RX ORDER — DIPHENHYDRAMINE HYDROCHLORIDE 25 MG/1
CAPSULE ORAL
COMMUNITY
Start: 2021-06-10

## 2021-06-28 RX ORDER — CALCIUM CARBONATE/VITAMIN D3 600 MG-10
1 TABLET ORAL DAILY
COMMUNITY
Start: 2021-06-17

## 2021-06-28 RX ORDER — FOLIC ACID 0.8 MG
800 TABLET ORAL DAILY
COMMUNITY
Start: 2021-06-19

## 2021-06-28 RX ORDER — ALBUTEROL SULFATE 1.25 MG/3ML
1.25 SOLUTION RESPIRATORY (INHALATION)
COMMUNITY
Start: 2021-06-25

## 2021-06-28 RX ORDER — PSYLLIUM HUSK 3.4G/5.8G
POWDER (GRAM) ORAL
COMMUNITY
Start: 2021-06-17

## 2021-06-28 RX ORDER — MULTIVITAMIN WITH FOLIC ACID 400 MCG
1 TABLET ORAL DAILY
COMMUNITY
Start: 2021-06-22

## 2021-06-28 RX ORDER — CHOLECALCIFEROL (VITAMIN D3) 50 MCG
1 TABLET ORAL DAILY
COMMUNITY
Start: 2021-06-17

## 2021-06-28 ASSESSMENT — PAIN SCALES - GENERAL: PAINLEVEL: NO PAIN (0)

## 2021-06-28 NOTE — PROGRESS NOTES
IBD CLINIC VISIT    CC/REFERRING MD:  No ref. provider found    REASON FOR CONSULTATION: follow up severe colitis    ASSESSMENT/PLAN:    1. Severe inflammatory bowel disease (colitis):  Current medication: infliximab (induction)  Current clinical disease activity: none   Current endoscopic disease activity: severe colitis (2/2021) sparing of the rectosigmoid area.  Biopsies of the rectosigmoid showed chronic changes.  Crohns colitis vs. Atypical variant of UC.  -- Continue infliximab induction followed by every 8 week infusions at 5mg/kg.  -- Repeat endoscopic assessment after 6 months of infliximab (Oct/Nov 2021) to ensure mucosal healing. If not achieved, may need infliximab level and may need to alter dose.  -- note that her noted pseudopolyps which will make ongoing colon cancer surveillance and polyp surveillance quite difficult.  We will see how things respond to treatment.  Also, given the number of pseudopolyps it is likely that she has had ongoing colitis for several years.  Per Dr. Nolasco's recommendations, we will proceed with dysplasia surveillance on her next colonoscopy this Fall.    2.  H. pylori gastritis-this is seen on her gastric biopsies.  This will require treatment because of the risk for stomach cancer with chronic H. pylori infections.  However, this is not likely contributing to her symptoms at this time and this is likely an longstanding process.  We will wait for her colitis to be under better control and then we will discuss with our pharmacy team about treating with quadruple therapy.    3.  Hepatitis C-her most recent hepatitis C viral load was undetectable.  This indicates spontaneous clearance.  This therefore means she does not need any treatment.    IBD HISTORY  Age at diagnosis: 51  Extent of disease: colon (pan-colitis - sparing of rectosigmoid - distal 15 cm)  Disease phenotype: inflammatory  Oneida-anal disease: none  Current CD medications: infliximab induction  Prior IBD  surgeries: none  Prior IBD Medications: none    DRUG MONITORING  TPMT enzyme activity: adequate    6-TGN/6-MMPN levels: NA    Biologic concentration: NA    DISEASE ASSESSMENT  Labs  Recent Labs   Lab Test 06/16/21  1240 05/19/21  1357   CRP <2.9 <2.9   SED 26* 29*     CRP outside EMR: 5  Fecal calprotectin: 5000s  Endoscopy:   -colonoscopy - severe inflammation colon (distal 15 cm spared). Ileum not seen  - normal EGD but gastric biopsis + h pylori  Enterography: MRE with normal small bowel   C diff: negative 1/22/21    sIBDQ:   No flowsheet data found.    IBD Health Care Maintenance:    Vaccinations:  All patients on biologics should avoid live vaccines.    -- Influenza (every year)  -- TdaP (every 10 years)  -- Pneumococcal Pneumonia (once plus booster at 5 years)  -- Yearly assessment for latent Tb (verbal screening and exam, PPD or QuantiFERON-Tb testing)    One time confirmation of immunity or serologies:  -- Hepatitis A (serologies or immunizations)  -- Hepatitis B (serologies or immunizations)  -- Varicella  -- MMR  -- HPV (all aged 18-26)  -- Meningococcal meningitis (all patients at risk for meningitis)  -- Due to the immunosuppression in this patient, I would not advise administration of live vaccines such as varicella/VZV, intranasal influenza, MMR, or yellow fever vaccine (if travelling).      Bone mineral density screening   -- Recommend all patients supplement with calcium and vitamin D    Cancer Screening:  Colon cancer screening:  Given panncolonic disease, recommend patient undergo regular dysplasia surveillance   Next dysplasia screening is recommended: given pseudopolyps I recommend we start dysplasia surveillance on next colonoscopy in 6-12 months.  Dysplasia surveillance will be difficult given the number of pseudopolyps that she is having.    Cervical cancer screening: Annual due to immunosupression    Skin cancer screening: Annual visual exam of skin by dermatologist since patient is  immunocompromised    Depression Screening:  -- Over the last month, have you felt down, depressed, or hopeless? no  -- Over the last month, have you felt little interest or pleasure doing things? no    Misc:  -- Avoid tobacco use  -- Avoid NSAIDs as there is potentially a 25% chance of causing an IBD flare    Return to clinic in 3 months    Thank you for this consultation.  48 minutes spent on the date of the encounter doing chart review, history and exam, documentation and further activities as noted above.  It was a pleasure to participate in the care of this patient; please contact us with any further questions.      Adam Brooke PA-C  Division of Gastroenterology, Hepatology and Nutrition  [facility]    HPI:   Currently, patient is here today to follow-up in IBD clinic.  Her colonoscopy showed severe colitis throughout the cecum/ascending, transverse colon, descending colon, and sigmoid colon.  The distal 15 cm (rectosigmoid region) was spared.  Biopsies showed chronic active colitis.  Biopsies of the rectum showed chronic changes only.  There was a see of pseudopolyps and exudate throughout the entire colon.  This was consistent with inflammatory bowel disease. MR enterography showed changes consistent with colitis but no evidence of small bowel disease.    We prescribed the patient prednisone, however, she was worried about the side effects of this and did not start it.  Her symptoms resolved.  She is current having 1 bowel movement every day to every other day without blood in her stool.  No EIM at this time.     She started infliximab 5/5/21 and has had a total of 3 infusions at this time.     She has also been followed in the hepatology clinic.  Her repeat hepatitis C viral load was undetectable which indicates clearing of the virus.    ROS:    No fevers or chills  No weight loss  No blurry vision, double vision or change in vision  No sore throat  No lymphadenopathy  No headache,  paraesthesias, or weakness in a limb  No shortness of breath or wheezing  No chest pain or pressure  No arthralgias or myalgias  No rashes or skin changes  No odynophagia or dysphagia  No BRBPR, hematochezia, melena  No dysuria, frequency or urgency  No hot/cold intolerance or polyria  No anxiety or depression    Extra intestinal manifestations of IBD:  No uveitis/episcleritis  No aphthous ulcers   No arthritis   No erythema nodosum/pyoderma gangrenosum.     PERTINENT PAST MEDICAL HISTORY:  Past Medical History:   Diagnosis Date     Pancolitis (H) 3/11/2021     Pancolitis (H) 3/11/2021       PREVIOUS SURGERIES:  Past Surgical History:   Procedure Laterality Date     BIOPSY      right breast     CHOLECYSTECTOMY       GYN SURGERY      , tubal       PREVIOUS ENDOSCOPY:  No results found for this or any previous visit.]    ALLERGIES:     Allergies   Allergen Reactions     Ibuprofen Swelling     Eyes swelling     Tylenol [Acetaminophen]      Feels like someone punched her in the stomach       PERTINENT MEDICATIONS:    Current Outpatient Medications:      gabapentin (NEURONTIN) 300 MG capsule, Take 300 mg by mouth 3 times daily, Disp: , Rfl:      METHADONE HCL PO, Take 48 mg by mouth daily , Disp: , Rfl:      methylcellulose (CITRUCEL) powder, Take 0.85 g (3 teaspoonful) by mouth daily, Disp: 90 g, Rfl: 3     omeprazole (PRILOSEC) 20 MG DR capsule, Take 1 capsule (20 mg) by mouth daily (Patient not taking: Reported on 5/5/2021), Disp: 30 capsule, Rfl: 1    SOCIAL HISTORY:  Social History     Socioeconomic History     Marital status: Single     Spouse name: Not on file     Number of children: Not on file     Years of education: Not on file     Highest education level: Not on file   Occupational History     Not on file   Social Needs     Financial resource strain: Not on file     Food insecurity     Worry: Not on file     Inability: Not on file     Transportation needs     Medical: Not on file     Non-medical: Not on file    Tobacco Use     Smoking status: Former Smoker     Quit date: 2021     Years since quittin.4     Smokeless tobacco: Never Used   Substance and Sexual Activity     Alcohol use: No     Drug use: No     Sexual activity: Yes     Partners: Male   Lifestyle     Physical activity     Days per week: Not on file     Minutes per session: Not on file     Stress: Not on file   Relationships     Social connections     Talks on phone: Not on file     Gets together: Not on file     Attends Baptism service: Not on file     Active member of club or organization: Not on file     Attends meetings of clubs or organizations: Not on file     Relationship status: Not on file     Intimate partner violence     Fear of current or ex partner: Not on file     Emotionally abused: Not on file     Physically abused: Not on file     Forced sexual activity: Not on file   Other Topics Concern     Not on file   Social History Narrative     Not on file       FAMILY HISTORY:  No family history on file.    Past/family/social history reviewed and no changes    PHYSICAL EXAMINATION:  Constitutional: aaox3, cooperative, pleasant, not dyspneic/diaphoretic, no acute distress  Vitals reviewed: There were no vitals taken for this visit.  Wt:   Wt Readings from Last 2 Encounters:   21 77.6 kg (171 lb 1.6 oz)   21 75.2 kg (165 lb 11.2 oz)      Eyes: Sclera anicteric/injected  Respiratory: Unlabored breathing  Skin: w no jaundice  Psych: Normal affect      PERTINENT STUDIES:  Most recent CBC:  Recent Labs   Lab Test 21  1240 21  1357   WBC 10.4 8.4   HGB 11.2* 11.3*   HCT 34.8* 35.5    367     Most recent hepatic panel:  Recent Labs   Lab Test 21  1240 21  1357   ALT 19 13   AST 26 18     Most recent creatinine:  Recent Labs   Lab Test 21  1450 21  1712   CR 0.84 0.83

## 2021-06-28 NOTE — PROGRESS NOTES
Shelly is a 51 year old who is being evaluated via a billable video visit.      How would you like to obtain your AVS? MyChart  If the video visit is dropped, the invitation should be resent by: Text to cell phone: [phone]  Will anyone else be joining your video visit? No      Video Start Time: 7:50 AM  Video-Visit Details    Type of service:  telephone Visit (patient unable to connect to video)    Video End Time:0815 AM    Originating Location (pt. Location): Home    Distant Location (provider location):  [facility] [place]     Platform used for Video Visit: [employer]

## 2021-06-28 NOTE — NURSING NOTE
"Chief Complaint   Patient presents with     RECHECK     IBD follow up, Dr. Nolasco pt.       Vitals:    06/28/21 0710   Height: 1.575 m (5' 2\")       Body mass index is 31.29 kg/m .                          Adrianna Castlilo, EMT  "

## 2021-06-28 NOTE — PATIENT INSTRUCTIONS
It was a pleasure taking care of you today.  I've included a brief summary of our discussion and care plan from today's visit below.  Please review this information with your primary care provider.  ______________________________________________________________________    My recommendations are summarized as follows:    -- Continue remicade infusions   -- Labs with infusions  -- Next endoscopic assessment: Colonoscopy this fall   -- Patient with IBD we recommend supplementation vitamin D 1000 units daily and calcium 500 mg twice daily.  -- Vaccines/immunizations to be updated: Recommend yearly flu shot, pneumonia vaccines (Prevnar 13 then 8 weeks later Pneumovax 23 then 5 years later Pneumovax 23), tetanus every 10 years.  -- Yearly Dermatology visit for skin check while on immunosuppressive therapy. Can call [phone] to schedule.  -- Yearly pap smear while on immunosuppressive therapy  -- No NSAIDs (ibuprofen, or anything containing ibuprofen)     For additional resources about inflammatory bowel disease visit [email or web address]    Return to GI Clinic in 3 months to review your progress.    ______________________________________________________________________    How do I schedule labs, imaging studies, or procedures that were ordered in clinic today?     Labs: To schedule lab appointment at the [facility], use my chart or call [phone]. If you have a [place] lab closer to home where you are regularly seen you can give them a call.     Procedures: If a colonoscopy, upper endoscopy, breath test, esophageal manometry, or pH impedence was ordered today, our endoscopy team will call you to schedule this. If you have not heard from our endoscopy team within a week, please call [phone] to schedule.     Imaging Studies: If you were scheduled for a CT scan, X-ray, MRI, ultrasound, HIDA scan or other imaging study, please call [phone] to have this scheduled.      Referral: If a referral to another specialty was ordered, expect a phone call or follow instructions above. If you have not heard from anyone regarding your referral in a week, please call our clinic to check the status.     Who do I call with any questions after my visit?  Please be in touch if there are any further questions that arise following today's visit.  There are multiple ways to contact your gastroenterology care team.        During business hours, you may reach a Gastroenterology nurse at [phone]      To schedule or reschedule an appointment, please call [phone].       You can always send a secure message through [employer].  [employer] messages are answered by your nurse or doctor typically within 24 hours.  Please allow extra time on weekends and holidays.        For urgent/emergent questions after business hours, you may reach the on-call GI Fellow by contacting the [facility]  at [phone].     How will I get the results of any tests ordered?    You will receive all of your results.  If you have signed up for [employer], any tests ordered at your visit will be available to you after your physician reviews them.  Typically this takes 1-2 weeks.  If there are urgent results that require a change in your care plan, your physician or nurse will call you to discuss the next steps.      What is [employer]?  [employer] is a secure way for you to access all of your healthcare records from the [facility].  It is a web based computer program, so you can sign on to it from any location.  It also allows you to send secure messages to your care team.  I recommend signing up for [employer] access if you have not already done so and are comfortable with using a computer.         Sincerely,    Adam Brooke PA-C  [facility]  Division of Gastroenterology

## 2021-06-28 NOTE — LETTER
6/28/2021         RE: Shelly Cha  [address]  [facility] [address]        Dear Colleague,    Thank you for referring your patient, Shelly Cha, to the [facility] [place]. Please see a copy of my visit note below.    IBD CLINIC VISIT    CC/REFERRING MD:  No ref. provider found    REASON FOR CONSULTATION: follow up severe colitis    ASSESSMENT/PLAN:    1. Severe inflammatory bowel disease (colitis):  Current medication: infliximab (induction)  Current clinical disease activity: none   Current endoscopic disease activity: severe colitis (2/2021) sparing of the rectosigmoid area.  Biopsies of the rectosigmoid showed chronic changes.  Crohns colitis vs. Atypical variant of UC.  -- Continue infliximab induction followed by every 8 week infusions at 5mg/kg.  -- Repeat endoscopic assessment after 6 months of infliximab (Oct/Nov 2021) to ensure mucosal healing. If not achieved, may need infliximab level and may need to alter dose.  -- note that her noted pseudopolyps which will make ongoing colon cancer surveillance and polyp surveillance quite difficult.  We will see how things respond to treatment.  Also, given the number of pseudopolyps it is likely that she has had ongoing colitis for several years.  Per Dr. Nolasco's recommendations, we will proceed with dysplasia surveillance on her next colonoscopy this Fall.    2.  H. pylori gastritis-this is seen on her gastric biopsies.  This will require treatment because of the risk for stomach cancer with chronic H. pylori infections.  However, this is not likely contributing to her symptoms at this time and this is likely an longstanding process.  We will wait for her colitis to be under better control and then we will discuss with our pharmacy team about treating with quadruple therapy.    3.  Hepatitis C-her most recent hepatitis C viral load was undetectable.  This indicates spontaneous clearance.  This therefore  means she does not need any treatment.    IBD HISTORY  Age at diagnosis: 51  Extent of disease: colon (pan-colitis - sparing of rectosigmoid - distal 15 cm)  Disease phenotype: inflammatory  Oneida-anal disease: none  Current CD medications: infliximab induction  Prior IBD surgeries: none  Prior IBD Medications: none    DRUG MONITORING  TPMT enzyme activity: adequate    6-TGN/6-MMPN levels: NA    Biologic concentration: NA    DISEASE ASSESSMENT  Labs  Recent Labs   Lab Test 06/16/21  1240 05/19/21  1357   CRP <2.9 <2.9   SED 26* 29*     CRP outside EMR: 5  Fecal calprotectin: 5000s  Endoscopy:   -colonoscopy - severe inflammation colon (distal 15 cm spared). Ileum not seen  - normal EGD but gastric biopsis + h pylori  Enterography: MRE with normal small bowel   C diff: negative 1/22/21    sIBDQ:   No flowsheet data found.    IBD Health Care Maintenance:    Vaccinations:  All patients on biologics should avoid live vaccines.    -- Influenza (every year)  -- TdaP (every 10 years)  -- Pneumococcal Pneumonia (once plus booster at 5 years)  -- Yearly assessment for latent Tb (verbal screening and exam, PPD or QuantiFERON-Tb testing)    One time confirmation of immunity or serologies:  -- Hepatitis A (serologies or immunizations)  -- Hepatitis B (serologies or immunizations)  -- Varicella  -- MMR  -- HPV (all aged 18-26)  -- Meningococcal meningitis (all patients at risk for meningitis)  -- Due to the immunosuppression in this patient, I would not advise administration of live vaccines such as varicella/VZV, intranasal influenza, MMR, or yellow fever vaccine (if travelling).      Bone mineral density screening   -- Recommend all patients supplement with calcium and vitamin D    Cancer Screening:  Colon cancer screening:  Given panncolonic disease, recommend patient undergo regular dysplasia surveillance   Next dysplasia screening is recommended: given pseudopolyps I recommend we start dysplasia surveillance on next  colonoscopy in 6-12 months.  Dysplasia surveillance will be difficult given the number of pseudopolyps that she is having.    Cervical cancer screening: Annual due to immunosupression    Skin cancer screening: Annual visual exam of skin by dermatologist since patient is immunocompromised    Depression Screening:  -- Over the last month, have you felt down, depressed, or hopeless? no  -- Over the last month, have you felt little interest or pleasure doing things? no    Misc:  -- Avoid tobacco use  -- Avoid NSAIDs as there is potentially a 25% chance of causing an IBD flare    Return to clinic in 3 months    Thank you for this consultation.  48 minutes spent on the date of the encounter doing chart review, history and exam, documentation and further activities as noted above.  It was a pleasure to participate in the care of this patient; please contact us with any further questions.      Adam Brooke PA-C  Division of Gastroenterology, Hepatology and Nutrition  [facility]    HPI:   Currently, patient is here today to follow-up in IBD clinic.  Her colonoscopy showed severe colitis throughout the cecum/ascending, transverse colon, descending colon, and sigmoid colon.  The distal 15 cm (rectosigmoid region) was spared.  Biopsies showed chronic active colitis.  Biopsies of the rectum showed chronic changes only.  There was a see of pseudopolyps and exudate throughout the entire colon.  This was consistent with inflammatory bowel disease. MR enterography showed changes consistent with colitis but no evidence of small bowel disease.    We prescribed the patient prednisone, however, she was worried about the side effects of this and did not start it.  Her symptoms resolved.  She is current having 1 bowel movement every day to every other day without blood in her stool.  No EIM at this time.     She started infliximab 5/5/21 and has had a total of 3 infusions at this time.     She has also been followed in the  hepatology clinic.  Her repeat hepatitis C viral load was undetectable which indicates clearing of the virus.    ROS:    No fevers or chills  No weight loss  No blurry vision, double vision or change in vision  No sore throat  No lymphadenopathy  No headache, paraesthesias, or weakness in a limb  No shortness of breath or wheezing  No chest pain or pressure  No arthralgias or myalgias  No rashes or skin changes  No odynophagia or dysphagia  No BRBPR, hematochezia, melena  No dysuria, frequency or urgency  No hot/cold intolerance or polyria  No anxiety or depression    Extra intestinal manifestations of IBD:  No uveitis/episcleritis  No aphthous ulcers   No arthritis   No erythema nodosum/pyoderma gangrenosum.     PERTINENT PAST MEDICAL HISTORY:  Past Medical History:   Diagnosis Date     Pancolitis (H) 3/11/2021     Pancolitis (H) 3/11/2021       PREVIOUS SURGERIES:  Past Surgical History:   Procedure Laterality Date     BIOPSY      right breast     CHOLECYSTECTOMY       GYN SURGERY      , tubal       PREVIOUS ENDOSCOPY:  No results found for this or any previous visit.]    ALLERGIES:     Allergies   Allergen Reactions     Ibuprofen Swelling     Eyes swelling     Tylenol [Acetaminophen]      Feels like someone punched her in the stomach       PERTINENT MEDICATIONS:    Current Outpatient Medications:      gabapentin (NEURONTIN) 300 MG capsule, Take 300 mg by mouth 3 times daily, Disp: , Rfl:      METHADONE HCL PO, Take 48 mg by mouth daily , Disp: , Rfl:      methylcellulose (CITRUCEL) powder, Take 0.85 g (3 teaspoonful) by mouth daily, Disp: 90 g, Rfl: 3     omeprazole (PRILOSEC) 20 MG DR capsule, Take 1 capsule (20 mg) by mouth daily (Patient not taking: Reported on 5/5/2021), Disp: 30 capsule, Rfl: 1    SOCIAL HISTORY:  Social History     Socioeconomic History     Marital status: Single     Spouse name: Not on file     Number of children: Not on file     Years of education: Not on file     Highest education level:  Not on file   Occupational History     Not on file   Social Needs     Financial resource strain: Not on file     Food insecurity     Worry: Not on file     Inability: Not on file     Transportation needs     Medical: Not on file     Non-medical: Not on file   Tobacco Use     Smoking status: Former Smoker     Quit date: 2021     Years since quittin.4     Smokeless tobacco: Never Used   Substance and Sexual Activity     Alcohol use: No     Drug use: No     Sexual activity: Yes     Partners: Male   Lifestyle     Physical activity     Days per week: Not on file     Minutes per session: Not on file     Stress: Not on file   Relationships     Social connections     Talks on phone: Not on file     Gets together: Not on file     Attends Yarsani service: Not on file     Active member of club or organization: Not on file     Attends meetings of clubs or organizations: Not on file     Relationship status: Not on file     Intimate partner violence     Fear of current or ex partner: Not on file     Emotionally abused: Not on file     Physically abused: Not on file     Forced sexual activity: Not on file   Other Topics Concern     Not on file   Social History Narrative     Not on file       FAMILY HISTORY:  No family history on file.    Past/family/social history reviewed and no changes    PHYSICAL EXAMINATION:  Constitutional: aaox3, cooperative, pleasant, not dyspneic/diaphoretic, no acute distress  Vitals reviewed: There were no vitals taken for this visit.  Wt:   Wt Readings from Last 2 Encounters:   21 77.6 kg (171 lb 1.6 oz)   21 75.2 kg (165 lb 11.2 oz)      Eyes: Sclera anicteric/injected  Respiratory: Unlabored breathing  Skin: w no jaundice  Psych: Normal affect      PERTINENT STUDIES:  Most recent CBC:  Recent Labs   Lab Test 21  1240 21  1357   WBC 10.4 8.4   HGB 11.2* 11.3*   HCT 34.8* 35.5    367     Most recent hepatic panel:  Recent Labs   Lab Test 21  1240  05/19/21  1357   ALT 19 13   AST 26 18     Most recent creatinine:  Recent Labs   Lab Test 03/12/21  1450 01/22/21  1712   CR 0.84 0.83             Shelly is a 51 year old who is being evaluated via a billable video visit.      How would you like to obtain your AVS? MyChart  If the video visit is dropped, the invitation should be resent by: Text to cell phone: [phone]  Will anyone else be joining your video visit? No      Video Start Time: 7:50 AM  Video-Visit Details    Type of service:  telephone Visit (patient unable to connect to video)    Video End Time:0815 AM    Originating Location (pt. Location): Home    Distant Location (provider location):  [facility] [place]     Platform used for Video Visit: Markus      Again, thank you for allowing me to participate in the care of your patient.        Sincerely,        Adam Brooke PA-C

## 2021-06-29 ENCOUNTER — VIRTUAL VISIT (OUTPATIENT)
Dept: SLEEP MEDICINE | Facility: CLINIC | Age: 51
End: 2021-06-29
Payer: COMMERCIAL

## 2021-06-29 DIAGNOSIS — F51.04 CHRONIC INSOMNIA: ICD-10-CM

## 2021-06-29 NOTE — PATIENT INSTRUCTIONS

## 2021-07-01 ENCOUNTER — TELEPHONE (OUTPATIENT)
Dept: GASTROENTEROLOGY | Facility: OUTPATIENT CENTER | Age: 51
End: 2021-07-01

## 2021-07-01 ENCOUNTER — PATIENT OUTREACH (OUTPATIENT)
Dept: GASTROENTEROLOGY | Facility: CLINIC | Age: 51
End: 2021-07-01

## 2021-07-01 NOTE — TELEPHONE ENCOUNTER
"[facility] @ [facility]- not yet open for Oct/Nov. Will defer order to Late August to call patient back to schedule then.       Screening Questions  1. Are you active on mychart?y    2. What insurance is in the chart? AMX    2.  Ordering/Referring Provider:     3. BMI \" 5'2\"/171=31.3    4. Are you on daily home oxygen? n    5. Do you have a history of difficult airway? n    6. Have you had a heart, lung, or liver transplant? n    7. Are you currently on dialysis? n    8. Have you had a stroke or Transient ischemic atttack (TIA) within 6 months? N    9. In the past 6 months, have you had any heart related issues including cardiomyopathy or heart attack?         If yes, did it require cardiac stenting or other implantable device? N    10. Do you have any implantable devices in your body (pacemaker, defib, LVAD)? N    11. Do you take nitroglycerin? If yes, how often? N    12. Are you currently taking any blood thinners?N    13. Are you a diabetic? N    14. (Females) Are you currently pregnant? N  If yes, how many weeks?    15. Have you had a procedure in the past that was difficult to tolerate with conscious sedation? Any allergies to Fentanyl or Versed N    16. Are you taking any scheduled prescription narcotics more than once daily? N    17. Do you have any chemical dependencies such as alcohol, street drugs, or methadone? N    18. Do you have any history of post-traumatic stress syndrome or mental health issues? N    19. Do you transfer independently? Y    20.  Do you have any issues with constipation? N    21. Preferred Pharmacy for Pre Prescription : [id number] - [place], [place] - [address] AT [facility] [address]      "

## 2021-07-13 ENCOUNTER — PATIENT OUTREACH (OUTPATIENT)
Dept: GASTROENTEROLOGY | Facility: CLINIC | Age: 51
End: 2021-07-13

## 2021-07-13 NOTE — PROGRESS NOTES
Returned  voice mail message from patient   Patient forgot her my chart password and reset  Await for mac schedule to be out so can schedule colonoscopy. Pt will be contacted by endoscopy schedulers.

## 2021-08-05 ENCOUNTER — VIRTUAL VISIT (OUTPATIENT)
Dept: GASTROENTEROLOGY | Facility: CLINIC | Age: 51
End: 2021-08-05
Payer: COMMERCIAL

## 2021-08-05 DIAGNOSIS — D64.9 ANEMIA, UNSPECIFIED TYPE: ICD-10-CM

## 2021-08-05 DIAGNOSIS — K29.70 HELICOBACTER PYLORI GASTRITIS: ICD-10-CM

## 2021-08-05 DIAGNOSIS — K50.10 CROHN'S COLITIS, WITHOUT COMPLICATIONS (H): Primary | ICD-10-CM

## 2021-08-05 DIAGNOSIS — K59.00 CONSTIPATION, UNSPECIFIED CONSTIPATION TYPE: ICD-10-CM

## 2021-08-05 DIAGNOSIS — B19.20 HEPATITIS C VIRUS INFECTION WITHOUT HEPATIC COMA, UNSPECIFIED CHRONICITY: ICD-10-CM

## 2021-08-05 DIAGNOSIS — B96.81 HELICOBACTER PYLORI GASTRITIS: ICD-10-CM

## 2021-08-05 PROCEDURE — 99214 OFFICE O/P EST MOD 30 MIN: CPT | Mod: 95 | Performed by: INTERNAL MEDICINE

## 2021-08-05 RX ORDER — POLYETHYLENE GLYCOL 3350 17 G/17G
POWDER, FOR SOLUTION ORAL
Qty: 507 G | Refills: 3 | Status: SHIPPED | OUTPATIENT
Start: 2021-08-05

## 2021-08-05 NOTE — NURSING NOTE
Chief Complaint   Patient presents with     Follow Up       There were no vitals filed for this visit.    There is no height or weight on file to calculate BMI.    Domitila Gonzalez CMA

## 2021-08-05 NOTE — PATIENT INSTRUCTIONS
It was nice to meet with you at the time of your visit. Keep up the good work!    Continue your inflximab infusions    Schedule colonoscopy for November    Check stool study for inflammation    Start miralax 1 capful daily in a glass of water. Goal is 1-2 soft BMs daily. If you do not have adequate results with once daily dosing you can increase to 2 capfuls or even 3 capfuls daily. If you develop diarrhea at a certain dose you can also lower the dose. You can increase or decrease the dose of miralax to get your desired stools per day (which is 1-2 per day).    Follow up after colonoscopy    Call or mychart with questions

## 2021-08-05 NOTE — PROGRESS NOTES
"Shelly Cha is a 51 year old female who is being evaluated via a billable video visit.      Please send to cell phone:  [phone]    The patient has been notified of following:     \"This video visit will be conducted via a call between you and your physician/provider. We have found that certain health care needs can be provided without the need for an in-person physical exam.  This service lets us provide the care you need with a video conversation.  If a prescription is necessary we can send it directly to your pharmacy.  If lab work is needed we can place an order for that and you can then stop by our lab to have the test done at a later time.    If during the course of the call the physician/provider feels a video visit is not appropriate, you will not be charged for this service.\"     Patient confirmed that they are in [place] for today's visit yes.    Video-Visit Details  Telephone visit - pt could not get [employer] video visit or [employer] video to work    Originating Location (pt. Location): Home    Distant Location (provider location):  [facility] [place]     Platform used: Augusto            "

## 2021-08-05 NOTE — LETTER
8/5/2021         RE: Shelly Cha  [address]  [facility] [address]        Dear Colleague,    Thank you for referring your patient, Shelly Cha, to the [facility] [place]. Please see a copy of my visit note below.    IBD CLINIC VISIT    CC/REFERRING MD:  No ref. provider found    REASON FOR CONSULTATION: follow-up colitis    ASSESSMENT/PLAN:    #. Severe colitis: Crohn's colitis versus patchy ulcerative colitis.  She is responded very well to infliximab.  She actually is now constipated.  I think her intermittent abdominal discomfort is likely related to her poorly controlled constipation.  See constipation section below.  She is due for her first infliximab infusion after 8 weeks on August 11.  We will arrange for a trough infliximab level.  We will also check a fecal calprotectin and if this is still significantly elevated we will discuss adjusting her dose of the infliximab.  We will get her scheduled for a colonoscopy at the end of October beginning of November to check for mucosal healing.  If mucosal healing is not obtained we will also adjust her infliximab level.  Surveillance biopsies will be obtained at that time.    #. Constipation: Much of this may be driven by her chronic methadone therapy.  Start on MiraLAX 1 capful daily.  This can be adjusted as needed for a goal of 1-2 soft bowel movements per day.  She can take up to 3 capfuls daily if needed.    #. H pylori gastritis: Status post triple therapy.  We will check a stool H. pylori antigen at her follow-up to ensure clearance.    #. Anemia: This has improved but a mild anemia persists.  We will continue to follow this on treatment.  We are checking for activity of the colitis as outlined above.    #. Hepatitis C: Spontaneously cleared.  No further evaluation or treatment is needed.    IBD HISTORY  Age at diagnosis: 51  Extent of disease: colon (pan-colitis - sparing of rectosigmoid - distal 15  cm)  Disease phenotype: inflammatory  Oneida-anal disease: none  Current CD medications: infliximab induction  Prior IBD surgeries: none  Prior IBD Medications: none    DRUG MONITORING  TPMT enzyme activity: adequate     6-TGN/6-MMPN levels: NA     Biologic concentration: NA    DISEASE ASSESSMENT  Labs  Recent Labs   Lab Test 06/16/21  1240 05/19/21  1357   CRP <2.9 <2.9   SED 26* 29*     CRP outside EMR: 5  Fecal calprotectin: 5000s  Endoscopy:   -colonoscopy - severe inflammation colon (distal 15 cm spared). Ileum not seen  - normal EGD but gastric biopsis + h pylori  Enterography: MRE with normal small bowel   C diff: negative 1/22/21    sIBDQ:   No flowsheet data found.    IBD Health Care Maintenance:    Vaccinations:  All patients on biologics should avoid live vaccines.    -- Influenza (every year)  -- TdaP (every 10 years)  -- Pneumococcal Pneumonia (once plus booster at 5 years)  -- Yearly assessment for latent Tb (verbal screening and exam, PPD or QuantiFERON-Tb testing)     One time confirmation of immunity or serologies:  -- Hepatitis A (serologies or immunizations)  -- Hepatitis B (serologies or immunizations)  -- Varicella  -- MMR  -- HPV (all aged 18-26)  -- Meningococcal meningitis (all patients at risk for meningitis)  -- Due to the immunosuppression in this patient, I would not advise administration of live vaccines such as varicella/VZV, intranasal influenza, MMR, or yellow fever vaccine (if travelling).       Bone mineral density screening   -- Recommend all patients supplement with calcium and vitamin D     Cancer Screening:  Colon cancer screening:  Given panncolonic disease, recommend patient undergo regular dysplasia surveillance   Next dysplasia screening is recommended: given pseudopolyps I recommend we start dysplasia surveillance on next colonoscopy in 6-12 months.  Dysplasia surveillance will be difficult given the number of pseudopolyps that she is having.     Cervical cancer screening:  Annual due to immunosupression     Skin cancer screening: Annual visual exam of skin by dermatologist since patient is immunocompromised     Depression Screening:  -- Over the last month, have you felt down, depressed, or hopeless? no  -- Over the last month, have you felt little interest or pleasure doing things? no     Misc:  -- Avoid tobacco use  -- Avoid NSAIDs as there is potentially a 25% chance of causing an IBD flare  Return to clinic in 3 months    Thank you for this consultation.  It was a pleasure to participate in the care of this patient; please contact us with any further questions.  I spent a total of 33 minutes, face to face, was spent with this patient, >50% of which was counseling regarding the above delineated issues.    This note was created with voice recognition software, and while reviewed for accuracy, typos may remain.     Kwasi Nolasco MD  Division of Gastroenterology, Hepatology and Nutrition  [facility]  Pager: [phone]    HPI:   Shelly is here today for routine follow-up for her colitis.  She is responded quite well to the infliximab.  She has now actually returned to her status of chronic constipation.  She notes that for much of her life she has had only 1-2 bowel movements per week.  She has now returned at his baseline.  She notes that if she does pass a harder larger stool she may have a small amount of blood on that stool but otherwise she is not having any blood.  She does note that after not having a bowel movement for quite some time she will experience some cramping abdominal discomfort leading up to a bowel movement that is relieved by passing a bowel movement.    Otherwise she is doing quite well.  She was actually seen by her primary care provider in June and was prescribed 2-week course of triple therapy for her H. pylori.  She does not remember the names of the exact medications but does note that she took 3 of them for the H. pylori.  She completed the therapy  without missing any doses.    She is tolerating the infusions of infliximab well.  She is having no side effects that she is aware.    She denies any extraintestinal manifestations of IBD    ROS:    No fevers or chills  No weight loss  No blurry vision, double vision or change in vision  No sore throat  No lymphadenopathy  No headache, paraesthesias, or weakness in a limb  No shortness of breath or wheezing  No chest pain or pressure  No arthralgias or myalgias  No rashes or skin changes  No odynophagia or dysphagia  No BRBPR, hematochezia, melena  No dysuria, frequency or urgency  No hot/cold intolerance or polyria  No anxiety or depression    Extra intestinal manifestations of IBD:  No uveitis/episcleritis  No aphthous ulcers   No arthritis   No erythema nodosum/pyoderma gangrenosum.     PERTINENT PAST MEDICAL HISTORY:  Past Medical History:   Diagnosis Date     Pancolitis (H) 3/11/2021     Pancolitis (H) 3/11/2021     PREVIOUS SURGERIES:  Past Surgical History:   Procedure Laterality Date     BIOPSY      right breast     CHOLECYSTECTOMY       GYN SURGERY      , tubal     PREVIOUS ENDOSCOPY:  No results found for this or any previous visit.]    ALLERGIES:  Allergies   Allergen Reactions     Ibuprofen Swelling     Eyes swelling     Tylenol [Acetaminophen]      Feels like someone punched her in the stomach     PERTINENT MEDICATIONS:  Current Outpatient Medications:      albuterol (ACCUNEB) 1.25 MG/3ML neb solution, Inhale 1.25 mg into the lungs, Disp: , Rfl:      BANOPHEN 25 MG capsule, TAKE 2 CAPSULES BY MOUTH EVERY NIGHT AT BEDTIME AS NEEDED FOR SLEEP, Disp: , Rfl:      Cholecalciferol (VITAMIN D3) 50 MCG (2000 UT) TABS, Take 1 tablet by mouth daily, Disp: , Rfl:      FLOVENT DISKUS 250 MCG/BLIST inhaler, INHALE 1 PUFF INTO THE LUNGS TWICE DAILY, Disp: , Rfl:      folic acid 800 MCG tablet, Take 800 mcg by mouth daily, Disp: , Rfl:      gabapentin (NEURONTIN) 300 MG capsule, Take 300 mg by mouth 4 times daily ,  Disp: , Rfl:      melatonin 5 MG tablet, Take 5 mg by mouth daily, Disp: , Rfl:      METHADONE HCL PO, Take 48 mg by mouth daily , Disp: , Rfl:      methylcellulose (CITRUCEL) powder, Take 0.85 g (3 teaspoonful) by mouth daily, Disp: 90 g, Rfl: 3     minoxidil (ROGAINE) 2 % external solution, , Disp: , Rfl:      Multiple Vitamin (DAILY-JUSTIN MULTIVITAMIN) TABS, Take 1 tablet by mouth daily, Disp: , Rfl:      omeprazole (PRILOSEC) 20 MG DR capsule, Take 1 capsule (20 mg) by mouth daily, Disp: 30 capsule, Rfl: 1     Thiamine Mononitrate (B1) 100 MG TABS, Take 1 tablet by mouth daily, Disp: , Rfl:      WAL-DRYL ALLERGY 25 MG tablet, TAKE 2 TABLETS BY MOUTH EVERY NIGHT AT BEDTIME AS NEEDED SLEEPLESSNESS, Disp: , Rfl:      WAL-MUCIL 58.6 % powder, TAKE 2.5 GRAMS TWICE DAILY, Disp: , Rfl:     SOCIAL HISTORY:  Social History     Socioeconomic History     Marital status: Single     Spouse name: Not on file     Number of children: Not on file     Years of education: Not on file     Highest education level: Not on file   Occupational History     Not on file   Tobacco Use     Smoking status: Current Some Day Smoker     Last attempt to quit: 2021     Years since quittin.5     Smokeless tobacco: Never Used   Substance and Sexual Activity     Alcohol use: No     Drug use: No     Sexual activity: Yes     Partners: Male   Other Topics Concern     Not on file   Social History Narrative     Not on file     Social Determinants of Health     Financial Resource Strain:      Difficulty of Paying Living Expenses:    Food Insecurity:      Worried About Running Out of Food in the Last Year:      Ran Out of Food in the Last Year:    Transportation Needs:      Lack of Transportation (Medical):      Lack of Transportation (Non-Medical):    Physical Activity:      Days of Exercise per Week:      Minutes of Exercise per Session:    Stress:      Feeling of Stress :    Social Connections:      Frequency of Communication with Friends and  Family:      Frequency of Social Gatherings with Friends and Family:      Attends [employer] Services:      Active Member of Clubs or Organizations:      Attends Club or Organization Meetings:      Marital Status:    Intimate Partner Violence:      Fear of Current or Ex-Partner:      Emotionally Abused:      Physically Abused:      Sexually Abused:        FAMILY HISTORY:  No family history on file.    Past/family/social history reviewed and no changes    PHYSICAL EXAMINATION:  Constitutional: aaox3, cooperative, pleasant, not dyspneic/diaphoretic, no acute distress  Vitals reviewed: There were no vitals taken for this visit.  Wt:   Wt Readings from Last 2 Encounters:   06/16/21 77.6 kg (171 lb 1.6 oz)   05/19/21 75.2 kg (165 lb 11.2 oz)      Eyes: Sclera anicteric/injected  Respiratory: Unlabored breathing  Skin: no jaundice  Psych: Normal affect      PERTINENT STUDIES:  Most recent CBC:  Recent Labs   Lab Test 06/16/21  1240 05/19/21  1357   WBC 10.4 8.4   HGB 11.2* 11.3*   HCT 34.8* 35.5    367     Most recent hepatic panel:  Recent Labs   Lab Test 06/16/21  1240 05/19/21  1357   ALT 19 13   AST 26 18     Most recent creatinine:  Recent Labs   Lab Test 03/12/21  1450 01/22/21  1712   CR 0.84 0.83         Again, thank you for allowing me to participate in the care of your patient.        Sincerely,    Kwasi Nolasco MD

## 2021-08-05 NOTE — PROGRESS NOTES
IBD CLINIC VISIT    CC/REFERRING MD:  No ref. provider found    REASON FOR CONSULTATION: follow-up colitis    ASSESSMENT/PLAN:    #. Severe colitis: Crohn's colitis versus patchy ulcerative colitis.  She is responded very well to infliximab.  She actually is now constipated.  I think her intermittent abdominal discomfort is likely related to her poorly controlled constipation.  See constipation section below.  She is due for her first infliximab infusion after 8 weeks on August 11.  We will arrange for a trough infliximab level.  We will also check a fecal calprotectin and if this is still significantly elevated we will discuss adjusting her dose of the infliximab.  We will get her scheduled for a colonoscopy at the end of October beginning of November to check for mucosal healing.  If mucosal healing is not obtained we will also adjust her infliximab level.  Surveillance biopsies will be obtained at that time.    #. Constipation: Much of this may be driven by her chronic methadone therapy.  Start on MiraLAX 1 capful daily.  This can be adjusted as needed for a goal of 1-2 soft bowel movements per day.  She can take up to 3 capfuls daily if needed.    #. H pylori gastritis: Status post triple therapy.  We will check a stool H. pylori antigen at her follow-up to ensure clearance.    #. Anemia: This has improved but a mild anemia persists.  We will continue to follow this on treatment.  We are checking for activity of the colitis as outlined above.    #. Hepatitis C: Spontaneously cleared.  No further evaluation or treatment is needed.          IBD HISTORY  Age at diagnosis: 51  Extent of disease: colon (pan-colitis - sparing of rectosigmoid - distal 15 cm)  Disease phenotype: inflammatory  Oneida-anal disease: none  Current CD medications: infliximab induction  Prior IBD surgeries: none  Prior IBD Medications: none    DRUG MONITORING  TPMT enzyme activity: adequate     6-TGN/6-MMPN levels: NA     Biologic  concentration: NA    DISEASE ASSESSMENT  Labs  Recent Labs   Lab Test 06/16/21  1240 05/19/21  1357   CRP <2.9 <2.9   SED 26* 29*     CRP outside EMR: 5  Fecal calprotectin: 5000s  Endoscopy:   -colonoscopy - severe inflammation colon (distal 15 cm spared). Ileum not seen  - normal EGD but gastric biopsis + h pylori  Enterography: MRE with normal small bowel   C diff: negative 1/22/21    sIBDQ:   No flowsheet data found.    IBD Health Care Maintenance:    Vaccinations:  All patients on biologics should avoid live vaccines.    -- Influenza (every year)  -- TdaP (every 10 years)  -- Pneumococcal Pneumonia (once plus booster at 5 years)  -- Yearly assessment for latent Tb (verbal screening and exam, PPD or QuantiFERON-Tb testing)     One time confirmation of immunity or serologies:  -- Hepatitis A (serologies or immunizations)  -- Hepatitis B (serologies or immunizations)  -- Varicella  -- MMR  -- HPV (all aged 18-26)  -- Meningococcal meningitis (all patients at risk for meningitis)  -- Due to the immunosuppression in this patient, I would not advise administration of live vaccines such as varicella/VZV, intranasal influenza, MMR, or yellow fever vaccine (if travelling).       Bone mineral density screening   -- Recommend all patients supplement with calcium and vitamin D     Cancer Screening:  Colon cancer screening:  Given panncolonic disease, recommend patient undergo regular dysplasia surveillance   Next dysplasia screening is recommended: given pseudopolyps I recommend we start dysplasia surveillance on next colonoscopy in 6-12 months.  Dysplasia surveillance will be difficult given the number of pseudopolyps that she is having.     Cervical cancer screening: Annual due to immunosupression     Skin cancer screening: Annual visual exam of skin by dermatologist since patient is immunocompromised     Depression Screening:  -- Over the last month, have you felt down, depressed, or hopeless? no  -- Over the last  month, have you felt little interest or pleasure doing things? no     Misc:  -- Avoid tobacco use  -- Avoid NSAIDs as there is potentially a 25% chance of causing an IBD flare  Return to clinic in 3 months    Thank you for this consultation.  It was a pleasure to participate in the care of this patient; please contact us with any further questions.  I spent a total of 33 minutes, face to face, was spent with this patient, >50% of which was counseling regarding the above delineated issues.    This note was created with voice recognition software, and while reviewed for accuracy, typos may remain.     Kwasi Nolasco MD  Division of Gastroenterology, Hepatology and Nutrition  [facility]  Pager: [phone]      HPI:   Shelly is here today for routine follow-up for her colitis.  She is responded quite well to the infliximab.  She has now actually returned to her status of chronic constipation.  She notes that for much of her life she has had only 1-2 bowel movements per week.  She has now returned at his baseline.  She notes that if she does pass a harder larger stool she may have a small amount of blood on that stool but otherwise she is not having any blood.  She does note that after not having a bowel movement for quite some time she will experience some cramping abdominal discomfort leading up to a bowel movement that is relieved by passing a bowel movement.    Otherwise she is doing quite well.  She was actually seen by her primary care provider in June and was prescribed 2-week course of triple therapy for her H. pylori.  She does not remember the names of the exact medications but does note that she took 3 of them for the H. pylori.  She completed the therapy without missing any doses.    She is tolerating the infusions of infliximab well.  She is having no side effects that she is aware.    She denies any extraintestinal manifestations of IBD    ROS:    No fevers or chills  No weight loss  No blurry vision,  double vision or change in vision  No sore throat  No lymphadenopathy  No headache, paraesthesias, or weakness in a limb  No shortness of breath or wheezing  No chest pain or pressure  No arthralgias or myalgias  No rashes or skin changes  No odynophagia or dysphagia  No BRBPR, hematochezia, melena  No dysuria, frequency or urgency  No hot/cold intolerance or polyria  No anxiety or depression    Extra intestinal manifestations of IBD:  No uveitis/episcleritis  No aphthous ulcers   No arthritis   No erythema nodosum/pyoderma gangrenosum.     PERTINENT PAST MEDICAL HISTORY:  Past Medical History:   Diagnosis Date     Pancolitis (H) 3/11/2021     Pancolitis (H) 3/11/2021       PREVIOUS SURGERIES:  Past Surgical History:   Procedure Laterality Date     BIOPSY      right breast     CHOLECYSTECTOMY       GYN SURGERY      , tubal       PREVIOUS ENDOSCOPY:  No results found for this or any previous visit.]    ALLERGIES:     Allergies   Allergen Reactions     Ibuprofen Swelling     Eyes swelling     Tylenol [Acetaminophen]      Feels like someone punched her in the stomach       PERTINENT MEDICATIONS:    Current Outpatient Medications:      albuterol (ACCUNEB) 1.25 MG/3ML neb solution, Inhale 1.25 mg into the lungs, Disp: , Rfl:      BANOPHEN 25 MG capsule, TAKE 2 CAPSULES BY MOUTH EVERY NIGHT AT BEDTIME AS NEEDED FOR SLEEP, Disp: , Rfl:      Cholecalciferol (VITAMIN D3) 50 MCG (2000 UT) TABS, Take 1 tablet by mouth daily, Disp: , Rfl:      FLOVENT DISKUS 250 MCG/BLIST inhaler, INHALE 1 PUFF INTO THE LUNGS TWICE DAILY, Disp: , Rfl:      folic acid 800 MCG tablet, Take 800 mcg by mouth daily, Disp: , Rfl:      gabapentin (NEURONTIN) 300 MG capsule, Take 300 mg by mouth 4 times daily , Disp: , Rfl:      melatonin 5 MG tablet, Take 5 mg by mouth daily, Disp: , Rfl:      METHADONE HCL PO, Take 48 mg by mouth daily , Disp: , Rfl:      methylcellulose (CITRUCEL) powder, Take 0.85 g (3 teaspoonful) by mouth daily, Disp: 90 g, Rfl:  3     minoxidil (ROGAINE) 2 % external solution, , Disp: , Rfl:      Multiple Vitamin (DAILY-JUSTIN MULTIVITAMIN) TABS, Take 1 tablet by mouth daily, Disp: , Rfl:      omeprazole (PRILOSEC) 20 MG DR capsule, Take 1 capsule (20 mg) by mouth daily, Disp: 30 capsule, Rfl: 1     Thiamine Mononitrate (B1) 100 MG TABS, Take 1 tablet by mouth daily, Disp: , Rfl:      WAL-DRYL ALLERGY 25 MG tablet, TAKE 2 TABLETS BY MOUTH EVERY NIGHT AT BEDTIME AS NEEDED SLEEPLESSNESS, Disp: , Rfl:      WAL-MUCIL 58.6 % powder, TAKE 2.5 GRAMS TWICE DAILY, Disp: , Rfl:     SOCIAL HISTORY:  Social History     Socioeconomic History     Marital status: Single     Spouse name: Not on file     Number of children: Not on file     Years of education: Not on file     Highest education level: Not on file   Occupational History     Not on file   Tobacco Use     Smoking status: Current Some Day Smoker     Last attempt to quit: 2021     Years since quittin.5     Smokeless tobacco: Never Used   Substance and Sexual Activity     Alcohol use: No     Drug use: No     Sexual activity: Yes     Partners: Male   Other Topics Concern     Not on file   Social History Narrative     Not on file     Social Determinants of Health     Financial Resource Strain:      Difficulty of Paying Living Expenses:    Food Insecurity:      Worried About Running Out of Food in the Last Year:      Ran Out of Food in the Last Year:    Transportation Needs:      Lack of Transportation (Medical):      Lack of Transportation (Non-Medical):    Physical Activity:      Days of Exercise per Week:      Minutes of Exercise per Session:    Stress:      Feeling of Stress :    Social Connections:      Frequency of Communication with Friends and Family:      Frequency of Social Gatherings with Friends and Family:      Attends [employer] Services:      Active Member of Clubs or Organizations:      Attends Club or Organization Meetings:      Marital Status:    Intimate Partner Violence:       Fear of Current or Ex-Partner:      Emotionally Abused:      Physically Abused:      Sexually Abused:        FAMILY HISTORY:  No family history on file.    Past/family/social history reviewed and no changes    PHYSICAL EXAMINATION:  Constitutional: aaox3, cooperative, pleasant, not dyspneic/diaphoretic, no acute distress  Vitals reviewed: There were no vitals taken for this visit.  Wt:   Wt Readings from Last 2 Encounters:   06/16/21 77.6 kg (171 lb 1.6 oz)   05/19/21 75.2 kg (165 lb 11.2 oz)      Eyes: Sclera anicteric/injected  Respiratory: Unlabored breathing  Skin: no jaundice  Psych: Normal affect      PERTINENT STUDIES:  Most recent CBC:  Recent Labs   Lab Test 06/16/21  1240 05/19/21  1357   WBC 10.4 8.4   HGB 11.2* 11.3*   HCT 34.8* 35.5    367     Most recent hepatic panel:  Recent Labs   Lab Test 06/16/21  1240 05/19/21  1357   ALT 19 13   AST 26 18     Most recent creatinine:  Recent Labs   Lab Test 03/12/21  1450 01/22/21  1712   CR 0.84 0.83

## 2021-08-11 ENCOUNTER — PATIENT OUTREACH (OUTPATIENT)
Dept: GASTROENTEROLOGY | Facility: CLINIC | Age: 51
End: 2021-08-11

## 2021-08-11 ENCOUNTER — MEDICAL CORRESPONDENCE (OUTPATIENT)
Dept: HEALTH INFORMATION MANAGEMENT | Facility: CLINIC | Age: 51
End: 2021-08-11

## 2021-08-11 ENCOUNTER — INFUSION THERAPY VISIT (OUTPATIENT)
Dept: INFUSION THERAPY | Facility: CLINIC | Age: 51
End: 2021-08-11
Attending: INTERNAL MEDICINE
Payer: COMMERCIAL

## 2021-08-11 VITALS
WEIGHT: 172.7 LBS | SYSTOLIC BLOOD PRESSURE: 114 MMHG | OXYGEN SATURATION: 97 % | BODY MASS INDEX: 31.59 KG/M2 | DIASTOLIC BLOOD PRESSURE: 77 MMHG | TEMPERATURE: 98.2 F | HEART RATE: 75 BPM | RESPIRATION RATE: 16 BRPM

## 2021-08-11 DIAGNOSIS — K51.00 PANCOLITIS (H): Primary | ICD-10-CM

## 2021-08-11 LAB
ALBUMIN SERPL-MCNC: 3.7 G/DL (ref 3.4–5)
ALP SERPL-CCNC: 77 U/L (ref 40–150)
ALT SERPL W P-5'-P-CCNC: 19 U/L (ref 0–50)
ANION GAP SERPL CALCULATED.3IONS-SCNC: 5 MMOL/L (ref 3–14)
AST SERPL W P-5'-P-CCNC: 32 U/L (ref 0–45)
BASOPHILS # BLD AUTO: 0 10E3/UL (ref 0–0.2)
BASOPHILS NFR BLD AUTO: 0 %
BILIRUB DIRECT SERPL-MCNC: 0.1 MG/DL (ref 0–0.2)
BILIRUB SERPL-MCNC: 0.4 MG/DL (ref 0.2–1.3)
BUN SERPL-MCNC: 16 MG/DL (ref 7–30)
CALCIUM SERPL-MCNC: 9.1 MG/DL (ref 8.5–10.1)
CHLORIDE BLD-SCNC: 107 MMOL/L (ref 94–109)
CO2 SERPL-SCNC: 27 MMOL/L (ref 20–32)
CREAT SERPL-MCNC: 0.72 MG/DL (ref 0.52–1.04)
CRP SERPL-MCNC: 3.8 MG/L (ref 0–8)
EOSINOPHIL # BLD AUTO: 0.6 10E3/UL (ref 0–0.7)
EOSINOPHIL NFR BLD AUTO: 8 %
ERYTHROCYTE [DISTWIDTH] IN BLOOD BY AUTOMATED COUNT: 15.9 % (ref 10–15)
ERYTHROCYTE [SEDIMENTATION RATE] IN BLOOD BY WESTERGREN METHOD: 30 MM/HR (ref 0–20)
GFR SERPL CREATININE-BSD FRML MDRD: >90 ML/MIN/1.73M2
GLUCOSE BLD-MCNC: 129 MG/DL (ref 70–99)
HCT VFR BLD AUTO: 34.9 % (ref 35–47)
HGB BLD-MCNC: 11.3 G/DL (ref 11.7–15.7)
IMM GRANULOCYTES # BLD: 0 10E3/UL
IMM GRANULOCYTES NFR BLD: 0 %
LYMPHOCYTES # BLD AUTO: 2.5 10E3/UL (ref 0.8–5.3)
LYMPHOCYTES NFR BLD AUTO: 36 %
MCH RBC QN AUTO: 27 PG (ref 26.5–33)
MCHC RBC AUTO-ENTMCNC: 32.4 G/DL (ref 31.5–36.5)
MCV RBC AUTO: 83 FL (ref 78–100)
MONOCYTES # BLD AUTO: 0.5 10E3/UL (ref 0–1.3)
MONOCYTES NFR BLD AUTO: 7 %
NEUTROPHILS # BLD AUTO: 3.4 10E3/UL (ref 1.6–8.3)
NEUTROPHILS NFR BLD AUTO: 49 %
NRBC # BLD AUTO: 0 10E3/UL
NRBC BLD AUTO-RTO: 0 /100
PLATELET # BLD AUTO: 354 10E3/UL (ref 150–450)
POTASSIUM BLD-SCNC: 4 MMOL/L (ref 3.4–5.3)
PROT SERPL-MCNC: 7.7 G/DL (ref 6.8–8.8)
RBC # BLD AUTO: 4.19 10E6/UL (ref 3.8–5.2)
SODIUM SERPL-SCNC: 139 MMOL/L (ref 133–144)
WBC # BLD AUTO: 7 10E3/UL (ref 4–11)

## 2021-08-11 PROCEDURE — 999N000127 HC STATISTIC PERIPHERAL IV START W US GUIDANCE

## 2021-08-11 PROCEDURE — 86140 C-REACTIVE PROTEIN: CPT

## 2021-08-11 PROCEDURE — 96365 THER/PROPH/DIAG IV INF INIT: CPT

## 2021-08-11 PROCEDURE — 85652 RBC SED RATE AUTOMATED: CPT

## 2021-08-11 PROCEDURE — 96366 THER/PROPH/DIAG IV INF ADDON: CPT

## 2021-08-11 PROCEDURE — 80053 COMPREHEN METABOLIC PANEL: CPT | Performed by: INTERNAL MEDICINE

## 2021-08-11 PROCEDURE — 36415 COLL VENOUS BLD VENIPUNCTURE: CPT | Performed by: INTERNAL MEDICINE

## 2021-08-11 PROCEDURE — 85025 COMPLETE CBC W/AUTO DIFF WBC: CPT

## 2021-08-11 PROCEDURE — 82248 BILIRUBIN DIRECT: CPT

## 2021-08-11 PROCEDURE — 250N000011 HC RX IP 250 OP 636: Performed by: INTERNAL MEDICINE

## 2021-08-11 PROCEDURE — 82310 ASSAY OF CALCIUM: CPT | Performed by: INTERNAL MEDICINE

## 2021-08-11 PROCEDURE — 258N000003 HC RX IP 258 OP 636: Performed by: INTERNAL MEDICINE

## 2021-08-11 RX ORDER — METHYLPREDNISOLONE SODIUM SUCCINATE 125 MG/2ML
125 INJECTION, POWDER, LYOPHILIZED, FOR SOLUTION INTRAMUSCULAR; INTRAVENOUS ONCE
Status: CANCELLED
Start: 2021-08-25 | End: 2021-08-25

## 2021-08-11 RX ORDER — DIPHENHYDRAMINE HCL 25 MG
25 CAPSULE ORAL ONCE
Status: CANCELLED
Start: 2021-08-25 | End: 2021-08-25

## 2021-08-11 RX ORDER — ACETAMINOPHEN 325 MG/1
650 TABLET ORAL ONCE
Status: CANCELLED
Start: 2021-08-25 | End: 2021-08-25

## 2021-08-11 RX ADMIN — SODIUM CHLORIDE 400 MG: 9 INJECTION, SOLUTION INTRAVENOUS at 13:42

## 2021-08-11 ASSESSMENT — PAIN SCALES - GENERAL: PAINLEVEL: NO PAIN (0)

## 2021-08-11 NOTE — PROGRESS NOTES
Nursing Note  Shelly Cha presents [facility] for:   Chief Complaint   Patient presents with     Infusion     inflectra     During today's [facility] appointment, orders from  were completed.  Frequency: every 8 weeks    Progress note:  Patient identification verified by name and date of birth.  Assessment completed.  Vitals recorded in Doc Flowsheets.  Patient was provided with education regarding medication/procedure and possible side effects.  Patient verbalized understanding.     present during visit today: Not Applicable.    Treatment Conditions: ~~~ NOTE: If the patient answers yes to any of the questions below, hold the infusion and contact ordering provider or on-call provider.    1. Have you recently had an elevated temperature, fever, chills, productive cough, coughing for 3 weeks or longer or hemoptysis, abnormal vital signs, night sweats,  chest pain or have you noticed a decrease in your appetite, unexplained weight loss or fatigue? No  2. Do you have any open wounds or new incisions? No  3. Do you have any recent or upcoming hospitalizations, surgeries or dental procedures? No  4. Do you currently have or recently have had any signs of illness or infection or are you on any antibiotics? Patient states she completed antibitoics for h pylori 1 week ago. Call placed to Dr.Howard Bigg kaye to proceed with infusion today.   5. Have you had any new, sudden or worsening abdominal pain? No  6. Have you or anyone in your household received a live vaccination in the past 4 weeks? Please note:  No live vaccines while on biologic/chemotherapy until 6 months after the last treatment.  Patient can receive the flu vaccine (shot only) and the pneumovax.  It is optimal for the patient to get these vaccines mid cycle, but they can be given at any time as long as it is not on the day of the infusion. No  7. Have you recently been  diagnosed with any new nervous system diseases (ie. Multiple sclerosis, Guillain [place], seizures, neurological changes) or cancer diagnosis? No  8. Are you on any form of radiation or chemotherapy? No  9. Are you pregnant or breast feeding or do you have plans of pregnancy in the future? No  10. Have you been having any signs of worsening depression or suicidal ideations?  (benlysta only) No  11. Have there been any other new onset medical symptoms? No      Premedications: historically patient has not taken pre-medications prior to infusion.    Drug Waste Record: No    Infusion length and rate:  infusion given over approximately 2 hours    Labs: No orders in EMR for this infusion but patient states ordering provider told her at last appointment she needed labs. Call placed to ordering provider, telephone with read back orders received from  for CBC, BMP, hepatic panel, CRP, ESR, infliximab level. Labs drawn.   Specimen cup sent with patient for stool sample collection.     Vascular access: peripheral IV placed today. IV noted to be infiltrated about 45 minutes into infusion. IV removed, ice and compress applied as patient states there is some burning to area. Vascular access paged and new IV started, infusion resumed.   Patient educated on care for infiltrated IV site.    Is the next appt scheduled? Message sent to scheduling  Asymptomatic COVID test completed?    Nurse report given to KAMERON Pack at 1545. Care transferred at this time.     Post Infusion Assessment:  Patient tolerated infusion without incident.  Blood return noted pre and post infusion.  Site patent and intact, free from redness, edema or discomfort.  No evidence of extravasations.  Access discontinued per protocol.     POST-INFUSION OF BIOLOGICAL MEDICATION:     Reviewed with patient.  Given biologic medication or medication hand-out. Inform patient if any fever, chills or signs of infection, new symptoms, abdominal pain, heart palpitations,  shortness of breath, reaction, weakness, neurological changes, seek medical attention immediately and should not receive infusions. No live virus vaccines prior to or during treatment or up to 6 months post infusion. If the patient has an upcoming procedure or surgery, this should be discussed with the rheumatologist and surgeon or provider.    Discharge Plan:   Follow up plan of care with: ongoing infusions at [facility].  Discharge instructions were reviewed with patient.  Patient/representative verbalized understanding of discharge instructions and all questions answered.  Patient discharged from [facility] in stable condition.    Ellen Villegas RN       Administrations This Visit     inFLIXimab-dyyb (INFLECTRA) 400 mg in sodium chloride 0.9 % 315 mL infusion     Admin Date  08/11/2021 Action  New Bag Dose  400 mg Rate  157.5 mL/hr Route  Intravenous Administered By  Ricarda Azevedo RN                /77   Pulse 75   Temp 98.2  F (36.8  C) (Oral)   Resp 16   SpO2 97%

## 2021-08-11 NOTE — LETTER
8/11/2021         RE: Shelly Cha  [address]  [place] [address]        Dear Colleague,    Thank you for referring your patient, Shelly Cha, to the [facility]. Please see a copy of my visit note below.    Nursing Note  Shelly Cha presents [facility] for:   Chief Complaint   Patient presents with     Infusion     inflectra     During [facility]'s [facility] appointment, orders from  were completed.  Frequency: every 8 weeks    Progress note:  Patient identification verified by name and date of birth.  Assessment completed.  Vitals recorded in Doc Flowsheets.  Patient was provided with education regarding medication/procedure and possible side effects.  Patient verbalized understanding.     present during visit today: Not Applicable.    Treatment Conditions: ~~~ NOTE: If the patient answers yes to any of the questions below, hold the infusion and contact ordering provider or on-call provider.    1. Have you recently had an elevated temperature, fever, chills, productive cough, coughing for 3 weeks or longer or hemoptysis, abnormal vital signs, night sweats,  chest pain or have you noticed a decrease in your appetite, unexplained weight loss or fatigue? No  2. Do you have any open wounds or new incisions? No  3. Do you have any recent or upcoming hospitalizations, surgeries or dental procedures? No  4. Do you currently have or recently have had any signs of illness or infection or are you on any antibiotics? Patient states she completed antibitoics for h pylori 1 week ago. Call placed to Dr.Howard Bigg kaye to proceed with infusion today.   5. Have you had any new, sudden or worsening abdominal pain? No  6. Have you or anyone in your household received a live vaccination in the past 4 weeks? Please note:  No live vaccines while on biologic/chemotherapy until 6 months  after the last treatment.  Patient can receive the flu vaccine (shot only) and the pneumovax.  It is optimal for the patient to get these vaccines mid cycle, but they can be given at any time as long as it is not on the day of the infusion. No  7. Have you recently been diagnosed with any new nervous system diseases (ie. Multiple sclerosis, Guillain [place], seizures, neurological changes) or cancer diagnosis? No  8. Are you on any form of radiation or chemotherapy? No  9. Are you pregnant or breast feeding or do you have plans of pregnancy in the future? No  10. Have you been having any signs of worsening depression or suicidal ideations?  (benlysta only) No  11. Have there been any other new onset medical symptoms? No      Premedications: historically patient has not taken pre-medications prior to infusion.    Drug Waste Record: No    Infusion length and rate:  infusion given over approximately 2 hours    Labs: No orders in EMR for this infusion but patient states ordering provider told her at last appointment she needed labs. Call placed to ordering provider, telephone with read back orders received from  for CBC, BMP, hepatic panel, CRP, ESR, infliximab level. Labs drawn.   Specimen cup sent with patient for stool sample collection.     Vascular access: peripheral IV placed today. IV noted to be infiltrated about 45 minutes into infusion. IV removed, ice and compress applied as patient states there is some burning to area. Vascular access paged and new IV started, infusion resumed.   Patient educated on care for infiltrated IV site.    Is the next appt scheduled? Message sent to scheduling  Asymptomatic COVID test completed?    Nurse report given to KAMERON Pack at 1542. Care transferred at this time.     Post Infusion Assessment:  Patient tolerated infusion without incident.  Blood return noted pre and post infusion.  Site patent and intact, free from redness, edema or discomfort.  No evidence of  extravasations.  Access discontinued per protocol.     POST-INFUSION OF BIOLOGICAL MEDICATION:     Reviewed with patient.  Given biologic medication or medication hand-out. Inform patient if any fever, chills or signs of infection, new symptoms, abdominal pain, heart palpitations, shortness of breath, reaction, weakness, neurological changes, seek medical attention immediately and should not receive infusions. No live virus vaccines prior to or during treatment or up to 6 months post infusion. If the patient has an upcoming procedure or surgery, this should be discussed with the rheumatologist and surgeon or provider.    Discharge Plan:   Follow up plan of care with: ongoing infusions at [facility].  Discharge instructions were reviewed with patient.  Patient/representative verbalized understanding of discharge instructions and all questions answered.  Patient discharged from [facility] in stable condition.    Ellen Villegas RN       Administrations This Visit     inFLIXimab-dyyb (INFLECTRA) 400 mg in sodium chloride 0.9 % 315 mL infusion     Admin Date  08/11/2021 Action  New Bag Dose  400 mg Rate  157.5 mL/hr Route  Intravenous Administered By  Ricarda Azevedo RN                /77   Pulse 75   Temp 98.2  F (36.8  C) (Oral)   Resp 16   SpO2 97%         Again, thank you for allowing me to participate in the care of your patient.        Sincerely,        [facility]

## 2021-08-11 NOTE — PROGRESS NOTES
Pt calls to state that she is at the infusion center and team is wondering if she can receive her infusion due to having teeth pulled two weeks ago. Informed pt yes and I will call the infusion team  Also pt wondering if covid vaccine caused her hair loss told it was most likely from her overall health and can be further discussed at her next visit  Contacted infusion team and the question was that pt was given antibiotics h pylori  Go ahead given to proceed with infusion.

## 2021-08-11 NOTE — PATIENT INSTRUCTIONS
Dear Shelly Cha    Thank you for choosing [facility] for your infusion.  The following information is a summary of our appointment as well as important reminders.      Patient Education     Infliximab Solution for injection  What is this medicine?  INFLIXIMAB (in FLIX i mab) is used to treat Crohn's disease and ulcerative colitis. It is also used to treat ankylosing spondylitis, psoriasis, and some forms of arthritis.  This medicine may be used for other purposes; ask your health care provider or pharmacist if you have questions.  What should I tell my health care provider before I take this medicine?  They need to know if you have any of these conditions:    diabetes    exposure to tuberculosis    heart failure    hepatitis or liver disease    immune system problems    infection    lung or breathing disease, like COPD    multiple sclerosis    current or past resident of [place]    seizure disorder    an unusual or allergic reaction to infliximab, mouse proteins, other medicines, foods, dyes, or preservatives    pregnant or trying to get pregnant    breast-feeding  How should I use this medicine?  This medicine is for injection into a vein. It is usually given by a health care professional in a hospital or clinic setting.  A special MedGuide will be given to you by the pharmacist with each prescription and refill. Be sure to read this information carefully each time.  Talk to your pediatrician regarding the use of this medicine in children. Special care may be needed.  Overdosage: If you think you have taken too much of this medicine contact a poison control center or emergency room at once.  NOTE: This medicine is only for you. Do not share this medicine with others.  What if I miss a dose?  It is important not to miss your dose. Call your doctor or health care professional if you are unable to keep an  appointment.  What may interact with this medicine?  Do not take this medicine with any of the following medications:    anakinra    rilonacept  This medicine may also interact with the following medications:    vaccines  This list may not describe all possible interactions. Give your health care provider a list of all the medicines, herbs, non-prescription drugs, or dietary supplements you use. Also tell them if you smoke, drink alcohol, or use illegal drugs. Some items may interact with your medicine.  What should I watch for while using this medicine?  Visit your doctor or health care professional for regular checks on your progress.  If you get a cold or other infection while receiving this medicine, call your doctor or health care professional. Do not treat yourself. This medicine may decrease your body's ability to fight infections. Before beginning therapy, your doctor may do a test to see if you have been exposed to tuberculosis.  This medicine may make the symptoms of heart failure worse in some patients. If you notice symptoms such as increased shortness of breath or swelling of the ankles or legs, contact your health care provider right away.  If you are going to have surgery or dental work, tell your health care professional or dentist that you have received this medicine.  If you take this medicine for plaque psoriasis, stay out of the sun. If you cannot avoid being in the sun, wear protective clothing and use sunscreen. Do not use sun lamps or tanning beds/booths.  What side effects may I notice from receiving this medicine?  Side effects that you should report to your doctor or health care professional as soon as possible:    allergic reactions like skin rash, itching or hives, swelling of the face, lips, or tongue    chest pain    fever or chills, usually related to the infusion    muscle or joint pain    red, scaly patches or raised bumps on the skin    signs of infection - fever or chills, cough,  sore throat, pain or difficulty passing urine    swollen lymph nodes in the neck, underarm, or groin areas    unexplained weight loss    unusual bleeding or bruising    unusually weak or tired    yellowing of the eyes or skin  Side effects that usually do not require medical attention (report to your doctor or health care professional if they continue or are bothersome):    headache    heartburn or stomach pain    nausea, vomiting  This list may not describe all possible side effects. Call your doctor for medical advice about side effects. You may report side effects to FDA at 8-580-HRG-2602.  Where should I keep my medicine?  This drug is given in a hospital or clinic and will not be stored at home.  NOTE:This sheet is a summary. It may not cover all possible information. If you have questions about this medicine, talk to your doctor, pharmacist, or health care provider. Copyright  2016 Gold Standard    EDUCATION POST BIOLOGICAL/CHEMOTHERAPY INFUSION  Call the triage nurse at your clinic or seek medical attention if you have chills and/or temperature greater than or equal to 100.5, uncontrolled nausea/vomiting, diarrhea, constipation, dizziness, shortness of breath, chest pain, heart palpitations, weakness or any other new or concerning symptoms, questions or concerns.  You can not have any live virus vaccines prior to or during treatment or up to 6 months post infusion.  If you have an upcoming surgery, medical procedure or dental procedure during treatment, this should be discussed with your ordering physician and your surgeon/dentist.  If you are having any concerning symptom, if you are unsure if you should get your next infusion or wish to speak to a provider before your next infusion, please call your care coordinator or triage nurse at your clinic to notify them so we can adequately serve you.           We look forward in seeing you on your next appointment here at [facility]  ([facility]).  Please don t hesitate to call us at [phone] to reschedule any of your appointments or to speak with one of the [facility] registered nurses.  It was a pleasure taking care of you today.    Sincerely,    [facility]  [facility]  [address] [place] [address]  [address]  Phone:  [phone]

## 2021-08-12 ENCOUNTER — LAB (OUTPATIENT)
Dept: LAB | Facility: CLINIC | Age: 51
End: 2021-08-12
Payer: COMMERCIAL

## 2021-08-12 DIAGNOSIS — K50.10 CROHN'S COLITIS, WITHOUT COMPLICATIONS (H): ICD-10-CM

## 2021-08-12 PROCEDURE — 83993 ASSAY FOR CALPROTECTIN FECAL: CPT

## 2021-08-13 LAB — CALPROTECTIN STL-MCNT: 113 MG/KG (ref 0–49.9)

## 2021-08-17 LAB — SCANNED LAB RESULT: NORMAL

## 2021-08-18 ENCOUNTER — PATIENT OUTREACH (OUTPATIENT)
Dept: GASTROENTEROLOGY | Facility: CLINIC | Age: 51
End: 2021-08-18

## 2021-08-18 NOTE — PROGRESS NOTES
Devora Butler, RN  Pt's IFX level is low. Current dose 5 mg/kg q 8 weeks     Please request insurance approval for 7.5 mg/kg q 6 weeks       Contacted Infusion finance team Rey to notify change in interval and dosing.

## 2021-09-03 ENCOUNTER — PATIENT OUTREACH (OUTPATIENT)
Dept: GASTROENTEROLOGY | Facility: CLINIC | Age: 51
End: 2021-09-03

## 2021-09-03 NOTE — TELEPHONE ENCOUNTER
the approval for the 7.5 mg/kg every six week dosing for Shelly.     Last dose was August 11   Contacted patient to inform her that the every 6 weeks is approved  Patient request that team call her to schedule   In basket to [facility] scheduling team to move up to around September 22

## 2021-09-20 NOTE — PROGRESS NOTES
Nursing Note  Shelly Cha presents [facility] for:   Chief Complaint   Patient presents with     Infusion     remicade     During [facility]'s [facility] appointment, orders from Dr. Kwasi Nolasco were completed.  Frequency: weeks 0, 2, 6 then every 8 weeks. Today is patient's first dose.    Progress note:  Patient identification verified by name and date of birth.  Assessment completed.  Vitals recorded in Doc Flowsheets.  Patient was provided with education regarding medication/procedure and possible side effects.  Patient verbalized understanding.     present during visit today: Not Applicable.    Treatment Conditions: See note per LEO Hughes RN. Negative bio checklist.    Premedications: were not administered, as today is patient's first dose.  Drug Waste Record: No  Infusion length and rate:  infusion given over approximately 2 hours at 157 ml/hr.  Labs: were drawn per orders.   Vascular access: peripheral IV placed today.    Is the next appt scheduled? 5/19  Asymptomatic COVID test completed? Completed 5/1    Report given to KAMERON Hughes at 1515. Care transferred at that time.  Nurse report given to KAMERON Bryan at 1612. Care transferred at this time.    Discharge Plan:   Follow up plan of care with: ongoing infusions at [facility]., ordering provider as scheduled. and after visit summary given to patient  Discharge instructions were reviewed with patient.  Patient/representative verbalized understanding of discharge instructions and all questions answered.  Patient discharged from [facility] in stable condition.    Zaynab Manzo RN    Administrations This Visit     inFLIXimab-dyyb (INFLECTRA) 400 mg in sodium chloride 0.9 % 315 mL infusion     Admin Date  05/05/2021 Action  New Bag Dose  400 mg Rate  157.5 mL/hr Route  Intravenous Administered By  Estella Reynolds, KAMERON                Administrations This Visit     inFLIXimab-dyyb (INFLECTRA) 400 mg in sodium chloride 0.9 % 315 mL infusion     Admin Date  05/05/2021 Action  New Bag Dose  400 mg Rate  157.5 mL/hr Route  Intravenous Administered By  Estella Reynolds, RN                /85   Pulse 74   Temp 98.1  F (36.7  C) (Oral)   Resp 16   Wt 75.1 kg (165 lb 9.6 oz)   SpO2 97%   BMI 29.33 kg/m       Patient unable to complete

## 2021-09-22 ENCOUNTER — INFUSION THERAPY VISIT (OUTPATIENT)
Dept: INFUSION THERAPY | Facility: CLINIC | Age: 51
End: 2021-09-22
Attending: INTERNAL MEDICINE
Payer: COMMERCIAL

## 2021-09-22 VITALS
WEIGHT: 173.6 LBS | DIASTOLIC BLOOD PRESSURE: 67 MMHG | RESPIRATION RATE: 16 BRPM | SYSTOLIC BLOOD PRESSURE: 105 MMHG | BODY MASS INDEX: 31.75 KG/M2 | TEMPERATURE: 98 F | OXYGEN SATURATION: 97 % | HEART RATE: 66 BPM

## 2021-09-22 DIAGNOSIS — K51.00 PANCOLITIS (H): Primary | ICD-10-CM

## 2021-09-22 PROCEDURE — 258N000003 HC RX IP 258 OP 636: Performed by: INTERNAL MEDICINE

## 2021-09-22 PROCEDURE — 96365 THER/PROPH/DIAG IV INF INIT: CPT

## 2021-09-22 PROCEDURE — 250N000011 HC RX IP 250 OP 636: Performed by: INTERNAL MEDICINE

## 2021-09-22 RX ORDER — METHYLPREDNISOLONE SODIUM SUCCINATE 125 MG/2ML
125 INJECTION, POWDER, LYOPHILIZED, FOR SOLUTION INTRAMUSCULAR; INTRAVENOUS ONCE
Status: CANCELLED
Start: 2021-10-27 | End: 2021-10-27

## 2021-09-22 RX ORDER — ACETAMINOPHEN 325 MG/1
650 TABLET ORAL ONCE
Status: CANCELLED
Start: 2021-10-27 | End: 2021-10-27

## 2021-09-22 RX ORDER — DIPHENHYDRAMINE HCL 25 MG
25 CAPSULE ORAL ONCE
Status: CANCELLED
Start: 2021-10-27 | End: 2021-10-27

## 2021-09-22 RX ADMIN — SODIUM CHLORIDE 600 MG: 9 INJECTION, SOLUTION INTRAVENOUS at 16:00

## 2021-09-22 NOTE — LETTER
9/22/2021         RE: Shelly Cha  [address]  [facility] [address]        Dear Colleague,    Thank you for referring your patient, Shelly Cha, to the [facility]. Please see a copy of my visit note below.    Nursing Note  Shelly Cha [facility] for:   Chief Complaint   Patient presents with     Infusion     Remicade     During today's [facility] appointment, orders from Dr Nolasco were completed.  Frequency: every 6 weeks    Progress note:  Patient identification verified by name and date of birth.  Assessment completed.  Vitals recorded in Doc Flowsheets.  Patient was provided with education regarding medication/procedure and possible side effects.  Patient verbalized understanding.     present during visit today: Not Applicable.    Treatment Conditions: ~~~ NOTE: If the patient answers yes to any of the questions below, hold the infusion and contact ordering provider or on-call provider.    1. Have you recently had an elevated temperature, fever, chills, productive cough, coughing for 3 weeks or longer or hemoptysis, abnormal vital signs, night sweats,  chest pain or have you noticed a decrease in your appetite, unexplained weight loss or fatigue? No  2. Do you have any open wounds or new incisions? No  3. Do you have any recent or upcoming hospitalizations, surgeries or dental procedures? No  4. Do you currently have or recently have had any signs of illness or infection or are you on any antibiotics? No  5. Have you had any new, sudden or worsening abdominal pain? No  6. Have you or anyone in your household received a live vaccination in the past 4 weeks? Please note:  No live vaccines while on biologic/chemotherapy until 6 months after the last treatment.  Patient can receive the flu vaccine (shot only) and the pneumovax.  It is optimal for the patient to  get these vaccines mid cycle, but they can be given at any time as long as it is not on the day of the infusion. No  7. Have you recently been diagnosed with any new nervous system diseases (ie. Multiple sclerosis, Guillain [place], seizures, neurological changes) or cancer diagnosis? No  8. Are you on any form of radiation or chemotherapy? No  9. Are you pregnant or breast feeding or do you have plans of pregnancy in the future? No  10. Have you been having any signs of worsening depression or suicidal ideations?  (benlysta only) No  11. Have there been any other new onset medical symptoms? No      Premedications: were not ordered.    Drug Waste Record: No    Infusion length and rate:  infusion given over approximately 1 hours    Labs: were not ordered for this appointment.    Vascular access: peripheral IV placed today.    Is the next appt scheduled? No,  messaged  Asymptomatic COVID test completed? no    Post Infusion Assessment:  Patient tolerated infusion without incident.     Discharge Plan:   Follow up plan of care with: ongoing infusions at [facility]. and ordering provider as scheduled.  Discharge instructions were reviewed with patient.  Patient/representative verbalized understanding of discharge instructions and all questions answered.  Patient discharged from [facility] in stable condition.    Zaynab Manzo, KAMERON       Administrations This Visit     inFLIXimab-dyyb (INFLECTRA) 600 mg in sodium chloride 0.9 % 335 mL infusion     Admin Date  09/22/2021 Action  New Bag Dose  600 mg Rate  335 mL/hr Route  Intravenous Administered By  Zaynab Manzo, RN                /83   Pulse 83   Temp 98  F (36.7  C) (Oral)   Resp 16   Wt 78.7 kg (173 lb 9.6 oz)   SpO2 97%   BMI 31.75 kg/m            Again, thank you for allowing me to participate in the care of your patient.        Sincerely,        [facility]

## 2021-09-22 NOTE — PROGRESS NOTES
Nursing Note  Shelly Cha presents today to [facility] for:   Chief Complaint   Patient presents with     Infusion     Remicade     During [facility]'s [facility] appointment, orders from Dr Nolasco were completed.  Frequency: every 6 weeks    Progress note:  Patient identification verified by name and date of birth.  Assessment completed.  Vitals recorded in Doc Flowsheets.  Patient was provided with education regarding medication/procedure and possible side effects.  Patient verbalized understanding.     present during visit today: Not Applicable.    Treatment Conditions: ~~~ NOTE: If the patient answers yes to any of the questions below, hold the infusion and contact ordering provider or on-call provider.    1. Have you recently had an elevated temperature, fever, chills, productive cough, coughing for 3 weeks or longer or hemoptysis, abnormal vital signs, night sweats,  chest pain or have you noticed a decrease in your appetite, unexplained weight loss or fatigue? No  2. Do you have any open wounds or new incisions? No  3. Do you have any recent or upcoming hospitalizations, surgeries or dental procedures? No  4. Do you currently have or recently have had any signs of illness or infection or are you on any antibiotics? No  5. Have you had any new, sudden or worsening abdominal pain? No  6. Have you or anyone in your household received a live vaccination in the past 4 weeks? Please note:  No live vaccines while on biologic/chemotherapy until 6 months after the last treatment.  Patient can receive the flu vaccine (shot only) and the pneumovax.  It is optimal for the patient to get these vaccines mid cycle, but they can be given at any time as long as it is not on the day of the infusion. No  7. Have you recently been diagnosed with any new nervous system diseases (ie. Multiple sclerosis, Guillain [place], seizures, neurological changes) or cancer  diagnosis? No  8. Are you on any form of radiation or chemotherapy? No  9. Are you pregnant or breast feeding or do you have plans of pregnancy in the future? No  10. Have you been having any signs of worsening depression or suicidal ideations?  (benlysta only) No  11. Have there been any other new onset medical symptoms? No      Premedications: were not ordered.    Drug Waste Record: No    Infusion length and rate:  infusion given over approximately 1 hours    Labs: were not ordered for this appointment.    Vascular access: peripheral IV placed today.    Is the next appt scheduled? No,  messaged  Asymptomatic COVID test completed? no    Post Infusion Assessment:  Patient tolerated infusion without incident.     Discharge Plan:   Follow up plan of care with: ongoing infusions at [facility]. and ordering provider as scheduled.  Discharge instructions were reviewed with patient.  Patient/representative verbalized understanding of discharge instructions and all questions answered.  Patient discharged from [facility] in stable condition.    Zaynab Manzo RN       Administrations This Visit     inFLIXimab-dyyb (INFLECTRA) 600 mg in sodium chloride 0.9 % 335 mL infusion     Admin Date  09/22/2021 Action  New Bag Dose  600 mg Rate  335 mL/hr Route  Intravenous Administered By  Zaynab Manzo, RN                /83   Pulse 83   Temp 98  F (36.7  C) (Oral)   Resp 16   Wt 78.7 kg (173 lb 9.6 oz)   SpO2 97%   BMI 31.75 kg/m

## 2021-10-05 ENCOUNTER — OFFICE VISIT (OUTPATIENT)
Dept: GASTROENTEROLOGY | Facility: CLINIC | Age: 51
End: 2021-10-05
Payer: COMMERCIAL

## 2021-10-05 ENCOUNTER — LAB (OUTPATIENT)
Dept: LAB | Facility: CLINIC | Age: 51
End: 2021-10-05

## 2021-10-05 VITALS
DIASTOLIC BLOOD PRESSURE: 85 MMHG | HEART RATE: 73 BPM | SYSTOLIC BLOOD PRESSURE: 124 MMHG | BODY MASS INDEX: 32.23 KG/M2 | OXYGEN SATURATION: 98 % | WEIGHT: 176.2 LBS

## 2021-10-05 DIAGNOSIS — K50.10 CROHN'S COLITIS, WITHOUT COMPLICATIONS (H): ICD-10-CM

## 2021-10-05 DIAGNOSIS — B19.20 HEPATITIS C VIRUS INFECTION WITHOUT HEPATIC COMA, UNSPECIFIED CHRONICITY: ICD-10-CM

## 2021-10-05 DIAGNOSIS — B96.81 HELICOBACTER PYLORI GASTRITIS: Primary | ICD-10-CM

## 2021-10-05 DIAGNOSIS — R10.13 ABDOMINAL PAIN, EPIGASTRIC: ICD-10-CM

## 2021-10-05 DIAGNOSIS — K29.70 HELICOBACTER PYLORI GASTRITIS: Primary | ICD-10-CM

## 2021-10-05 DIAGNOSIS — B96.81 HELICOBACTER PYLORI GASTRITIS: ICD-10-CM

## 2021-10-05 DIAGNOSIS — K29.70 HELICOBACTER PYLORI GASTRITIS: ICD-10-CM

## 2021-10-05 DIAGNOSIS — K59.00 CONSTIPATION, UNSPECIFIED CONSTIPATION TYPE: ICD-10-CM

## 2021-10-05 PROCEDURE — 99214 OFFICE O/P EST MOD 30 MIN: CPT | Performed by: INTERNAL MEDICINE

## 2021-10-05 NOTE — NURSING NOTE
Chief Complaint   Patient presents with     Follow Up     3 MONTHS FOLLOW UP       Vitals:    10/05/21 1231   BP: 124/85   Pulse: 73   SpO2: 98%   Weight: 79.9 kg (176 lb 3.2 oz)       Body mass index is 32.23 kg/m .    Domitila Gonzalez CMA

## 2021-10-05 NOTE — PROGRESS NOTES
/IBD CLINIC VISIT    CC/REFERRING MD:  No ref. provider found    REASON FOR CONSULTATION: follow up IBD colitis    ASSESSMENT/PLAN:    #. Severe colitis:     -seems to be doing well. Fecal calpro decreased from 5000s-->>110s.  -follow on IFX dose increase (7.5 mg/kg q 6 wks)  -repeat colonoscopy in 6 months (April/May 2022)     #. Constipation: Likely drive by methadone  -miralax 2 capfuls daily - titrated to one BM per day ideally (at least every other day)  -hold on fiber for now  -taper methadone as able (this will help constipation) - defer to addiction clinic     #. H pylori gastritis: Status post triple therapy.  check stool antigen to confirm eradication (she states she is off PPI for at least 3 weeks now)     #. Anemia: This has improved but a mild anemia persists.  We will continue to follow this on treatment.       #. Hepatitis C: Spontaneously cleared.  No further evaluation or treatment is needed.    #. Epigastric/generalized abdominal pain - ddx includes constipation vs dyspepsia vs ongoing h pylori infection vs functional vs visceral hypersensitivity vs pancreas/biliary (felt to be much less likely)  -check for h pylori eradication (if still present will re-treat with bismuth quad therapy)  -maximize constipation management  -pending above may trial course of PPI for functional dyspepsia (not until after check h pylori status)      IBD HISTORY  Age at diagnosis: 51  Extent of disease: colon (pan-colitis - sparing of rectosigmoid - distal 15 cm)  Disease phenotype: inflammatory  [place]-anal disease: none  Current CD medications: infliximab 7.5 mg/kg q 6 weeks (dose increase 9/2021)  Prior IBD surgeries: none  Prior IBD Medications: none    DRUG MONITORING  TPMT enzyme activity: adequate    6-TGN/6-MMPN levels: none    Biologic concentration:  -IFX 8/11/21 - 3 with no antibodies    DISEASE ASSESSMENT  Labs  Recent Labs   Lab Test 08/11/21  1236 06/16/21  1240   CRP 3.8 <2.9   SED 30* 26*     Fecal  calprotectin: 5000 prior to treatment -->>113 (8/12/21)  -colonoscopy - severe inflammation colon (distal 15 cm spared). Ileum not seen  - normal EGD but gastric biopsis + h pylori  Enterography: MRE with normal small bowel   C diff: negative 1/22/21       sIBDQ:   No flowsheet data found.    IBD Health Care Maintenance:    Vaccinations:  All patients on biologics should avoid live vaccines.    -- Influenza (every year)  -- TdaP (every 10 years)  -- Pneumococcal Pneumonia (once plus booster at 5 years)  -- Yearly assessment for latent Tb (verbal screening and exam, PPD or QuantiFERON-Tb testing)     One time confirmation of immunity or serologies:  -- Hepatitis A (serologies or immunizations)  -- Hepatitis B (serologies or immunizations)  -- Varicella  -- MMR  -- HPV (all aged 18-26)  -- Meningococcal meningitis (all patients at risk for meningitis)  -- Due to the immunosuppression in this patient, I would not advise administration of live vaccines such as varicella/VZV, intranasal influenza, MMR, or yellow fever vaccine (if travelling).       Bone mineral density screening   -- Recommend all patients supplement with calcium and vitamin D     Cancer Screening:  Colon cancer screening:  Given panncolonic disease, recommend patient undergo regular dysplasia surveillance   Next dysplasia screening is recommended: given pseudopolyps I recommend we start dysplasia surveillance on next colonoscopy in 6-12 months.  Dysplasia surveillance will be difficult given the number of pseudopolyps that she is having.     Cervical cancer screening: Annual due to immunosupression     Skin cancer screening: Annual visual exam of skin by dermatologist since patient is immunocompromised     Depression Screening:  -- Over the last month, have you felt down, depressed, or hopeless? no  -- Over the last month, have you felt little interest or pleasure doing things? no     Misc:  -- Avoid tobacco use  -- Avoid NSAIDs as there is potentially  "a 25% chance of causing an IBD flare    Return to clinic in 3 months with Adam FRANNY    Thank you for this consultation.  It was a pleasure to participate in the care of this patient; please contact us with any further questions.     This note was created with voice recognition software, and while reviewed for accuracy, typos may remain.     Kwasi Nolasco MD  Division of Gastroenterology, Hepatology and Nutrition  [facility]  Pager: [phone]      HPI:   Currently, here to follow up. Recently increased to 7.5 mg/kg q 6 weeks.    Overall doing well.     Colitis - feels good. Having 2 BMs per week. She has long standing constipation. She is taking the miralax 3-5 times per week. Did not like citrucel. Gave her heartburn.    Constipation - as above    Abdominal pain - she describes a long standing discomfort. For years. Starts epigastric and can radiate to remainder of abdomen. Worse with spicy foods and coffee. Also sometimes worse when she is sitting and feels her abdomen is \"scrunched.\" Last a few minutes and then resolves. Happens most days of the week.    She is on methadone 60 mg daily (for history of opioid abuse).    ROS:    No fevers or chills  No weight loss  No blurry vision, double vision or change in vision  No sore throat  No lymphadenopathy  No headache, paraesthesias, or weakness in a limb  No shortness of breath or wheezing  No chest pain or pressure  No arthralgias or myalgias  No rashes or skin changes  No odynophagia or dysphagia  No BRBPR, hematochezia, melena  No dysuria, frequency or urgency  No hot/cold intolerance or polyria  No anxiety or depression    Extra intestinal manifestations of IBD:  No uveitis/episcleritis  No aphthous ulcers   No arthritis   No erythema nodosum/pyoderma gangrenosum.     PERTINENT PAST MEDICAL HISTORY:  Past Medical History:   Diagnosis Date     Pancolitis (H) 3/11/2021     Pancolitis (H) 3/11/2021       PREVIOUS SURGERIES:  Past Surgical History:   Procedure " Laterality Date     BIOPSY      right breast     CHOLECYSTECTOMY       GYN SURGERY      , tubal       PREVIOUS ENDOSCOPY:  No results found for this or any previous visit.]    ALLERGIES:     Allergies   Allergen Reactions     Ibuprofen Swelling     Eyes swelling     Tylenol [Acetaminophen]      Feels like someone punched her in the stomach       PERTINENT MEDICATIONS:    Current Outpatient Medications:      albuterol (ACCUNEB) 1.25 MG/3ML neb solution, Inhale 1.25 mg into the lungs, Disp: , Rfl:      BANOPHEN 25 MG capsule, TAKE 2 CAPSULES BY MOUTH EVERY NIGHT AT BEDTIME AS NEEDED FOR SLEEP, Disp: , Rfl:      Cholecalciferol (VITAMIN D3) 50 MCG (2000 UT) TABS, Take 1 tablet by mouth daily, Disp: , Rfl:      FLOVENT DISKUS 250 MCG/BLIST inhaler, INHALE 1 PUFF INTO THE LUNGS TWICE DAILY, Disp: , Rfl:      folic acid 800 MCG tablet, Take 800 mcg by mouth daily, Disp: , Rfl:      gabapentin (NEURONTIN) 300 MG capsule, Take 300 mg by mouth 4 times daily , Disp: , Rfl:      melatonin 5 MG tablet, Take 5 mg by mouth daily, Disp: , Rfl:      METHADONE HCL PO, Take 48 mg by mouth daily , Disp: , Rfl:      methylcellulose (CITRUCEL) powder, Take 0.85 g (3 teaspoonful) by mouth daily, Disp: 90 g, Rfl: 3     minoxidil (ROGAINE) 2 % external solution, , Disp: , Rfl:      Multiple Vitamin (DAILY-JUSTIN MULTIVITAMIN) TABS, Take 1 tablet by mouth daily, Disp: , Rfl:      omeprazole (PRILOSEC) 20 MG DR capsule, Take 1 capsule (20 mg) by mouth daily, Disp: 30 capsule, Rfl: 1     polyethylene glycol (MIRALAX) 17 GM/Dose powder, Take 1 capful daily to have 1-2 soft BMs per day. Can increase up to 3 capfuls per day if needed to get desired result, Disp: 507 g, Rfl: 3     Thiamine Mononitrate (B1) 100 MG TABS, Take 1 tablet by mouth daily, Disp: , Rfl:      WAL-DRYL ALLERGY 25 MG tablet, TAKE 2 TABLETS BY MOUTH EVERY NIGHT AT BEDTIME AS NEEDED SLEEPLESSNESS, Disp: , Rfl:      WAL-MUCIL 58.6 % powder, TAKE 2.5 GRAMS TWICE DAILY, Disp: ,  Rfl:     SOCIAL HISTORY:  Social History     Socioeconomic History     Marital status: Single     Spouse name: Not on file     Number of children: Not on file     Years of education: Not on file     Highest education level: Not on file   Occupational History     Not on file   Tobacco Use     Smoking status: Current Some Day Smoker     Last attempt to quit: 2021     Years since quittin.7     Smokeless tobacco: Never Used   Substance and Sexual Activity     Alcohol use: No     Drug use: No     Sexual activity: Yes     Partners: Male   Other Topics Concern     Not on file   Social History Narrative     Not on file     Social Determinants of Health     Financial Resource Strain:      Difficulty of Paying Living Expenses:    Food Insecurity:      Worried About Running Out of Food in the Last Year:      Ran Out of Food in the Last Year:    Transportation Needs:      Lack of Transportation (Medical):      Lack of Transportation (Non-Medical):    Physical Activity:      Days of Exercise per Week:      Minutes of Exercise per Session:    Stress:      Feeling of Stress :    Social Connections:      Frequency of Communication with Friends and Family:      Frequency of Social Gatherings with Friends and Family:      Attends [employer] Services:      Active Member of Clubs or Organizations:      Attends Club or Organization Meetings:      Marital Status:    Intimate Partner Violence:      Fear of Current or Ex-Partner:      Emotionally Abused:      Physically Abused:      Sexually Abused:        FAMILY HISTORY:  No family history on file.    Past/family/social history reviewed and no changes    PHYSICAL EXAMINATION:  Constitutional: aaox3, cooperative, pleasant, not dyspneic/diaphoretic, no acute distress  Vitals reviewed: Wt 79.9 kg (176 lb 3.2 oz)   BMI 32.23 kg/m    Wt:   Wt Readings from Last 2 Encounters:   10/05/21 79.9 kg (176 lb 3.2 oz)   21 78.7 kg (173 lb 9.6 oz)      Eyes: Sclera  anicteric/injected  Ears/nose/mouth/throat: Normal oropharynx without ulcers or exudate, mucus membranes moist, hearing intact  Neck: supple, thyroid normal size  CV: No edema  Respiratory: Unlabored breathing  Lymph: No axillary, submandibular, supraclavicular or inguinal lymphadenopathy  Abd:  Nondistended, +bs, no hepatosplenomegaly, nontender, no peritoneal signs  Skin: warm, perfused, no jaundice  Psych: Normal affect  MSK: Normal gait      PERTINENT STUDIES:  Most recent CBC:  Recent Labs   Lab Test 08/11/21  1236 06/16/21  1240   WBC 7.0 10.4   HGB 11.3* 11.2*   HCT 34.9* 34.8*    328     Most recent hepatic panel:  Recent Labs   Lab Test 08/11/21  1236 06/16/21  1240   ALT 19 19   AST 32 26     Most recent creatinine:  Recent Labs   Lab Test 08/11/21  1236 03/12/21  1450   CR 0.72 0.84

## 2021-10-05 NOTE — LETTER
10/5/2021         RE: Shelly Cha  [address]  [facility] [address]        Dear Colleague,    Thank you for referring your patient, Shelly Cha, to the [facility] [place]. Please see a copy of my visit note below.    /IBD CLINIC VISIT    CC/REFERRING MD:  No ref. provider found    REASON FOR CONSULTATION: follow up IBD colitis    ASSESSMENT/PLAN:    #. Severe colitis:     -seems to be doing well. Fecal calpro decreased from 5000s-->>110s.  -follow on IFX dose increase (7.5 mg/kg q 6 wks)  -repeat colonoscopy in 6 months (April/May 2022)     #. Constipation: Likely drive by methadone  -miralax 2 capfuls daily - titrated to one BM per day ideally (at least every other day)  -hold on fiber for now  -taper methadone as able (this will help constipation) - defer to addiction clinic     #. H pylori gastritis: Status post triple therapy.  check stool antigen to confirm eradication (she states she is off PPI for at least 3 weeks now)     #. Anemia: This has improved but a mild anemia persists.  We will continue to follow this on treatment.       #. Hepatitis C: Spontaneously cleared.  No further evaluation or treatment is needed.    #. Epigastric/generalized abdominal pain - ddx includes constipation vs dyspepsia vs ongoing h pylori infection vs functional vs visceral hypersensitivity vs pancreas/biliary (felt to be much less likely)  -check for h pylori eradication (if still present will re-treat with bismuth quad therapy)  -maximize constipation management  -pending above may trial course of PPI for functional dyspepsia (not until after check h pylori status)      IBD HISTORY  Age at diagnosis: 51  Extent of disease: colon (pan-colitis - sparing of rectosigmoid - distal 15 cm)  Disease phenotype: inflammatory  Oneida-anal disease: none  Current CD medications: infliximab 7.5 mg/kg q 6 weeks (dose increase 9/2021)  Prior IBD surgeries: none  Prior IBD Medications:  none    DRUG MONITORING  TPMT enzyme activity: adequate    6-TGN/6-MMPN levels: none    Biologic concentration:  -IFX 8/11/21 - 3 with no antibodies    DISEASE ASSESSMENT  Labs  Recent Labs   Lab Test 08/11/21  1236 06/16/21  1240   CRP 3.8 <2.9   SED 30* 26*     Fecal calprotectin: 5000 prior to treatment -->>113 (8/12/21)  -colonoscopy - severe inflammation colon (distal 15 cm spared). Ileum not seen  - normal EGD but gastric biopsis + h pylori  Enterography: MRE with normal small bowel   C diff: negative 1/22/21       sIBDQ:   No flowsheet data found.    IBD Health Care Maintenance:    Vaccinations:  All patients on biologics should avoid live vaccines.    -- Influenza (every year)  -- TdaP (every 10 years)  -- Pneumococcal Pneumonia (once plus booster at 5 years)  -- Yearly assessment for latent Tb (verbal screening and exam, PPD or QuantiFERON-Tb testing)     One time confirmation of immunity or serologies:  -- Hepatitis A (serologies or immunizations)  -- Hepatitis B (serologies or immunizations)  -- Varicella  -- MMR  -- HPV (all aged 18-26)  -- Meningococcal meningitis (all patients at risk for meningitis)  -- Due to the immunosuppression in this patient, I would not advise administration of live vaccines such as varicella/VZV, intranasal influenza, MMR, or yellow fever vaccine (if travelling).       Bone mineral density screening   -- Recommend all patients supplement with calcium and vitamin D     Cancer Screening:  Colon cancer screening:  Given panncolonic disease, recommend patient undergo regular dysplasia surveillance   Next dysplasia screening is recommended: given pseudopolyps I recommend we start dysplasia surveillance on next colonoscopy in 6-12 months.  Dysplasia surveillance will be difficult given the number of pseudopolyps that she is having.     Cervical cancer screening: Annual due to immunosupression     Skin cancer screening: Annual visual exam of skin by dermatologist since patient is  "immunocompromised     Depression Screening:  -- Over the last month, have you felt down, depressed, or hopeless? no  -- Over the last month, have you felt little interest or pleasure doing things? no     Misc:  -- Avoid tobacco use  -- Avoid NSAIDs as there is potentially a 25% chance of causing an IBD flare    Return to clinic in 3 months with Adam P    Thank you for this consultation.  It was a pleasure to participate in the care of this patient; please contact us with any further questions.     This note was created with voice recognition software, and while reviewed for accuracy, typos may remain.     Kwasi Nolasco MD  Division of Gastroenterology, Hepatology and Nutrition  [facility]  Pager: [phone]      HPI:   Currently, here to follow up. Recently increased to 7.5 mg/kg q 6 weeks.    Overall doing well.     Colitis - feels good. Having 2 BMs per week. She has long standing constipation. She is taking the miralax 3-5 times per week. Did not like citrucel. Gave her heartburn.    Constipation - as above    Abdominal pain - she describes a long standing discomfort. For years. Starts epigastric and can radiate to remainder of abdomen. Worse with spicy foods and coffee. Also sometimes worse when she is sitting and feels her abdomen is \"scrunched.\" Last a few minutes and then resolves. Happens most days of the week.    She is on methadone 60 mg daily (for history of opioid abuse).    ROS:    No fevers or chills  No weight loss  No blurry vision, double vision or change in vision  No sore throat  No lymphadenopathy  No headache, paraesthesias, or weakness in a limb  No shortness of breath or wheezing  No chest pain or pressure  No arthralgias or myalgias  No rashes or skin changes  No odynophagia or dysphagia  No BRBPR, hematochezia, melena  No dysuria, frequency or urgency  No hot/cold intolerance or polyria  No anxiety or depression    Extra intestinal manifestations of IBD:  No " uveitis/episcleritis  No aphthous ulcers   No arthritis   No erythema nodosum/pyoderma gangrenosum.     PERTINENT PAST MEDICAL HISTORY:  Past Medical History:   Diagnosis Date     Pancolitis (H) 3/11/2021     Pancolitis (H) 3/11/2021       PREVIOUS SURGERIES:  Past Surgical History:   Procedure Laterality Date     BIOPSY      right breast     CHOLECYSTECTOMY       GYN SURGERY      , tubal       PREVIOUS ENDOSCOPY:  No results found for this or any previous visit.]    ALLERGIES:     Allergies   Allergen Reactions     Ibuprofen Swelling     Eyes swelling     Tylenol [Acetaminophen]      Feels like someone punched her in the stomach       PERTINENT MEDICATIONS:    Current Outpatient Medications:      albuterol (ACCUNEB) 1.25 MG/3ML neb solution, Inhale 1.25 mg into the lungs, Disp: , Rfl:      BANOPHEN 25 MG capsule, TAKE 2 CAPSULES BY MOUTH EVERY NIGHT AT BEDTIME AS NEEDED FOR SLEEP, Disp: , Rfl:      Cholecalciferol (VITAMIN D3) 50 MCG (2000 UT) TABS, Take 1 tablet by mouth daily, Disp: , Rfl:      FLOVENT DISKUS 250 MCG/BLIST inhaler, INHALE 1 PUFF INTO THE LUNGS TWICE DAILY, Disp: , Rfl:      folic acid 800 MCG tablet, Take 800 mcg by mouth daily, Disp: , Rfl:      gabapentin (NEURONTIN) 300 MG capsule, Take 300 mg by mouth 4 times daily , Disp: , Rfl:      melatonin 5 MG tablet, Take 5 mg by mouth daily, Disp: , Rfl:      METHADONE HCL PO, Take 48 mg by mouth daily , Disp: , Rfl:      methylcellulose (CITRUCEL) powder, Take 0.85 g (3 teaspoonful) by mouth daily, Disp: 90 g, Rfl: 3     minoxidil (ROGAINE) 2 % external solution, , Disp: , Rfl:      Multiple Vitamin (DAILY-JUSTIN MULTIVITAMIN) TABS, Take 1 tablet by mouth daily, Disp: , Rfl:      omeprazole (PRILOSEC) 20 MG DR capsule, Take 1 capsule (20 mg) by mouth daily, Disp: 30 capsule, Rfl: 1     polyethylene glycol (MIRALAX) 17 GM/Dose powder, Take 1 capful daily to have 1-2 soft BMs per day. Can increase up to 3 capfuls per day if needed to get desired result,  Disp: 507 g, Rfl: 3     Thiamine Mononitrate (B1) 100 MG TABS, Take 1 tablet by mouth daily, Disp: , Rfl:      WAL-DRYL ALLERGY 25 MG tablet, TAKE 2 TABLETS BY MOUTH EVERY NIGHT AT BEDTIME AS NEEDED SLEEPLESSNESS, Disp: , Rfl:      WAL-MUCIL 58.6 % powder, TAKE 2.5 GRAMS TWICE DAILY, Disp: , Rfl:     SOCIAL HISTORY:  Social History     Socioeconomic History     Marital status: Single     Spouse name: Not on file     Number of children: Not on file     Years of education: Not on file     Highest education level: Not on file   Occupational History     Not on file   Tobacco Use     Smoking status: Current Some Day Smoker     Last attempt to quit: 2021     Years since quittin.7     Smokeless tobacco: Never Used   Substance and Sexual Activity     Alcohol use: No     Drug use: No     Sexual activity: Yes     Partners: Male   Other Topics Concern     Not on file   Social History Narrative     Not on file     Social Determinants of Health     Financial Resource Strain:      Difficulty of Paying Living Expenses:    Food Insecurity:      Worried About Running Out of Food in the Last Year:      Ran Out of Food in the Last Year:    Transportation Needs:      Lack of Transportation (Medical):      Lack of Transportation (Non-Medical):    Physical Activity:      Days of Exercise per Week:      Minutes of Exercise per Session:    Stress:      Feeling of Stress :    Social Connections:      Frequency of Communication with Friends and Family:      Frequency of Social Gatherings with Friends and Family:      Attends [employer] Services:      Active Member of Clubs or Organizations:      Attends Club or Organization Meetings:      Marital Status:    Intimate Partner Violence:      Fear of Current or Ex-Partner:      Emotionally Abused:      Physically Abused:      Sexually Abused:        FAMILY HISTORY:  No family history on file.    Past/family/social history reviewed and no changes    PHYSICAL EXAMINATION:  Constitutional:  aaox3, cooperative, pleasant, not dyspneic/diaphoretic, no acute distress  Vitals reviewed: Wt 79.9 kg (176 lb 3.2 oz)   BMI 32.23 kg/m    Wt:   Wt Readings from Last 2 Encounters:   10/05/21 79.9 kg (176 lb 3.2 oz)   09/22/21 78.7 kg (173 lb 9.6 oz)      Eyes: Sclera anicteric/injected  Ears/nose/mouth/throat: Normal oropharynx without ulcers or exudate, mucus membranes moist, hearing intact  Neck: supple, thyroid normal size  CV: No edema  Respiratory: Unlabored breathing  Lymph: No axillary, submandibular, supraclavicular or inguinal lymphadenopathy  Abd:  Nondistended, +bs, no hepatosplenomegaly, nontender, no peritoneal signs  Skin: warm, perfused, no jaundice  Psych: Normal affect  MSK: Normal gait      PERTINENT STUDIES:  Most recent CBC:  Recent Labs   Lab Test 08/11/21  1236 06/16/21  1240   WBC 7.0 10.4   HGB 11.3* 11.2*   HCT 34.9* 34.8*    328     Most recent hepatic panel:  Recent Labs   Lab Test 08/11/21  1236 06/16/21  1240   ALT 19 19   AST 32 26     Most recent creatinine:  Recent Labs   Lab Test 08/11/21  1236 03/12/21  1450   CR 0.72 0.84         Again, thank you for allowing me to participate in the care of your patient.      Sincerely,    Kwasi Nolasco MD

## 2021-10-05 NOTE — PATIENT INSTRUCTIONS
It is good to see you today! My recommendations are outlined below.    Continue the infliximab (Remicade) infusions every 6 weeks.    Start miralax 2 capfuls daily in a glass of water. The goal is to have 1 BM per day (at least every other day). If this dose is not enough you can increase to 3 capfuls daily. If you get diarrhea (more than 2 loose BMs per day you can decrease the dose.    Please submit a stool sample so we can check to make sure the h pylori is gone.    Colonoscopy to check for healing of the colitis in 6 months    Follow up in clinic with Adam BLANTON in 3 months    Call with questions

## 2021-10-08 ENCOUNTER — APPOINTMENT (OUTPATIENT)
Dept: LAB | Facility: CLINIC | Age: 51
End: 2021-10-08
Payer: COMMERCIAL

## 2021-10-08 PROCEDURE — 87338 HPYLORI STOOL AG IA: CPT | Mod: 90 | Performed by: PATHOLOGY

## 2021-10-11 LAB — H PYLORI AG STL QL IA: NEGATIVE

## 2021-10-23 ENCOUNTER — HEALTH MAINTENANCE LETTER (OUTPATIENT)
Age: 51
End: 2021-10-23

## 2021-11-03 ENCOUNTER — INFUSION THERAPY VISIT (OUTPATIENT)
Dept: INFUSION THERAPY | Facility: CLINIC | Age: 51
End: 2021-11-03
Attending: INTERNAL MEDICINE
Payer: COMMERCIAL

## 2021-11-03 VITALS
SYSTOLIC BLOOD PRESSURE: 125 MMHG | WEIGHT: 179 LBS | HEART RATE: 85 BPM | BODY MASS INDEX: 32.74 KG/M2 | OXYGEN SATURATION: 100 % | DIASTOLIC BLOOD PRESSURE: 83 MMHG | TEMPERATURE: 99.5 F

## 2021-11-03 DIAGNOSIS — K51.00 PANCOLITIS (H): Primary | ICD-10-CM

## 2021-11-03 LAB
ALBUMIN SERPL-MCNC: 3.4 G/DL (ref 3.4–5)
ALP SERPL-CCNC: 77 U/L (ref 40–150)
ALT SERPL W P-5'-P-CCNC: 20 U/L (ref 0–50)
AST SERPL W P-5'-P-CCNC: 25 U/L (ref 0–45)
BASOPHILS # BLD AUTO: 0 10E3/UL (ref 0–0.2)
BASOPHILS NFR BLD AUTO: 0 %
BILIRUB DIRECT SERPL-MCNC: 0.2 MG/DL (ref 0–0.2)
BILIRUB SERPL-MCNC: 0.8 MG/DL (ref 0.2–1.3)
CRP SERPL-MCNC: 13 MG/L (ref 0–8)
EOSINOPHIL # BLD AUTO: 0.6 10E3/UL (ref 0–0.7)
EOSINOPHIL NFR BLD AUTO: 4 %
ERYTHROCYTE [DISTWIDTH] IN BLOOD BY AUTOMATED COUNT: 17.3 % (ref 10–15)
ERYTHROCYTE [SEDIMENTATION RATE] IN BLOOD BY WESTERGREN METHOD: 33 MM/HR (ref 0–20)
HCT VFR BLD AUTO: 35.5 % (ref 35–47)
HGB BLD-MCNC: 11.7 G/DL (ref 11.7–15.7)
IMM GRANULOCYTES # BLD: 0.1 10E3/UL
IMM GRANULOCYTES NFR BLD: 0 %
LYMPHOCYTES # BLD AUTO: 3.1 10E3/UL (ref 0.8–5.3)
LYMPHOCYTES NFR BLD AUTO: 20 %
MCH RBC QN AUTO: 27.4 PG (ref 26.5–33)
MCHC RBC AUTO-ENTMCNC: 33 G/DL (ref 31.5–36.5)
MCV RBC AUTO: 83 FL (ref 78–100)
MONOCYTES # BLD AUTO: 1.3 10E3/UL (ref 0–1.3)
MONOCYTES NFR BLD AUTO: 8 %
NEUTROPHILS # BLD AUTO: 10.3 10E3/UL (ref 1.6–8.3)
NEUTROPHILS NFR BLD AUTO: 68 %
NRBC # BLD AUTO: 0 10E3/UL
NRBC BLD AUTO-RTO: 0 /100
PLATELET # BLD AUTO: 354 10E3/UL (ref 150–450)
PROT SERPL-MCNC: 7.6 G/DL (ref 6.8–8.8)
RBC # BLD AUTO: 4.27 10E6/UL (ref 3.8–5.2)
WBC # BLD AUTO: 15.4 10E3/UL (ref 4–11)

## 2021-11-03 PROCEDURE — 85652 RBC SED RATE AUTOMATED: CPT

## 2021-11-03 PROCEDURE — 36415 COLL VENOUS BLD VENIPUNCTURE: CPT

## 2021-11-03 PROCEDURE — 250N000011 HC RX IP 250 OP 636: Performed by: INTERNAL MEDICINE

## 2021-11-03 PROCEDURE — 85025 COMPLETE CBC W/AUTO DIFF WBC: CPT

## 2021-11-03 PROCEDURE — 86140 C-REACTIVE PROTEIN: CPT

## 2021-11-03 PROCEDURE — 258N000003 HC RX IP 258 OP 636: Performed by: INTERNAL MEDICINE

## 2021-11-03 PROCEDURE — 80076 HEPATIC FUNCTION PANEL: CPT

## 2021-11-03 PROCEDURE — 96365 THER/PROPH/DIAG IV INF INIT: CPT

## 2021-11-03 RX ORDER — METHYLPREDNISOLONE SODIUM SUCCINATE 125 MG/2ML
125 INJECTION, POWDER, LYOPHILIZED, FOR SOLUTION INTRAMUSCULAR; INTRAVENOUS ONCE
Status: CANCELLED
Start: 2021-12-15 | End: 2021-12-15

## 2021-11-03 RX ORDER — ACETAMINOPHEN 325 MG/1
650 TABLET ORAL ONCE
Status: CANCELLED
Start: 2021-12-15 | End: 2021-12-15

## 2021-11-03 RX ORDER — DIPHENHYDRAMINE HCL 25 MG
25 CAPSULE ORAL ONCE
Status: CANCELLED
Start: 2021-12-15 | End: 2021-12-15

## 2021-11-03 RX ADMIN — SODIUM CHLORIDE 600 MG: 9 INJECTION, SOLUTION INTRAVENOUS at 15:43

## 2021-11-03 ASSESSMENT — PAIN SCALES - GENERAL: PAINLEVEL: NO PAIN (0)

## 2021-11-03 NOTE — PROGRESS NOTES
Nursing Note  Shelly Cha presents [facility] for:   Chief Complaint   Patient presents with     Infusion     remicade     During today's [facility] appointment, orders from  were completed.  Frequency: every 42 days     Progress note:  Patient identification verified by name and date of birth.  Assessment completed.  Vitals recorded in Doc Flowsheets.  Patient was provided with education regarding medication/procedure and possible side effects.  Patient verbalized understanding.     present during visit today: Not Applicable.    Treatment Conditions: ~~~ NOTE: If the patient answers yes to any of the questions below, hold the infusion and contact ordering provider or on-call provider.    1. Have you recently had an elevated temperature, fever, chills, productive cough, coughing for 3 weeks or longer or hemoptysis, abnormal vital signs, night sweats,  chest pain or have you noticed a decrease in your appetite, unexplained weight loss or fatigue? No  2. Do you have any open wounds or new incisions? No  3. Do you have any recent or upcoming hospitalizations, surgeries or dental procedures? No  4. Do you currently have or recently have had any signs of illness or infection or are you on any antibiotics? No  5. Have you had any new, sudden or worsening abdominal pain? No  6. Have you or anyone in your household received a live vaccination in the past 4 weeks? Please note:  No live vaccines while on biologic/chemotherapy until 6 months after the last treatment.  Patient can receive the flu vaccine (shot only) and the pneumovax.  It is optimal for the patient to get these vaccines mid cycle, but they can be given at any time as long as it is not on the day of the infusion. No  7. Have you recently been diagnosed with any new nervous system diseases (ie. Multiple sclerosis, Guillain Monette, seizures, neurological changes) or cancer  diagnosis? No  8. Are you on any form of radiation or chemotherapy? No  9. Are you pregnant or breast feeding or do you have plans of pregnancy in the future? No  10. Have you been having any signs of worsening depression or suicidal ideations?  (benlysta only) N/A  11. Have there been any other new onset medical symptoms? No      Premedications: historically patient has not taken pre-medications prior to infusion.    Drug Waste Record: No    Infusion length and rate:  infusion given over approximately 60 minutes    Labs: were drawn per orders.     Vascular access: peripheral IV placed today.    Is the next appt scheduled? Message sent to scheduling  Asymptomatic COVID test completed? no    Post Infusion Assessment:  Patient tolerated infusion without incident.  Blood return noted pre and post infusion.  Site patent and intact, free from redness, edema or discomfort.  No evidence of extravasations.  Access discontinued per protocol.     Discharge Plan:   Follow up plan of care with: ongoing infusions at [facility].  Discharge instructions were reviewed with patient.  Patient/representative verbalized understanding of discharge instructions and all questions answered.  Patient discharged from [facility] in stable condition.    Ellen Villegas RN       Administrations This Visit     inFLIXimab-dyyb (INFLECTRA) 600 mg in sodium chloride 0.9 % 335 mL infusion     Admin Date  11/03/2021 Action  New Bag Dose  600 mg Rate  335 mL/hr Route  Intravenous Administered By  Ellen Villegas, RN                  /74   Pulse 80   Temp 99.5  F (37.5  C) (Oral)   Wt 81.2 kg (179 lb)   SpO2 100%   BMI 32.74 kg/m

## 2021-11-03 NOTE — PATIENT INSTRUCTIONS
Dear Shelly Cha    Thank you for choosing [facility] for your infusion.  The following information is a summary of our appointment as well as important reminders.      Patient Education     Infliximab Solution for injection  What is this medicine?  INFLIXIMAB (in FLIX i mab) is used to treat Crohn's disease and ulcerative colitis. It is also used to treat ankylosing spondylitis, psoriasis, and some forms of arthritis.  This medicine may be used for other purposes; ask your health care provider or pharmacist if you have questions.  What should I tell my health care provider before I take this medicine?  They need to know if you have any of these conditions:    diabetes    exposure to tuberculosis    heart failure    hepatitis or liver disease    immune system problems    infection    lung or breathing disease, like COPD    multiple sclerosis    current or past resident of [place]    seizure disorder    an unusual or allergic reaction to infliximab, mouse proteins, other medicines, foods, dyes, or preservatives    pregnant or trying to get pregnant    breast-feeding  How should I use this medicine?  This medicine is for injection into a vein. It is usually given by a health care professional in a hospital or clinic setting.  A special MedGuide will be given to you by the pharmacist with each prescription and refill. Be sure to read this information carefully each time.  Talk to your pediatrician regarding the use of this medicine in children. Special care may be needed.  Overdosage: If you think you have taken too much of this medicine contact a poison control center or emergency room at once.  NOTE: This medicine is only for you. Do not share this medicine with others.  What if I miss a dose?  It is important not to miss your dose. Call your doctor or health care professional if you are unable to keep an  appointment.  What may interact with this medicine?  Do not take this medicine with any of the following medications:    anakinra    rilonacept  This medicine may also interact with the following medications:    vaccines  This list may not describe all possible interactions. Give your health care provider a list of all the medicines, herbs, non-prescription drugs, or dietary supplements you use. Also tell them if you smoke, drink alcohol, or use illegal drugs. Some items may interact with your medicine.  What should I watch for while using this medicine?  Visit your doctor or health care professional for regular checks on your progress.  If you get a cold or other infection while receiving this medicine, call your doctor or health care professional. Do not treat yourself. This medicine may decrease your body's ability to fight infections. Before beginning therapy, your doctor may do a test to see if you have been exposed to tuberculosis.  This medicine may make the symptoms of heart failure worse in some patients. If you notice symptoms such as increased shortness of breath or swelling of the ankles or legs, contact your health care provider right away.  If you are going to have surgery or dental work, tell your health care professional or dentist that you have received this medicine.  If you take this medicine for plaque psoriasis, stay out of the sun. If you cannot avoid being in the sun, wear protective clothing and use sunscreen. Do not use sun lamps or tanning beds/booths.  What side effects may I notice from receiving this medicine?  Side effects that you should report to your doctor or health care professional as soon as possible:    allergic reactions like skin rash, itching or hives, swelling of the face, lips, or tongue    chest pain    fever or chills, usually related to the infusion    muscle or joint pain    red, scaly patches or raised bumps on the skin    signs of infection - fever or chills, cough,  sore throat, pain or difficulty passing urine    swollen lymph nodes in the neck, underarm, or groin areas    unexplained weight loss    unusual bleeding or bruising    unusually weak or tired    yellowing of the eyes or skin  Side effects that usually do not require medical attention (report to your doctor or health care professional if they continue or are bothersome):    headache    heartburn or stomach pain    nausea, vomiting  This list may not describe all possible side effects. Call your doctor for medical advice about side effects. You may report side effects to FDA at 2-619-MMP-6520.  Where should I keep my medicine?  This drug is given in a hospital or clinic and will not be stored at home.  NOTE:This sheet is a summary. It may not cover all possible information. If you have questions about this medicine, talk to your doctor, pharmacist, or health care provider. Copyright  2016 Gold Standard           EDUCATION POST BIOLOGICAL/CHEMOTHERAPY INFUSION  Call the triage nurse at your clinic or seek medical attention if you have chills and/or temperature greater than or equal to 100.5, uncontrolled nausea/vomiting, diarrhea, constipation, dizziness, shortness of breath, chest pain, heart palpitations, weakness or any other new or concerning symptoms, questions or concerns.  You can not have any live virus vaccines prior to or during treatment or up to 6 months post infusion.  If you have an upcoming surgery, medical procedure or dental procedure during treatment, this should be discussed with your ordering physician and your surgeon/dentist.  If you are having any concerning symptom, if you are unsure if you should get your next infusion or wish to speak to a provider before your next infusion, please call your care coordinator or triage nurse at your clinic to notify them so we can adequately serve you.    We look forward in seeing you on your next appointment here at [facility]  ([facility]).  Please don t hesitate to call us at [phone] to reschedule any of your appointments or to speak with one of the [facility] registered nurses.  It was a pleasure taking care of you today.    Sincerely,    [facility]  [facility]  [address] [place] [address]  [id number]  Phone:  [phone]

## 2021-11-03 NOTE — LETTER
11/3/2021         RE: Shelly Cha  [address]  [facility] [address]        Dear Colleague,    Thank you for referring your patient, Shelly Cha, to the [facility] TREATMENT [facility]. Please see a copy of my visit note below.    Nursing Note  Shelly Cha [facility] for:   Chief Complaint   Patient presents with     Infusion     remicade     During today's [facility] appointment, orders from  were completed.  Frequency: every 42 days     Progress note:  Patient identification verified by name and date of birth.  Assessment completed.  Vitals recorded in Doc Flowsheets.  Patient was provided with education regarding medication/procedure and possible side effects.  Patient verbalized understanding.     present during visit today: Not Applicable.    Treatment Conditions: ~~~ NOTE: If the patient answers yes to any of the questions below, hold the infusion and contact ordering provider or on-call provider.    1. Have you recently had an elevated temperature, fever, chills, productive cough, coughing for 3 weeks or longer or hemoptysis, abnormal vital signs, night sweats,  chest pain or have you noticed a decrease in your appetite, unexplained weight loss or fatigue? No  2. Do you have any open wounds or new incisions? No  3. Do you have any recent or upcoming hospitalizations, surgeries or dental procedures? No  4. Do you currently have or recently have had any signs of illness or infection or are you on any antibiotics? No  5. Have you had any new, sudden or worsening abdominal pain? No  6. Have you or anyone in your household received a live vaccination in the past 4 weeks? Please note:  No live vaccines while on biologic/chemotherapy until 6 months after the last treatment.  Patient can receive the flu vaccine (shot only) and the pneumovax.  It is optimal for the patient to  get these vaccines mid cycle, but they can be given at any time as long as it is not on the day of the infusion. No  7. Have you recently been diagnosed with any new nervous system diseases (ie. Multiple sclerosis, Guillain [place], seizures, neurological changes) or cancer diagnosis? No  8. Are you on any form of radiation or chemotherapy? No  9. Are you pregnant or breast feeding or do you have plans of pregnancy in the future? No  10. Have you been having any signs of worsening depression or suicidal ideations?  (benlysta only) N/A  11. Have there been any other new onset medical symptoms? No      Premedications: historically patient has not taken pre-medications prior to infusion.    Drug Waste Record: No    Infusion length and rate:  infusion given over approximately 60 minutes    Labs: were drawn per orders.     Vascular access: peripheral IV placed today.    Is the next appt scheduled? Message sent to scheduling  Asymptomatic COVID test completed? no    Post Infusion Assessment:  Patient tolerated infusion without incident.  Blood return noted pre and post infusion.  Site patent and intact, free from redness, edema or discomfort.  No evidence of extravasations.  Access discontinued per protocol.     Discharge Plan:   Follow up plan of care with: ongoing infusions at [facility].  Discharge instructions were reviewed with patient.  Patient/representative verbalized understanding of discharge instructions and all questions answered.  Patient discharged from [facility] in stable condition.    Ellen Villegas RN       Administrations This Visit     inFLIXimab-dyyb (INFLECTRA) 600 mg in sodium chloride 0.9 % 335 mL infusion     Admin Date  11/03/2021 Action  New Bag Dose  600 mg Rate  335 mL/hr Route  Intravenous Administered By  Ellen Villegas RN                  /74   Pulse 80   Temp 99.5  F (37.5  C) (Oral)   Wt 81.2 kg (179 lb)   SpO2 100%    BMI 32.74 kg/m          Again, thank you for allowing me to participate in the care of your patient.        Sincerely,        [facility]

## 2021-11-04 ENCOUNTER — PATIENT OUTREACH (OUTPATIENT)
Dept: GASTROENTEROLOGY | Facility: CLINIC | Age: 51
End: 2021-11-04

## 2021-11-05 ENCOUNTER — TELEPHONE (OUTPATIENT)
Dept: GASTROENTEROLOGY | Facility: CLINIC | Age: 51
End: 2021-11-05

## 2021-11-05 DIAGNOSIS — Z11.59 ENCOUNTER FOR SCREENING FOR OTHER VIRAL DISEASES: ICD-10-CM

## 2021-11-05 NOTE — TELEPHONE ENCOUNTER
Contacted patient as follow up from a voice mail message. Patient reports that after her infusion on November had one episode of diarrhea.  Patient reports that only had episode of diarrhea. Pt said she did not eat any solid food yesterday. Suggested she eat bland diet for a day or so. To contact us if diarrhea continues.  Pt is overdue for colonoscopy.  Patient transferred to endoscopy scheduling.

## 2021-11-05 NOTE — TELEPHONE ENCOUNTER
Screening Questions  1. Are you active on mychart? Yes     2. What insurance is in the chart? [employer]     2.  Ordering/Referring Provider: Adam Brooke PA-C      3. BMI 32.7    4. Do you have any Lung issues?  N If yes continue:   Do you use daily home oxygen?    Do you have Pulmonary Hypertension?    Do you have SEVERE asthma?     5. Have you had a heart, lung, or liver transplant? N    6. Are you currently on dialysis or have chronic kidney disease? N    7. Have you had a stroke or Transient ischemic attack (TIA) within 6 months? N    8. In the past 6 months, have you had any heart related issues including cardiomyopathy or heart attack? N      If yes, did it require cardiac stenting or other implantable device?N      9. Do you have any implantable devices in your body (pacemaker, defib, LVAD)? N    10. Do you take nitroglycerin? If yes, how often? N    11. Are you currently taking any blood thinners?N    12. Are you a diabetic? N    13. (Females) Are you currently pregnant? N  If yes, how many weeks?      15. Are you taking any prescription pain medications on a routine schedule? N If yes, MAC sedation.    16. Do you have any chemical dependencies such as alcohol, street drugs, or methadone? N If yes, MAC sedation.    17. Do you have any history of post-traumatic stress syndrome, severe anxiety or history of psychosis? N    18. Do you transfer independently? Y    19.  Do you have any issues with constipation? N    20. Preferred Pharmacy for Pre Prescription [facility]/ On Chart     Scheduling Details    Which Colonoscopy Prep was Sent?:    Procedure Scheduled: Colonoscopy   Provider/Surgeon: Herbie   Date of Procedure: 11/29/2021  Location: [facility]   Caller (Please ask for phone number if not scheduled by patient): Shelly Cha       Sedation Type: MAC   Conscious Sedation- Needs  for 6 hours after the procedure  MAC/General-Needs  for 24 hours after procedure    Pre-op Required at [facility] and OR for MAC sedation:   (if yes advise patient they will need a pre-op prior to procedure)      Is patient on blood thinners? -N (If yes- inform patient to follow up with PCP or provider for follow up instructions)     Informed patient they will need an adult  Y  Cannot take any type of public or medical transportation alone    Pre-Procedure [facility] or Externally: Y    Confirmed Nurse will call to complete assessment Y    Additional comments: N

## 2021-11-17 ENCOUNTER — TELEPHONE (OUTPATIENT)
Dept: GASTROENTEROLOGY | Facility: CLINIC | Age: 51
End: 2021-11-17
Payer: COMMERCIAL

## 2021-11-17 DIAGNOSIS — K51.00 PANCOLITIS (H): Primary | ICD-10-CM

## 2021-11-17 RX ORDER — BISACODYL 5 MG
TABLET, DELAYED RELEASE (ENTERIC COATED) ORAL
Qty: 2 TABLET | Refills: 0 | Status: SHIPPED | OUTPATIENT
Start: 2021-11-17 | End: 2022-01-10

## 2021-11-17 NOTE — TELEPHONE ENCOUNTER
Writer reviewed pre-assessment questions with patient prior to upcoming colonoscopy on 11.29.2021.      Covid test scheduled: 11.27.2021    Arrival time: 0730    Facility location: [facility]    Sedation type: MAC    Electronic Implantable devices? No    Blood thinners/Antiplatelet medication? No    Reviewed Extended prep instructions with patient.  No fiber/iron supplements or foods that contain nuts/seeds 7 days prior to procedure.     Prep instructions sent via MyChart and letter.  Prep prescription sent to [id number] - [place], [place] - [address] AT [facility] [address]    Designated  policy reviewed with patient.     Patient verbalized understanding.  No further questions or concerns.    Nurys Fischer RN

## 2021-11-17 NOTE — LETTER
January 10, 2022      Shelly Cha  [address]  [facility][1] [address]              Dear Shelly,        Colonoscopy  Your exam is on 1.17.2022  Arrival Time: 10am  Please note that your procedure time may change  Check in at: [facility]; [address], [place] [address]    Please arrive with an adult who can drive you home after the exam and stay with you for the next 24 hours, unless your provider says otherwise.   The medicines used in the exam will make you sleepy. You will not be able to drive. You cannot take a medical cab, taxi, Uber, train or bus by yourself.    For questions or appointments, call: [facility]: [phone] option 2. Monday through Friday, 8 a.m. to 4:30 p.m.  (If it is after hours please reach out to the clinic or provider you were scheduled with.)    Extended Colonoscopy Prep    The inside of your intestine must be clean in order to allow examination of the colon for presence of any growths and abnormalities, as well as their biopsy or removal.   Please review these instructions carefully well in advance. A number of tips are included in order to make this part of the procedure as comfortable as possible.    Immediately:     Arrange for a responsible adult to drive you home on the day of the exam. This cannot be a taxi or a bus as you will need someone with you after the procedure.    Be sure to tell the doctor who ordered the colonoscopy if you are on Coumadin, Plavix or other blood thinners. These medications may need to be discontinued prior to procedure.    If you have diabetes, you should request an early morning appointment.    Discontinue iron supplements and multivitamins    Fill your prescription for Golytely (2 containers), a bottle of Magnesium Citrate, and 2 Dulcolax tablets at the pharmacy.    It is very important that you stay well hydrated during the colonoscopy prep. The large  volume of the bowel cleansing liquid is designed to clean your colon, but it will not provide hydration. While you are getting the prescribed prep, you may also get 64 oz. of Gatorade or similar sports drink product. (Avoid red and purple colors)    Discontinue Fiber supplements    5 days prior: 1.12.2022    Begin a restricted or low fiber diet    2 days prior: 1.15.2022    Remember to discontinue NSAIDs, example: Advil, Aleve, Ibuprofen, Naproxen, Motrin    Make sure to stay well hydrated, drink at least 4 large glasses of Gatorade or similar sports drink    Drink to be well hydrated, this is important.    4:00 PM: Drink 10oz Magnesium Citrate (one bottle)    1 day prior: 1.16.2022    Plan on being at home this day.    Begin clear liquid diet only.    Avoid any red or purple colored items    10:00 AM: Take 2 Dulcolax tablets at 10 AM    3:00 PM: You will start drinking the Golytely solution.  Drink one, eight oz. glass every 10-15 minutes until   of the 1st container of Golytely is gone.    You will likely start having frequent liquid bowel movements within an hour of drinking the Golytely solution.    8:00 PM: Drink the second half of the 1st container of Golytely.  Drink one, eight oz. glass every 10-15 minutes until   of the 1st container of Golytely is gone.    The prep liquid will not keep you hydrated. You should continue drinking clear liquids throughout the day.    Before you go to bed, mix the 2nd container of Golytely.    Procedure day: 1.17.2022    6 hours before your check-in time @ 4am, drink one, eight oz. glass every 10-15 minutes until   of the 2nd  container of Golytely is gone.    You may take your necessary morning medications.    You can have clear liquids until 4 hours prior to your exam.    Please arrive with an adult who can take you home after the test and stay with you for a minimum of 6 hours: The medicine used will make you sleepy. If you do not have someone to take you home, we may  cancel your test.      Please arrive with an adult who can drive you home after the exam. If using public transportation you must have someone to ride with you.      What are clear liquids?   You may have:  - Water, tea, coffee (no cream)  - Soda pop, Gatorade (not red or purple)  - Clear nutrition drinks (Enlive, Resource Breeze)   - Jell-O, Popsicles (no milk or fruit pieces) or sorbet (not red or purple)  - Fat-free soup broth or bouillon  - Plain hard candy, such as clear life savers (not red or purple)  - Clear juices and fruit-flavored drinks such as apple juice, white grape juice, Hi-C and Jaden-Aid (not red or purple)   Do not have:  - Milk or milk products such as ice cream, malts or shakes  - Red or purple drinks of any kind such as cranberry juice or grape juice. Avoid red or purple Jell-O, Popsicles, Jaden-Aid, sorbet and candy  - Juices with pulp such as orange, grapefruit, pineapple or tomato juice  - Cream soups of any kind  - Alcohol       What is a Low Fiber Diet?   You may have:  - Starches: White bread, rolls, biscuits, croissants, [place] toast, white flour tortillas, waffles, pancakes, Sinhala toast; white rice, noodles, pasta, macaroni; cooked and peeled potatoes; plain crackers, saltines; cooked farina or cream of rice; puffed rice, corn flakes, Rice Krispies, Special K   - Vegetables: vegetable broths   - Fruits and fruit juices: Strained fruit juice, canned fruit without seeds or skin (not pineapple), applesauce, pear sauce, ripe bananas, melons (not watermelon)   - Milk products: Milk (plain or flavored), cheese, cottage cheese, yogurt (no berries), custard, ice cream    - Proteins: Tender, well-cooked ground beef, lamb, veal, ham, pork, chicken, turkey, fish or organ meats; eggs; creamy peanut butter   - Fats and condiments:  Margarine, butter, oils, mayonnaise, sour cream, salad dressing, plain gravy; spices, cooked herbs; sugar, clear jelly, honey, syrup   - Snacks, sweets and drinks:  Pretzels, hard candy; plain cakes and cookies (no nuts or seeds); gelatin, plain pudding, sherbet, Popsicles; coffee, tea, carbonated ( fizzy ) drinks Do not have:  - Starches: Breads or rolls that contain nuts, seeds or fruit; whole wheat or whole grain breads that contain more than 1 gram of fiber per slice; cornbread; corn or whole wheat tortillas; potatoes with skin; brown rice, wild rice, kasha (buckwheat)   - Vegetables: Any raw or steamed vegetables; vegetables with seeds; corn in any form   - Fruits and fruit juices: Prunes, prune juice, raisins and other dried fruits, berries and other fruits with seeds, canned pineapple juices with pulp such as orange, grapefruit, pineapple or tomato juice  - Milk products: Any yogurt with nuts, seeds or berries   - Proteins: Tough, fibrous meats with gristle; cooked dried beans, peas or lentils; crunchy peanut butter   - Fats and condiments: Pickles, olives, relish, horseradish; jam, marmalade, preserves   - Snacks, sweets and drinks: Popcorn, nuts, seeds, granola, coconut, candies made with nuts or seeds; all desserts that contain nuts, seeds, raisins and other dried fruits, coconut, whole grains or bran.        Thank you for choosing [facility], for your procedure. If you are sent a survey regarding your care, please take the time to complete the questionnaire.     Updated: 9/28/2021

## 2021-11-17 NOTE — LETTER
November 17, 2021      Shelly Cha  [address]  [facility] [address]              Dear Shelly,          Colonoscopy  Your exam is on 11.29.2021  Arrival Time: 7:30am  Please note that your procedure time may change  Check in at: [facility]; [address], [place] [address]    Please arrive with an adult who can drive you home after the exam and stay with you for the next 24 hours, unless your provider says otherwise.   The medicines used in the exam will make you sleepy. You will not be able to drive. You cannot take a medical cab, taxi, Uber, train or bus by yourself.    For questions or appointments, call: [facility]: [phone] Monday through Friday, 8 a.m. to 4:30 p.m.  (If it is after hours, call [phone]. Ask for the GI fellow resident on call.)    Prep prescription [id number] - [place], [place] - [address] AT [facility] [address]    Extended Colonoscopy Prep    The inside of your intestine must be clean in order to allow examination of the colon for presence of any growths and abnormalities, as well as their biopsy or removal.  Please review these instructions carefully well in advance. A number of tips are included in order to make this part of the procedure as comfortable as possible.    Immediately:     Arrange for a responsible adult to drive you home on the day of the exam. This cannot be a taxi or a bus as you will need someone with you after the procedure.    Be sure to tell the doctor who ordered the colonoscopy if you are on Coumadin, Plavix or other blood thinners. These medications may need to be discontinued prior to procedure.    If you have diabetes, you should request an early morning appointment.    Discontinue iron supplements and multivitamins    Fill your prescription for Golytely (2 containers), a bottle of Magnesium Citrate, and 2 Dulcolax tablets at the  pharmacy.    It is very important that you stay well hydrated during the colonoscopy prep. The large volume of the bowel cleansing liquid is designed to clean your colon, but it will not provide hydration. While you are getting the prescribed prep, you may also get 64 oz. of Gatorade or similar sports drink product. (Avoid red and purple colors)    Discontinue Fiber supplements    5 days prior: 11.24.2021    Begin a restricted or low fiber diet    2 days prior: 11.27.2021    Remember to discontinue NSAIDs, example: Advil, Aleve, Ibuprofen, Naproxen, Motrin    Make sure to stay well hydrated, drink at least 4 large glasses of Gatorade or similar sports drink    Drink to be well hydrated, this is important.    4:00 PM: Drink 10oz Magnesium Citrate (one bottle)    1 day prior: 11.28.2021    Plan on being at home this day.    Begin clear liquid diet only.    Avoid any red or purple colored items    10:00 AM: Take 2 Dulcolax tablets at 10 AM    3:00 PM: You will start drinking the Golytely solution.  Drink one, eight oz. glass every 10-15 minutes until   of the 1st container of Golytely is gone.    You will likely start having frequent liquid bowel movements within an hour of drinking the Golytely solution.    8:00 PM: Drink the second half of the 1st container of Golytely.  Drink one, eight oz. glass every 10-15 minutes until   of the 1st container of Golytely is gone.    The prep liquid will not keep you hydrated. You should continue drinking clear liquids throughout the day.    Before you go to bed, mix the 2nd container of Golytely.    Procedure day: 11.29.2021    6 hours before your check-in time @ 1:30am, drink one, eight oz. glass every 10-15 minutes until   of the 2nd  container of Golytely is gone.    You may take your necessary morning medications.    You can have clear liquids until 4 hours prior to your exam.    Please arrive with an adult who can take you home after the test and stay with you for a minimum  of 6 hours: The medicine used will make you sleepy. If you do not have someone to take you home, we may cancel your test.      Please arrive with an adult who can drive you home after the exam. If using public transportation you must have someone to ride with you.      What are clear liquids?   You may have:  - Water, tea, coffee (no cream)  - Soda pop, Gatorade (not red or purple)  - Clear nutrition drinks (Enlive, Resource Breeze)   - Jell-O, Popsicles (no milk or fruit pieces) or sorbet (not red or purple)  - Fat-free soup broth or bouillon  - Plain hard candy, such as clear life savers (not red or purple)  - Clear juices and fruit-flavored drinks such as apple juice, white grape juice, Hi-C and Jaden-Aid (not red or purple)   Do not have:  - Milk or milk products such as ice cream, malts or shakes  - Red or purple drinks of any kind such as cranberry juice or grape juice. Avoid red or purple Jell-O, Popsicles, Jaden-Aid, sorbet and candy  - Juices with pulp such as orange, grapefruit, pineapple or tomato juice  - Cream soups of any kind  - Alcohol       What is a Low Fiber Diet?   You may have:  - Starches: White bread, rolls, biscuits, croissants, [place] toast, white flour tortillas, waffles, pancakes, [place] toast; white rice, noodles, pasta, macaroni; cooked and peeled potatoes; plain crackers, saltines; cooked farina or cream of rice; puffed rice, corn flakes, Rice Krispies, Special K   - Vegetables: vegetable broths   - Fruits and fruit juices: Strained fruit juice, canned fruit without seeds or skin (not pineapple), applesauce, pear sauce, ripe bananas, melons (not watermelon)   - Milk products: Milk (plain or flavored), cheese, cottage cheese, yogurt (no berries), custard, ice cream    - Proteins: Tender, well-cooked ground beef, lamb, veal, ham, pork, chicken, turkey, fish or organ meats; eggs; creamy peanut butter   - Fats and condiments:  Margarine, butter, oils, mayonnaise, sour cream, salad dressing,  plain gravy; spices, cooked herbs; sugar, clear jelly, honey, syrup   - Snacks, sweets and drinks: Pretzels, hard candy; plain cakes and cookies (no nuts or seeds); gelatin, plain pudding, sherbet, Popsicles; coffee, tea, carbonated ( fizzy ) drinks Do not have:  - Starches: Breads or rolls that contain nuts, seeds or fruit; whole wheat or whole grain breads that contain more than 1 gram of fiber per slice; cornbread; corn or whole wheat tortillas; potatoes with skin; brown rice, wild rice, kasha (buckwheat)   - Vegetables: Any raw or steamed vegetables; vegetables with seeds; corn in any form   - Fruits and fruit juices: Prunes, prune juice, raisins and other dried fruits, berries and other fruits with seeds, canned pineapple juices with pulp such as orange, grapefruit, pineapple or tomato juice  - Milk products: Any yogurt with nuts, seeds or berries   - Proteins: Tough, fibrous meats with gristle; cooked dried beans, peas or lentils; crunchy peanut butter   - Fats and condiments: Pickles, olives, relish, horseradish; jam, marmalade, preserves   - Snacks, sweets and drinks: Popcorn, nuts, seeds, granola, coconut, candies made with nuts or seeds; all desserts that contain nuts, seeds, raisins and other dried fruits, coconut, whole grains or bran.        Thank you for choosing [facility], for your procedure. If you are sent a survey regarding your care, please take the time to complete the questionnaire.     Updated: 9/28/2021

## 2021-12-02 ENCOUNTER — PATIENT OUTREACH (OUTPATIENT)
Dept: GASTROENTEROLOGY | Facility: CLINIC | Age: 51
End: 2021-12-02
Payer: COMMERCIAL

## 2021-12-02 ENCOUNTER — TELEPHONE (OUTPATIENT)
Dept: GASTROENTEROLOGY | Facility: CLINIC | Age: 51
End: 2021-12-02
Payer: COMMERCIAL

## 2021-12-02 NOTE — TELEPHONE ENCOUNTER
Contacted endoscopy scheduling and scheduled patient for colonoscopy on Monday December 6 as appt are booking up fast and also has covid lab test appointment.  Asked patient to call back and also sent a my chart message.

## 2021-12-02 NOTE — TELEPHONE ENCOUNTER
Patient's colonoscopy was cancelled due to covid test not completed.  Attempted to reach pt and left a voice mail  Also asked

## 2021-12-02 NOTE — TELEPHONE ENCOUNTER
Caller:  Received call from [facility] GI Nurse: Devora Butler    Procedure: Colonoscopy/RESCHEDULE    Ordering Provider: Adam Brooke PA-C    Reason for cancel (please be detailed, any staff messages or encounters to note?):     12/02/2021 - pt had Colonoscopy CX due to no COVID test done.     Devora/KAMERON called to reschedule procedure from CS to MAC with Herbie @ [facility].          Rescheduled: Yes      If rescheduled:    Date: 12/06/2021   Location: [facility]    Note any change or update to original order/sedation: CS to MAC

## 2021-12-03 ENCOUNTER — LAB (OUTPATIENT)
Dept: URGENT CARE | Facility: URGENT CARE | Age: 51
End: 2021-12-03
Payer: COMMERCIAL

## 2021-12-03 DIAGNOSIS — Z11.59 ENCOUNTER FOR SCREENING FOR OTHER VIRAL DISEASES: ICD-10-CM

## 2021-12-03 PROCEDURE — U0003 INFECTIOUS AGENT DETECTION BY NUCLEIC ACID (DNA OR RNA); SEVERE ACUTE RESPIRATORY SYNDROME CORONAVIRUS 2 (SARS-COV-2) (CORONAVIRUS DISEASE [COVID-19]), AMPLIFIED PROBE TECHNIQUE, MAKING USE OF HIGH THROUGHPUT TECHNOLOGIES AS DESCRIBED BY CMS-2020-01-R: HCPCS

## 2021-12-03 PROCEDURE — U0005 INFEC AGEN DETEC AMPLI PROBE: HCPCS

## 2021-12-03 NOTE — TELEPHONE ENCOUNTER
Rescheduled procedure.    Pre assessment questions completed for upcoming colonoscopy procedure scheduled on 12.6.2021    COVID test scheduled: Pt transferred to endo  to help set up pre procedure COVID test appt.  Pt was not aware of her appt for this morning.    Reviewed procedural arrival time 0945 and facility location [facility].    Designated  policy reviewed. Instructed to have someone stay 24 hours post procedure.     Reviewed extended prep instructions with patient. No fiber/iron supplements or foods that contain nuts/seeds 7 days prior to procedure.     Patient verbalized understanding and had no questions or concerns at this time.    Nurys Fischer RN

## 2021-12-04 ENCOUNTER — TELEPHONE (OUTPATIENT)
Dept: MULTI SPECIALTY CLINIC | Facility: CLINIC | Age: 51
End: 2021-12-04
Payer: COMMERCIAL

## 2021-12-04 ENCOUNTER — TELEPHONE (OUTPATIENT)
Dept: URGENT CARE | Facility: URGENT CARE | Age: 51
End: 2021-12-04
Payer: COMMERCIAL

## 2021-12-04 LAB — SARS-COV-2 RNA RESP QL NAA+PROBE: POSITIVE

## 2021-12-04 NOTE — TELEPHONE ENCOUNTER
Brief GI note    I was called today by the patient to discuss starting colon preparation for colonoscopy scheduled 12/6/21 with Dr. Stoner. COVID-19 test was positive. The patient does not have any symptoms, told me she is vaccinated x 2 against COVID-19. She lives alone. She was given instructions for when to go to the ED. A message will be sent to Dr. Stoner for colonoscopy cancellation.     Ally Millan MD  GI fellow.

## 2021-12-05 NOTE — TELEPHONE ENCOUNTER
"-Coronavirus (COVID-19) Notification    Pt encouraged to reach out to clinic about proc coming up Monday, as explained that is a dept I do not work in, it'll help clarify questions/concens she may have. Pt understood. She is able to see mychart result, knows will get notice on there. She has been vaccinated.    Caller Name (Patient, parent, daughter/son, grandparent, etc)  Pt    Reason for call  Notify of Positive Coronavirus (COVID-19) lab results, assess symptoms,  review  [facility] [place] recommendations    Lab Result    Lab test:  2019-nCoV rRt-PCR or SARS-CoV-2 PCR    Oropharyngeal AND/OR nasopharyngeal swabs is POSITIVE for 2019-nCoV RNA/SARS-COV-2 PCR (COVID-19 virus)    RN Recommendations/Instructions per [facility] Coronavirus COVID-19 recommendations    Brief introduction script  Introduce self then review script:  \"I am calling on behalf of [employer].  We were notified that your Coronavirus test (COVID-19) for was POSITIVE for the virus.  I have some information to relay to you but first I wanted to mention that the [place] Dept of Health will be contacting you shortly [it's possible MD already called Patient] to talk to you more about how you are feeling and other people you have had contact with who might now also have the virus.  Also, [facility] is Partnering with the [facility] for Covid-19 research, you may be contacted directly by research staff.\"    Assessment (Inquire about Patient's current symptoms)   Assessment   Current Symptoms at time of phone call: (if no symptoms, document No symptoms] none   Symptoms onset (if applicable) n/a     If at time of call, Patients symptoms hare worsened, the Patient should contact 911 or have someone drive them to Emergency Dept promptly:      If Patient calling 911, inform 911 personal that you have tested positive for the Coronavirus (COVID-19).  Place mask on and await 911 to arrive.    If Emergency Dept, If possible, please have " "another adult drive you to the Emergency Dept but you need to wear mask when in contact with other people.      Monoclonal Antibody Administration    You may be eligible to receive a new treatment with a monoclonal antibody for preventing hospitalization in patients at high risk for complications from COVID-19.   This medication is still experimental and available on a limited basis; it is given through an IV and must be given at an infusion center. Please note that not all people who are eligible will receive the medication since it is in limited supply.     Are you interested in being considered for this medication?  No.   Does the patient fit the criteria: No    If patient qualifies based on above criteria:  \"You will be contacted if you are selected to receive this treatment in the next 1-2 business days.   This is time sensitive and if you are not selected in the next 1-2 business days, you will not receive the medication.  If you do not receive a call to schedule, you have not been selected.\"      Review information with Patient    Your result was positive. This means you have COVID-19 (coronavirus).  We have sent you a letter that reviews the information that I'll be reviewing with you now.    How can I protect others?    If you have symptoms: stay home and away from others (self-isolate) until:    You've had no fever--and no medicine that reduces fever--for 1 full day (24 hours). And       Your other symptoms have gotten better. For example, your cough or breathing has improved. And     At least 10 days have passed since your symptoms started. (If you've been told by a doctor that you have a weak immune system, wait 20 days.)     If you don't have symptoms: Stay home and away from others (self-isolate) until at least 10 days have passed since your first positive COVID-19 test. (Date test collected)    During this time:    Stay in your own room, including for meals. Use your own bathroom if you can.    Stay " away from others in your home. No hugging, kissing or shaking hands. No visitors.     Don't go to work, school or anywhere else.     Clean  high touch  surfaces often (doorknobs, counters, handles, etc.). Use a household cleaning spray or wipes. You'll find a full list on the EPA website at www.epa.gov/pesticide-registration/list-n-disinfectants-use-against-sars-cov-2.     Cover your mouth and nose with a mask, tissue or other face covering to avoid spreading germs.    Wash your hands and face often with soap and water.    Make a list of people you have been in close contact with recently, even if either of you wore a face covering.   ; Start your list from 2 days before you became ill or had a positive test.  ; Include anyone that was within 6 feet of you for a cumulative total of 15 minutes or more in 24 hours. (Example: if you sat next to Adria for 5 minutes in the morning and 10 minutes in the afternoon, then you were in close contact for 15 minutes total that day. Adria would be added to your list.)    A public health worker will call or text you. It is important that you answer. They will ask you questions about possible exposures to COVID-19, such as people you have been in direct contact with and places you have visited.    Tell the people on your list that you have COVID-19; they should stay away from others for 14 days starting from the last time they were in contact with you (unless you are told something different from a public health worker).     Caregivers in these groups are at risk for severe illness due to COVID-19:  o People 65 years and older  o People who live in a nursing home or long-term care facility  o People with chronic disease (lung, heart, cancer, diabetes, kidney, liver, immunologic)  o People who have a weakened immune system, including those who:  - Are in cancer treatment  - Take medicine that weakens the immune system, such as corticosteroids  - Had a bone marrow or organ  transplant  - Have an immune deficiency  - Have poorly controlled HIV or AIDS  - Are obese (body mass index of 40 or higher)  - Smoke regularly    Caregivers should wear gloves while washing dishes, handling laundry and cleaning bedrooms and bathrooms.    Wash and dry laundry with special caution. Don't shake dirty laundry, and use the warmest water setting you can.    If you have a weakened immune system, ask your doctor about other actions you should take.    For more tips, go to www.cdc.gov/coronavirus/2019-ncov/downloads/10Things.pdf.    You should not go back to work until you meet the guidelines above for ending your home isolation. You don't need to be retested for COVID-19 before going back to work--studies show that you won't spread the virus if it's been at least 10 days since your symptoms started (or 20 days, if you have a weak immune system).    Employers: This document serves as formal notice of your employee's medical guidelines for going back to work. They must meet the above guidelines before going back to work in person.    How can I take care of myself?    1. Get lots of rest. Drink extra fluids (unless a doctor has told you not to).    2. Take Tylenol (acetaminophen) for fever or pain. If you have liver or kidney problems, ask your family doctor if it's okay to take Tylenol.     Take either:     650 mg (two 325 mg pills) every 4 to 6 hours, or     1,000 mg (two 500 mg pills) every 8 hours as needed.     Note: Don't take more than 3,000 mg in one day. Acetaminophen is found in many medicines (both prescribed and over-the-counter medicines). Read all labels to be sure you don't take too much.    For children, check the Tylenol bottle for the right dose (based on their age or weight).    3. If you have other health problems (like cancer, heart failure, an organ transplant or severe kidney disease): Call your specialty clinic if you don't feel better in the next 2 days.    4. Know when to call 911:  Emergency warning signs include:    Trouble breathing or shortness of breath    Pain or pressure in the chest that doesn't go away    Feeling confused like you haven't felt before, or not being able to wake up    Bluish-colored lips or face    5. Sign up for [employer]. We know it's scary to hear that you have COVID-19. We want to track your symptoms to make sure you're okay over the next 2 weeks. Please look for an email from [employer]--this is a free, online program that we'll use to keep in touch. To sign up, follow the link in the email. Learn more at [email or web address].    Where can I get more information?    [facility]: [email or web address]    Coronavirus Basics: www.health.[facility].[place]/diseases/coronavirus/basics.html    What to Do If You're Sick: www.cdc.gov/coronavirus/2019-ncov/about/steps-when-sick.html    Ending Home Isolation: www.cdc.gov/coronavirus/2019-ncov/hcp/disposition-in-home-patients.html     Caring for Someone with COVID-19: www.cdc.gov/coronavirus/2019-ncov/if-you-are-sick/care-for-someone.html     [facility] clinical trials (COVID-19 research studies): [email or web address]/umn-[email or web address]-trials     A Positive COVID-19 letter will be sent via [employer] or the mail. (Exception, no letters sent to Presurgerical/Preprocedure Patients)    Federica Mckeon

## 2021-12-13 ENCOUNTER — PATIENT OUTREACH (OUTPATIENT)
Dept: GASTROENTEROLOGY | Facility: CLINIC | Age: 51
End: 2021-12-13
Payer: COMMERCIAL

## 2021-12-15 ENCOUNTER — INFUSION THERAPY VISIT (OUTPATIENT)
Dept: INFUSION THERAPY | Facility: CLINIC | Age: 51
End: 2021-12-15
Attending: INTERNAL MEDICINE
Payer: COMMERCIAL

## 2021-12-15 VITALS
RESPIRATION RATE: 18 BRPM | HEART RATE: 72 BPM | WEIGHT: 174.4 LBS | DIASTOLIC BLOOD PRESSURE: 74 MMHG | TEMPERATURE: 97.9 F | OXYGEN SATURATION: 94 % | SYSTOLIC BLOOD PRESSURE: 126 MMHG | BODY MASS INDEX: 31.9 KG/M2

## 2021-12-15 DIAGNOSIS — K51.00 PANCOLITIS (H): Primary | ICD-10-CM

## 2021-12-15 PROCEDURE — 96413 CHEMO IV INFUSION 1 HR: CPT

## 2021-12-15 PROCEDURE — 258N000003 HC RX IP 258 OP 636: Performed by: INTERNAL MEDICINE

## 2021-12-15 PROCEDURE — 250N000011 HC RX IP 250 OP 636: Performed by: INTERNAL MEDICINE

## 2021-12-15 RX ORDER — METHYLPREDNISOLONE SODIUM SUCCINATE 125 MG/2ML
125 INJECTION, POWDER, LYOPHILIZED, FOR SOLUTION INTRAMUSCULAR; INTRAVENOUS ONCE
Status: CANCELLED
Start: 2022-01-26 | End: 2022-01-26

## 2021-12-15 RX ORDER — DIPHENHYDRAMINE HCL 25 MG
25 CAPSULE ORAL ONCE
Status: CANCELLED
Start: 2022-01-26 | End: 2022-01-26

## 2021-12-15 RX ORDER — ACETAMINOPHEN 325 MG/1
650 TABLET ORAL ONCE
Status: CANCELLED
Start: 2022-01-26 | End: 2022-01-26

## 2021-12-15 RX ADMIN — SODIUM CHLORIDE 600 MG: 9 INJECTION, SOLUTION INTRAVENOUS at 10:53

## 2021-12-15 NOTE — PROGRESS NOTES
Nursing Note  Shelly Cha [facility] for:   Chief Complaint   Patient presents with     Infusion     Inflectra     During today's [facility] appointment, orders from Dr. Nolasco were completed.  Frequency: every 6 weeks    Progress note:  Patient identification verified by name and date of birth.  Assessment completed.  Vitals recorded in Doc Flowsheets.  Patient was provided with education regarding medication/procedure and possible side effects.  Patient verbalized understanding.     present during visit today: Not Applicable.    Treatment Conditions: ~~~ NOTE: If the patient answers yes to any of the questions below, hold the infusion and contact ordering provider or on-call provider.    1. Have you recently had an elevated temperature, fever, chills, productive cough, coughing for 3 weeks or longer or hemoptysis, abnormal vital signs, night sweats,  chest pain or have you noticed a decrease in your appetite, unexplained weight loss or fatigue? No  2. Do you have any open wounds or new incisions? No  3. Do you have any recent or upcoming hospitalizations, surgeries or dental procedures? No  4. Do you currently have or recently have had any signs of illness or infection or are you on any antibiotics? No  5. Have you had any new, sudden or worsening abdominal pain? No  6. Have you or anyone in your household received a live vaccination in the past 4 weeks? Please note:  No live vaccines while on biologic/chemotherapy until 6 months after the last treatment.  Patient can receive the flu vaccine (shot only) and the pneumovax.  It is optimal for the patient to get these vaccines mid cycle, but they can be given at any time as long as it is not on the day of the infusion. No  7. Have you recently been diagnosed with any new nervous system diseases (ie. Multiple sclerosis, Guillain [place], seizures, neurological changes) or cancer  diagnosis? No  8. Are you on any form of radiation or chemotherapy? No  9. Are you pregnant or breast feeding or do you have plans of pregnancy in the future? No  10. Have you been having any signs of worsening depression or suicidal ideations?  (benlysta only) No  11. Have there been any other new onset medical symptoms? No      Premedications: were not ordered.    Drug Waste Record: No    Infusion length and rate:  infusion given over approximately 1 hours    Labs: were not ordered for this appointment.    Vascular access: peripheral IV placed today.    Is the next appt scheduled? Yes; sent to scheduling  Asymptomatic COVID test completed? No-pt is currently COVID positive    Post Infusion Assessment:  Patient tolerated infusion without incident.     Discharge Plan:   Follow up plan of care with: ongoing infusions at [facility].  Discharge instructions were reviewed with patient.  Patient/representative verbalized understanding of discharge instructions and all questions answered.  Patient discharged from [facility] in stable condition.    Sirena Melgoza RN    Administrations This Visit     inFLIXimab-dyyb (INFLECTRA) 600 mg in sodium chloride 0.9 % 335 mL infusion     Admin Date  12/15/2021 Action  New Bag Dose  600 mg Rate  335 mL/hr Route  Intravenous Administered By  Sirena Melgoza RN                  Wt 79.1 kg (174 lb 6.4 oz)   BMI 31.90 kg/m

## 2021-12-15 NOTE — PATIENT INSTRUCTIONS
Dear Shelly Cha    Thank you for choosing [facility] ([facility]) for your infusion.  The following information is a summary of our appointment as well as important reminders.      EDUCATION POST BIOLOGICAL/CHEMOTHERAPY INFUSION  Call the triage nurse at your clinic or seek medical attention if you have chills and/or temperature greater than or equal to 100.5, uncontrolled nausea/vomiting, diarrhea, constipation, dizziness, shortness of breath, chest pain, heart palpitations, weakness or any other new or concerning symptoms, questions or concerns.  You can not have any live virus vaccines prior to or during treatment or up to 6 months post infusion.  If you have an upcoming surgery, medical procedure or dental procedure during treatment, this should be discussed with your ordering physician and your surgeon/dentist.  If you are having any concerning symptom, if you are unsure if you should get your next infusion or wish to speak to a provider before your next infusion, please call your care coordinator or triage nurse at your clinic to notify them so we can adequately serve you.    We look forward in seeing you on your next appointment here at [facility] ([facility]).  Please don t hesitate to call us at [phone] to reschedule any of your appointments or to speak with one of the [facility] registered nurses.  It was a pleasure taking care of you today.    Sincerely,    [facility]  [facility]  [address] [place] [address]  [address]  Phone:  [phone]

## 2021-12-15 NOTE — LETTER
12/15/2021         RE: Shelly Cha  [address]  [place] [address]        Dear Colleague,    Thank you for referring your patient, Shelly Cha, to the [facility]. Please see a copy of my visit note below.    Nursing Note  Shelly Cha presents [facility] for:   Chief Complaint   Patient presents with     Infusion     Inflectra     During [facility]'s [facility] appointment, orders from Dr. Nolasco were completed.  Frequency: every 6 weeks    Progress note:  Patient identification verified by name and date of birth.  Assessment completed.  Vitals recorded in Doc Flowsheets.  Patient was provided with education regarding medication/procedure and possible side effects.  Patient verbalized understanding.     present during visit today: Not Applicable.    Treatment Conditions: ~~~ NOTE: If the patient answers yes to any of the questions below, hold the infusion and contact ordering provider or on-call provider.    1. Have you recently had an elevated temperature, fever, chills, productive cough, coughing for 3 weeks or longer or hemoptysis, abnormal vital signs, night sweats,  chest pain or have you noticed a decrease in your appetite, unexplained weight loss or fatigue? No  2. Do you have any open wounds or new incisions? No  3. Do you have any recent or upcoming hospitalizations, surgeries or dental procedures? No  4. Do you currently have or recently have had any signs of illness or infection or are you on any antibiotics? No  5. Have you had any new, sudden or worsening abdominal pain? No  6. Have you or anyone in your household received a live vaccination in the past 4 weeks? Please note:  No live vaccines while on biologic/chemotherapy until 6 months after the last treatment.  Patient can receive the flu vaccine (shot only) and the pneumovax.  It is optimal for the patient to  get these vaccines mid cycle, but they can be given at any time as long as it is not on the day of the infusion. No  7. Have you recently been diagnosed with any new nervous system diseases (ie. Multiple sclerosis, Guillain [place], seizures, neurological changes) or cancer diagnosis? No  8. Are you on any form of radiation or chemotherapy? No  9. Are you pregnant or breast feeding or do you have plans of pregnancy in the future? No  10. Have you been having any signs of worsening depression or suicidal ideations?  (benlysta only) No  11. Have there been any other new onset medical symptoms? No      Premedications: were not ordered.    Drug Waste Record: No    Infusion length and rate:  infusion given over approximately 1 hours    Labs: were not ordered for this appointment.    Vascular access: peripheral IV placed today.    Is the next appt scheduled? Yes; sent to scheduling  Asymptomatic COVID test completed? No-pt is currently COVID positive    Post Infusion Assessment:  Patient tolerated infusion without incident.     Discharge Plan:   Follow up plan of care with: ongoing infusions at [facility].  Discharge instructions were reviewed with patient.  Patient/representative verbalized understanding of discharge instructions and all questions answered.  Patient discharged from [facility] in stable condition.    Sirena Melgoza RN    Administrations This Visit     inFLIXimab-dyyb (INFLECTRA) 600 mg in sodium chloride 0.9 % 335 mL infusion     Admin Date  12/15/2021 Action  New Bag Dose  600 mg Rate  335 mL/hr Route  Intravenous Administered By  Sirena Melgoza RN                  Wt 79.1 kg (174 lb 6.4 oz)   BMI 31.90 kg/m          Again, thank you for allowing me to participate in the care of your patient.        Sincerely,        [facility] [address]

## 2021-12-20 DIAGNOSIS — Z11.59 ENCOUNTER FOR SCREENING FOR OTHER VIRAL DISEASES: ICD-10-CM

## 2022-01-10 ENCOUNTER — TELEPHONE (OUTPATIENT)
Dept: GASTROENTEROLOGY | Facility: CLINIC | Age: 52
End: 2022-01-10
Payer: COMMERCIAL

## 2022-01-10 RX ORDER — BISACODYL 5 MG
TABLET, DELAYED RELEASE (ENTERIC COATED) ORAL
Qty: 2 TABLET | Refills: 0 | Status: SHIPPED | OUTPATIENT
Start: 2022-01-10

## 2022-01-10 NOTE — TELEPHONE ENCOUNTER
Rescheduled colonoscopy.    Pre assessment questions completed for upcoming colonoscopy procedure scheduled on 1.17.2022    COVID test scheduled 12.3.2021; Pt is aware she needs to be s/sx free; if pt develops any s/sx pt was instructed to r/s.    Reviewed procedural arrival time 1000 and facility location [facility].    Designated  policy reviewed. Instructed to have someone stay 24 hours post procedure.     Anticoagulation/blood thinners? no    Electronic implanted devices? No    Extended prep d/t past poor colonoscopy prep.    Reviewed extended prep instructions with patient. No fiber/iron supplements or foods that contain nuts/seeds prior to procedure.     Letter sent with prep instructions.  Pt has Endoscopy number to call if she has any questions.    Patient verbalized understanding and had no questions or concerns at this time.    Nurys Fischer RN

## 2022-01-10 NOTE — TELEPHONE ENCOUNTER
Called to remind patient of their upcoming appointment with our GI clinic, on 1/13/22 at 8:40AM with Adam Brooke. This appointment is scheduled as an in-person appt. Please arrive 15 minutes early to check in for your appointment. , if your appointment is virtual (video or telephone) you need to be in [place] for the visit. To reschedule or cancel patient to call [phone].    Called and confirmed.    Betty Bender MA

## 2022-01-17 ENCOUNTER — TRANSFERRED RECORDS (OUTPATIENT)
Dept: HEALTH INFORMATION MANAGEMENT | Facility: CLINIC | Age: 52
End: 2022-01-17
Payer: COMMERCIAL

## 2022-01-17 ENCOUNTER — DOCUMENTATION ONLY (OUTPATIENT)
Dept: GASTROENTEROLOGY | Facility: OUTPATIENT CENTER | Age: 52
End: 2022-01-17
Payer: COMMERCIAL

## 2022-01-17 DIAGNOSIS — K50.10 CROHN'S COLITIS, WITHOUT COMPLICATIONS (H): ICD-10-CM

## 2022-01-17 PROCEDURE — 88305 TISSUE EXAM BY PATHOLOGIST: CPT | Mod: TC,ORL | Performed by: INTERNAL MEDICINE

## 2022-01-17 PROCEDURE — 88305 TISSUE EXAM BY PATHOLOGIST: CPT | Mod: 26 | Performed by: PATHOLOGY

## 2022-01-18 ENCOUNTER — LAB REQUISITION (OUTPATIENT)
Dept: LAB | Facility: CLINIC | Age: 52
End: 2022-01-18
Payer: COMMERCIAL

## 2022-01-19 LAB
PATH REPORT.COMMENTS IMP SPEC: NORMAL
PATH REPORT.COMMENTS IMP SPEC: NORMAL
PATH REPORT.FINAL DX SPEC: NORMAL
PATH REPORT.GROSS SPEC: NORMAL
PATH REPORT.MICROSCOPIC SPEC OTHER STN: NORMAL
PATH REPORT.RELEVANT HX SPEC: NORMAL
PHOTO IMAGE: NORMAL

## 2022-01-26 ENCOUNTER — TELEPHONE (OUTPATIENT)
Dept: OTOLARYNGOLOGY | Facility: CLINIC | Age: 52
End: 2022-01-26

## 2022-01-26 ENCOUNTER — CARE COORDINATION (OUTPATIENT)
Dept: GASTROENTEROLOGY | Facility: CLINIC | Age: 52
End: 2022-01-26

## 2022-01-26 ENCOUNTER — INFUSION THERAPY VISIT (OUTPATIENT)
Dept: INFUSION THERAPY | Facility: CLINIC | Age: 52
End: 2022-01-26
Attending: INTERNAL MEDICINE
Payer: COMMERCIAL

## 2022-01-26 VITALS
DIASTOLIC BLOOD PRESSURE: 83 MMHG | WEIGHT: 177.5 LBS | TEMPERATURE: 98.2 F | SYSTOLIC BLOOD PRESSURE: 132 MMHG | HEART RATE: 76 BPM | OXYGEN SATURATION: 97 % | RESPIRATION RATE: 16 BRPM | BODY MASS INDEX: 32.47 KG/M2

## 2022-01-26 DIAGNOSIS — K12.0 APHTHOUS ULCER OF MOUTH: Primary | ICD-10-CM

## 2022-01-26 DIAGNOSIS — K51.00 PANCOLITIS (H): Primary | ICD-10-CM

## 2022-01-26 LAB
ALBUMIN SERPL-MCNC: 3.8 G/DL (ref 3.4–5)
ALP SERPL-CCNC: 68 U/L (ref 40–150)
ALT SERPL W P-5'-P-CCNC: 24 U/L (ref 0–50)
AST SERPL W P-5'-P-CCNC: 30 U/L (ref 0–45)
BASOPHILS # BLD AUTO: 0 10E3/UL (ref 0–0.2)
BASOPHILS NFR BLD AUTO: 0 %
BILIRUB DIRECT SERPL-MCNC: <0.1 MG/DL (ref 0–0.2)
BILIRUB SERPL-MCNC: 0.3 MG/DL (ref 0.2–1.3)
CRP SERPL-MCNC: <2.9 MG/L (ref 0–8)
EOSINOPHIL # BLD AUTO: 0.3 10E3/UL (ref 0–0.7)
EOSINOPHIL NFR BLD AUTO: 4 %
ERYTHROCYTE [DISTWIDTH] IN BLOOD BY AUTOMATED COUNT: 15.9 % (ref 10–15)
ERYTHROCYTE [SEDIMENTATION RATE] IN BLOOD BY WESTERGREN METHOD: 18 MM/HR (ref 0–30)
HCT VFR BLD AUTO: 37.4 % (ref 35–47)
HGB BLD-MCNC: 12.3 G/DL (ref 11.7–15.7)
IMM GRANULOCYTES # BLD: 0 10E3/UL
IMM GRANULOCYTES NFR BLD: 0 %
LYMPHOCYTES # BLD AUTO: 1.7 10E3/UL (ref 0.8–5.3)
LYMPHOCYTES NFR BLD AUTO: 24 %
MCH RBC QN AUTO: 27.9 PG (ref 26.5–33)
MCHC RBC AUTO-ENTMCNC: 32.9 G/DL (ref 31.5–36.5)
MCV RBC AUTO: 85 FL (ref 78–100)
MONOCYTES # BLD AUTO: 0.7 10E3/UL (ref 0–1.3)
MONOCYTES NFR BLD AUTO: 10 %
NEUTROPHILS # BLD AUTO: 4.3 10E3/UL (ref 1.6–8.3)
NEUTROPHILS NFR BLD AUTO: 62 %
NRBC # BLD AUTO: 0 10E3/UL
NRBC BLD AUTO-RTO: 0 /100
PLATELET # BLD AUTO: 347 10E3/UL (ref 150–450)
PROT SERPL-MCNC: 7.4 G/DL (ref 6.8–8.8)
RBC # BLD AUTO: 4.41 10E6/UL (ref 3.8–5.2)
WBC # BLD AUTO: 6.9 10E3/UL (ref 4–11)

## 2022-01-26 PROCEDURE — 999N000128 HC STATISTIC PERIPHERAL IV START W/O US GUIDANCE

## 2022-01-26 PROCEDURE — 258N000003 HC RX IP 258 OP 636: Performed by: INTERNAL MEDICINE

## 2022-01-26 PROCEDURE — 86140 C-REACTIVE PROTEIN: CPT | Performed by: INTERNAL MEDICINE

## 2022-01-26 PROCEDURE — 36415 COLL VENOUS BLD VENIPUNCTURE: CPT | Performed by: INTERNAL MEDICINE

## 2022-01-26 PROCEDURE — 96413 CHEMO IV INFUSION 1 HR: CPT

## 2022-01-26 PROCEDURE — 250N000011 HC RX IP 250 OP 636: Performed by: INTERNAL MEDICINE

## 2022-01-26 PROCEDURE — 85004 AUTOMATED DIFF WBC COUNT: CPT | Performed by: INTERNAL MEDICINE

## 2022-01-26 PROCEDURE — 80076 HEPATIC FUNCTION PANEL: CPT | Performed by: INTERNAL MEDICINE

## 2022-01-26 PROCEDURE — 85652 RBC SED RATE AUTOMATED: CPT | Performed by: INTERNAL MEDICINE

## 2022-01-26 RX ORDER — ACETAMINOPHEN 325 MG/1
650 TABLET ORAL ONCE
Status: CANCELLED
Start: 2022-03-09 | End: 2022-03-09

## 2022-01-26 RX ORDER — DIPHENHYDRAMINE HCL 25 MG
25 CAPSULE ORAL ONCE
Status: CANCELLED
Start: 2022-03-09 | End: 2022-03-09

## 2022-01-26 RX ORDER — METHYLPREDNISOLONE SODIUM SUCCINATE 125 MG/2ML
125 INJECTION, POWDER, LYOPHILIZED, FOR SOLUTION INTRAMUSCULAR; INTRAVENOUS ONCE
Status: CANCELLED
Start: 2022-03-09 | End: 2022-03-09

## 2022-01-26 RX ADMIN — SODIUM CHLORIDE 600 MG: 9 INJECTION, SOLUTION INTRAVENOUS at 11:35

## 2022-01-26 ASSESSMENT — PAIN SCALES - GENERAL: PAINLEVEL: SEVERE PAIN (6)

## 2022-01-26 NOTE — PROGRESS NOTES
"Infusion Nursing Note:  Shelly Cha presents today for   Chief Complaint   Patient presents with     Infusion     Infliximab     Patient seen by provider today: No   present during visit today: Not Applicable.    Note: Patient presented to [facility] over one hour late (due to flat tire per patient) with many requests and concerns which this RN made an effort to address and accomodate.  Patient reported a \"canker sore\" on her tongue since Christmas which is not healing. She is concerned that it is cancer. She wanted her doctor to come to [facility] to see and evaluate this sore as well as speak with her about her colonoscopy and whether she needed any more infusions.      Spoke with Dr. Kwasi Nolasco who stated to proceed with infliximab and let patient know that her colonoscopy was good, and to continue with infliximab infusions for these results to continue.  He also prescribed orajel for patient's mouth pain and sent a referral to ENT for further evaluation due to patient's concerns.      All of this information was provided to patient, and future infliximab infusions were scheduled.  I also printed off this information for patient to view.      Intravenous Access:  Peripheral IV placed by vascular access RN. Labs collected.    Treatment Conditions:  Biological Infusion Checklist:  ~~~ NOTE: If the patient answers yes to any of the questions below, hold the infusion and contact ordering provider or on-call provider.    1. Have you recently had an elevated temperature, fever, chills, productive cough, coughing for 3 weeks or longer or hemoptysis, abnormal vital signs, night sweats,  chest pain or have you noticed a decrease in your appetite, unexplained weight loss or fatigue? No  2. Do you have any open wounds or new incisions? Yes, Reports \"canker sore\" on right lateral tongue since Christmas, not healing OK to proceed per Dr. Kwasi Nolasco.  3. Do you have any recent or upcoming hospitalizations, surgeries or " dental procedures? No  4. Do you currently have or recently have had any signs of illness or infection or are you on any antibiotics? No  5. Have you had any new, sudden or worsening abdominal pain? No  6. Have you or anyone in your household received a live vaccination in the past 4 weeks? Please note:  No live vaccines while on biologic/chemotherapy until 6 months after the last treatment.  Patient can receive the flu vaccine (shot only) and the pneumovax.  It is optimal for the patient to get these vaccines mid cycle, but they can be given at any time as long as it is not on the day of the infusion. No  7. Have you recently been diagnosed with any new nervous system diseases (ie. Multiple sclerosis, Guillain [place], seizures, neurological changes) or cancer diagnosis? No  8. Are you on any form of radiation or chemotherapy? No  9. Are you pregnant or breast feeding or do you have plans of pregnancy in the future? No  10. Have you been having any signs of worsening depression or suicidal ideations?  (benlysta only) No  11. Have there been any other new onset medical symptoms? No  Administrations This Visit     inFLIXimab-dyyb (INFLECTRA) 600 mg in sodium chloride 0.9 % 335 mL infusion     Admin Date  01/26/2022 Action  New Bag Dose  600 mg Rate  335 mL/hr Route  Intravenous Administered By  Patience Vasquez RN                    Post Infusion Assessment:  Patient tolerated infusion without incident.  Blood return noted pre and post infusion.  Site patent and intact, free from redness, edema or discomfort.  No evidence of extravasations.  Access discontinued per protocol.       Discharge Plan:   Copy of AVS reviewed with patient and/or family.  Future appointments requested.  Scheduling to confirm with patient.  Patient discharged in stable condition accompanied by: self.  Departure Mode: Ambulatory.      Patience Vasquez RN

## 2022-01-26 NOTE — TELEPHONE ENCOUNTER
[facility] Call Center    Phone Message    May a detailed message be left on voicemail: no     Reason for Call: Appointment Intake    Referring Provider Name: TREVOR MULLEN  Diagnosis and/or Symptoms: Aphthous ulcer of mouth      Refer to: ENT - persistent canker. Has IBD so likely related to this. Pt worried about cancer. Please evaluate  Please call to schedule your appointment      Sending to clinic for review  Action Taken: Other: ENT    Travel Screening: Not Applicable

## 2022-01-26 NOTE — PATIENT INSTRUCTIONS
Dear Shelly Cha    Thank you for choosing [facility] ([facility]) for your infliximab infusion.  The following information is a summary of our appointment as well as important reminders.      -Per Dr. Nolasco: Your colonoscopy was good.  To continue this success, receive infliximab infusion every 6 weeks.  For your mouth pain, there is a new prescription for orajel which you can .  For further evaluation of the sore in your mouth that is causing you concern, and referral has been made to ENT.    We look forward in seeing you on your next appointment here at [facility] ([facility]).  Please don t hesitate to call us at [phone] to reschedule any of your appointments or to speak with one of the [facility] registered nurses.  It was a pleasure taking care of you today.    Sincerely,    [facility]  [address] [place] [address]  [id number]  Phone:  [phone]    Patient Education     Infliximab Solution for injection  What is this medicine?  INFLIXIMAB (in FLIX i mab) is used to treat Crohn's disease and ulcerative colitis. It is also used to treat ankylosing spondylitis, psoriasis, and some forms of arthritis.  This medicine may be used for other purposes; ask your health care provider or pharmacist if you have questions.  What should I tell my health care provider before I take this medicine?  They need to know if you have any of these conditions:    diabetes    exposure to tuberculosis    heart failure    hepatitis or liver disease    immune system problems    infection    lung or breathing disease, like COPD    multiple sclerosis    current or past resident of [place]    seizure disorder    an unusual or allergic reaction to infliximab, mouse proteins, other medicines, foods, dyes, or preservatives    pregnant or trying to get  pregnant    breast-feeding  How should I use this medicine?  This medicine is for injection into a vein. It is usually given by a health care professional in a hospital or clinic setting.  A special MedGuide will be given to you by the pharmacist with each prescription and refill. Be sure to read this information carefully each time.  Talk to your pediatrician regarding the use of this medicine in children. Special care may be needed.  Overdosage: If you think you have taken too much of this medicine contact a poison control center or emergency room at once.  NOTE: This medicine is only for you. Do not share this medicine with others.  What if I miss a dose?  It is important not to miss your dose. Call your doctor or health care professional if you are unable to keep an appointment.  What may interact with this medicine?  Do not take this medicine with any of the following medications:    anakinra    rilonacept  This medicine may also interact with the following medications:    vaccines  This list may not describe all possible interactions. Give your health care provider a list of all the medicines, herbs, non-prescription drugs, or dietary supplements you use. Also tell them if you smoke, drink alcohol, or use illegal drugs. Some items may interact with your medicine.  What should I watch for while using this medicine?  Visit your doctor or health care professional for regular checks on your progress.  If you get a cold or other infection while receiving this medicine, call your doctor or health care professional. Do not treat yourself. This medicine may decrease your body's ability to fight infections. Before beginning therapy, your doctor may do a test to see if you have been exposed to tuberculosis.  This medicine may make the symptoms of heart failure worse in some patients. If you notice symptoms such as increased shortness of breath or swelling of the ankles or legs, contact your health care provider right  away.  If you are going to have surgery or dental work, tell your health care professional or dentist that you have received this medicine.  If you take this medicine for plaque psoriasis, stay out of the sun. If you cannot avoid being in the sun, wear protective clothing and use sunscreen. Do not use sun lamps or tanning beds/booths.  What side effects may I notice from receiving this medicine?  Side effects that you should report to your doctor or health care professional as soon as possible:    allergic reactions like skin rash, itching or hives, swelling of the face, lips, or tongue    chest pain    fever or chills, usually related to the infusion    muscle or joint pain    red, scaly patches or raised bumps on the skin    signs of infection - fever or chills, cough, sore throat, pain or difficulty passing urine    swollen lymph nodes in the neck, underarm, or groin areas    unexplained weight loss    unusual bleeding or bruising    unusually weak or tired    yellowing of the eyes or skin  Side effects that usually do not require medical attention (report to your doctor or health care professional if they continue or are bothersome):    headache    heartburn or stomach pain    nausea, vomiting  This list may not describe all possible side effects. Call your doctor for medical advice about side effects. You may report side effects to FDA at 9-907-FDA-8406.  Where should I keep my medicine?  This drug is given in a hospital or clinic and will not be stored at home.  NOTE:This sheet is a summary. It may not cover all possible information. If you have questions about this medicine, talk to your doctor, pharmacist, or health care provider. Copyright  2016 [employer]

## 2022-01-26 NOTE — LETTER
"    1/26/2022         RE: Shelly Cha  [address]  [place] [facility] [address]        Dear Colleague,    Thank you for referring your patient, Shelly Cha, to the [facility]. Please see a copy of my visit note below.    Infusion Nursing Note:  Shelly Cha presents today for   Chief Complaint   Patient presents with     Infusion     Infliximab     Patient seen by provider today: No   present during visit today: Not Applicable.    Note: Patient presented to [facility] over one hour late (due to flat tire per patient) with many requests and concerns which this RN made an effort to address and accomodate.  Patient reported a \"canker sore\" on her tongue since Christmas which is not healing. She is concerned that it is cancer. She wanted her doctor to come to [facility] to see and evaluate this sore as well as speak with her about her colonoscopy and whether she needed any more infusions.      Spoke with Dr. Kwasi Nolasco who stated to proceed with infliximab and let patient know that her colonoscopy was good, and to continue with infliximab infusions for these results to continue.  He also prescribed orajel for patient's mouth pain and sent a referral to ENT for further evaluation due to patient's concerns.      All of this information was provided to patient, and future infliximab infusions were scheduled.  I also printed off this information for patient to view.      Intravenous Access:  Peripheral IV placed by vascular access RN. Labs collected.    Treatment Conditions:  Biological Infusion Checklist:  ~~~ NOTE: If the patient answers yes to any of the questions below, hold the infusion and contact ordering provider or on-call provider.    1. Have you recently had an elevated temperature, fever, chills, productive cough, coughing for 3 weeks or longer or hemoptysis, abnormal vital signs, night sweats,  chest pain or have you noticed a decrease in your appetite, " "unexplained weight loss or fatigue? No  2. Do you have any open wounds or new incisions? Yes, Reports \"canker sore\" on right lateral tongue since [place], not healing OK to proceed per Dr. Kwasi Nolasco.  3. Do you have any recent or upcoming hospitalizations, surgeries or dental procedures? No  4. Do you currently have or recently have had any signs of illness or infection or are you on any antibiotics? No  5. Have you had any new, sudden or worsening abdominal pain? No  6. Have you or anyone in your household received a live vaccination in the past 4 weeks? Please note:  No live vaccines while on biologic/chemotherapy until 6 months after the last treatment.  Patient can receive the flu vaccine (shot only) and the pneumovax.  It is optimal for the patient to get these vaccines mid cycle, but they can be given at any time as long as it is not on the day of the infusion. No  7. Have you recently been diagnosed with any new nervous system diseases (ie. Multiple sclerosis, Guillain [place], seizures, neurological changes) or cancer diagnosis? No  8. Are you on any form of radiation or chemotherapy? No  9. Are you pregnant or breast feeding or do you have plans of pregnancy in the future? No  10. Have you been having any signs of worsening depression or suicidal ideations?  (benlysta only) No  11. Have there been any other new onset medical symptoms? No  Administrations This Visit     inFLIXimab-dyyb (INFLECTRA) 600 mg in sodium chloride 0.9 % 335 mL infusion     Admin Date  01/26/2022 Action  New Bag Dose  600 mg Rate  335 mL/hr Route  Intravenous Administered By  Patience Vasquez RN                    Post Infusion Assessment:  Patient tolerated infusion without incident.  Blood return noted pre and post infusion.  Site patent and intact, free from redness, edema or discomfort.  No evidence of extravasations.  Access discontinued per protocol.       Discharge Plan:   Copy of AVS reviewed with patient and/or family.  " Future appointments requested.  Scheduling to confirm with patient.  Patient discharged in stable condition accompanied by: self.  Departure Mode: Ambulatory.      Patience Vasquez RN                Again, thank you for allowing me to participate in the care of your patient.      Sincerely,    [facility]

## 2022-01-26 NOTE — PROGRESS NOTES
Called by infusion team.    Pt there for infusion.    Has had a persistent canker sore since the new year. Per nursing she is worried about cancer of the mouth.    Also wonders how long needs infusions. Recent colonoscopy showed good healing of her colitis but has significant pseudopolyps.    Plan:  -orajel prescribed for canker sores  -given her concern will refer to ENT to ensure no other pathology  -recommend she continues with infliximab infusions as this has improved her colitis. She needs ongoing treatment or her colitis will return.  -follow up as scheduled in GI clinic in March    Kwasi Nolasco MD

## 2022-01-27 NOTE — TELEPHONE ENCOUNTER
FUTURE VISIT INFORMATION      FUTURE VISIT INFORMATION:    Date: 2/1/22    Time: 3:40PM    Location: [facility]  REFERRAL INFORMATION:    Referring provider:  Kwasi Nolasco MD    Referring providers clinic:  [facility]     Reason for visit/diagnosis  persisitent mouth ulcer    RECORDS REQUESTED FROM:       Clinic name Comments Records Status Imaging Status   [facility]  1/26/22 referral  [facility]   1/3/22 note from Romeo Landin MD   Care everywhere     [facility] 2/14/15 CT Head brain  Care everywhere req 1/27/22 - PACS                       1/27/22 1:18PM sent a fax to [facility] for images - [facility]   *images received in PACS - [facility]

## 2022-01-27 NOTE — TELEPHONE ENCOUNTER
Writer called and spoke to pt, writer offered appointment on 2/1/22 with Dr. Yan at 3:40pm, pt accepted offer of appointment. Writer provided clinic address and floor number. Pt expressed understanding.    Sandee Roque, EMT

## 2022-01-27 NOTE — TELEPHONE ENCOUNTER
Writer attempted to call pt to schedule appointment with Dr. Yan, phone call went right to voicemail, no voicemail was left.    Sandee Roque, EMT

## 2022-02-01 ENCOUNTER — OFFICE VISIT (OUTPATIENT)
Dept: OTOLARYNGOLOGY | Facility: CLINIC | Age: 52
End: 2022-02-01
Payer: COMMERCIAL

## 2022-02-01 ENCOUNTER — PRE VISIT (OUTPATIENT)
Dept: OTOLARYNGOLOGY | Facility: CLINIC | Age: 52
End: 2022-02-01
Payer: COMMERCIAL

## 2022-02-01 VITALS
HEART RATE: 100 BPM | SYSTOLIC BLOOD PRESSURE: 134 MMHG | WEIGHT: 173 LBS | OXYGEN SATURATION: 99 % | HEIGHT: 63 IN | DIASTOLIC BLOOD PRESSURE: 90 MMHG | BODY MASS INDEX: 30.65 KG/M2 | TEMPERATURE: 97.1 F

## 2022-02-01 DIAGNOSIS — K11.20 SIALADENITIS: Primary | ICD-10-CM

## 2022-02-01 PROCEDURE — 99203 OFFICE O/P NEW LOW 30 MIN: CPT | Performed by: OTOLARYNGOLOGY

## 2022-02-01 ASSESSMENT — PAIN SCALES - GENERAL: PAINLEVEL: NO PAIN (0)

## 2022-02-01 ASSESSMENT — MIFFLIN-ST. JEOR: SCORE: 1373.85

## 2022-02-01 NOTE — NURSING NOTE
"Chief Complaint   Patient presents with     Consult     persisitent mouth ulcer       Blood pressure (!) 134/90, pulse 100, temperature 97.1  F (36.2  C), temperature source Temporal, height 1.6 m (5' 3\"), weight 78.5 kg (173 lb), SpO2 99 %.    Sandee Roque, EMT    "

## 2022-02-01 NOTE — PATIENT INSTRUCTIONS
1. Please follow-up in clinic in   2. Please call the ENT clinic with any questions,concerns, new or worsening symptoms.    -Clinic number is [phone]   - Noemy's direct line (Dr. Yan's nurse) [phone]

## 2022-02-01 NOTE — PROGRESS NOTES
HISTORY OF PRESENT ILLNESS: Shelly Cha is a 52 year old female with a history of  HISTORY OF PRESENT ILLNESS:  She has a history of oral ulcers.  She may have bit her tongue.  This has been going on and she is on some kind of infusions for autoimmune colitis.   This mouth ulcer has not healed for six weeks.  There is always some concern that these patients might be a little bit additional risk for oral cavity cancer.  She has not had any other lesions like this in her mouth and it has been going on for at least the past six weeks or so since Christmas.  She has no other current complaints at the present time.    PHYSICAL EXAMINATION:  Looking at the oral cavity itself, there is a small ulcer in the ventrolateral tongue on the right side that measures only about 8 mm.  It looks very bland that side.  When I palpate around it, it is soft.   I cauterized it with a little bit of gentian violet and that worked out quite nicely and then once I did that, the remainder of the neck examination and oral cavity are all that was negative.    ASSESSMENT:  The patient with history of chronic oral ulcer.    PLAN:  We will her use three times a week to four times a week, gentian violet.  We gave it to her in clinic today.  We will bring her back again in approximately six weeks.  It will have to be biopsied or removed at that point in time if it is not healed.  She also notes that if this were to grow, that she is going to come see us sooner.        Last 2 Scores for Patient-Answered VHI Questionnaire  No flowsheet data found.    Last 2 Scores for Patient-Answered CSI Questionnaire  No flowsheet data found.      Last 2 Scores for Patient-Answered EAT Questionnaire  No flowsheet data found.        PAST MEDICAL HISTORY:   Past Medical History:   Diagnosis Date     Pancolitis (H) 3/11/2021     Pancolitis (H) 3/11/2021       PAST SURGICAL HISTORY:   Past Surgical History:   Procedure Laterality Date     BIOPSY      right breast      CHOLECYSTECTOMY       GYN SURGERY      , tubal       FAMILY HISTORY: No family history on file.    SOCIAL HISTORY:   Social History     Tobacco Use     Smoking status: Current Some Day Smoker     Last attempt to quit: 2021     Years since quittin.0     Smokeless tobacco: Never Used   Substance Use Topics     Alcohol use: No       REVIEW OF SYSTEMS: Ten point review of systems was performed and is negative except for: No flowsheet data found.     ALLERGIES: Ibuprofen and Tylenol [acetaminophen]    MEDICATIONS:   Current Outpatient Medications   Medication Sig Dispense Refill     albuterol (ACCUNEB) 1.25 MG/3ML neb solution Inhale 1.25 mg into the lungs       BANOPHEN 25 MG capsule TAKE 2 CAPSULES BY MOUTH EVERY NIGHT AT BEDTIME AS NEEDED FOR SLEEP       benzocaine (ANBESOL) 10 % gel Take by mouth 4 times daily as needed for moderate pain (apply to ulcer in mouth as needed) 9 g 1     bisacodyl (DULCOLAX) 5 MG EC tablet Take 2 tablets by mouth at 10am the day before procedure. 2 tablet 0     Cholecalciferol (VITAMIN D3) 50 MCG (2000 UT) TABS Take 1 tablet by mouth daily       FLOVENT DISKUS 250 MCG/BLIST inhaler INHALE 1 PUFF INTO THE LUNGS TWICE DAILY       folic acid 800 MCG tablet Take 800 mcg by mouth daily       gabapentin (NEURONTIN) 300 MG capsule Take 300 mg by mouth 4 times daily        magnesium citrate solution Drink entire bottle at 4pm two days prior to procedure. 296 mL 0     melatonin 5 MG tablet Take 5 mg by mouth daily       METHADONE HCL PO Take 48 mg by mouth daily        methylcellulose (CITRUCEL) powder Take 0.85 g (3 teaspoonful) by mouth daily 90 g 3     minoxidil (ROGAINE) 2 % external solution        Multiple Vitamin (DAILY-JUSTIN MULTIVITAMIN) TABS Take 1 tablet by mouth daily       omeprazole (PRILOSEC) 20 MG DR capsule Take 1 capsule (20 mg) by mouth daily 30 capsule 1     polyethylene glycol (GOLYTELY) 236 g suspension Take as directed. One day before your exam fill the first container  with water. Cover and shake until mixed well. At 3:00pm drink one 8oz glass every 10-15 minutes until half of the first container is empty. Store the remainder in the refrigerator. At 8:00pm drink the second half of the first container until it is gone. Before you go to bed mix the second container with water and put in refrigerator. Six hours before your check in time drink one 8oz glass every 10-15 minutes until half of container is empty. Discard the remainder of solution. 8000 mL 0     polyethylene glycol (MIRALAX) 17 GM/Dose powder Take 1 capful daily to have 1-2 soft BMs per day. Can increase up to 3 capfuls per day if needed to get desired result 507 g 3     Thiamine Mononitrate (B1) 100 MG TABS Take 1 tablet by mouth daily       WAL-DRYL ALLERGY 25 MG tablet TAKE 2 TABLETS BY MOUTH EVERY NIGHT AT BEDTIME AS NEEDED SLEEPLESSNESS       WAL-MUCIL 58.6 % powder TAKE 2.5 GRAMS TWICE DAILY           PHYSICAL EXAMINATION:  She  is awake, alert and in no apparent distress.    Her tympanic membranes are clear and intact bilaterally. External auditory canals are clear.  Nasal exam shows a mild septal deviation without obstruction.  Examination of the oral cavity shows no suspicious lesions.  There is symmetric movement of the tongue and soft palate.    The oropharynx is clear.  Her neck is supple without significant adenopathy.  Pulse is regular.  Upper airway is clear.  Cranial nerves II-XII are grossly intact.       IMPRESSION/PLAN:

## 2022-02-01 NOTE — LETTER
2/1/2022       RE: Shelly Cha  [address]  [facility] [address]     Dear Colleague,    Thank you for referring your patient, Shelly Cha, to the [facility] [place] [facility]. Please see a copy of my visit note below.    HISTORY OF PRESENT ILLNESS: Shelly Cha is a 52 year old female with a history of  HISTORY OF PRESENT ILLNESS:  She has a history of oral ulcers.  She may have bit her tongue.  This has been going on and she is on some kind of infusions for autoimmune colitis.   This mouth ulcer has not healed for six weeks.  There is always some concern that these patients might be a little bit additional risk for oral cavity cancer.  She has not had any other lesions like this in her mouth and it has been going on for at least the past six weeks or so since Christmas.  She has no other current complaints at the present time.    PHYSICAL EXAMINATION:  Looking at the oral cavity itself, there is a small ulcer in the ventrolateral tongue on the right side that measures only about 8 mm.  It looks very bland that side.  When I palpate around it, it is soft.   I cauterized it with a little bit of gentian violet and that worked out quite nicely and then once I did that, the remainder of the neck examination and oral cavity are all that was negative.    ASSESSMENT:  The patient with history of chronic oral ulcer.    PLAN:  We will her use three times a week to four times a week, gentian violet.  We gave it to her in clinic today.  We will bring her back again in approximately six weeks.  It will have to be biopsied or removed at that point in time if it is not healed.  She also notes that if this were to grow, that she is going to come see us sooner.        Last 2 Scores for Patient-Answered VHI Questionnaire  No flowsheet data found.    Last 2 Scores for Patient-Answered CSI Questionnaire  No flowsheet data  found.      Last 2 Scores for Patient-Answered EAT Questionnaire  No flowsheet data found.        PAST MEDICAL HISTORY:   Past Medical History:   Diagnosis Date     Pancolitis (H) 3/11/2021     Pancolitis (H) 3/11/2021       PAST SURGICAL HISTORY:   Past Surgical History:   Procedure Laterality Date     BIOPSY      right breast     CHOLECYSTECTOMY       GYN SURGERY      , tubal       FAMILY HISTORY: No family history on file.    SOCIAL HISTORY:   Social History     Tobacco Use     Smoking status: Current Some Day Smoker     Last attempt to quit: 2021     Years since quittin.0     Smokeless tobacco: Never Used   Substance Use Topics     Alcohol use: No       REVIEW OF SYSTEMS: Ten point review of systems was performed and is negative except for: No flowsheet data found.     ALLERGIES: Ibuprofen and Tylenol [acetaminophen]    MEDICATIONS:   Current Outpatient Medications   Medication Sig Dispense Refill     albuterol (ACCUNEB) 1.25 MG/3ML neb solution Inhale 1.25 mg into the lungs       BANOPHEN 25 MG capsule TAKE 2 CAPSULES BY MOUTH EVERY NIGHT AT BEDTIME AS NEEDED FOR SLEEP       benzocaine (ANBESOL) 10 % gel Take by mouth 4 times daily as needed for moderate pain (apply to ulcer in mouth as needed) 9 g 1     bisacodyl (DULCOLAX) 5 MG EC tablet Take 2 tablets by mouth at 10am the day before procedure. 2 tablet 0     Cholecalciferol (VITAMIN D3) 50 MCG (2000 UT) TABS Take 1 tablet by mouth daily       FLOVENT DISKUS 250 MCG/BLIST inhaler INHALE 1 PUFF INTO THE LUNGS TWICE DAILY       folic acid 800 MCG tablet Take 800 mcg by mouth daily       gabapentin (NEURONTIN) 300 MG capsule Take 300 mg by mouth 4 times daily        magnesium citrate solution Drink entire bottle at 4pm two days prior to procedure. 296 mL 0     melatonin 5 MG tablet Take 5 mg by mouth daily       METHADONE HCL PO Take 48 mg by mouth daily        methylcellulose (CITRUCEL) powder Take 0.85 g (3 teaspoonful) by mouth daily 90 g 3      minoxidil (ROGAINE) 2 % external solution        Multiple Vitamin (DAILY-JUSTIN MULTIVITAMIN) TABS Take 1 tablet by mouth daily       omeprazole (PRILOSEC) 20 MG DR capsule Take 1 capsule (20 mg) by mouth daily 30 capsule 1     polyethylene glycol (GOLYTELY) 236 g suspension Take as directed. One day before your exam fill the first container with water. Cover and shake until mixed well. At 3:00pm drink one 8oz glass every 10-15 minutes until half of the first container is empty. Store the remainder in the refrigerator. At 8:00pm drink the second half of the first container until it is gone. Before you go to bed mix the second container with water and put in refrigerator. Six hours before your check in time drink one 8oz glass every 10-15 minutes until half of container is empty. Discard the remainder of solution. 8000 mL 0     polyethylene glycol (MIRALAX) 17 GM/Dose powder Take 1 capful daily to have 1-2 soft BMs per day. Can increase up to 3 capfuls per day if needed to get desired result 507 g 3     Thiamine Mononitrate (B1) 100 MG TABS Take 1 tablet by mouth daily       WAL-DRYL ALLERGY 25 MG tablet TAKE 2 TABLETS BY MOUTH EVERY NIGHT AT BEDTIME AS NEEDED SLEEPLESSNESS       WAL-MUCIL 58.6 % powder TAKE 2.5 GRAMS TWICE DAILY           PHYSICAL EXAMINATION:  She  is awake, alert and in no apparent distress.    Her tympanic membranes are clear and intact bilaterally. External auditory canals are clear.  Nasal exam shows a mild septal deviation without obstruction.  Examination of the oral cavity shows no suspicious lesions.  There is symmetric movement of the tongue and soft palate.    The oropharynx is clear.  Her neck is supple without significant adenopathy.  Pulse is regular.  Upper airway is clear.  Cranial nerves II-XII are grossly intact.       IMPRESSION/PLAN:      Again, thank you for allowing me to participate in the care of your patient.      Sincerely,    Iain Yan MD

## 2022-03-09 ENCOUNTER — INFUSION THERAPY VISIT (OUTPATIENT)
Dept: INFUSION THERAPY | Facility: CLINIC | Age: 52
End: 2022-03-09
Attending: INTERNAL MEDICINE
Payer: COMMERCIAL

## 2022-03-09 VITALS
HEART RATE: 78 BPM | WEIGHT: 174.3 LBS | RESPIRATION RATE: 18 BRPM | OXYGEN SATURATION: 97 % | BODY MASS INDEX: 30.88 KG/M2 | DIASTOLIC BLOOD PRESSURE: 65 MMHG | TEMPERATURE: 98.1 F | SYSTOLIC BLOOD PRESSURE: 107 MMHG

## 2022-03-09 DIAGNOSIS — K51.00 PANCOLITIS (H): Primary | ICD-10-CM

## 2022-03-09 PROCEDURE — 96413 CHEMO IV INFUSION 1 HR: CPT

## 2022-03-09 PROCEDURE — 258N000003 HC RX IP 258 OP 636: Performed by: INTERNAL MEDICINE

## 2022-03-09 PROCEDURE — 250N000011 HC RX IP 250 OP 636: Performed by: INTERNAL MEDICINE

## 2022-03-09 RX ORDER — ACETAMINOPHEN 325 MG/1
650 TABLET ORAL ONCE
Status: CANCELLED
Start: 2022-04-20 | End: 2022-04-20

## 2022-03-09 RX ORDER — METHYLPREDNISOLONE SODIUM SUCCINATE 125 MG/2ML
125 INJECTION, POWDER, LYOPHILIZED, FOR SOLUTION INTRAMUSCULAR; INTRAVENOUS ONCE
Status: CANCELLED
Start: 2022-04-20 | End: 2022-04-20

## 2022-03-09 RX ORDER — DIPHENHYDRAMINE HCL 25 MG
25 CAPSULE ORAL ONCE
Status: CANCELLED
Start: 2022-04-20 | End: 2022-04-20

## 2022-03-09 RX ADMIN — SODIUM CHLORIDE 600 MG: 9 INJECTION, SOLUTION INTRAVENOUS at 13:29

## 2022-03-09 NOTE — PATIENT INSTRUCTIONS
Dear Shelly Cha    Thank you for choosing [facility] ([facility]) for your infusion.  The following information is a summary of our appointment as well as important reminders.      We look forward in seeing you on your next appointment here at [facility] ([facility]).  Please don t hesitate to call us at [phone] to reschedule any of your appointments or to speak with one of the [facility] registered nurses.  It was a pleasure taking care of you today.    Sincerely,    [facility]  [address] [place] [address]  [id number]  Phone:  [phone]    EDUCATION POST BIOLOGICAL/CHEMOTHERAPY INFUSION  Call the triage nurse at your clinic or seek medical attention if you have chills and/or temperature greater than or equal to 100.5, uncontrolled nausea/vomiting, diarrhea, constipation, dizziness, shortness of breath, chest pain, heart palpitations, weakness or any other new or concerning symptoms, questions or concerns.  You can not have any live virus vaccines prior to or during treatment or up to 6 months post infusion.  If you have an upcoming surgery, medical procedure or dental procedure during treatment, this should be discussed with your ordering physician and your surgeon/dentist.  If you are having any concerning symptom, if you are unsure if you should get your next infusion or wish to speak to a provider before your next infusion, please call your care coordinator or triage nurse at your clinic to notify them so we can adequately serve you.

## 2022-03-09 NOTE — LETTER
3/9/2022         RE: Shelly Cha  [address]  [facility] [address]        Dear Colleague,    Thank you for referring your patient, Shelly Cha, to the [facility]. Please see a copy of my visit note below.    Nursing Note  Shelly Cha [facility] for:   Chief Complaint   Patient presents with     Infusion     Infliximab     During today's [facility] appointment, orders from Dr Nolasco were completed.  Frequency: every 6 weeks    Progress note:  Patient identification verified by name and date of birth.  Assessment completed.  Vitals recorded in Doc Flowsheets.  Patient was provided with education regarding medication/procedure and possible side effects.  Patient verbalized understanding.     present during visit today: Not Applicable.    Treatment Conditions: ~~~ NOTE: If the patient answers yes to any of the questions below, hold the infusion and contact ordering provider or on-call provider.    1. Have you recently had an elevated temperature, fever, chills, productive cough, coughing for 3 weeks or longer or hemoptysis, abnormal vital signs, night sweats,  chest pain or have you noticed a decrease in your appetite, unexplained weight loss or fatigue? No  2. Do you have any open wounds or new incisions? No  3. Do you have any recent or upcoming hospitalizations, surgeries or dental procedures? No  4. Do you currently have or recently have had any signs of illness or infection or are you on any antibiotics? No  5. Have you had any new, sudden or worsening abdominal pain? No  6. Have you or anyone in your household received a live vaccination in the past 4 weeks? Please note:  No live vaccines while on biologic/chemotherapy until 6 months after the last treatment.  Patient can receive the flu vaccine (shot only) and the pneumovax.  It is optimal for the patient to  get these vaccines mid cycle, but they can be given at any time as long as it is not on the day of the infusion. No  7. Have you recently been diagnosed with any new nervous system diseases (ie. Multiple sclerosis, Guillain [place], seizures, neurological changes) or cancer diagnosis? No  8. Are you on any form of radiation or chemotherapy? No  9. Are you pregnant or breast feeding or do you have plans of pregnancy in the future? No  10. Have you been having any signs of worsening depression or suicidal ideations?  (benlysta only) Not applicable  11. Have there been any other new onset medical symptoms? No      Premedications: historically patient has not taken pre-medications prior to infusion.    Infusion length and rate:  infusion given over approximately 1 hours    Labs: ordered every other infusion, due next infusion.    Vascular access: peripheral IV placed today.    Is the next appt scheduled? yes  Asymptomatic COVID test completed? no    Post Infusion Assessment:  Patient tolerated infusion without incident.  Site patent and intact, free from redness, edema or discomfort.  Access discontinued per protocol.  Biologic Infusion Post Education: Call the triage nurse at your clinic or seek medical attention if you have chills and/or temperature greater than or equal to 100.5, uncontrolled nausea/vomiting, diarrhea, constipation, dizziness, shortness of breath, chest pain, heart palpitations, weakness or any other new or concerning symptoms, questions or concerns.  You cannot have any live virus vaccines prior to or during treatment or up to 6 months post infusion.  If you have an upcoming surgery, medical procedure or dental procedure during treatment, this should be discussed with your ordering physician and your surgeon/dentist.  If you are having any concerning symptom, if you are unsure if you should get your next infusion or wish to speak to a provider before your next infusion, please call your care  coordinator or triage nurse at your clinic to notify them so we can adequately serve you.     Discharge Plan:   Follow up plan of care with: ongoing infusions at [facility]. and ordering provider as scheduled.  Discharge instructions were reviewed with patient.  Patient/representative verbalized understanding of discharge instructions and all questions answered.  Patient discharged from [facility] in stable condition.    Abby Mcwilliams RN       Administrations This Visit     inFLIXimab-dyyb (INFLECTRA) 600 mg in sodium chloride 0.9 % 335 mL infusion     Admin Date  03/09/2022 Action  New Bag Dose  600 mg Rate  335 mL/hr Route  Intravenous Administered By  Abby Mcwilliams RN          sodium chloride (PF) 0.9% PF flush 3-20 mL     Admin Date  03/09/2022 Action  Given Dose  25 mL Route  Intracatheter Administered By  Abby Mcwilliams RN                /76 (BP Location: Left arm)   Pulse 76   Temp 98.1  F (36.7  C) (Oral)   Wt 79.1 kg (174 lb 4.8 oz)   SpO2 97%   BMI 30.88 kg/m          Again, thank you for allowing me to participate in the care of your patient.      Sincerely,    [facility]

## 2022-03-09 NOTE — PROGRESS NOTES
Nursing Note  Shelly Cha presents [facility] for:   Chief Complaint   Patient presents with     Infusion     Infliximab     During [facility]'s [facility] appointment, orders from Dr Nolasco were completed.  Frequency: every 6 weeks    Progress note:  Patient identification verified by name and date of birth.  Assessment completed.  Vitals recorded in Doc Flowsheets.  Patient was provided with education regarding medication/procedure and possible side effects.  Patient verbalized understanding.     present during visit today: Not Applicable.    Treatment Conditions: ~~~ NOTE: If the patient answers yes to any of the questions below, hold the infusion and contact ordering provider or on-call provider.    1. Have you recently had an elevated temperature, fever, chills, productive cough, coughing for 3 weeks or longer or hemoptysis, abnormal vital signs, night sweats,  chest pain or have you noticed a decrease in your appetite, unexplained weight loss or fatigue? No  2. Do you have any open wounds or new incisions? No  3. Do you have any recent or upcoming hospitalizations, surgeries or dental procedures? No  4. Do you currently have or recently have had any signs of illness or infection or are you on any antibiotics? No  5. Have you had any new, sudden or worsening abdominal pain? No  6. Have you or anyone in your household received a live vaccination in the past 4 weeks? Please note:  No live vaccines while on biologic/chemotherapy until 6 months after the last treatment.  Patient can receive the flu vaccine (shot only) and the pneumovax.  It is optimal for the patient to get these vaccines mid cycle, but they can be given at any time as long as it is not on the day of the infusion. No  7. Have you recently been diagnosed with any new nervous system diseases (ie. Multiple sclerosis, Guillain [place], seizures, neurological changes) or cancer  diagnosis? No  8. Are you on any form of radiation or chemotherapy? No  9. Are you pregnant or breast feeding or do you have plans of pregnancy in the future? No  10. Have you been having any signs of worsening depression or suicidal ideations?  (benlysta only) Not applicable  11. Have there been any other new onset medical symptoms? No      Premedications: historically patient has not taken pre-medications prior to infusion.    Infusion length and rate:  infusion given over approximately 1 hours    Labs: ordered every other infusion, due next infusion.    Vascular access: peripheral IV placed today.    Is the next appt scheduled? yes  Asymptomatic COVID test completed? no    Post Infusion Assessment:  Patient tolerated infusion without incident.  Site patent and intact, free from redness, edema or discomfort.  Access discontinued per protocol.  Biologic Infusion Post Education: Call the triage nurse at your clinic or seek medical attention if you have chills and/or temperature greater than or equal to 100.5, uncontrolled nausea/vomiting, diarrhea, constipation, dizziness, shortness of breath, chest pain, heart palpitations, weakness or any other new or concerning symptoms, questions or concerns.  You cannot have any live virus vaccines prior to or during treatment or up to 6 months post infusion.  If you have an upcoming surgery, medical procedure or dental procedure during treatment, this should be discussed with your ordering physician and your surgeon/dentist.  If you are having any concerning symptom, if you are unsure if you should get your next infusion or wish to speak to a provider before your next infusion, please call your care coordinator or triage nurse at your clinic to notify them so we can adequately serve you.     Discharge Plan:   Follow up plan of care with: ongoing infusions at [facility]. and ordering provider as scheduled.  Discharge instructions were reviewed with  patient.  Patient/representative verbalized understanding of discharge instructions and all questions answered.  Patient discharged from [facility] in stable condition.    Abby Mcwilliams RN       Administrations This Visit     inFLIXimab-dyyb (INFLECTRA) 600 mg in sodium chloride 0.9 % 335 mL infusion     Admin Date  03/09/2022 Action  New Bag Dose  600 mg Rate  335 mL/hr Route  Intravenous Administered By  Abby Mcwilliams RN          sodium chloride (PF) 0.9% PF flush 3-20 mL     Admin Date  03/09/2022 Action  Given Dose  25 mL Route  Intracatheter Administered By  Abby Mcwilliams RN                /76 (BP Location: Left arm)   Pulse 76   Temp 98.1  F (36.7  C) (Oral)   Wt 79.1 kg (174 lb 4.8 oz)   SpO2 97%   BMI 30.88 kg/m

## 2022-03-24 ENCOUNTER — TELEPHONE (OUTPATIENT)
Dept: GASTROENTEROLOGY | Facility: CLINIC | Age: 52
End: 2022-03-24
Payer: COMMERCIAL

## 2022-03-24 NOTE — TELEPHONE ENCOUNTER
Called to remind patient of their upcoming appointment with our GI clinic, on 3/30/2022 at 0820 with Adam Brooke. This appointment is scheduled as a video visit. You will receive a call approximately 30 minutes prior to check you in, you must be in [place] for this visit., if your appointment is virtual (video or telephone) you need to be in [place] for the visit. To reschedule or cancel patient to call [phone].    Domitila Gonzalez, [facility]

## 2022-03-28 ENCOUNTER — LAB REQUISITION (OUTPATIENT)
Dept: LAB | Facility: CLINIC | Age: 52
End: 2022-03-28
Payer: COMMERCIAL

## 2022-03-28 DIAGNOSIS — Z12.4 ENCOUNTER FOR SCREENING FOR MALIGNANT NEOPLASM OF CERVIX: ICD-10-CM

## 2022-03-28 PROCEDURE — 87624 HPV HI-RISK TYP POOLED RSLT: CPT | Mod: ORL | Performed by: FAMILY MEDICINE

## 2022-03-28 PROCEDURE — G0145 SCR C/V CYTO,THINLAYER,RESCR: HCPCS | Mod: ORL | Performed by: FAMILY MEDICINE

## 2022-03-28 NOTE — PROGRESS NOTES
Patient tested positive on December 3  Test was pre procedure and has had no symptoms  Okay to have infusion n the 14 th and patient and pharmacist aware  Pt rescheduled her colonoscopy    Klisyri Counseling:  I discussed with the patient the risks of Klisyri including but not limited to erythema, scaling, itching, weeping, crusting, and pain.

## 2022-03-30 LAB
BKR LAB AP GYN ADEQUACY: NORMAL
BKR LAB AP GYN INTERPRETATION: NORMAL
BKR LAB AP HPV REFLEX: NORMAL
BKR LAB AP PREVIOUS ABNORMAL: NORMAL
PATH REPORT.COMMENTS IMP SPEC: NORMAL
PATH REPORT.COMMENTS IMP SPEC: NORMAL
PATH REPORT.RELEVANT HX SPEC: NORMAL

## 2022-03-31 LAB
HUMAN PAPILLOMA VIRUS 16 DNA: NEGATIVE
HUMAN PAPILLOMA VIRUS 18 DNA: NEGATIVE
HUMAN PAPILLOMA VIRUS FINAL DIAGNOSIS: NORMAL
HUMAN PAPILLOMA VIRUS OTHER HR: NEGATIVE

## 2022-04-04 ENCOUNTER — TRANSCRIBE ORDERS (OUTPATIENT)
Dept: OTHER | Age: 52
End: 2022-04-04
Payer: COMMERCIAL

## 2022-04-04 DIAGNOSIS — Z12.11 SCREEN FOR COLON CANCER: Primary | ICD-10-CM

## 2022-04-05 ENCOUNTER — TELEPHONE (OUTPATIENT)
Dept: GASTROENTEROLOGY | Facility: CLINIC | Age: 52
End: 2022-04-05
Payer: COMMERCIAL

## 2022-04-05 DIAGNOSIS — Z11.59 ENCOUNTER FOR SCREENING FOR OTHER VIRAL DISEASES: Primary | ICD-10-CM

## 2022-04-05 NOTE — TELEPHONE ENCOUNTER
Screening Questions  BlueKIND OF PREP RedLOCATION [review exclusion criteria] GreenSEDATION TYPE  1. Have you had a positive covid test in the last 90 days? N     2. Are you active on mychart? Y    3. What insurance is in the chart? [employer]     3.   Ordering/Referring Provider: Romeo Landin    4. BMI 30.8 [BMI OVER 40-EXTENDED PREP]  If greater than 40 review exclusion criteria [PAC APPT IF @ [facility]]        5.  Respiratory Screening :  [If yes to any of the following HOSPITAL setting only]     Do you use daily home oxygen? N    Do you have mod to severe Obstructive Sleep Apnea? N  [OKAY @ [facility] SH PH RI]   Do you have Pulmonary Hypertension? N     Do you have UNCONTROLLED asthma? N        6.   Have you had a heart or lung transplant? N      7.   Are you currently on dialysis? N [ If yes, G-PREP & HOSPITAL setting only]     8.   Do you have chronic kidney disease? N [ If yes, G-PREP ]    9.   Have you had a stroke or Transient ischemic attack (TIA - aka  mini stroke ) within 6 months?  N (If yes, please review exclusion criteria)    10.   In the past 6 months, have you had any heart related issues including cardiomyopathy or heart attack? N           If yes, did it require cardiac stenting or other implantable device?       11.   Do you have any implantable devices in your body (pacemaker, defib, LVAD)? N (If yes, please review exclusion criteria)    12.   Do you take nitroglycerin? N           If yes, how often?   (if yes, HOSPITAL setting ONLY)    13.   Are you currently taking any blood thinners? N           [IF YES, INFORM PATIENT TO FOLLOW UP W/ ORDERING PROVIDER FOR BRIDGING INSTRUCTIONS]     14.   Do you have a diagnosis of diabetes? N   [ If yes, G-PREP ]    15.   [FEMALES] Are you currently pregnant? N    If yes, how many weeks?     16.   Are you taking any prescription pain medications on a routine schedule?  N  [ If yes, EXTENDED PREP.] [If yes, MAC]    17.   Do you have any chemical dependencies such  as alcohol, street drugs, or methadone?  N [If yes, MAC]    18.   Do you have any history of post-traumatic stress syndrome, severe anxiety or history of psychosis?  Y  [If yes, MAC]    19.   Do you have a significant intellectual disability?  N [If yes, MAC]    20.   Do you transfer independently?  Y    21.  On a regular basis do you go 3-5 days between bowel movements? Y   [ If yes, EXTENDED PREP.]    22.   Preferred LOCAL Pharmacy for Pre Prescription   [id number] - [place], [place] - [address] AT [facility] [address]      Scheduling Details      Caller :   (Please ask for phone number if not scheduled by patient)    Type of Procedure Scheduled: COLON  Which Colonoscopy Prep was Sent?: EXTENDED  KHORUTS CF PATIENTS & GROEN'S PATIENTS NEEDS EXTENDED PREP  Surgeon: JUDITH  Date of Procedure: 5/12  Location: [facility]      Sedation Type: MAC  Conscious Sedation- Needs  for 6 hours after the procedure  MAC/General-Needs  for 24 hours after procedure    Pre-op Required at [facility] [place] [place] and OR for MAC sedation: N  (advise patient they will need a pre-op prior to procedure -)      Informed patient they will need an adult  Y  Cannot take any type of public or medical transportation alone    Pre-Procedure Covid test to be completed at [facility] or Externally: 5/9    Confirmed Nurse will call to complete assessment Y    Additional comments:

## 2022-04-20 ENCOUNTER — INFUSION THERAPY VISIT (OUTPATIENT)
Dept: INFUSION THERAPY | Facility: CLINIC | Age: 52
End: 2022-04-20
Attending: INTERNAL MEDICINE
Payer: COMMERCIAL

## 2022-04-20 VITALS
HEART RATE: 90 BPM | WEIGHT: 174.9 LBS | OXYGEN SATURATION: 100 % | SYSTOLIC BLOOD PRESSURE: 138 MMHG | RESPIRATION RATE: 16 BRPM | DIASTOLIC BLOOD PRESSURE: 71 MMHG | BODY MASS INDEX: 30.98 KG/M2 | TEMPERATURE: 97.8 F

## 2022-04-20 DIAGNOSIS — K51.00 PANCOLITIS (H): Primary | ICD-10-CM

## 2022-04-20 LAB
ALBUMIN SERPL-MCNC: 4 G/DL (ref 3.4–5)
ALP SERPL-CCNC: 70 U/L (ref 40–150)
ALT SERPL W P-5'-P-CCNC: 30 U/L (ref 0–50)
AST SERPL W P-5'-P-CCNC: 34 U/L (ref 0–45)
BASOPHILS # BLD AUTO: 0.1 10E3/UL (ref 0–0.2)
BASOPHILS NFR BLD AUTO: 0 %
BILIRUB DIRECT SERPL-MCNC: 0.1 MG/DL (ref 0–0.2)
BILIRUB SERPL-MCNC: 0.9 MG/DL (ref 0.2–1.3)
CRP SERPL-MCNC: 5 MG/L (ref 0–8)
EOSINOPHIL # BLD AUTO: 0.3 10E3/UL (ref 0–0.7)
EOSINOPHIL NFR BLD AUTO: 2 %
ERYTHROCYTE [DISTWIDTH] IN BLOOD BY AUTOMATED COUNT: 15.6 % (ref 10–15)
ERYTHROCYTE [SEDIMENTATION RATE] IN BLOOD BY WESTERGREN METHOD: 19 MM/HR (ref 0–30)
HCT VFR BLD AUTO: 39.1 % (ref 35–47)
HGB BLD-MCNC: 12.7 G/DL (ref 11.7–15.7)
IMM GRANULOCYTES # BLD: 0 10E3/UL
IMM GRANULOCYTES NFR BLD: 0 %
LYMPHOCYTES # BLD AUTO: 2.2 10E3/UL (ref 0.8–5.3)
LYMPHOCYTES NFR BLD AUTO: 19 %
MCH RBC QN AUTO: 28.3 PG (ref 26.5–33)
MCHC RBC AUTO-ENTMCNC: 32.5 G/DL (ref 31.5–36.5)
MCV RBC AUTO: 87 FL (ref 78–100)
MONOCYTES # BLD AUTO: 1.1 10E3/UL (ref 0–1.3)
MONOCYTES NFR BLD AUTO: 9 %
NEUTROPHILS # BLD AUTO: 8 10E3/UL (ref 1.6–8.3)
NEUTROPHILS NFR BLD AUTO: 70 %
NRBC # BLD AUTO: 0 10E3/UL
NRBC BLD AUTO-RTO: 0 /100
PLATELET # BLD AUTO: 317 10E3/UL (ref 150–450)
PROT SERPL-MCNC: 8.2 G/DL (ref 6.8–8.8)
RBC # BLD AUTO: 4.48 10E6/UL (ref 3.8–5.2)
WBC # BLD AUTO: 11.6 10E3/UL (ref 4–11)

## 2022-04-20 PROCEDURE — 258N000003 HC RX IP 258 OP 636: Performed by: INTERNAL MEDICINE

## 2022-04-20 PROCEDURE — 80076 HEPATIC FUNCTION PANEL: CPT | Performed by: INTERNAL MEDICINE

## 2022-04-20 PROCEDURE — 250N000011 HC RX IP 250 OP 636: Performed by: INTERNAL MEDICINE

## 2022-04-20 PROCEDURE — 86140 C-REACTIVE PROTEIN: CPT | Performed by: INTERNAL MEDICINE

## 2022-04-20 PROCEDURE — 85025 COMPLETE CBC W/AUTO DIFF WBC: CPT | Performed by: INTERNAL MEDICINE

## 2022-04-20 PROCEDURE — 96413 CHEMO IV INFUSION 1 HR: CPT

## 2022-04-20 PROCEDURE — 85652 RBC SED RATE AUTOMATED: CPT | Performed by: INTERNAL MEDICINE

## 2022-04-20 PROCEDURE — 36415 COLL VENOUS BLD VENIPUNCTURE: CPT | Performed by: INTERNAL MEDICINE

## 2022-04-20 RX ORDER — METHYLPREDNISOLONE SODIUM SUCCINATE 125 MG/2ML
125 INJECTION, POWDER, LYOPHILIZED, FOR SOLUTION INTRAMUSCULAR; INTRAVENOUS ONCE
Status: CANCELLED
Start: 2022-06-01 | End: 2022-06-01

## 2022-04-20 RX ORDER — DIPHENHYDRAMINE HCL 25 MG
25 CAPSULE ORAL ONCE
Status: CANCELLED
Start: 2022-06-01 | End: 2022-06-01

## 2022-04-20 RX ORDER — ACETAMINOPHEN 325 MG/1
650 TABLET ORAL ONCE
Status: CANCELLED
Start: 2022-06-01 | End: 2022-06-01

## 2022-04-20 RX ADMIN — SODIUM CHLORIDE 600 MG: 9 INJECTION, SOLUTION INTRAVENOUS at 14:55

## 2022-04-20 ASSESSMENT — PAIN SCALES - GENERAL: PAINLEVEL: NO PAIN (0)

## 2022-04-20 NOTE — LETTER
4/20/2022         RE: Shelly Cha  [address]  [facility] [place] [address]        Dear Colleague,    Thank you for referring your patient, Shelly Cha, to the [facility]. Please see a copy of my visit note below.    Nursing Note  Shelly Cha [facility] for:   Chief Complaint   Patient presents with     Infusion     remicade     During today's [facility] appointment, orders from  were completed.  Frequency: every 6 weeks    Progress note:  Patient identification verified by name and date of birth.  Assessment completed.  Vitals recorded in Doc Flowsheets.  Patient was provided with education regarding medication/procedure and possible side effects.  Patient verbalized understanding.     present during visit today: Not Applicable.    Treatment Conditions: ~~~ NOTE: If the patient answers yes to any of the questions below, hold the infusion and contact ordering provider or on-call provider.    1. Have you recently had an elevated temperature, fever, chills, productive cough, coughing for 3 weeks or longer or hemoptysis, abnormal vital signs, night sweats,  chest pain or have you noticed a decrease in your appetite, unexplained weight loss or fatigue? No  2. Do you have any open wounds or new incisions? No  3. Do you have any recent or upcoming hospitalizations, surgeries or dental procedures? No  4. Do you currently have or recently have had any signs of illness or infection or are you on any antibiotics? No  5. Have you had any new, sudden or worsening abdominal pain? No  6. Have you or anyone in your household received a live vaccination in the past 4 weeks? Please note:  No live vaccines while on biologic/chemotherapy until 6 months after the last treatment.  Patient can receive the flu vaccine (shot only) and the pneumovax.  It is optimal for the patient to  get these vaccines mid cycle, but they can be given at any time as long as it is not on the day of the infusion. No  7. Have you recently been diagnosed with any new nervous system diseases (ie. Multiple sclerosis, Guillain [place], seizures, neurological changes) or cancer diagnosis? No  8. Are you on any form of radiation or chemotherapy? No  9. Are you pregnant or breast feeding or do you have plans of pregnancy in the future? No  10. Have you been having any signs of worsening depression or suicidal ideations?  (benlysta only) N/A  11. Have there been any other new onset medical symptoms? No      Premedications: historically patient has not taken pre-medications prior to infusion.    Drug Waste Record: No    Infusion length and rate:  infusion given over approximately 60 minutes    Labs: were drawn per orders.     Vascular access: peripheral IV placed today.    Is the next appt scheduled? Message sent to scheduling  Asymptomatic COVID test completed? No    Post Infusion Assessment:  Patient tolerated infusion without incident.  Blood return noted pre and post infusion.  Site patent and intact, free from redness, edema or discomfort.  No evidence of extravasations.  Access discontinued per protocol.     POST-INFUSION OF BIOLOGICAL MEDICATION:     Reviewed with patient.  Given biologic medication or medication hand-out. Inform patient if any fever, chills or signs of infection, new symptoms, abdominal pain, heart palpitations, shortness of breath, reaction, weakness, neurological changes, seek medical attention immediately and should not receive infusions. No live virus vaccines prior to or during treatment or up to 6 months post infusion. If the patient has an upcoming procedure or surgery, this should be discussed with the rheumatologist and surgeon or provider.    Discharge Plan:   Follow up plan of care with: ongoing infusions at [facility].  Discharge instructions were reviewed with  patient.  Patient/representative verbalized understanding of discharge instructions and all questions answered.  Patient discharged from [facility] in stable condition.    Ellen Villegas RN       Administrations This Visit     inFLIXimab-dyyb (INFLECTRA) 600 mg in sodium chloride 0.9 % 335 mL infusion     Admin Date  04/20/2022 Action  New Bag Dose  600 mg Rate  335 mL/hr Route  Intravenous Administered By  Ellen Villegas RN                /86   Pulse 92   Temp 97.8  F (36.6  C) (Oral)   Resp 16   Wt 79.3 kg (174 lb 14.4 oz)   SpO2 100%   BMI 30.98 kg/m          Again, thank you for allowing me to participate in the care of your patient.        Sincerely,        [facility] [place]

## 2022-04-20 NOTE — PATIENT INSTRUCTIONS
Dear Shelly Cha    Thank you for choosing [facility] ([facility]) for your infusion.  The following information is a summary of our appointment as well as important reminders.      EDUCATION POST BIOLOGICAL/CHEMOTHERAPY INFUSION  Call the triage nurse at your clinic or seek medical attention if you have chills and/or temperature greater than or equal to 100.5, uncontrolled nausea/vomiting, diarrhea, constipation, dizziness, shortness of breath, chest pain, heart palpitations, weakness or any other new or concerning symptoms, questions or concerns.  You can not have any live virus vaccines prior to or during treatment or up to 6 months post infusion.  If you have an upcoming surgery, medical procedure or dental procedure during treatment, this should be discussed with your ordering physician and your surgeon/dentist.  If you are having any concerning symptom, if you are unsure if you should get your next infusion or wish to speak to a provider before your next infusion, please call your care coordinator or triage nurse at your clinic to notify them so we can adequately serve you.     We look forward in seeing you on your next appointment here at [facility] ([facility]).  Please don t hesitate to call us at [phone] to reschedule any of your appointments or to speak with one of the [facility] registered nurses.  It was a pleasure taking care of you today.    Sincerely,    [facility]  [facility]  [address] [place] [address]  [address]  Phone:  [phone]

## 2022-04-20 NOTE — PROGRESS NOTES
Nursing Note  Shelly Cha presents today to [facility] for:   Chief Complaint   Patient presents with     Infusion     remicade     During [facility]'s [facility] appointment, orders from  were completed.  Frequency: every 6 weeks    Progress note:  Patient identification verified by name and date of birth.  Assessment completed.  Vitals recorded in Doc Flowsheets.  Patient was provided with education regarding medication/procedure and possible side effects.  Patient verbalized understanding.     present during visit today: Not Applicable.    Treatment Conditions: ~~~ NOTE: If the patient answers yes to any of the questions below, hold the infusion and contact ordering provider or on-call provider.    1. Have you recently had an elevated temperature, fever, chills, productive cough, coughing for 3 weeks or longer or hemoptysis, abnormal vital signs, night sweats,  chest pain or have you noticed a decrease in your appetite, unexplained weight loss or fatigue? No  2. Do you have any open wounds or new incisions? No  3. Do you have any recent or upcoming hospitalizations, surgeries or dental procedures? No  4. Do you currently have or recently have had any signs of illness or infection or are you on any antibiotics? No  5. Have you had any new, sudden or worsening abdominal pain? No  6. Have you or anyone in your household received a live vaccination in the past 4 weeks? Please note:  No live vaccines while on biologic/chemotherapy until 6 months after the last treatment.  Patient can receive the flu vaccine (shot only) and the pneumovax.  It is optimal for the patient to get these vaccines mid cycle, but they can be given at any time as long as it is not on the day of the infusion. No  7. Have you recently been diagnosed with any new nervous system diseases (ie. Multiple sclerosis, Guillain [place], seizures, neurological changes) or cancer  diagnosis? No  8. Are you on any form of radiation or chemotherapy? No  9. Are you pregnant or breast feeding or do you have plans of pregnancy in the future? No  10. Have you been having any signs of worsening depression or suicidal ideations?  (benlysta only) N/A  11. Have there been any other new onset medical symptoms? No      Premedications: historically patient has not taken pre-medications prior to infusion.    Drug Waste Record: No    Infusion length and rate:  infusion given over approximately 60 minutes    Labs: were drawn per orders.     Vascular access: peripheral IV placed today.    Is the next appt scheduled? Message sent to scheduling. Message also sent to  to renew therapy plan as it is expiring soon.  Asymptomatic COVID test completed? No    Post Infusion Assessment:  Patient tolerated infusion without incident.  Blood return noted pre and post infusion.  Site patent and intact, free from redness, edema or discomfort.  No evidence of extravasations.  Access discontinued per protocol.     POST-INFUSION OF BIOLOGICAL MEDICATION:     Reviewed with patient.  Given biologic medication or medication hand-out. Inform patient if any fever, chills or signs of infection, new symptoms, abdominal pain, heart palpitations, shortness of breath, reaction, weakness, neurological changes, seek medical attention immediately and should not receive infusions. No live virus vaccines prior to or during treatment or up to 6 months post infusion. If the patient has an upcoming procedure or surgery, this should be discussed with the rheumatologist and surgeon or provider.    Discharge Plan:   Follow up plan of care with: ongoing infusions at [facility].  Discharge instructions were reviewed with patient.  Patient/representative verbalized understanding of discharge instructions and all questions answered.  Patient discharged from [facility] in stable  condition.    Ellen Villegas, KAMERON       Administrations This Visit     inFLIXimab-dyyb (INFLECTRA) 600 mg in sodium chloride 0.9 % 335 mL infusion     Admin Date  04/20/2022 Action  New Bag Dose  600 mg Rate  335 mL/hr Route  Intravenous Administered By  Ellen Villegas, RN                /86   Pulse 92   Temp 97.8  F (36.6  C) (Oral)   Resp 16   Wt 79.3 kg (174 lb 14.4 oz)   SpO2 100%   BMI 30.98 kg/m

## 2022-04-25 ENCOUNTER — PATIENT OUTREACH (OUTPATIENT)
Dept: GASTROENTEROLOGY | Facility: CLINIC | Age: 52
End: 2022-04-25
Payer: COMMERCIAL

## 2022-04-25 DIAGNOSIS — K51.00 PANCOLITIS (H): ICD-10-CM

## 2022-04-25 DIAGNOSIS — K50.10 CROHN'S COLITIS, WITHOUT COMPLICATIONS (H): Primary | ICD-10-CM

## 2022-04-25 RX ORDER — EPINEPHRINE 1 MG/ML
0.3 INJECTION, SOLUTION, CONCENTRATE INTRAVENOUS EVERY 5 MIN PRN
Status: CANCELLED | OUTPATIENT
Start: 2022-04-25

## 2022-04-25 RX ORDER — METHYLPREDNISOLONE SODIUM SUCCINATE 125 MG/2ML
125 INJECTION, POWDER, LYOPHILIZED, FOR SOLUTION INTRAMUSCULAR; INTRAVENOUS
Status: CANCELLED
Start: 2022-04-25

## 2022-04-25 RX ORDER — DIPHENHYDRAMINE HCL 25 MG
25 CAPSULE ORAL ONCE
Status: CANCELLED
Start: 2022-04-25 | End: 2022-04-25

## 2022-04-25 RX ORDER — ACETAMINOPHEN 325 MG/1
650 TABLET ORAL ONCE
Status: CANCELLED
Start: 2022-04-25 | End: 2022-04-25

## 2022-04-25 RX ORDER — ALBUTEROL SULFATE 0.83 MG/ML
2.5 SOLUTION RESPIRATORY (INHALATION)
Status: CANCELLED | OUTPATIENT
Start: 2022-04-25

## 2022-04-25 RX ORDER — DIPHENHYDRAMINE HYDROCHLORIDE 50 MG/ML
50 INJECTION INTRAMUSCULAR; INTRAVENOUS
Status: CANCELLED
Start: 2022-04-25

## 2022-04-25 RX ORDER — MEPERIDINE HYDROCHLORIDE 25 MG/ML
25 INJECTION INTRAMUSCULAR; INTRAVENOUS; SUBCUTANEOUS EVERY 30 MIN PRN
Status: CANCELLED | OUTPATIENT
Start: 2022-04-25

## 2022-04-25 RX ORDER — NALOXONE HYDROCHLORIDE 0.4 MG/ML
0.2 INJECTION, SOLUTION INTRAMUSCULAR; INTRAVENOUS; SUBCUTANEOUS
Status: CANCELLED | OUTPATIENT
Start: 2022-04-25

## 2022-04-25 RX ORDER — ALBUTEROL SULFATE 90 UG/1
1-2 AEROSOL, METERED RESPIRATORY (INHALATION)
Status: CANCELLED
Start: 2022-04-25

## 2022-04-25 NOTE — TELEPHONE ENCOUNTER
Therapy plan    New plan with no changes  Inflectra 7.5 mg/kg every 6 weeks   Standing lab orders To be drawn day of  infusion prior to start of infusion every other infusion   One time quantiferon gold lab placed

## 2022-05-03 ENCOUNTER — TELEPHONE (OUTPATIENT)
Dept: GASTROENTEROLOGY | Facility: CLINIC | Age: 52
End: 2022-05-03

## 2022-05-03 NOTE — TELEPHONE ENCOUNTER
Attempted to contact patient regarding upcoming colonoscopy procedure on 5.12.2022 for pre assessment questions.     Pt had a colonoscopy 1.17.2022.  Pt is unsure why she has another colonoscopy scheduled and did not remember that one was scheduled.    Message to referring provider for clarification if colonoscopy is needed.     Bowel prep recommendation: extended prep d/t constipation.    Nurys Fischer RN

## 2022-05-09 ENCOUNTER — TELEPHONE (OUTPATIENT)
Dept: GASTROENTEROLOGY | Facility: CLINIC | Age: 52
End: 2022-05-09

## 2022-05-09 NOTE — TELEPHONE ENCOUNTER
"Per Dr. Nolasco \"Pt did have colonoscopy in January.  She does NOT need colonoscopy now.  But she does need to follow up in IBD clinic.\"    RN informed pt of message above.  Colonoscopy for 5.12.2022 will be canceled.  Dr. Nolasco asked his nurse to reach out to pt to set up an IBD clinic appt.    Pt has no further questions or concerns.      Nurys Fischer RN    "

## 2022-05-09 NOTE — TELEPHONE ENCOUNTER
"    Procedure: COLON    Date, Location, and Surgeon of Procedure Cancelled: 05/12 [facility]    Ordering Provider:Romeo Landin    Reason for cancel (please be detailed, any staff messages or encounters to note?): PATIENT NOT DUE FOR COLONOSCOPY          ----- Message from Nurys Fischer RN sent at 5/9/2022  8:51 AM CDT -----  Regarding: FW: colonoscopy referral  Please cancel pt's procedure on 5/12 [facility].  See message below.      Writer contacted pt and informed her the colonoscopy will be canceled.    Thank you,  Nurys Fischer RN  Pre-Assessment Endoscopy  ----- Message -----  From: Kwasi Nolasco MD  Sent: 5/9/2022   8:50 AM CDT  To: Devora Butler, KAMERON, Romeo Landin, #  Subject: RE: colonoscopy referral                         Pt did have colonoscopy in January.    She does NOT need colonoscopy now.    But she does need to follow up in IBD clinic. Devora, please help arrange nonurgent IBD clinic follow up with myself or Adam SHEIKH    ----- Message -----  From: Nurys Fischer RN  Sent: 5/9/2022   8:27 AM CDT  To: Kwasi Nolasco MD, Devora Butler, RN, #  Subject: FW: colonoscopy referral                         Looking for clarification on whether this pt needs a colonoscopy this Thursday 5/12.    Thank you,  Vinny  ----- Message -----  From: Nurys Fischer RN  Sent: 5/3/2022   1:49 PM CDT  To: Romeo Landin, Endoscopy Nurse Pool  Subject: colonoscopy referral                             Dr. Landin,    Looking for clarification on colonoscopy order placed 4.4.2022; indication: screening.  Per 3.28.2022 OV notes    \"# ulcerative colitis - Stable   -Continue infusion   -UTD for colonoscopy\"     RN contacted pt to talk about upcoming colonoscopy.  Pt stated she was unsure why she has one scheduled as she had a colonoscopy 1.17.2022.    I apologize I could not find from the 1/17/2022 colonoscopy report or surg path what the recommendation was for a repeat screening colonoscopy.    Thank you for any " clarification,  Nurys Fischer RN  Pre-Assessment Endoscopy                    Rescheduled: NO

## 2022-05-11 ENCOUNTER — PATIENT OUTREACH (OUTPATIENT)
Dept: GASTROENTEROLOGY | Facility: CLINIC | Age: 52
End: 2022-05-11
Payer: COMMERCIAL

## 2022-05-11 NOTE — TELEPHONE ENCOUNTER
Patient had missed/no show a couple of ibd pt '  Overdue for appt   Scheduled with Ms Brooke video visit   Left a voice mail message and my chart message

## 2022-06-01 ENCOUNTER — INFUSION THERAPY VISIT (OUTPATIENT)
Dept: INFUSION THERAPY | Facility: CLINIC | Age: 52
End: 2022-06-01
Attending: INTERNAL MEDICINE
Payer: COMMERCIAL

## 2022-06-01 VITALS
SYSTOLIC BLOOD PRESSURE: 108 MMHG | TEMPERATURE: 98.1 F | BODY MASS INDEX: 30.2 KG/M2 | RESPIRATION RATE: 18 BRPM | DIASTOLIC BLOOD PRESSURE: 69 MMHG | WEIGHT: 170.5 LBS | OXYGEN SATURATION: 96 % | HEART RATE: 78 BPM

## 2022-06-01 DIAGNOSIS — K50.10 CROHN'S COLITIS, WITHOUT COMPLICATIONS (H): ICD-10-CM

## 2022-06-01 DIAGNOSIS — K51.00 PANCOLITIS (H): Primary | ICD-10-CM

## 2022-06-01 LAB
ALBUMIN SERPL-MCNC: 3.5 G/DL (ref 3.4–5)
ALP SERPL-CCNC: 66 U/L (ref 40–150)
ALT SERPL W P-5'-P-CCNC: 24 U/L (ref 0–50)
AST SERPL W P-5'-P-CCNC: 27 U/L (ref 0–45)
BASOPHILS # BLD AUTO: 0 10E3/UL (ref 0–0.2)
BASOPHILS NFR BLD AUTO: 0 %
BILIRUB DIRECT SERPL-MCNC: <0.1 MG/DL (ref 0–0.2)
BILIRUB SERPL-MCNC: 0.3 MG/DL (ref 0.2–1.3)
CRP SERPL-MCNC: <2.9 MG/L (ref 0–8)
EOSINOPHIL # BLD AUTO: 0.4 10E3/UL (ref 0–0.7)
EOSINOPHIL NFR BLD AUTO: 4 %
ERYTHROCYTE [DISTWIDTH] IN BLOOD BY AUTOMATED COUNT: 14.8 % (ref 10–15)
ERYTHROCYTE [SEDIMENTATION RATE] IN BLOOD BY WESTERGREN METHOD: 21 MM/HR (ref 0–30)
HCT VFR BLD AUTO: 34.3 % (ref 35–47)
HGB BLD-MCNC: 11.6 G/DL (ref 11.7–15.7)
IMM GRANULOCYTES # BLD: 0 10E3/UL
IMM GRANULOCYTES NFR BLD: 0 %
LYMPHOCYTES # BLD AUTO: 3.3 10E3/UL (ref 0.8–5.3)
LYMPHOCYTES NFR BLD AUTO: 33 %
MCH RBC QN AUTO: 29.1 PG (ref 26.5–33)
MCHC RBC AUTO-ENTMCNC: 33.8 G/DL (ref 31.5–36.5)
MCV RBC AUTO: 86 FL (ref 78–100)
MONOCYTES # BLD AUTO: 0.9 10E3/UL (ref 0–1.3)
MONOCYTES NFR BLD AUTO: 9 %
NEUTROPHILS # BLD AUTO: 5.4 10E3/UL (ref 1.6–8.3)
NEUTROPHILS NFR BLD AUTO: 54 %
NRBC # BLD AUTO: 0 10E3/UL
NRBC BLD AUTO-RTO: 0 /100
PLATELET # BLD AUTO: 369 10E3/UL (ref 150–450)
PROT SERPL-MCNC: 7.2 G/DL (ref 6.8–8.8)
RBC # BLD AUTO: 3.99 10E6/UL (ref 3.8–5.2)
WBC # BLD AUTO: 10 10E3/UL (ref 4–11)

## 2022-06-01 PROCEDURE — 86481 TB AG RESPONSE T-CELL SUSP: CPT

## 2022-06-01 PROCEDURE — 999N000248 HC STATISTIC IV INSERT WITH US BY RN

## 2022-06-01 PROCEDURE — 85025 COMPLETE CBC W/AUTO DIFF WBC: CPT | Performed by: INTERNAL MEDICINE

## 2022-06-01 PROCEDURE — 36415 COLL VENOUS BLD VENIPUNCTURE: CPT | Performed by: INTERNAL MEDICINE

## 2022-06-01 PROCEDURE — 86140 C-REACTIVE PROTEIN: CPT | Performed by: INTERNAL MEDICINE

## 2022-06-01 PROCEDURE — 250N000011 HC RX IP 250 OP 636: Performed by: INTERNAL MEDICINE

## 2022-06-01 PROCEDURE — 258N000003 HC RX IP 258 OP 636: Performed by: INTERNAL MEDICINE

## 2022-06-01 PROCEDURE — 36415 COLL VENOUS BLD VENIPUNCTURE: CPT

## 2022-06-01 PROCEDURE — 85652 RBC SED RATE AUTOMATED: CPT | Performed by: INTERNAL MEDICINE

## 2022-06-01 PROCEDURE — 80076 HEPATIC FUNCTION PANEL: CPT | Performed by: INTERNAL MEDICINE

## 2022-06-01 PROCEDURE — 96413 CHEMO IV INFUSION 1 HR: CPT

## 2022-06-01 RX ORDER — DIPHENHYDRAMINE HCL 25 MG
25 CAPSULE ORAL ONCE
Status: CANCELLED
Start: 2022-07-08 | End: 2022-07-08

## 2022-06-01 RX ORDER — ALBUTEROL SULFATE 0.83 MG/ML
2.5 SOLUTION RESPIRATORY (INHALATION)
Status: CANCELLED | OUTPATIENT
Start: 2022-07-08

## 2022-06-01 RX ORDER — NALOXONE HYDROCHLORIDE 0.4 MG/ML
0.2 INJECTION, SOLUTION INTRAMUSCULAR; INTRAVENOUS; SUBCUTANEOUS
Status: CANCELLED | OUTPATIENT
Start: 2022-07-08

## 2022-06-01 RX ORDER — METHYLPREDNISOLONE SODIUM SUCCINATE 125 MG/2ML
125 INJECTION, POWDER, LYOPHILIZED, FOR SOLUTION INTRAMUSCULAR; INTRAVENOUS
Status: CANCELLED
Start: 2022-07-08

## 2022-06-01 RX ORDER — EPINEPHRINE 1 MG/ML
0.3 INJECTION, SOLUTION INTRAMUSCULAR; SUBCUTANEOUS EVERY 5 MIN PRN
Status: CANCELLED | OUTPATIENT
Start: 2022-07-08

## 2022-06-01 RX ORDER — DIPHENHYDRAMINE HYDROCHLORIDE 50 MG/ML
50 INJECTION INTRAMUSCULAR; INTRAVENOUS
Status: CANCELLED
Start: 2022-07-08

## 2022-06-01 RX ORDER — ACETAMINOPHEN 325 MG/1
650 TABLET ORAL ONCE
Status: CANCELLED
Start: 2022-07-08 | End: 2022-07-08

## 2022-06-01 RX ORDER — ALBUTEROL SULFATE 90 UG/1
1-2 AEROSOL, METERED RESPIRATORY (INHALATION)
Status: CANCELLED
Start: 2022-07-08

## 2022-06-01 RX ORDER — MEPERIDINE HYDROCHLORIDE 25 MG/ML
25 INJECTION INTRAMUSCULAR; INTRAVENOUS; SUBCUTANEOUS EVERY 30 MIN PRN
Status: CANCELLED | OUTPATIENT
Start: 2022-07-08

## 2022-06-01 RX ADMIN — SODIUM CHLORIDE 600 MG: 9 INJECTION, SOLUTION INTRAVENOUS at 16:25

## 2022-06-01 ASSESSMENT — PAIN SCALES - GENERAL: PAINLEVEL: NO PAIN (0)

## 2022-06-01 NOTE — PATIENT INSTRUCTIONS
Dear Shelly Cha    Thank you for choosing [facility] ([facility]) for your infusion.  The following information is a summary of our appointment as well as important reminders.      We look forward in seeing you on your next appointment here at [facility] ([facility]).  Please don t hesitate to call us at [phone] to reschedule any of your appointments or to speak with one of the [facility] registered nurses.  It was a pleasure taking care of you today.    Sincerely,    [facility]  [facility]  [address] [place] [address]  [address]  Phone:  [phone]

## 2022-06-01 NOTE — LETTER
6/1/2022         RE: Shelly Cha  [address]  [place] [address]        Dear Colleague,    Thank you for referring your patient, Shelly Cha, to the [facility]. Please see a copy of my visit note below.    Nursing Note  Shelly Cha presents [facility] for:   Chief Complaint   Patient presents with     Infusion     Infliximab     During today's [facility] appointment, orders from Dr Nolasco were completed.  Frequency: every 6 weeks    Progress note:  Patient identification verified by name and date of birth.  Assessment completed.  Vitals recorded in Doc Flowsheets.  Patient was provided with education regarding medication/procedure and possible side effects.  Patient verbalized understanding.     present during visit today: Not Applicable.    Treatment Conditions: ~~~ NOTE: If the patient answers yes to any of the questions below, hold the infusion and contact ordering provider or on-call provider.    1. Have you recently had an elevated temperature, fever, chills, productive cough, coughing for 33 weeks or longer or hemoptysis, abnormal vital signs, night sweats, chest pain or have you noticed a decrease in your appetite, unexplained weight loss or fatigue? No  2. Do you have any open wounds or new incisions? No  3. Do you have any recent or upcoming hospitalizations, surgeries or dental procedures? No  4. Do you currently have or recently have had any signs of illness or infection or are you on any antibiotics? No  5. Have you had any new, sudden or worsening abdominal pain? No  6. Have you or anyone in your household received a live vaccination in the past 4 weeks? Please note:  No live vaccines while on biologic/chemotherapy until 6 months after the last treatment.  Patient can receive the flu vaccine (shot only) and the pneumovax.  It is optimal for the patient to  get these vaccines mid cycle, but they can be given at any time as long as it is not on the day of the infusion. No  7. Have you recently been diagnosed with any new nervous system diseases (ie. Multiple sclerosis, Guillain [place], seizures, neurological changes) or cancer diagnosis? No  8. Are you on any form of radiation or chemotherapy? No  9. Are you pregnant or breast feeding or do you have plans of pregnancy in the future? No  10. Have you been having any signs of worsening depression or suicidal ideations?  (benlysta only) NA  11. Have there been any other new onset medical symptoms? No      Premedications: historically pt has not taken premedications      Infusion length and rate:  infusion given over approximately 1 hours    Labs: were drawn per orders.     Vascular access: peripheral IV was placed by vascular access nurse.    Is the next appt scheduled? No - message sent  Asymptomatic COVID test completed? no    Report given to Ellen MELO at 1630. Care transferred at this time.   Post Infusion Assessment:  Patient tolerated infusion without incident.  Site patent and intact, free from redness, edema or discomfort.  Access discontinued per protocol.  Biologic Infusion Post Education: Call the triage nurse at your clinic or seek medical attention if you have chills and/or temperature greater than or equal to 100.5, uncontrolled nausea/vomiting, diarrhea, constipation, dizziness, shortness of breath, chest pain, heart palpitations, weakness or any other new or concerning symptoms, questions or concerns.  You cannot have any live virus vaccines prior to or during treatment or up to 6 months post infusion.  If you have an upcoming surgery, medical procedure or dental procedure during treatment, this should be discussed with your ordering physician and your surgeon/dentist.  If you are having any concerning symptom, if you are unsure if you should get your next infusion or wish to speak to a provider before your  next infusion, please call your care coordinator or triage nurse at your clinic to notify them so we can adequately serve you.     Discharge Plan:   Follow up plan of care with: ongoing infusions at [facility]., ordering provider as scheduled. and after visit summary given to patient  Discharge instructions were reviewed with patient.  Patient/representative verbalized understanding of discharge instructions and all questions answered.  Patient discharged from [facility] in stable condition.    Abby Mcwilliams RN       Administrations This Visit     inFLIXimab-dyyb (INFLECTRA) 600 mg in sodium chloride 0.9 % 335 mL infusion     Admin Date  06/01/2022 Action  New Bag Dose  600 mg Rate  335 mL/hr Route  Intravenous Administered By  Abby Mcwilliams RN              /78 (BP Location: Left arm, Patient Position: Sitting)   Pulse 78   Temp 98.1  F (36.7  C) (Oral)   Resp 18   Wt 77.3 kg (170 lb 8 oz)   SpO2 96%   BMI 30.20 kg/m          Again, thank you for allowing me to participate in the care of your patient.        Sincerely,        [facility] [place]

## 2022-06-01 NOTE — PROGRESS NOTES
Nursing Note  Shelly Cha presents today to [facility] for:   Chief Complaint   Patient presents with     Infusion     Infliximab     During today's [facility] appointment, orders from Dr Nolasco were completed.  Frequency: every 6 weeks    Progress note:  Patient identification verified by name and date of birth.  Assessment completed.  Vitals recorded in Doc Flowsheets.  Patient was provided with education regarding medication/procedure and possible side effects.  Patient verbalized understanding.     present during visit today: Not Applicable.    Treatment Conditions: ~~~ NOTE: If the patient answers yes to any of the questions below, hold the infusion and contact ordering provider or on-call provider.    1. Have you recently had an elevated temperature, fever, chills, productive cough, coughing for 33 weeks or longer or hemoptysis, abnormal vital signs, night sweats, chest pain or have you noticed a decrease in your appetite, unexplained weight loss or fatigue? No  2. Do you have any open wounds or new incisions? No  3. Do you have any recent or upcoming hospitalizations, surgeries or dental procedures? No  4. Do you currently have or recently have had any signs of illness or infection or are you on any antibiotics? No  5. Have you had any new, sudden or worsening abdominal pain? No  6. Have you or anyone in your household received a live vaccination in the past 4 weeks? Please note:  No live vaccines while on biologic/chemotherapy until 6 months after the last treatment.  Patient can receive the flu vaccine (shot only) and the pneumovax.  It is optimal for the patient to get these vaccines mid cycle, but they can be given at any time as long as it is not on the day of the infusion. No  7. Have you recently been diagnosed with any new nervous system diseases (ie. Multiple sclerosis, Guillain [place], seizures, neurological changes) or cancer  diagnosis? No  8. Are you on any form of radiation or chemotherapy? No  9. Are you pregnant or breast feeding or do you have plans of pregnancy in the future? No  10. Have you been having any signs of worsening depression or suicidal ideations?  (benlysta only) NA  11. Have there been any other new onset medical symptoms? No      Premedications: historically pt has not taken premedications      Infusion length and rate:  infusion given over approximately 1 hours    Labs: were drawn per orders.     Vascular access: peripheral IV was placed by vascular access nurse.    Is the next appt scheduled? No - message sent  Asymptomatic COVID test completed? no    Report given to Ellen MELO at 1630. Care transferred at this time.   Post Infusion Assessment:  Patient tolerated infusion without incident.  Site patent and intact, free from redness, edema or discomfort.  Access discontinued per protocol.  Biologic Infusion Post Education: Call the triage nurse at your clinic or seek medical attention if you have chills and/or temperature greater than or equal to 100.5, uncontrolled nausea/vomiting, diarrhea, constipation, dizziness, shortness of breath, chest pain, heart palpitations, weakness or any other new or concerning symptoms, questions or concerns.  You cannot have any live virus vaccines prior to or during treatment or up to 6 months post infusion.  If you have an upcoming surgery, medical procedure or dental procedure during treatment, this should be discussed with your ordering physician and your surgeon/dentist.  If you are having any concerning symptom, if you are unsure if you should get your next infusion or wish to speak to a provider before your next infusion, please call your care coordinator or triage nurse at your clinic to notify them so we can adequately serve you.     Discharge Plan:   Follow up plan of care with: ongoing infusions at [facility]., ordering provider as scheduled.  and after visit summary given to patient  Discharge instructions were reviewed with patient.  Patient/representative verbalized understanding of discharge instructions and all questions answered.  Patient discharged from [facility] in stable condition.    Abby Mcwilliams RN       Administrations This Visit     inFLIXimab-dyyb (INFLECTRA) 600 mg in sodium chloride 0.9 % 335 mL infusion     Admin Date  06/01/2022 Action  New Bag Dose  600 mg Rate  335 mL/hr Route  Intravenous Administered By  Abby Mcwilliams RN              /78 (BP Location: Left arm, Patient Position: Sitting)   Pulse 78   Temp 98.1  F (36.7  C) (Oral)   Resp 18   Wt 77.3 kg (170 lb 8 oz)   SpO2 96%   BMI 30.20 kg/m

## 2022-06-03 LAB
GAMMA INTERFERON BACKGROUND BLD IA-ACNC: 0.02 IU/ML
M TB IFN-G BLD-IMP: NEGATIVE
M TB IFN-G CD4+ BCKGRND COR BLD-ACNC: 9.98 IU/ML
MITOGEN IGNF BCKGRD COR BLD-ACNC: 0.05 IU/ML
MITOGEN IGNF BCKGRD COR BLD-ACNC: 0.06 IU/ML
QUANTIFERON MITOGEN: 10 IU/ML
QUANTIFERON NIL TUBE: 0.02 IU/ML
QUANTIFERON TB1 TUBE: 0.07 IU/ML
QUANTIFERON TB2 TUBE: 0.08

## 2022-06-04 ENCOUNTER — HEALTH MAINTENANCE LETTER (OUTPATIENT)
Age: 52
End: 2022-06-04

## 2022-06-07 ENCOUNTER — MYC MEDICAL ADVICE (OUTPATIENT)
Dept: GASTROENTEROLOGY | Facility: CLINIC | Age: 52
End: 2022-06-07
Payer: COMMERCIAL

## 2022-06-14 NOTE — TELEPHONE ENCOUNTER
Called to remind patient of their upcoming appointment with our GI clinic, on Thursday 6/23/2022 at 10:40AM with Adam Brooke. This appointment is scheduled as a video visit. You will receive a call approximately 30 minutes prior to check you in, you must be in [place] for this visit., if your appointment is virtual (video or telephone) you need to be in [place] for the visit. To reschedule or cancel patient to call [phone].    Betty Bender MA

## 2022-06-23 ENCOUNTER — VIRTUAL VISIT (OUTPATIENT)
Dept: GASTROENTEROLOGY | Facility: CLINIC | Age: 52
End: 2022-06-23
Payer: COMMERCIAL

## 2022-06-23 DIAGNOSIS — K50.10 CROHN'S COLITIS, WITHOUT COMPLICATIONS (H): Primary | ICD-10-CM

## 2022-06-23 PROCEDURE — 99214 OFFICE O/P EST MOD 30 MIN: CPT | Mod: 95 | Performed by: PHYSICIAN ASSISTANT

## 2022-06-23 ASSESSMENT — PAIN SCALES - GENERAL: PAINLEVEL: NO PAIN (0)

## 2022-06-23 NOTE — PROGRESS NOTES
Shelly is a 52 year old who is being evaluated via a billable video visit.      How would you like to obtain your AVS? MyChart  If the video visit is dropped, the invitation should be resent by: Send to e-mail at: [email or web address]  Will anyone else be joining your video visit? No        Video-Visit Details    Video Start Time: 1048 AM    Type of service:  Video Visit    Video End Time:10:57 AM    Originating Location (pt. Location): Home    Distant Location (provider location):  [facility] [place]     Platform used for Video Visit: [employer], due to technical reasons    /IBD CLINIC VISIT    CC/REFERRING MD:  No ref. provider found    REASON FOR CONSULTATION: follow up IBD colitis    ASSESSMENT/PLAN:  #. Severe colitis:   Current medication: infliximab 7.5mg/kg every 6 weeks  Current clinical disease activity: remission   Current endoscopic disease activity: Haile 0 on colonoscopy 1/17/22  Clinically doing well on infliximab therapy with recent scope showing endoscopic remission. Continue with current plan.   -- Continue IFX (7.5 mg/kg q 6 wks)  -- Labs with infusions (CBC, CRP, ESR, CRP)     #. Constipation: Likely drive by methadone, now discontinued   Despite discontinuing methodone, patinet continues with 2 BM per week. Would recommend regular doses of Miralax, 2 caps full daily.       #. H pylori gastritis: Status post triple therapy.  Stool antigen now negative.     #. Anemia: This has improved but a mild anemia persists.  We will continue to follow this on treatment.       #. Hepatitis C: Spontaneously cleared.  No further evaluation or treatment is needed.    #. Epigastric/generalized abdominal pain - today this is not present.  Note that previous ddx includes constipation vs dyspepsia vs ongoing h pylori infection (documented eradication) vs functional vs visceral hypersensitivity vs pancreas/biliary (felt to be much less likely)  -maximize constipation management    IBD  HISTORY  Age at diagnosis: 51  Extent of disease: colon (pan-colitis - sparing of rectosigmoid - distal 15 cm)  Disease phenotype: inflammatory  [place]-anal disease: none  Current CD medications: infliximab 7.5 mg/kg q 6 weeks (dose increase 9/2021)  Prior IBD surgeries: none  Prior IBD Medications: none    DRUG MONITORING  TPMT enzyme activity: adequate    6-TGN/6-MMPN levels: none    Biologic concentration:  -IFX 8/11/21 - 3 with no antibodies    DISEASE ASSESSMENT  Labs  Recent Labs   Lab Test 06/01/22  1526 04/20/22  1433   CRP <2.9 5.0   SED 21 19     Fecal calprotectin: 5000 prior to treatment -->>113 (8/12/21)  -colonoscopy - severe inflammation colon (distal 15 cm spared). Ileum not seen  - normal EGD but gastric biopsis + h pylori  Enterography: MRE with normal small bowel   C diff: negative 1/22/21       sIBDQ:   No flowsheet data found.    IBD Health Care Maintenance:    Vaccinations:  All patients on biologics should avoid live vaccines.    -- Influenza (every year)  -- TdaP (every 10 years)  -- Pneumococcal Pneumonia (once plus booster at 5 years)  -- Yearly assessment for latent Tb (verbal screening and exam, PPD or QuantiFERON-Tb testing)     One time confirmation of immunity or serologies:  -- Hepatitis A (serologies or immunizations)  -- Hepatitis B (serologies or immunizations)  -- Varicella  -- MMR  -- HPV (all aged 18-26)  -- Meningococcal meningitis (all patients at risk for meningitis)  -- Due to the immunosuppression in this patient, I would not advise administration of live vaccines such as varicella/VZV, intranasal influenza, MMR, or yellow fever vaccine (if travelling).       Bone mineral density screening   -- Recommend all patients supplement with calcium and vitamin D     Cancer Screening:  Colon cancer screening:  Given panncolonic disease, recommend patient undergo regular dysplasia surveillance   Next dysplasia screening is recommended: given pseudopolyps I recommend we start dysplasia  surveillance on next colonoscopy in 6-12 months.  Dysplasia surveillance will be difficult given the number of pseudopolyps that she is having.     Cervical cancer screening: Annual due to immunosupression     Skin cancer screening: Annual visual exam of skin by dermatologist since patient is immunocompromised     Depression Screening:  -- Over the last month, have you felt down, depressed, or hopeless? no  -- Over the last month, have you felt little interest or pleasure doing things? no     Misc:  -- Avoid tobacco use  -- Avoid NSAIDs as there is potentially a 25% chance of causing an IBD flare    Return to clinic in 3 months with Adam BLANTON    Thank you for this consultation.  It was a pleasure to participate in the care of this patient; please contact us with any further questions.       Adam Brooke PA-C  Division of Gastroenterology, Hepatology and Nutrition  [facility]     HPI:   Currently, here to follow up. Recently increased to 7.5 mg/kg q 6 weeks.    Overall doing well.     Colitis - feels good. Having 2 BMs per week, formed, no blood. She has long standing constipation. She has not been taking miralax or citrucel.    Constipation - as above    Abdominal pain - None at this time.   She is no longer on methadone (for history of opioid abuse).  She is on methadone 60 mg daily (for history of opioid abuse).    ROS:    No fevers or chills  No weight loss  No blurry vision, double vision or change in vision  No sore throat  No lymphadenopathy  No headache, paraesthesias, or weakness in a limb  No shortness of breath or wheezing  No chest pain or pressure  No arthralgias or myalgias  No rashes or skin changes  No odynophagia or dysphagia  No BRBPR, hematochezia, melena  No dysuria, frequency or urgency  No hot/cold intolerance or polyria  No anxiety or depression    Extra intestinal manifestations of IBD:  No uveitis/episcleritis  No aphthous ulcers   No arthritis   No erythema nodosum/pyoderma  gangrenosum.     PERTINENT PAST MEDICAL HISTORY:  Past Medical History:   Diagnosis Date     Pancolitis (H) 3/11/2021     Pancolitis (H) 3/11/2021       PREVIOUS SURGERIES:  Past Surgical History:   Procedure Laterality Date     BIOPSY      right breast     CHOLECYSTECTOMY       GYN SURGERY      , tubal       PREVIOUS ENDOSCOPY:  No results found for this or any previous visit.]    ALLERGIES:     Allergies   Allergen Reactions     Ibuprofen Swelling     Eyes swelling     Lactose Diarrhea     Tylenol [Acetaminophen]      Feels like someone punched her in the stomach       PERTINENT MEDICATIONS:    Current Outpatient Medications:      albuterol (ACCUNEB) 1.25 MG/3ML neb solution, Inhale 1.25 mg into the lungs, Disp: , Rfl:      BANOPHEN 25 MG capsule, TAKE 2 CAPSULES BY MOUTH EVERY NIGHT AT BEDTIME AS NEEDED FOR SLEEP, Disp: , Rfl:      FLOVENT DISKUS 250 MCG/BLIST inhaler, INHALE 1 PUFF INTO THE LUNGS TWICE DAILY, Disp: , Rfl:      gabapentin (NEURONTIN) 300 MG capsule, Take 300 mg by mouth 4 times daily , Disp: , Rfl:      melatonin 5 MG tablet, Take 5 mg by mouth daily, Disp: , Rfl:      benzocaine (ANBESOL) 10 % gel, Take by mouth 4 times daily as needed for moderate pain (apply to ulcer in mouth as needed) (Patient not taking: Reported on 6/23/2022), Disp: 9 g, Rfl: 1     bisacodyl (DULCOLAX) 5 MG EC tablet, Take 2 tablets by mouth at 10am the day before procedure. (Patient not taking: No sig reported), Disp: 2 tablet, Rfl: 0     Cholecalciferol (VITAMIN D3) 50 MCG (2000 UT) TABS, Take 1 tablet by mouth daily (Patient not taking: Reported on 6/23/2022), Disp: , Rfl:      folic acid 800 MCG tablet, Take 800 mcg by mouth daily (Patient not taking: No sig reported), Disp: , Rfl:      magnesium citrate solution, Drink entire bottle at 4pm two days prior to procedure. (Patient not taking: No sig reported), Disp: 296 mL, Rfl: 0     METHADONE HCL PO, Take 48 mg by mouth daily  (Patient not taking: No sig reported),  Disp: , Rfl:      methylcellulose (CITRUCEL) powder, Take 0.85 g (3 teaspoonful) by mouth daily (Patient not taking: No sig reported), Disp: 90 g, Rfl: 3     minoxidil (ROGAINE) 2 % external solution, , Disp: , Rfl:      Multiple Vitamin (DAILY-JUSTIN MULTIVITAMIN) TABS, Take 1 tablet by mouth daily (Patient not taking: No sig reported), Disp: , Rfl:      omeprazole (PRILOSEC) 20 MG DR capsule, Take 1 capsule (20 mg) by mouth daily (Patient not taking: No sig reported), Disp: 30 capsule, Rfl: 1     polyethylene glycol (GOLYTELY) 236 g suspension, Take as directed. One day before your exam fill the first container with water. Cover and shake until mixed well. At 3:00pm drink one 8oz glass every 10-15 minutes until half of the first container is empty. Store the remainder in the refrigerator. At 8:00pm drink the second half of the first container until it is gone. Before you go to bed mix the second container with water and put in refrigerator. Six hours before your check in time drink one 8oz glass every 10-15 minutes until half of container is empty. Discard the remainder of solution. (Patient not taking: No sig reported), Disp: 8000 mL, Rfl: 0     polyethylene glycol (MIRALAX) 17 GM/Dose powder, Take 1 capful daily to have 1-2 soft BMs per day. Can increase up to 3 capfuls per day if needed to get desired result (Patient not taking: Reported on 6/23/2022), Disp: 507 g, Rfl: 3     Thiamine Mononitrate (B1) 100 MG TABS, Take 1 tablet by mouth daily (Patient not taking: No sig reported), Disp: , Rfl:      WAL-DRYL ALLERGY 25 MG tablet, TAKE 2 TABLETS BY MOUTH EVERY NIGHT AT BEDTIME AS NEEDED SLEEPLESSNESS (Patient not taking: No sig reported), Disp: , Rfl:      WAL-MUCIL 58.6 % powder, TAKE 2.5 GRAMS TWICE DAILY (Patient not taking: No sig reported), Disp: , Rfl:     SOCIAL HISTORY:  Social History     Socioeconomic History     Marital status: Single     Spouse name: Not on file     Number of children: Not on file      Years of education: Not on file     Highest education level: Not on file   Occupational History     Not on file   Tobacco Use     Smoking status: Former Smoker     Quit date: 2021     Years since quittin.4     Smokeless tobacco: Never Used   Substance and Sexual Activity     Alcohol use: No     Drug use: No     Sexual activity: Yes     Partners: Male   Other Topics Concern     Not on file   Social History Narrative     Not on file     Social Determinants of Health     Financial Resource Strain: Not on file   Food Insecurity: Not on file   Transportation Needs: Not on file   Physical Activity: Not on file   Stress: Not on file   Social Connections: Not on file   Intimate Partner Violence: Not on file   Housing Stability: Not on file       FAMILY HISTORY:  No family history on file.    Past/family/social history reviewed and no changes    PHYSICAL EXAMINATION:  Constitutional: aaox3, cooperative, pleasant, not dyspneic/diaphoretic, no acute distress  Vitals reviewed: There were no vitals taken for this visit.  Wt:   Wt Readings from Last 2 Encounters:   22 77.3 kg (170 lb 8 oz)   22 79.3 kg (174 lb 14.4 oz)      Constitutional - general appearance is well and in no acute distress. Body habitus normal  Eyes - No redness or discharge  Respiratory - No cough, unlabored breathing  Musculoskeletal - range of motion intact: Neck and arms  Skin - No discoloration or lesions  Neurological - No tremors, headaches  Psychiatric - No anxiety, alert & oriented      PERTINENT STUDIES:  Most recent CBC:  Recent Labs   Lab Test 22  1526 22  1433   WBC 10.0 11.6*   HGB 11.6* 12.7   HCT 34.3* 39.1    317     Most recent hepatic panel:  Recent Labs   Lab Test 22  1526 22  1433   ALT 24 30   AST 27 34     Most recent creatinine:  Recent Labs   Lab Test 21  1236 21  1450   CR 0.72 0.84

## 2022-06-23 NOTE — PATIENT INSTRUCTIONS
It was a pleasure taking care of you today.  I've included a brief summary of our discussion and care plan from today's visit below.  Please review this information with your primary care provider.  ______________________________________________________________________    My recommendations are summarized as follows:    -- Continue remicade infusions   -- Labs with infusions  -- Next endoscopic assessment: Colonoscopy this fall   -- Patient with IBD we recommend supplementation vitamin D 1000 units daily and calcium 500 mg twice daily.  -- Vaccines/immunizations to be updated: Recommend yearly flu shot, pneumonia vaccines (Prevnar 13 then 8 weeks later Pneumovax 23 then 5 years later Pneumovax 23), tetanus every 10 years.  -- Yearly Dermatology visit for skin check while on immunosuppressive therapy. Can call [phone] to schedule.  -- Yearly pap smear while on immunosuppressive therapy  -- No NSAIDs (ibuprofen, or anything containing ibuprofen)     For additional resources about inflammatory bowel disease visit [email or web address]    Return to GI Clinic in 3 months to review your progress.    ______________________________________________________________________    How do I schedule labs, imaging studies, or procedures that were ordered in clinic today?     Labs: To schedule lab appointment at the [facility], use my chart or call [phone]. If you have a [facility] lab closer to home where you are regularly seen you can give them a call.     Procedures: If a colonoscopy, upper endoscopy, breath test, esophageal manometry, or pH impedence was ordered today, our endoscopy team will call you to schedule this. If you have not heard from our endoscopy team within a week, please call [phone] to schedule.     Imaging Studies: If you were scheduled for a CT scan, X-ray, MRI, ultrasound, HIDA scan or other imaging study, please call [phone] to have this scheduled.      Referral: If a referral to another specialty was ordered, expect a phone call or follow instructions above. If you have not heard from anyone regarding your referral in a week, please call our clinic to check the status.     Who do I call with any questions after my visit?  Please be in touch if there are any further questions that arise following today's visit.  There are multiple ways to contact your gastroenterology care team.      During business hours, you may reach a Gastroenterology nurse at [phone]    To schedule or reschedule an appointment, please call [phone].     You can always send a secure message through [email or web address].  [email or web address] messages are answered by your nurse or doctor typically within 24 hours.  Please allow extra time on weekends and holidays.      For urgent/emergent questions after business hours, you may reach the on-call GI Fellow by contacting [facility]  at [phone].     How will I get the results of any tests ordered?    You will receive all of your results.  If you have signed up for [employer], any tests ordered at your visit will be available to you after your physician reviews them.  Typically this takes 1-2 weeks.  If there are urgent results that require a change in your care plan, your physician or nurse will call you to discuss the next steps.      What is [email or web address]?  [email or web address] is a secure way for you to access all of your healthcare records from the [facility].  It is a web based computer program, so you can sign on to it from any location.  It also allows you to send secure messages to your care team.  I recommend signing up for [email or web address] access if you have not already done so and are comfortable with using a computer.         Sincerely,    Adam Brooke PA-C  [facility]  Division of Gastroenterology

## 2022-06-23 NOTE — LETTER
6/23/2022         RE: Shelly Cha  [address]  [facility] [address]        Dear Colleague,    Thank you for referring your patient, Shelly Cha, to the [facility] [place]. Please see a copy of my visit note below.    IBD CLINIC VISIT    CC/REFERRING MD:  No ref. provider found    REASON FOR CONSULTATION: follow up IBD colitis    ASSESSMENT/PLAN:  #. Severe colitis:   Current medication: infliximab 7.5mg/kg every 6 weeks  Current clinical disease activity: remission   Current endoscopic disease activity: Haile 0 on colonoscopy 1/17/22  Clinically doing well on infliximab therapy with recent scope showing endoscopic remission. Continue with current plan.   -- Continue IFX (7.5 mg/kg q 6 wks)  -- Labs with infusions (CBC, CRP, ESR, CRP)     #. Constipation: Likely drive by methadone, now discontinued   Despite discontinuing methodone, patinet continues with 2 BM per week. Would recommend regular doses of Miralax, 2 caps full daily.       #. H pylori gastritis: Status post triple therapy.  Stool antigen now negative.     #. Anemia: This has improved but a mild anemia persists.  We will continue to follow this on treatment.       #. Hepatitis C: Spontaneously cleared.  No further evaluation or treatment is needed.    #. Epigastric/generalized abdominal pain - today this is not present.  Note that previous ddx includes constipation vs dyspepsia vs ongoing h pylori infection (documented eradication) vs functional vs visceral hypersensitivity vs pancreas/biliary (felt to be much less likely)  -maximize constipation management    IBD HISTORY  Age at diagnosis: 51  Extent of disease: colon (pan-colitis - sparing of rectosigmoid - distal 15 cm)  Disease phenotype: inflammatory  [place]-anal disease: none  Current CD medications: infliximab 7.5 mg/kg q 6 weeks (dose increase 9/2021)  Prior IBD surgeries: none  Prior IBD Medications: none    DRUG MONITORING  TPMT enzyme  activity: adequate    6-TGN/6-MMPN levels: none    Biologic concentration:  -IFX 8/11/21 - 3 with no antibodies    DISEASE ASSESSMENT  Labs  Recent Labs   Lab Test 06/01/22  1526 04/20/22  1433   CRP <2.9 5.0   SED 21 19     Fecal calprotectin: 5000 prior to treatment -->>113 (8/12/21)  -colonoscopy - severe inflammation colon (distal 15 cm spared). Ileum not seen  - normal EGD but gastric biopsis + h pylori  Enterography: MRE with normal small bowel   C diff: negative 1/22/21       sIBDQ:   No flowsheet data found.    IBD Health Care Maintenance:    Vaccinations:  All patients on biologics should avoid live vaccines.    -- Influenza (every year)  -- TdaP (every 10 years)  -- Pneumococcal Pneumonia (once plus booster at 5 years)  -- Yearly assessment for latent Tb (verbal screening and exam, PPD or QuantiFERON-Tb testing)     One time confirmation of immunity or serologies:  -- Hepatitis A (serologies or immunizations)  -- Hepatitis B (serologies or immunizations)  -- Varicella  -- MMR  -- HPV (all aged 18-26)  -- Meningococcal meningitis (all patients at risk for meningitis)  -- Due to the immunosuppression in this patient, I would not advise administration of live vaccines such as varicella/VZV, intranasal influenza, MMR, or yellow fever vaccine (if travelling).       Bone mineral density screening   -- Recommend all patients supplement with calcium and vitamin D     Cancer Screening:  Colon cancer screening:  Given panncolonic disease, recommend patient undergo regular dysplasia surveillance   Next dysplasia screening is recommended: given pseudopolyps I recommend we start dysplasia surveillance on next colonoscopy in 6-12 months.  Dysplasia surveillance will be difficult given the number of pseudopolyps that she is having.     Cervical cancer screening: Annual due to immunosupression     Skin cancer screening: Annual visual exam of skin by dermatologist since patient is immunocompromised     Depression  Screening:  -- Over the last month, have you felt down, depressed, or hopeless? no  -- Over the last month, have you felt little interest or pleasure doing things? no     Misc:  -- Avoid tobacco use  -- Avoid NSAIDs as there is potentially a 25% chance of causing an IBD flare    Return to clinic in 3 months with Adam BLANTON    Thank you for this consultation.  It was a pleasure to participate in the care of this patient; please contact us with any further questions.       Adam Brooke PA-C  Division of Gastroenterology, Hepatology and Nutrition  [facility]     HPI:   Currently, here to follow up. Recently increased to 7.5 mg/kg q 6 weeks.    Overall doing well.     Colitis - feels good. Having 2 BMs per week, formed, no blood. She has long standing constipation. She has not been taking miralax or citrucel.    Constipation - as above    Abdominal pain - None at this time.   She is no longer on methadone (for history of opioid abuse).  She is on methadone 60 mg daily (for history of opioid abuse).    ROS:    No fevers or chills  No weight loss  No blurry vision, double vision or change in vision  No sore throat  No lymphadenopathy  No headache, paraesthesias, or weakness in a limb  No shortness of breath or wheezing  No chest pain or pressure  No arthralgias or myalgias  No rashes or skin changes  No odynophagia or dysphagia  No BRBPR, hematochezia, melena  No dysuria, frequency or urgency  No hot/cold intolerance or polyria  No anxiety or depression    Extra intestinal manifestations of IBD:  No uveitis/episcleritis  No aphthous ulcers   No arthritis   No erythema nodosum/pyoderma gangrenosum.     PERTINENT PAST MEDICAL HISTORY:  Past Medical History:   Diagnosis Date     Pancolitis (H) 3/11/2021     Pancolitis (H) 3/11/2021       PREVIOUS SURGERIES:  Past Surgical History:   Procedure Laterality Date     BIOPSY      right breast     CHOLECYSTECTOMY       GYN SURGERY      , tubal       PREVIOUS  ENDOSCOPY:  No results found for this or any previous visit.]    ALLERGIES:     Allergies   Allergen Reactions     Ibuprofen Swelling     Eyes swelling     Lactose Diarrhea     Tylenol [Acetaminophen]      Feels like someone punched her in the stomach       PERTINENT MEDICATIONS:    Current Outpatient Medications:      albuterol (ACCUNEB) 1.25 MG/3ML neb solution, Inhale 1.25 mg into the lungs, Disp: , Rfl:      BANOPHEN 25 MG capsule, TAKE 2 CAPSULES BY MOUTH EVERY NIGHT AT BEDTIME AS NEEDED FOR SLEEP, Disp: , Rfl:      FLOVENT DISKUS 250 MCG/BLIST inhaler, INHALE 1 PUFF INTO THE LUNGS TWICE DAILY, Disp: , Rfl:      gabapentin (NEURONTIN) 300 MG capsule, Take 300 mg by mouth 4 times daily , Disp: , Rfl:      melatonin 5 MG tablet, Take 5 mg by mouth daily, Disp: , Rfl:      benzocaine (ANBESOL) 10 % gel, Take by mouth 4 times daily as needed for moderate pain (apply to ulcer in mouth as needed) (Patient not taking: Reported on 6/23/2022), Disp: 9 g, Rfl: 1     bisacodyl (DULCOLAX) 5 MG EC tablet, Take 2 tablets by mouth at 10am the day before procedure. (Patient not taking: No sig reported), Disp: 2 tablet, Rfl: 0     Cholecalciferol (VITAMIN D3) 50 MCG (2000 UT) TABS, Take 1 tablet by mouth daily (Patient not taking: Reported on 6/23/2022), Disp: , Rfl:      folic acid 800 MCG tablet, Take 800 mcg by mouth daily (Patient not taking: No sig reported), Disp: , Rfl:      magnesium citrate solution, Drink entire bottle at 4pm two days prior to procedure. (Patient not taking: No sig reported), Disp: 296 mL, Rfl: 0     METHADONE HCL PO, Take 48 mg by mouth daily  (Patient not taking: No sig reported), Disp: , Rfl:      methylcellulose (CITRUCEL) powder, Take 0.85 g (3 teaspoonful) by mouth daily (Patient not taking: No sig reported), Disp: 90 g, Rfl: 3     minoxidil (ROGAINE) 2 % external solution, , Disp: , Rfl:      Multiple Vitamin (DAILY-JUSTIN MULTIVITAMIN) TABS, Take 1 tablet by mouth daily (Patient not taking: No  sig reported), Disp: , Rfl:      omeprazole (PRILOSEC) 20 MG DR capsule, Take 1 capsule (20 mg) by mouth daily (Patient not taking: No sig reported), Disp: 30 capsule, Rfl: 1     polyethylene glycol (GOLYTELY) 236 g suspension, Take as directed. One day before your exam fill the first container with water. Cover and shake until mixed well. At 3:00pm drink one 8oz glass every 10-15 minutes until half of the first container is empty. Store the remainder in the refrigerator. At 8:00pm drink the second half of the first container until it is gone. Before you go to bed mix the second container with water and put in refrigerator. Six hours before your check in time drink one 8oz glass every 10-15 minutes until half of container is empty. Discard the remainder of solution. (Patient not taking: No sig reported), Disp: 8000 mL, Rfl: 0     polyethylene glycol (MIRALAX) 17 GM/Dose powder, Take 1 capful daily to have 1-2 soft BMs per day. Can increase up to 3 capfuls per day if needed to get desired result (Patient not taking: Reported on 2022), Disp: 507 g, Rfl: 3     Thiamine Mononitrate (B1) 100 MG TABS, Take 1 tablet by mouth daily (Patient not taking: No sig reported), Disp: , Rfl:      WAL-DRYL ALLERGY 25 MG tablet, TAKE 2 TABLETS BY MOUTH EVERY NIGHT AT BEDTIME AS NEEDED SLEEPLESSNESS (Patient not taking: No sig reported), Disp: , Rfl:      WAL-MUCIL 58.6 % powder, TAKE 2.5 GRAMS TWICE DAILY (Patient not taking: No sig reported), Disp: , Rfl:     SOCIAL HISTORY:  Social History     Socioeconomic History     Marital status: Single     Spouse name: Not on file     Number of children: Not on file     Years of education: Not on file     Highest education level: Not on file   Occupational History     Not on file   Tobacco Use     Smoking status: Former Smoker     Quit date: 2021     Years since quittin.4     Smokeless tobacco: Never Used   Substance and Sexual Activity     Alcohol use: No     Drug use: No      Sexual activity: Yes     Partners: Male   Other Topics Concern     Not on file   Social History Narrative     Not on file     Social Determinants of Health     Financial Resource Strain: Not on file   Food Insecurity: Not on file   Transportation Needs: Not on file   Physical Activity: Not on file   Stress: Not on file   Social Connections: Not on file   Intimate Partner Violence: Not on file   Housing Stability: Not on file       FAMILY HISTORY:  No family history on file.    Past/family/social history reviewed and no changes    PHYSICAL EXAMINATION:  Constitutional: aaox3, cooperative, pleasant, not dyspneic/diaphoretic, no acute distress  Vitals reviewed: There were no vitals taken for this visit.  Wt:   Wt Readings from Last 2 Encounters:   06/01/22 77.3 kg (170 lb 8 oz)   04/20/22 79.3 kg (174 lb 14.4 oz)      Constitutional - general appearance is well and in no acute distress. Body habitus normal  Eyes - No redness or discharge  Respiratory - No cough, unlabored breathing  Musculoskeletal - range of motion intact: Neck and arms  Skin - No discoloration or lesions  Neurological - No tremors, headaches  Psychiatric - No anxiety, alert & oriented      PERTINENT STUDIES:  Most recent CBC:  Recent Labs   Lab Test 06/01/22  1526 04/20/22  1433   WBC 10.0 11.6*   HGB 11.6* 12.7   HCT 34.3* 39.1    317     Most recent hepatic panel:  Recent Labs   Lab Test 06/01/22  1526 04/20/22  1433   ALT 24 30   AST 27 34     Most recent creatinine:  Recent Labs   Lab Test 08/11/21  1236 03/12/21  1450   CR 0.72 0.84             Sincerely,    Adam Brooke PA-C

## 2022-07-13 ENCOUNTER — INFUSION THERAPY VISIT (OUTPATIENT)
Dept: INFUSION THERAPY | Facility: CLINIC | Age: 52
End: 2022-07-13
Attending: INTERNAL MEDICINE
Payer: COMMERCIAL

## 2022-07-13 VITALS
WEIGHT: 169 LBS | HEART RATE: 80 BPM | BODY MASS INDEX: 29.94 KG/M2 | SYSTOLIC BLOOD PRESSURE: 119 MMHG | TEMPERATURE: 98 F | DIASTOLIC BLOOD PRESSURE: 76 MMHG | RESPIRATION RATE: 18 BRPM

## 2022-07-13 DIAGNOSIS — K51.00 PANCOLITIS (H): Primary | ICD-10-CM

## 2022-07-13 PROCEDURE — 258N000003 HC RX IP 258 OP 636: Performed by: INTERNAL MEDICINE

## 2022-07-13 PROCEDURE — 96413 CHEMO IV INFUSION 1 HR: CPT

## 2022-07-13 PROCEDURE — 250N000011 HC RX IP 250 OP 636: Performed by: INTERNAL MEDICINE

## 2022-07-13 RX ORDER — ALBUTEROL SULFATE 0.83 MG/ML
2.5 SOLUTION RESPIRATORY (INHALATION)
Status: CANCELLED | OUTPATIENT
Start: 2022-08-19

## 2022-07-13 RX ORDER — EPINEPHRINE 1 MG/ML
0.3 INJECTION, SOLUTION INTRAMUSCULAR; SUBCUTANEOUS EVERY 5 MIN PRN
Status: CANCELLED | OUTPATIENT
Start: 2022-08-19

## 2022-07-13 RX ORDER — METHYLPREDNISOLONE SODIUM SUCCINATE 125 MG/2ML
125 INJECTION, POWDER, LYOPHILIZED, FOR SOLUTION INTRAMUSCULAR; INTRAVENOUS
Status: CANCELLED
Start: 2022-08-19

## 2022-07-13 RX ORDER — DIPHENHYDRAMINE HYDROCHLORIDE 50 MG/ML
50 INJECTION INTRAMUSCULAR; INTRAVENOUS
Status: CANCELLED
Start: 2022-08-19

## 2022-07-13 RX ORDER — ACETAMINOPHEN 325 MG/1
650 TABLET ORAL ONCE
Status: CANCELLED
Start: 2022-08-19 | End: 2022-08-19

## 2022-07-13 RX ORDER — NALOXONE HYDROCHLORIDE 0.4 MG/ML
0.2 INJECTION, SOLUTION INTRAMUSCULAR; INTRAVENOUS; SUBCUTANEOUS
Status: CANCELLED | OUTPATIENT
Start: 2022-08-19

## 2022-07-13 RX ORDER — ALBUTEROL SULFATE 90 UG/1
1-2 AEROSOL, METERED RESPIRATORY (INHALATION)
Status: CANCELLED
Start: 2022-08-19

## 2022-07-13 RX ORDER — MEPERIDINE HYDROCHLORIDE 25 MG/ML
25 INJECTION INTRAMUSCULAR; INTRAVENOUS; SUBCUTANEOUS EVERY 30 MIN PRN
Status: CANCELLED | OUTPATIENT
Start: 2022-08-19

## 2022-07-13 RX ORDER — DIPHENHYDRAMINE HCL 25 MG
25 CAPSULE ORAL ONCE
Status: CANCELLED
Start: 2022-08-19 | End: 2022-08-19

## 2022-07-13 RX ADMIN — SODIUM CHLORIDE 600 MG: 9 INJECTION, SOLUTION INTRAVENOUS at 14:15

## 2022-07-13 ASSESSMENT — PAIN SCALES - GENERAL: PAINLEVEL: NO PAIN (0)

## 2022-07-13 NOTE — LETTER
7/13/2022         RE: Shelly Cha  [address]  [place] [address]        Dear Colleague,    Thank you for referring your patient, Shelly Cha, to the [facility]. Please see a copy of my visit note below.    Infusion Nursing Note:  Shelly Cha presents today for remicade infusion.    Patient seen by provider today: No   present during visit today: Not Applicable.    Note: ~~~ NOTE: If the patient answers yes to any of the questions below, hold the infusion and contact ordering provider or on-call provider.    1. Have you recently had an elevated temperature, fever, chills, productive cough, coughing for 3 weeks or longer or hemoptysis,  abnormal vital signs, night sweats,  chest pain or have you noticed a decrease in your appetite, unexplained weight loss or fatigue? No  2. Do you have any open wounds or new incisions? No  3. Do you have any upcoming hospitalizations or surgeries? Does not include esophagogastroduodenoscopy, colonoscopy, endoscopic retrograde cholangiopancreatography (ERCP), endoscopic ultrasound (EUS), dental procedures or joint aspiration/steroid injections No  4. Do you currently have any signs of illness or infection or are you on any antibiotics? No  5. Have you had any new, sudden or worsening abdominal pain? No  6. Have you or anyone in your household received a live vaccination in the past 4 weeks? Please note: No live vaccines while on biologic/chemotherapy until 6 months after the last treatment. Patient can receive the flu vaccine (shot only), pneumovax and the Covid vaccine. It is optimal for the patient to get these vaccines mid cycle, but they can be given at any time as long as it is not on the day of the infusion. No  7. Have you recently been diagnosed with any new nervous system diseases (ie. Multiple sclerosis, Guillain [place], seizures, neurological changes) or cancer diagnosis? Are you on any form of  radiation or chemotherapy? No  8. Are you pregnant or breast feeding or do you have plans of pregnancy in the future? No  9. Have you been having any signs of worsening depression or suicidal ideations?  (benlysta only) n/a  10. Have there been any other new onset medical symptoms? No  11. Have you had any new blood clots? (IVIG only) No  .    Intravenous Access:  Peripheral IV placed.    Post Infusion Assessment:  Patient tolerated infusion without incident.     Discharge Plan:   Patient and/or family verbalized understanding of discharge instructions and all questions answered.      Sharona Hughes RN      Administrations This Visit     inFLIXimab-dyyb (INFLECTRA) 600 mg in sodium chloride 0.9 % 275 mL infusion     Admin Date  07/13/2022 Action  New Bag Dose  600 mg Rate  275 mL/hr Route  Intravenous Administered By  Ellen Villegas RN                  Message sent to scheduling to call pt to schedule next appt.                    Again, thank you for allowing me to participate in the care of your patient.        Sincerely,        [facility] [place]

## 2022-07-13 NOTE — PROGRESS NOTES
Infusion Nursing Note:  Shelly Cha presents today for remicade infusion.    Patient seen by provider today: No   present during visit today: Not Applicable.    Note: ~~~ NOTE: If the patient answers yes to any of the questions below, hold the infusion and contact ordering provider or on-call provider.    1. Have you recently had an elevated temperature, fever, chills, productive cough, coughing for 3 weeks or longer or hemoptysis,  abnormal vital signs, night sweats,  chest pain or have you noticed a decrease in your appetite, unexplained weight loss or fatigue? No  2. Do you have any open wounds or new incisions? No  3. Do you have any upcoming hospitalizations or surgeries? Does not include esophagogastroduodenoscopy, colonoscopy, endoscopic retrograde cholangiopancreatography (ERCP), endoscopic ultrasound (EUS), dental procedures or joint aspiration/steroid injections No  4. Do you currently have any signs of illness or infection or are you on any antibiotics? No  5. Have you had any new, sudden or worsening abdominal pain? No  6. Have you or anyone in your household received a live vaccination in the past 4 weeks? Please note: No live vaccines while on biologic/chemotherapy until 6 months after the last treatment. Patient can receive the flu vaccine (shot only), pneumovax and the Covid vaccine. It is optimal for the patient to get these vaccines mid cycle, but they can be given at any time as long as it is not on the day of the infusion. No  7. Have you recently been diagnosed with any new nervous system diseases (ie. Multiple sclerosis, Guillain [place], seizures, neurological changes) or cancer diagnosis? Are you on any form of radiation or chemotherapy? No  8. Are you pregnant or breast feeding or do you have plans of pregnancy in the future? No  9. Have you been having any signs of worsening depression or suicidal ideations?  (benlysta only) n/a  10. Have there been any other new onset medical  symptoms? No  11. Have you had any new blood clots? (IVIG only) No  .    Intravenous Access:  Peripheral IV placed.    Post Infusion Assessment:  Patient tolerated infusion without incident.     Discharge Plan:   Patient and/or family verbalized understanding of discharge instructions and all questions answered.      Sharona Hughes RN      Administrations This Visit     inFLIXimab-dyyb (INFLECTRA) 600 mg in sodium chloride 0.9 % 275 mL infusion     Admin Date  07/13/2022 Action  New Bag Dose  600 mg Rate  275 mL/hr Route  Intravenous Administered By  Ellen Villegas RN                  Message sent to scheduling to call pt to schedule next appt.

## 2022-08-24 ENCOUNTER — INFUSION THERAPY VISIT (OUTPATIENT)
Dept: INFUSION THERAPY | Facility: CLINIC | Age: 52
End: 2022-08-24
Attending: INTERNAL MEDICINE
Payer: COMMERCIAL

## 2022-08-24 VITALS
OXYGEN SATURATION: 97 % | WEIGHT: 178.6 LBS | SYSTOLIC BLOOD PRESSURE: 123 MMHG | TEMPERATURE: 99.3 F | HEART RATE: 76 BPM | BODY MASS INDEX: 31.64 KG/M2 | DIASTOLIC BLOOD PRESSURE: 77 MMHG | RESPIRATION RATE: 16 BRPM

## 2022-08-24 DIAGNOSIS — K51.00 PANCOLITIS (H): Primary | ICD-10-CM

## 2022-08-24 DIAGNOSIS — K50.10 CROHN'S COLITIS, WITHOUT COMPLICATIONS (H): ICD-10-CM

## 2022-08-24 LAB
ALBUMIN SERPL-MCNC: 3.5 G/DL (ref 3.4–5)
ALP SERPL-CCNC: 60 U/L (ref 40–150)
ALT SERPL W P-5'-P-CCNC: 20 U/L (ref 0–50)
AST SERPL W P-5'-P-CCNC: 24 U/L (ref 0–45)
BASOPHILS # BLD AUTO: 0 10E3/UL (ref 0–0.2)
BASOPHILS NFR BLD AUTO: 0 %
BILIRUB DIRECT SERPL-MCNC: <0.1 MG/DL (ref 0–0.2)
BILIRUB SERPL-MCNC: 0.2 MG/DL (ref 0.2–1.3)
CRP SERPL-MCNC: 3.5 MG/L (ref 0–8)
EOSINOPHIL # BLD AUTO: 0.4 10E3/UL (ref 0–0.7)
EOSINOPHIL NFR BLD AUTO: 4 %
ERYTHROCYTE [DISTWIDTH] IN BLOOD BY AUTOMATED COUNT: 14.3 % (ref 10–15)
ERYTHROCYTE [SEDIMENTATION RATE] IN BLOOD BY WESTERGREN METHOD: 20 MM/HR (ref 0–30)
HCT VFR BLD AUTO: 32.3 % (ref 35–47)
HGB BLD-MCNC: 11.1 G/DL (ref 11.7–15.7)
IMM GRANULOCYTES # BLD: 0 10E3/UL
IMM GRANULOCYTES NFR BLD: 0 %
LYMPHOCYTES # BLD AUTO: 2.9 10E3/UL (ref 0.8–5.3)
LYMPHOCYTES NFR BLD AUTO: 32 %
MCH RBC QN AUTO: 29.2 PG (ref 26.5–33)
MCHC RBC AUTO-ENTMCNC: 34.4 G/DL (ref 31.5–36.5)
MCV RBC AUTO: 85 FL (ref 78–100)
MONOCYTES # BLD AUTO: 0.9 10E3/UL (ref 0–1.3)
MONOCYTES NFR BLD AUTO: 10 %
NEUTROPHILS # BLD AUTO: 4.9 10E3/UL (ref 1.6–8.3)
NEUTROPHILS NFR BLD AUTO: 54 %
NRBC # BLD AUTO: 0 10E3/UL
NRBC BLD AUTO-RTO: 0 /100
PLATELET # BLD AUTO: 325 10E3/UL (ref 150–450)
PROT SERPL-MCNC: 7.1 G/DL (ref 6.8–8.8)
RBC # BLD AUTO: 3.8 10E6/UL (ref 3.8–5.2)
WBC # BLD AUTO: 9.1 10E3/UL (ref 4–11)

## 2022-08-24 PROCEDURE — 250N000011 HC RX IP 250 OP 636: Performed by: INTERNAL MEDICINE

## 2022-08-24 PROCEDURE — 999N000128 HC STATISTIC PERIPHERAL IV START W/O US GUIDANCE

## 2022-08-24 PROCEDURE — 85025 COMPLETE CBC W/AUTO DIFF WBC: CPT

## 2022-08-24 PROCEDURE — 80076 HEPATIC FUNCTION PANEL: CPT

## 2022-08-24 PROCEDURE — 258N000003 HC RX IP 258 OP 636: Performed by: INTERNAL MEDICINE

## 2022-08-24 PROCEDURE — 86140 C-REACTIVE PROTEIN: CPT

## 2022-08-24 PROCEDURE — 85652 RBC SED RATE AUTOMATED: CPT

## 2022-08-24 PROCEDURE — 96413 CHEMO IV INFUSION 1 HR: CPT

## 2022-08-24 PROCEDURE — 36415 COLL VENOUS BLD VENIPUNCTURE: CPT

## 2022-08-24 RX ORDER — ALBUTEROL SULFATE 0.83 MG/ML
2.5 SOLUTION RESPIRATORY (INHALATION)
Status: CANCELLED | OUTPATIENT
Start: 2022-09-30

## 2022-08-24 RX ORDER — ALBUTEROL SULFATE 90 UG/1
1-2 AEROSOL, METERED RESPIRATORY (INHALATION)
Status: CANCELLED
Start: 2022-09-30

## 2022-08-24 RX ORDER — NALOXONE HYDROCHLORIDE 0.4 MG/ML
0.2 INJECTION, SOLUTION INTRAMUSCULAR; INTRAVENOUS; SUBCUTANEOUS
Status: CANCELLED | OUTPATIENT
Start: 2022-09-30

## 2022-08-24 RX ORDER — EPINEPHRINE 1 MG/ML
0.3 INJECTION, SOLUTION INTRAMUSCULAR; SUBCUTANEOUS EVERY 5 MIN PRN
Status: CANCELLED | OUTPATIENT
Start: 2022-09-30

## 2022-08-24 RX ORDER — METHYLPREDNISOLONE SODIUM SUCCINATE 125 MG/2ML
125 INJECTION, POWDER, LYOPHILIZED, FOR SOLUTION INTRAMUSCULAR; INTRAVENOUS
Status: CANCELLED
Start: 2022-09-30

## 2022-08-24 RX ORDER — ACETAMINOPHEN 325 MG/1
650 TABLET ORAL ONCE
Status: CANCELLED
Start: 2022-09-30 | End: 2022-09-30

## 2022-08-24 RX ORDER — DIPHENHYDRAMINE HCL 25 MG
25 CAPSULE ORAL ONCE
Status: CANCELLED
Start: 2022-09-30 | End: 2022-09-30

## 2022-08-24 RX ORDER — MEPERIDINE HYDROCHLORIDE 25 MG/ML
25 INJECTION INTRAMUSCULAR; INTRAVENOUS; SUBCUTANEOUS EVERY 30 MIN PRN
Status: CANCELLED | OUTPATIENT
Start: 2022-09-30

## 2022-08-24 RX ORDER — DIPHENHYDRAMINE HYDROCHLORIDE 50 MG/ML
50 INJECTION INTRAMUSCULAR; INTRAVENOUS
Status: CANCELLED
Start: 2022-09-30

## 2022-08-24 RX ADMIN — SODIUM CHLORIDE 600 MG: 9 INJECTION, SOLUTION INTRAVENOUS at 16:02

## 2022-08-24 NOTE — PATIENT INSTRUCTIONS
Dear Shelly Cha    Thank you for choosing [facility] ([facility]) for your infusion.  The following information is a summary of our appointment as well as important reminders.        We look forward in seeing you on your next appointment here at [facility] ([facility]).  Please don t hesitate to call us at [phone] to reschedule any of your appointments or to speak with one of the [facility] registered nurses.  It was a pleasure taking care of you today.    Sincerely,    [facility]  [address] [place] [address]  [id number]  Phone:  [phone]

## 2022-08-24 NOTE — PROGRESS NOTES
Infusion Nursing Note:  Shelly Cha presents today for Remicade infusion.    Patient seen by provider today: No   present during visit today: Not Applicable.    Note: Remicade infused over ~1hour.    Intravenous Access:  Labs drawn without difficulty.  [facility] by [facility].    Treatment Conditions:  Biological Infusion Checklist:  ~~~ NOTE: If the patient answers yes to any of the questions below, hold the infusion and contact ordering provider or on-call provider.    1. Have you recently had an elevated temperature, fever, chills, productive cough, coughing for 3 weeks or longer or hemoptysis, abnormal vital signs, night sweats,  chest pain or have you noticed a decrease in your appetite, unexplained weight loss or fatigue? No  2. Do you have any open wounds or new incisions? No  3. Do you have any recent or upcoming hospitalizations, surgeries or dental procedures? No  4. Do you currently have or recently have had any signs of illness or infection or are you on any antibiotics? No  5. Have you had any new, sudden or worsening abdominal pain? No  6. Have you or anyone in your household received a live vaccination in the past 4 weeks? Please note:  No live vaccines while on biologic/chemotherapy until 6 months after the last treatment.  Patient can receive the flu vaccine (shot only) and the pneumovax.  It is optimal for the patient to get these vaccines mid cycle, but they can be given at any time as long as it is not on the day of the infusion. No  7. Have you recently been diagnosed with any new nervous system diseases (ie. Multiple sclerosis, Guillain [place], seizures, neurological changes) or cancer diagnosis? No  8. Are you on any form of radiation or chemotherapy? No  9. Are you pregnant or breast feeding or do you have plans of pregnancy in the future? No  10. Have you been having any signs of worsening depression or suicidal ideations?  (benlysta only) No  11. Have there been any other new  onset medical symptoms? No      Post Infusion Assessment:  Patient tolerated infusion without incident.  Blood return noted pre and post infusion.  Site patent and intact, free from redness, edema or discomfort.  No evidence of extravasations.  Access discontinued per protocol.    Biologic Infusion Post Education: Call the triage nurse at your clinic or seek medical attention if you have chills and/or temperature greater than or equal to 100.5, uncontrolled nausea/vomiting, diarrhea, constipation, dizziness, shortness of breath, chest pain, heart palpitations, weakness or any other new or concerning symptoms, questions or concerns.  You cannot have any live virus vaccines prior to or during treatment or up to 6 months post infusion.  If you have an upcoming surgery, medical procedure or dental procedure during treatment, this should be discussed with your ordering physician and your surgeon/dentist.  If you are having any concerning symptom, if you are unsure if you should get your next infusion or wish to speak to a provider before your next infusion, please call your care coordinator or triage nurse at your clinic to notify them so we can adequately serve you.     Discharge Plan:   Discharge instructions reviewed with: Patient.  Patient and/or family verbalized understanding of discharge instructions and all questions answered.  AVS to patient via [employer].  Patient will return in 6 weeks for next appointment. Message sent to scheduling to coordinate.  Patient discharged in stable condition accompanied by: self.  Departure Mode: Ambulatory.        /72 (BP Location: Right arm)   Pulse 76   Temp 99.3  F (37.4  C) (Oral)   Resp 16   Wt 81 kg (178 lb 9.6 oz)   SpO2 97%   BMI 31.64 kg/m      Report given to KAMERON Nelson at 1530. Care transferred at that time.    VERONICA SMION RN

## 2022-08-24 NOTE — LETTER
Date:August 24, 2022      Provider requested that no letter be sent. Do not send.       [facility]

## 2022-08-24 NOTE — LETTER
8/24/2022         RE: Shelly Cha  [address]  [facility] [address]        Dear Colleague,    Thank you for referring your patient, Shelly Cha, to the [facility]. Please see a copy of my visit note below.    Infusion Nursing Note:  Shelly Cha presents today for Remicade infusion.    Patient seen by provider today: No   present during visit today: Not Applicable.    Note: Remicade infused over ~1hour.    Intravenous Access:  Labs drawn without difficulty.  [facility].    Treatment Conditions:  Biological Infusion Checklist:  ~~~ NOTE: If the patient answers yes to any of the questions below, hold the infusion and contact ordering provider or on-call provider.    1. Have you recently had an elevated temperature, fever, chills, productive cough, coughing for 3 weeks or longer or hemoptysis, abnormal vital signs, night sweats,  chest pain or have you noticed a decrease in your appetite, unexplained weight loss or fatigue? No  2. Do you have any open wounds or new incisions? No  3. Do you have any recent or upcoming hospitalizations, surgeries or dental procedures? No  4. Do you currently have or recently have had any signs of illness or infection or are you on any antibiotics? No  5. Have you had any new, sudden or worsening abdominal pain? No  6. Have you or anyone in your household received a live vaccination in the past 4 weeks? Please note:  No live vaccines while on biologic/chemotherapy until 6 months after the last treatment.  Patient can receive the flu vaccine (shot only) and the pneumovax.  It is optimal for the patient to get these vaccines mid cycle, but they can be given at any time as long as it is not on the day of the infusion. No  7. Have you recently been diagnosed with any new nervous system diseases (ie. Multiple sclerosis, Guillain [place], seizures, neurological changes) or cancer diagnosis? No  8. Are  you on any form of radiation or chemotherapy? No  9. Are you pregnant or breast feeding or do you have plans of pregnancy in the future? No  10. Have you been having any signs of worsening depression or suicidal ideations?  (benlysta only) No  11. Have there been any other new onset medical symptoms? No      Post Infusion Assessment:  Patient tolerated infusion without incident.  Blood return noted pre and post infusion.  Site patent and intact, free from redness, edema or discomfort.  No evidence of extravasations.  Access discontinued per protocol.    Biologic Infusion Post Education: Call the triage nurse at your clinic or seek medical attention if you have chills and/or temperature greater than or equal to 100.5, uncontrolled nausea/vomiting, diarrhea, constipation, dizziness, shortness of breath, chest pain, heart palpitations, weakness or any other new or concerning symptoms, questions or concerns.  You cannot have any live virus vaccines prior to or during treatment or up to 6 months post infusion.  If you have an upcoming surgery, medical procedure or dental procedure during treatment, this should be discussed with your ordering physician and your surgeon/dentist.  If you are having any concerning symptom, if you are unsure if you should get your next infusion or wish to speak to a provider before your next infusion, please call your care coordinator or triage nurse at your clinic to notify them so we can adequately serve you.     Discharge Plan:   Discharge instructions reviewed with: Patient.  Patient and/or family verbalized understanding of discharge instructions and all questions answered.  AVS to patient via [employer].  Patient will return in 6 weeks for next appointment. Message sent to scheduling to coordinate.  Patient discharged in stable condition accompanied by: self.  Departure Mode: Ambulatory.        /72 (BP Location: Right arm)   Pulse 76   Temp 99.3  F (37.4  C) (Oral)   Resp 16   Wt  81 kg (178 lb 9.6 oz)   SpO2 97%   BMI 31.64 kg/m      Report given to KAMERON Nelson at 1530. Care transferred at that time.    VERONICA SIMON RN                        Again, thank you for allowing me to participate in the care of your patient.        Sincerely,        [facility]

## 2022-10-05 ENCOUNTER — INFUSION THERAPY VISIT (OUTPATIENT)
Dept: INFUSION THERAPY | Facility: CLINIC | Age: 52
End: 2022-10-05
Attending: INTERNAL MEDICINE
Payer: COMMERCIAL

## 2022-10-05 VITALS
RESPIRATION RATE: 16 BRPM | WEIGHT: 172.8 LBS | HEART RATE: 89 BPM | SYSTOLIC BLOOD PRESSURE: 115 MMHG | TEMPERATURE: 98.1 F | OXYGEN SATURATION: 99 % | DIASTOLIC BLOOD PRESSURE: 70 MMHG | BODY MASS INDEX: 30.61 KG/M2

## 2022-10-05 DIAGNOSIS — K51.00 PANCOLITIS (H): Primary | ICD-10-CM

## 2022-10-05 PROCEDURE — 96413 CHEMO IV INFUSION 1 HR: CPT

## 2022-10-05 PROCEDURE — 250N000011 HC RX IP 250 OP 636: Performed by: INTERNAL MEDICINE

## 2022-10-05 PROCEDURE — 258N000003 HC RX IP 258 OP 636: Performed by: INTERNAL MEDICINE

## 2022-10-05 RX ORDER — NALOXONE HYDROCHLORIDE 0.4 MG/ML
0.2 INJECTION, SOLUTION INTRAMUSCULAR; INTRAVENOUS; SUBCUTANEOUS
Status: CANCELLED | OUTPATIENT
Start: 2022-11-11

## 2022-10-05 RX ORDER — EPINEPHRINE 1 MG/ML
0.3 INJECTION, SOLUTION INTRAMUSCULAR; SUBCUTANEOUS EVERY 5 MIN PRN
Status: CANCELLED | OUTPATIENT
Start: 2022-11-11

## 2022-10-05 RX ORDER — MEPERIDINE HYDROCHLORIDE 25 MG/ML
25 INJECTION INTRAMUSCULAR; INTRAVENOUS; SUBCUTANEOUS EVERY 30 MIN PRN
Status: CANCELLED | OUTPATIENT
Start: 2022-11-11

## 2022-10-05 RX ORDER — DIPHENHYDRAMINE HCL 25 MG
25 CAPSULE ORAL ONCE
Status: CANCELLED
Start: 2022-11-11 | End: 2022-11-11

## 2022-10-05 RX ORDER — ALBUTEROL SULFATE 90 UG/1
1-2 AEROSOL, METERED RESPIRATORY (INHALATION)
Status: CANCELLED
Start: 2022-11-11

## 2022-10-05 RX ORDER — DIPHENHYDRAMINE HYDROCHLORIDE 50 MG/ML
50 INJECTION INTRAMUSCULAR; INTRAVENOUS
Status: CANCELLED
Start: 2022-11-11

## 2022-10-05 RX ORDER — METHYLPREDNISOLONE SODIUM SUCCINATE 125 MG/2ML
125 INJECTION, POWDER, LYOPHILIZED, FOR SOLUTION INTRAMUSCULAR; INTRAVENOUS
Status: CANCELLED
Start: 2022-11-11

## 2022-10-05 RX ORDER — ALBUTEROL SULFATE 0.83 MG/ML
2.5 SOLUTION RESPIRATORY (INHALATION)
Status: CANCELLED | OUTPATIENT
Start: 2022-11-11

## 2022-10-05 RX ORDER — ACETAMINOPHEN 325 MG/1
650 TABLET ORAL ONCE
Status: CANCELLED
Start: 2022-11-11 | End: 2022-11-11

## 2022-10-05 RX ADMIN — SODIUM CHLORIDE 600 MG: 9 INJECTION, SOLUTION INTRAVENOUS at 15:21

## 2022-10-05 NOTE — PROGRESS NOTES
Infusion Nursing Note:  Shelly Cha presents today for Infliximab.    Patient seen by provider today: No   present during visit today: Not Applicable.    Note:   -Infliximab infused over ~1hr    Intravenous Access:  Peripheral IV placed.    Treatment Conditions:  Biological Infusion Checklist:  ~~~ NOTE: If the patient answers yes to any of the questions below, hold the infusion and contact ordering provider or on-call provider.    1. Have you recently had an elevated temperature, fever, chills, productive cough, coughing for 3 weeks or longer or hemoptysis, abnormal vital signs, night sweats,  chest pain or have you noticed a decrease in your appetite, unexplained weight loss or fatigue? No  2. Do you have any open wounds or new incisions? No  3. Do you have any recent or upcoming hospitalizations, surgeries or dental procedures? No  4. Do you currently have or recently have had any signs of illness or infection or are you on any antibiotics? No  5. Have you had any new, sudden or worsening abdominal pain? No  6. Have you or anyone in your household received a live vaccination in the past 4 weeks? Please note:  No live vaccines while on biologic/chemotherapy until 6 months after the last treatment.  Patient can receive the flu vaccine (shot only) and the pneumovax.  It is optimal for the patient to get these vaccines mid cycle, but they can be given at any time as long as it is not on the day of the infusion. No  7. Have you recently been diagnosed with any new nervous system diseases (ie. Multiple sclerosis, Guillain [place], seizures, neurological changes) or cancer diagnosis? No  8. Are you on any form of radiation or chemotherapy? No  9. Are you pregnant or breast feeding or do you have plans of pregnancy in the future? No  10. Have you been having any signs of worsening depression or suicidal ideations?  (benlysta only) No  11. Have there been any other new onset medical symptoms? No      Post  Infusion Assessment:  Patient tolerated infusion without incident.  Site patent and intact, free from redness, edema or discomfort.  No evidence of extravasations.  Access discontinued per protocol.  Biologic Infusion Post Education: Call the triage nurse at your clinic or seek medical attention if you have chills and/or temperature greater than or equal to 100.5, uncontrolled nausea/vomiting, diarrhea, constipation, dizziness, shortness of breath, chest pain, heart palpitations, weakness or any other new or concerning symptoms, questions or concerns.  You cannot have any live virus vaccines prior to or during treatment or up to 6 months post infusion.  If you have an upcoming surgery, medical procedure or dental procedure during treatment, this should be discussed with your ordering physician and your surgeon/dentist.  If you are having any concerning symptom, if you are unsure if you should get your next infusion or wish to speak to a provider before your next infusion, please call your care coordinator or triage nurse at your clinic to notify them so we can adequately serve you.     Discharge Plan:   AVS to patient via [employer].  Patient will return 11/30/22 for next appointment.   Patient discharged in stable condition accompanied by: self.  Departure Mode: Ambulatory.    Ricarda Azevedo RN    BP (!) 145/90 (BP Location: Left arm)   Pulse 89   Temp 98.1  F (36.7  C) (Oral)   Resp 16   Wt 78.4 kg (172 lb 12.8 oz)   SpO2 99%   BMI 30.61 kg/m      Administrations This Visit     inFLIXimab-dyyb (INFLECTRA) 600 mg in sodium chloride 0.9 % 275 mL infusion     Admin Date  10/05/2022 Action  New Bag Dose  600 mg Rate  275 mL/hr Route  Intravenous Administered By  Leslie Mclean, RN

## 2022-10-05 NOTE — PATIENT INSTRUCTIONS
Dear Shelly Cha    Thank you for choosing [facility] ([facility]) for your infusion.  The following information is a summary of our appointment as well as important reminders.      We look forward in seeing you on your next appointment here at [facility] ([facility]).  Please don t hesitate to call us at [phone] to reschedule any of your appointments or to speak with one of the [facility] registered nurses.  It was a pleasure taking care of you today.    Sincerely,    [facility]  [address] [place] [address]  [id number]  Phone:  [phone]

## 2022-10-05 NOTE — LETTER
10/5/2022         RE: Shelly Cha  [address]  [facility] [address]        Dear Colleague,    Thank you for referring your patient, Shelly Cha, to the [facility]. Please see a copy of my visit note below.    Infusion Nursing Note:  Shelly Cha presents today for Infliximab.    Patient seen by provider today: No   present during visit today: Not Applicable.    Note:   -Infliximab infused over ~1hr    Intravenous Access:  Peripheral IV placed.    Treatment Conditions:  Biological Infusion Checklist:  ~~~ NOTE: If the patient answers yes to any of the questions below, hold the infusion and contact ordering provider or on-call provider.    1. Have you recently had an elevated temperature, fever, chills, productive cough, coughing for 3 weeks or longer or hemoptysis, abnormal vital signs, night sweats,  chest pain or have you noticed a decrease in your appetite, unexplained weight loss or fatigue? No  2. Do you have any open wounds or new incisions? No  3. Do you have any recent or upcoming hospitalizations, surgeries or dental procedures? No  4. Do you currently have or recently have had any signs of illness or infection or are you on any antibiotics? No  5. Have you had any new, sudden or worsening abdominal pain? No  6. Have you or anyone in your household received a live vaccination in the past 4 weeks? Please note:  No live vaccines while on biologic/chemotherapy until 6 months after the last treatment.  Patient can receive the flu vaccine (shot only) and the pneumovax.  It is optimal for the patient to get these vaccines mid cycle, but they can be given at any time as long as it is not on the day of the infusion. No  7. Have you recently been diagnosed with any new nervous system diseases (ie. Multiple sclerosis, Guillain [place], seizures, neurological changes) or cancer diagnosis? No  8. Are you on any form of radiation or  chemotherapy? No  9. Are you pregnant or breast feeding or do you have plans of pregnancy in the future? No  10. Have you been having any signs of worsening depression or suicidal ideations?  (benlysta only) No  11. Have there been any other new onset medical symptoms? No      Post Infusion Assessment:  Patient tolerated infusion without incident.  Site patent and intact, free from redness, edema or discomfort.  No evidence of extravasations.  Access discontinued per protocol.  Biologic Infusion Post Education: Call the triage nurse at your clinic or seek medical attention if you have chills and/or temperature greater than or equal to 100.5, uncontrolled nausea/vomiting, diarrhea, constipation, dizziness, shortness of breath, chest pain, heart palpitations, weakness or any other new or concerning symptoms, questions or concerns.  You cannot have any live virus vaccines prior to or during treatment or up to 6 months post infusion.  If you have an upcoming surgery, medical procedure or dental procedure during treatment, this should be discussed with your ordering physician and your surgeon/dentist.  If you are having any concerning symptom, if you are unsure if you should get your next infusion or wish to speak to a provider before your next infusion, please call your care coordinator or triage nurse at your clinic to notify them so we can adequately serve you.     Discharge Plan:   [facility]  Patient will return 11/30/22 for next appointment.   Patient discharged in stable condition accompanied by: self.  Departure Mode: Ambulatory.    Ricarda Azevedo RN    BP (!) 145/90 (BP Location: Left arm)   Pulse 89   Temp 98.1  F (36.7  C) (Oral)   Resp 16   Wt 78.4 kg (172 lb 12.8 oz)   SpO2 99%   BMI 30.61 kg/m      Administrations This Visit     inFLIXimab-dyyb (INFLECTRA) 600 mg in sodium chloride 0.9 % 275 mL infusion     Admin Date  10/05/2022 Action  New Bag Dose  600 mg Rate  275 mL/hr  Route  Intravenous Administered By  Leslie Mclean RN                                  Again, thank you for allowing me to participate in the care of your patient.        Sincerely,        [facility]

## 2022-10-10 ENCOUNTER — HEALTH MAINTENANCE LETTER (OUTPATIENT)
Age: 52
End: 2022-10-10

## 2022-10-26 ENCOUNTER — TELEPHONE (OUTPATIENT)
Dept: GASTROENTEROLOGY | Facility: CLINIC | Age: 52
End: 2022-10-26

## 2022-12-03 ENCOUNTER — INFUSION THERAPY VISIT (OUTPATIENT)
Dept: INFUSION THERAPY | Facility: CLINIC | Age: 52
End: 2022-12-03
Attending: INTERNAL MEDICINE
Payer: COMMERCIAL

## 2022-12-03 VITALS
HEART RATE: 65 BPM | BODY MASS INDEX: 30.86 KG/M2 | WEIGHT: 174.2 LBS | TEMPERATURE: 97.6 F | DIASTOLIC BLOOD PRESSURE: 73 MMHG | OXYGEN SATURATION: 99 % | SYSTOLIC BLOOD PRESSURE: 112 MMHG | RESPIRATION RATE: 16 BRPM

## 2022-12-03 DIAGNOSIS — K51.00 PANCOLITIS (H): Primary | ICD-10-CM

## 2022-12-03 LAB
ALBUMIN SERPL BCG-MCNC: 3.7 G/DL (ref 3.5–5.2)
ALP SERPL-CCNC: 66 U/L (ref 35–104)
ALT SERPL W P-5'-P-CCNC: 14 U/L (ref 10–35)
AST SERPL W P-5'-P-CCNC: 25 U/L (ref 10–35)
BASOPHILS # BLD AUTO: 0.1 10E3/UL (ref 0–0.2)
BASOPHILS NFR BLD AUTO: 1 %
BILIRUB DIRECT SERPL-MCNC: <0.2 MG/DL (ref 0–0.3)
BILIRUB SERPL-MCNC: 0.2 MG/DL
CRP SERPL-MCNC: <3 MG/L
EOSINOPHIL # BLD AUTO: 0.5 10E3/UL (ref 0–0.7)
EOSINOPHIL NFR BLD AUTO: 4 %
ERYTHROCYTE [DISTWIDTH] IN BLOOD BY AUTOMATED COUNT: 15.8 % (ref 10–15)
ERYTHROCYTE [SEDIMENTATION RATE] IN BLOOD BY WESTERGREN METHOD: 12 MM/HR (ref 0–30)
HCT VFR BLD AUTO: 35 % (ref 35–47)
HGB BLD-MCNC: 11.7 G/DL (ref 11.7–15.7)
IMM GRANULOCYTES # BLD: 0 10E3/UL
IMM GRANULOCYTES NFR BLD: 0 %
LYMPHOCYTES # BLD AUTO: 2.9 10E3/UL (ref 0.8–5.3)
LYMPHOCYTES NFR BLD AUTO: 23 %
MCH RBC QN AUTO: 28.7 PG (ref 26.5–33)
MCHC RBC AUTO-ENTMCNC: 33.4 G/DL (ref 31.5–36.5)
MCV RBC AUTO: 86 FL (ref 78–100)
MONOCYTES # BLD AUTO: 1 10E3/UL (ref 0–1.3)
MONOCYTES NFR BLD AUTO: 8 %
NEUTROPHILS # BLD AUTO: 8.1 10E3/UL (ref 1.6–8.3)
NEUTROPHILS NFR BLD AUTO: 64 %
NRBC # BLD AUTO: 0 10E3/UL
NRBC BLD AUTO-RTO: 0 /100
PLATELET # BLD AUTO: 358 10E3/UL (ref 150–450)
PROT SERPL-MCNC: 6.3 G/DL (ref 6.4–8.3)
RBC # BLD AUTO: 4.07 10E6/UL (ref 3.8–5.2)
WBC # BLD AUTO: 12.5 10E3/UL (ref 4–11)

## 2022-12-03 PROCEDURE — 85652 RBC SED RATE AUTOMATED: CPT | Performed by: INTERNAL MEDICINE

## 2022-12-03 PROCEDURE — 36415 COLL VENOUS BLD VENIPUNCTURE: CPT | Performed by: INTERNAL MEDICINE

## 2022-12-03 PROCEDURE — 85014 HEMATOCRIT: CPT | Performed by: INTERNAL MEDICINE

## 2022-12-03 PROCEDURE — 250N000011 HC RX IP 250 OP 636: Performed by: INTERNAL MEDICINE

## 2022-12-03 PROCEDURE — 80076 HEPATIC FUNCTION PANEL: CPT | Performed by: INTERNAL MEDICINE

## 2022-12-03 PROCEDURE — 258N000003 HC RX IP 258 OP 636: Performed by: INTERNAL MEDICINE

## 2022-12-03 PROCEDURE — 96413 CHEMO IV INFUSION 1 HR: CPT

## 2022-12-03 PROCEDURE — 86140 C-REACTIVE PROTEIN: CPT | Performed by: INTERNAL MEDICINE

## 2022-12-03 RX ORDER — NALOXONE HYDROCHLORIDE 0.4 MG/ML
0.2 INJECTION, SOLUTION INTRAMUSCULAR; INTRAVENOUS; SUBCUTANEOUS
Status: CANCELLED | OUTPATIENT
Start: 2022-12-23

## 2022-12-03 RX ORDER — ALBUTEROL SULFATE 0.83 MG/ML
2.5 SOLUTION RESPIRATORY (INHALATION)
Status: CANCELLED | OUTPATIENT
Start: 2022-12-23

## 2022-12-03 RX ORDER — METHYLPREDNISOLONE SODIUM SUCCINATE 125 MG/2ML
125 INJECTION, POWDER, LYOPHILIZED, FOR SOLUTION INTRAMUSCULAR; INTRAVENOUS
Status: CANCELLED
Start: 2022-12-23

## 2022-12-03 RX ORDER — EPINEPHRINE 1 MG/ML
0.3 INJECTION, SOLUTION INTRAMUSCULAR; SUBCUTANEOUS EVERY 5 MIN PRN
Status: CANCELLED | OUTPATIENT
Start: 2022-12-23

## 2022-12-03 RX ORDER — DIPHENHYDRAMINE HCL 25 MG
25 CAPSULE ORAL ONCE
Status: CANCELLED
Start: 2022-12-23 | End: 2022-12-23

## 2022-12-03 RX ORDER — ALBUTEROL SULFATE 90 UG/1
1-2 AEROSOL, METERED RESPIRATORY (INHALATION)
Status: CANCELLED
Start: 2022-12-23

## 2022-12-03 RX ORDER — DIPHENHYDRAMINE HYDROCHLORIDE 50 MG/ML
50 INJECTION INTRAMUSCULAR; INTRAVENOUS
Status: CANCELLED
Start: 2022-12-23

## 2022-12-03 RX ORDER — ACETAMINOPHEN 325 MG/1
650 TABLET ORAL ONCE
Status: CANCELLED
Start: 2022-12-23 | End: 2022-12-23

## 2022-12-03 RX ORDER — MEPERIDINE HYDROCHLORIDE 25 MG/ML
25 INJECTION INTRAMUSCULAR; INTRAVENOUS; SUBCUTANEOUS EVERY 30 MIN PRN
Status: CANCELLED | OUTPATIENT
Start: 2022-12-23

## 2022-12-03 RX ADMIN — SODIUM CHLORIDE 600 MG: 9 INJECTION, SOLUTION INTRAVENOUS at 09:43

## 2022-12-03 NOTE — LETTER
12/3/2022         RE: Shelly Cha  [address]  [facility] [address]        Dear Colleague,    Thank you for referring your patient, Shelly Cha, to the [facility]. Please see a copy of my visit note below.    Chief Complaint   Patient presents with     Infusion     IV infliximab     Infusion Nursing Note:  Shelly Cha presents today for IV infliximab.  Q 6 weeks.   Patient seen by provider today: No   present during visit today: Not Applicable.    Note: Infliximab infused over 1 hour.    Intravenous Access:  Peripheral IV placed.  Labs collected per orders.     Treatment Conditions:  Biological Infusion Checklist:  ~~~ NOTE: If the patient answers yes to any of the questions below, hold the infusion and contact ordering provider or on-call provider.    1. Have you recently had an elevated temperature, fever, chills, productive cough, coughing for 3 weeks or longer or hemoptysis, abnormal vital signs, night sweats,  chest pain or have you noticed a decrease in your appetite, unexplained weight loss or fatigue? No  2. Do you have any open wounds or new incisions? No  3. Do you have any recent or upcoming hospitalizations, surgeries or dental procedures? No  4. Do you currently have or recently have had any signs of illness or infection or are you on any antibiotics? No  5. Have you had any new, sudden or worsening abdominal pain? No  6. Have you or anyone in your household received a live vaccination in the past 4 weeks? Please note:  No live vaccines while on biologic/chemotherapy until 6 months after the last treatment.  Patient can receive the flu vaccine (shot only) and the pneumovax.  It is optimal for the patient to get these vaccines mid cycle, but they can be given at any time as long as it is not on the day of the infusion. No  7. Have you recently been diagnosed with any new nervous system diseases (ie. Multiple sclerosis,  "Guillain [place], seizures, neurological changes) or cancer diagnosis? No  8. Are you on any form of radiation or chemotherapy? No  9. Are you pregnant or breast feeding or do you have plans of pregnancy in the future? No  10. Have you been having any signs of worsening depression or suicidal ideations?  (benlysta only) No  11. Have there been any other new onset medical symptoms? No      Post Infusion Assessment:  Patient tolerated infusion without incident.  Blood return noted pre and post infusion.  Site patent and intact, free from redness, edema or discomfort.  No evidence of extravasations.  Access discontinued per protocol.     Discharge Plan:   AVS to patient via [employer]. Msg sent to scheduling for next appt.   Patient discharged in stable condition accompanied by: self.  Departure Mode: Ambulatory.    Administrations This Visit     inFLIXimab-dyyb (INFLECTRA) 600 mg in sodium chloride 0.9 % 275 mL infusion     Admin Date  12/03/2022 Action  New Bag Dose  600 mg Rate  275 mL/hr Route  Intravenous Administered By  Taylor Washington RN                Vital signs:  Temp: 97.6  F (36.4  C) Temp src: Oral BP: 119/69 Pulse: 79   Resp: 18 SpO2: 99 % O2 Device: None (Room air)     Weight: 79 kg (174 lb 3.2 oz)  Estimated body mass index is 30.86 kg/m  as calculated from the following:    Height as of 2/1/22: 1.6 m (5' 3\").    Weight as of this encounter: 79 kg (174 lb 3.2 oz).                                  Again, thank you for allowing me to participate in the care of your patient.        Sincerely,        No name on file    "

## 2022-12-03 NOTE — LETTER
Date:December 3, 2022      Provider requested that no letter be sent. Do not send.       [facility]

## 2022-12-03 NOTE — PROGRESS NOTES
Chief Complaint   Patient presents with     Infusion     IV infliximab     Infusion Nursing Note:  Shelly Cha presents today for IV infliximab.  Q 6 weeks.   Patient seen by provider today: No   present during visit today: Not Applicable.    Note: Infliximab infused over 1 hour.    Intravenous Access:  Peripheral IV placed.  Labs collected per orders.     Treatment Conditions:  Biological Infusion Checklist:  ~~~ NOTE: If the patient answers yes to any of the questions below, hold the infusion and contact ordering provider or on-call provider.    1. Have you recently had an elevated temperature, fever, chills, productive cough, coughing for 3 weeks or longer or hemoptysis, abnormal vital signs, night sweats,  chest pain or have you noticed a decrease in your appetite, unexplained weight loss or fatigue? No  2. Do you have any open wounds or new incisions? No  3. Do you have any recent or upcoming hospitalizations, surgeries or dental procedures? No  4. Do you currently have or recently have had any signs of illness or infection or are you on any antibiotics? No  5. Have you had any new, sudden or worsening abdominal pain? No  6. Have you or anyone in your household received a live vaccination in the past 4 weeks? Please note:  No live vaccines while on biologic/chemotherapy until 6 months after the last treatment.  Patient can receive the flu vaccine (shot only) and the pneumovax.  It is optimal for the patient to get these vaccines mid cycle, but they can be given at any time as long as it is not on the day of the infusion. No  7. Have you recently been diagnosed with any new nervous system diseases (ie. Multiple sclerosis, Guillain [place], seizures, neurological changes) or cancer diagnosis? No  8. Are you on any form of radiation or chemotherapy? No  9. Are you pregnant or breast feeding or do you have plans of pregnancy in the future? No  10. Have you been having any signs of worsening depression or  "suicidal ideations?  (benlysta only) No  11. Have there been any other new onset medical symptoms? No      Post Infusion Assessment:  Patient tolerated infusion without incident.  Blood return noted pre and post infusion.  Site patent and intact, free from redness, edema or discomfort.  No evidence of extravasations.  Access discontinued per protocol.     Discharge Plan:   AVS to patient via [employer]. Msg sent to scheduling for next appt.   Patient discharged in stable condition accompanied by: self.  Departure Mode: Ambulatory.    Administrations This Visit     inFLIXimab-dyyb (INFLECTRA) 600 mg in sodium chloride 0.9 % 275 mL infusion     Admin Date  12/03/2022 Action  New Bag Dose  600 mg Rate  275 mL/hr Route  Intravenous Administered By  Taylor Washington RN                Vital signs:  Temp: 97.6  F (36.4  C) Temp src: Oral BP: 119/69 Pulse: 79   Resp: 18 SpO2: 99 % O2 Device: None (Room air)     Weight: 79 kg (174 lb 3.2 oz)  Estimated body mass index is 30.86 kg/m  as calculated from the following:    Height as of 2/1/22: 1.6 m (5' 3\").    Weight as of this encounter: 79 kg (174 lb 3.2 oz).                              "

## 2022-12-22 ENCOUNTER — MYC MEDICAL ADVICE (OUTPATIENT)
Dept: GASTROENTEROLOGY | Facility: CLINIC | Age: 52
End: 2022-12-22

## 2023-01-03 NOTE — TELEPHONE ENCOUNTER
Called to remind patient of their upcoming appointment with our GI clinic, on Tuesday 1/10/2023 at 10:00AM with Adam Brooke. This appointment is scheduled as a video visit. You will receive a call approximately 30 minutes prior to check you in, you must be in [place] for this visit., if your appointment is virtual (video or telephone) you need to be in [place] for the visit. To reschedule or cancel patient to call [phone].    Betty Bender MA

## 2023-01-10 ENCOUNTER — VIRTUAL VISIT (OUTPATIENT)
Dept: GASTROENTEROLOGY | Facility: CLINIC | Age: 53
End: 2023-01-10
Payer: COMMERCIAL

## 2023-01-10 VITALS — WEIGHT: 175 LBS | HEIGHT: 63 IN | BODY MASS INDEX: 31.01 KG/M2

## 2023-01-10 DIAGNOSIS — K50.10 CROHN'S COLITIS, WITHOUT COMPLICATIONS (H): Primary | ICD-10-CM

## 2023-01-10 PROCEDURE — 99213 OFFICE O/P EST LOW 20 MIN: CPT | Mod: 95 | Performed by: PHYSICIAN ASSISTANT

## 2023-01-10 ASSESSMENT — PAIN SCALES - GENERAL: PAINLEVEL: EXTREME PAIN (8)

## 2023-01-10 NOTE — PROGRESS NOTES
Shelly is a 53 year old who is being evaluated via a billable video visit.      How would you like to obtain your AVS? [employer]  If the video visit is dropped, the invitation should be resent by: Text to cell phone: [phone]  Will anyone else be joining your video visit? No        Video-Visit Details    Type of service:  Video Visit   Video Start Time: 10:04 AM  Video End Time:10:14 AM    Originating Location (pt. Location): Home    Distant Location (provider location):  Off-site  Platform used for Video Visit: [facility]     IBD CLINIC VISIT    CC/REFERRING MD:  No ref. provider found    REASON FOR CONSULTATION: follow up IBD colitis    ASSESSMENT/PLAN:  #. Severe colitis:   Current medication: infliximab 7.5mg/kg every 6 weeks  Current clinical disease activity: remission   Current endoscopic disease activity: Haile 0 on colonoscopy 1/17/22, no active disease on bx, however has significant number of pseudopolyps   Clinically doing well on infliximab therapy with recent scope showing endoscopic remission. Due to number of pseudopolyps, we will repeat endoscopic assessment.  -- Colonoscopy, dysplasia screening due to number of pseudopolyps  -- Continue IFX (7.5 mg/kg q 6 wks)  -- Labs with infusions (CBC, CRP, ESR, CRP)     #. Constipation: Likely drive by methadone, now discontinued   Despite discontinuing methodone, patinet continues with 2 BM per week. Would recommend regular doses of Miralax, 2 caps full daily.       #. H pylori gastritis: Status post triple therapy.  Stool antigen now negative.       #. Hepatitis C: Spontaneously cleared.  No further evaluation or treatment is needed.    IBD HISTORY  Age at diagnosis: 51  Extent of disease: colon (pan-colitis - sparing of rectosigmoid - distal 15 cm)  Disease phenotype: inflammatory  Oneida-anal disease: none  Current CD medications: infliximab 7.5 mg/kg q 6 weeks (dose increase 9/2021)  Prior IBD surgeries: none  Prior IBD Medications: none    DRUG  MONITORING  TPMT enzyme activity: adequate    6-TGN/6-MMPN levels: none    Biologic concentration:  -IFX 8/11/21 - 3 with no antibodies    DISEASE ASSESSMENT  Labs  Recent Labs   Lab Test 12/03/22  0920 08/24/22  1458   CRP <3.00 3.5   SED 12 20     Fecal calprotectin: 5000 prior to treatment -->>113 (8/12/21)  - colonoscopy - severe inflammation colon (distal 15 cm spared). Ileum not seen  - normal EGD but gastric biopsis + h pylori  Enterography: MRE with normal small bowel   C diff: negative 1/22/21    sIBDQ:   No flowsheet data found.    IBD Health Care Maintenance:    Vaccinations:  All patients on biologics should avoid live vaccines.    -- Influenza (every year)  -- TdaP (every 10 years)  -- Pneumococcal Pneumonia (once plus booster at 5 years)  -- Yearly assessment for latent Tb (verbal screening and exam, PPD or QuantiFERON-Tb testing)     One time confirmation of immunity or serologies:  -- Hepatitis A (serologies or immunizations)  -- Hepatitis B (serologies or immunizations)  -- Varicella  -- MMR  -- HPV (all aged 18-26)  -- Meningococcal meningitis (all patients at risk for meningitis)  -- Due to the immunosuppression in this patient, I would not advise administration of live vaccines such as varicella/VZV, intranasal influenza, MMR, or yellow fever vaccine (if travelling).       Bone mineral density screening   -- Recommend all patients supplement with calcium and vitamin D     Cancer Screening:  Colon cancer screening:  Given panncolonic disease, recommend patient undergo regular dysplasia surveillance   Next dysplasia screening is recommended: given pseudopolyps I recommend we start dysplasia surveillance on next colonoscopy in 6-12 months.  Dysplasia surveillance will be difficult given the number of pseudopolyps that she is having.     Cervical cancer screening: Annual due to immunosupression     Skin cancer screening: Annual visual exam of skin by dermatologist since patient is  immunocompromised     Depression Screening:  -- Over the last month, have you felt down, depressed, or hopeless? no  -- Over the last month, have you felt little interest or pleasure doing things? no     Misc:  -- Avoid tobacco use  -- Avoid NSAIDs as there is potentially a 25% chance of causing an IBD flare    Return to clinic in 3 months     Thank you for this consultation.  28 minutes spent on the date of the encounter doing chart review, history and exam, documentation and further activities as noted above.  It was a pleasure to participate in the care of this patient; please contact us with any further questions.      Adam Brooke PA-C  Division of Gastroenterology, Hepatology and Nutrition  [facility]        HPI:   Currently, here to follow up. Recently increased to 7.5 mg/kg q 6 weeks.    Overall doing well.     Colitis - feels good. Having 0-1 BMs per week, formed, no blood. She has long standing constipation. She has not been taking miralax or citrucel.     Abdominal pain - occasional with specific foods.   She is no longer on methadone (for history of opioid abuse).    ROS:    No fevers or chills  No weight loss  No blurry vision, double vision or change in vision  No sore throat  No lymphadenopathy  No headache, paraesthesias, or weakness in a limb  No shortness of breath or wheezing  No chest pain or pressure  No arthralgias or myalgias  No rashes or skin changes  No odynophagia or dysphagia  No BRBPR, hematochezia, melena  No dysuria, frequency or urgency  No hot/cold intolerance or polyria  No anxiety or depression    Extra intestinal manifestations of IBD:  No uveitis/episcleritis  No aphthous ulcers   No arthritis   No erythema nodosum/pyoderma gangrenosum.     PERTINENT PAST MEDICAL HISTORY:  Past Medical History:   Diagnosis Date     Pancolitis (H) 3/11/2021     Pancolitis (H) 3/11/2021       PREVIOUS SURGERIES:  Past Surgical History:   Procedure Laterality Date     BIOPSY      right  breast     CHOLECYSTECTOMY       GYN SURGERY      , tubal       PREVIOUS ENDOSCOPY:  No results found for this or any previous visit.]    ALLERGIES:     Allergies   Allergen Reactions     Ibuprofen Swelling     Eyes swelling     Lactose Diarrhea     Tylenol [Acetaminophen]      Feels like someone punched her in the stomach       PERTINENT MEDICATIONS:    Current Outpatient Medications:      FLOVENT DISKUS 250 MCG/BLIST inhaler, INHALE 1 PUFF INTO THE LUNGS TWICE DAILY, Disp: , Rfl:      gabapentin (NEURONTIN) 300 MG capsule, Take 300 mg by mouth 4 times daily , Disp: , Rfl:      albuterol (ACCUNEB) 1.25 MG/3ML neb solution, Inhale 1.25 mg into the lungs (Patient not taking: Reported on 1/10/2023), Disp: , Rfl:      BANOPHEN 25 MG capsule, TAKE 2 CAPSULES BY MOUTH EVERY NIGHT AT BEDTIME AS NEEDED FOR SLEEP (Patient not taking: Reported on 1/10/2023), Disp: , Rfl:      benzocaine (ANBESOL) 10 % gel, Take by mouth 4 times daily as needed for moderate pain (apply to ulcer in mouth as needed) (Patient not taking: Reported on 6/23/2022), Disp: 9 g, Rfl: 1     bisacodyl (DULCOLAX) 5 MG EC tablet, Take 2 tablets by mouth at 10am the day before procedure. (Patient not taking: Reported on 3/9/2022), Disp: 2 tablet, Rfl: 0     Cholecalciferol (VITAMIN D3) 50 MCG (2000 UT) TABS, Take 1 tablet by mouth daily (Patient not taking: Reported on 6/23/2022), Disp: , Rfl:      folic acid 800 MCG tablet, Take 800 mcg by mouth daily (Patient not taking: Reported on 3/9/2022), Disp: , Rfl:      magnesium citrate solution, Drink entire bottle at 4pm two days prior to procedure. (Patient not taking: Reported on 3/9/2022), Disp: 296 mL, Rfl: 0     melatonin 5 MG tablet, Take 5 mg by mouth daily (Patient not taking: Reported on 1/10/2023), Disp: , Rfl:      METHADONE HCL PO, Take 48 mg by mouth daily  (Patient not taking: Reported on 3/9/2022), Disp: , Rfl:      methylcellulose (CITRUCEL) powder, Take 0.85 g (3 teaspoonful) by mouth daily  (Patient not taking: Reported on 3/9/2022), Disp: 90 g, Rfl: 3     minoxidil (ROGAINE) 2 % external solution, , Disp: , Rfl:      Multiple Vitamin (DAILY-JUSTIN MULTIVITAMIN) TABS, Take 1 tablet by mouth daily (Patient not taking: Reported on 3/9/2022), Disp: , Rfl:      omeprazole (PRILOSEC) 20 MG DR capsule, Take 1 capsule (20 mg) by mouth daily (Patient not taking: Reported on 3/9/2022), Disp: 30 capsule, Rfl: 1     polyethylene glycol (GOLYTELY) 236 g suspension, Take as directed. One day before your exam fill the first container with water. Cover and shake until mixed well. At 3:00pm drink one 8oz glass every 10-15 minutes until half of the first container is empty. Store the remainder in the refrigerator. At 8:00pm drink the second half of the first container until it is gone. Before you go to bed mix the second container with water and put in refrigerator. Six hours before your check in time drink one 8oz glass every 10-15 minutes until half of container is empty. Discard the remainder of solution. (Patient not taking: Reported on 3/9/2022), Disp: 8000 mL, Rfl: 0     polyethylene glycol (MIRALAX) 17 GM/Dose powder, Take 1 capful daily to have 1-2 soft BMs per day. Can increase up to 3 capfuls per day if needed to get desired result (Patient not taking: Reported on 6/23/2022), Disp: 507 g, Rfl: 3     Thiamine Mononitrate (B1) 100 MG TABS, Take 1 tablet by mouth daily (Patient not taking: Reported on 3/9/2022), Disp: , Rfl:      WAL-DRYL ALLERGY 25 MG tablet, TAKE 2 TABLETS BY MOUTH EVERY NIGHT AT BEDTIME AS NEEDED SLEEPLESSNESS (Patient not taking: No sig reported), Disp: , Rfl:      WAL-MUCIL 58.6 % powder, TAKE 2.5 GRAMS TWICE DAILY (Patient not taking: No sig reported), Disp: , Rfl:     SOCIAL HISTORY:  Social History     Socioeconomic History     Marital status: Single     Spouse name: Not on file     Number of children: Not on file     Years of education: Not on file     Highest education level: Not on file  "  Occupational History     Not on file   Tobacco Use     Smoking status: Former     Types: Cigarettes     Quit date: 2021     Years since quittin.0     Smokeless tobacco: Never   Substance and Sexual Activity     Alcohol use: No     Drug use: No     Sexual activity: Yes     Partners: Male   Other Topics Concern     Not on file   Social History Narrative     Not on file     Social Determinants of Health     Financial Resource Strain: Not on file   Food Insecurity: Not on file   Transportation Needs: Not on file   Physical Activity: Not on file   Stress: Not on file   Social Connections: Not on file   Intimate Partner Violence: Not on file   Housing Stability: Not on file       FAMILY HISTORY:  No family history on file.    Past/family/social history reviewed and no changes    PHYSICAL EXAMINATION:  Constitutional: aaox3, cooperative, pleasant, not dyspneic/diaphoretic, no acute distress  Vitals reviewed: Ht 1.6 m (5' 3\")   Wt 79.4 kg (175 lb)   BMI 31.00 kg/m    Wt:   Wt Readings from Last 2 Encounters:   01/10/23 79.4 kg (175 lb)   22 79 kg (174 lb 3.2 oz)      Constitutional - general appearance is well and in no acute distress. Body habitus normal  Eyes - No redness or discharge  Respiratory - No cough, unlabored breathing  Musculoskeletal - range of motion intact: Neck and arms  Skin - No discoloration or lesions  Neurological - No tremors, headaches  Psychiatric - No anxiety, alert & oriented      PERTINENT STUDIES:  Most recent CBC:  Recent Labs   Lab Test 22  0920 22  1458   WBC 12.5* 9.1   HGB 11.7 11.1*   HCT 35.0 32.3*    325     Most recent hepatic panel:  Recent Labs   Lab Test 22  0920 22  1458   ALT 14 20   AST 25 24     Most recent creatinine:  Recent Labs   Lab Test 21  1236 21  1450   CR 0.72 0.84             "

## 2023-01-10 NOTE — LETTER
1/10/2023         RE: Shelly Cha  [address]  [place] [address]        Dear Colleague,    Thank you for referring your patient, Shelly Cha, to the [facility] [place]. Please see a copy of my visit note below.    Shelly is a 53 year old who is being evaluated via a billable video visit.      How would you like to obtain your AVS? MyChart  If the video visit is dropped, the invitation should be resent by: Text to cell phone: [phone]  Will anyone else be joining your video visit? No        Video-Visit Details    Type of service:  Video Visit   Video Start Time: 10:04 AM  Video End Time:10:14 AM    Originating Location (pt. Location): Home    Distant Location (provider location):  Off-site  Platform used for Video Visit: [employer]     IBD CLINIC VISIT    CC/REFERRING MD:  No ref. provider found    REASON FOR CONSULTATION: follow up IBD colitis    ASSESSMENT/PLAN:  #. Severe colitis:   Current medication: infliximab 7.5mg/kg every 6 weeks  Current clinical disease activity: remission   Current endoscopic disease activity: Haile 0 on colonoscopy 1/17/22, no active disease on bx, however has significant number of pseudopolyps   Clinically doing well on infliximab therapy with recent scope showing endoscopic remission. Due to number of pseudopolyps, we will repeat endoscopic assessment.  -- Colonoscopy, dysplasia screening due to number of pseudopolyps  -- Continue IFX (7.5 mg/kg q 6 wks)  -- Labs with infusions (CBC, CRP, ESR, CRP)     #. Constipation: Likely drive by methadone, now discontinued   Despite discontinuing methodone, patinet continues with 2 BM per week. Would recommend regular doses of Miralax, 2 caps full daily.       #. H pylori gastritis: Status post triple therapy.  Stool antigen now negative.       #. Hepatitis C: Spontaneously cleared.  No further evaluation or treatment is needed.    IBD HISTORY  Age at diagnosis: 51  Extent of disease:  colon (pan-colitis - sparing of rectosigmoid - distal 15 cm)  Disease phenotype: inflammatory  Oneida-anal disease: none  Current CD medications: infliximab 7.5 mg/kg q 6 weeks (dose increase 9/2021)  Prior IBD surgeries: none  Prior IBD Medications: none    DRUG MONITORING  TPMT enzyme activity: adequate    6-TGN/6-MMPN levels: none    Biologic concentration:  -IFX 8/11/21 - 3 with no antibodies    DISEASE ASSESSMENT  Labs  Recent Labs   Lab Test 12/03/22  0920 08/24/22  1458   CRP <3.00 3.5   SED 12 20     Fecal calprotectin: 5000 prior to treatment -->>113 (8/12/21)  - colonoscopy - severe inflammation colon (distal 15 cm spared). Ileum not seen  - normal EGD but gastric biopsis + h pylori  Enterography: MRE with normal small bowel   C diff: negative 1/22/21    sIBDQ:   No flowsheet data found.    IBD Health Care Maintenance:    Vaccinations:  All patients on biologics should avoid live vaccines.    -- Influenza (every year)  -- TdaP (every 10 years)  -- Pneumococcal Pneumonia (once plus booster at 5 years)  -- Yearly assessment for latent Tb (verbal screening and exam, PPD or QuantiFERON-Tb testing)     One time confirmation of immunity or serologies:  -- Hepatitis A (serologies or immunizations)  -- Hepatitis B (serologies or immunizations)  -- Varicella  -- MMR  -- HPV (all aged 18-26)  -- Meningococcal meningitis (all patients at risk for meningitis)  -- Due to the immunosuppression in this patient, I would not advise administration of live vaccines such as varicella/VZV, intranasal influenza, MMR, or yellow fever vaccine (if travelling).       Bone mineral density screening   -- Recommend all patients supplement with calcium and vitamin D     Cancer Screening:  Colon cancer screening:  Given panncolonic disease, recommend patient undergo regular dysplasia surveillance   Next dysplasia screening is recommended: given pseudopolyps I recommend we start dysplasia surveillance on next colonoscopy in 6-12 months.   Dysplasia surveillance will be difficult given the number of pseudopolyps that she is having.     Cervical cancer screening: Annual due to immunosupression     Skin cancer screening: Annual visual exam of skin by dermatologist since patient is immunocompromised     Depression Screening:  -- Over the last month, have you felt down, depressed, or hopeless? no  -- Over the last month, have you felt little interest or pleasure doing things? no     Misc:  -- Avoid tobacco use  -- Avoid NSAIDs as there is potentially a 25% chance of causing an IBD flare    Return to clinic in 3 months     Thank you for this consultation.  28 minutes spent on the date of the encounter doing chart review, history and exam, documentation and further activities as noted above.  It was a pleasure to participate in the care of this patient; please contact us with any further questions.      Adam Brooke PA-C  Division of Gastroenterology, Hepatology and Nutrition  [facility]        HPI:   Currently, here to follow up. Recently increased to 7.5 mg/kg q 6 weeks.    Overall doing well.     Colitis - feels good. Having 0-1 BMs per week, formed, no blood. She has long standing constipation. She has not been taking miralax or citrucel.     Abdominal pain - occasional with specific foods.   She is no longer on methadone (for history of opioid abuse).    ROS:    No fevers or chills  No weight loss  No blurry vision, double vision or change in vision  No sore throat  No lymphadenopathy  No headache, paraesthesias, or weakness in a limb  No shortness of breath or wheezing  No chest pain or pressure  No arthralgias or myalgias  No rashes or skin changes  No odynophagia or dysphagia  No BRBPR, hematochezia, melena  No dysuria, frequency or urgency  No hot/cold intolerance or polyria  No anxiety or depression    Extra intestinal manifestations of IBD:  No uveitis/episcleritis  No aphthous ulcers   No arthritis   No erythema nodosum/pyoderma  gangrenosum.     PERTINENT PAST MEDICAL HISTORY:  Past Medical History:   Diagnosis Date     Pancolitis (H) 3/11/2021     Pancolitis (H) 3/11/2021       PREVIOUS SURGERIES:  Past Surgical History:   Procedure Laterality Date     BIOPSY      right breast     CHOLECYSTECTOMY       GYN SURGERY      , tubal       PREVIOUS ENDOSCOPY:  No results found for this or any previous visit.]    ALLERGIES:     Allergies   Allergen Reactions     Ibuprofen Swelling     Eyes swelling     Lactose Diarrhea     Tylenol [Acetaminophen]      Feels like someone punched her in the stomach       PERTINENT MEDICATIONS:    Current Outpatient Medications:      FLOVENT DISKUS 250 MCG/BLIST inhaler, INHALE 1 PUFF INTO THE LUNGS TWICE DAILY, Disp: , Rfl:      gabapentin (NEURONTIN) 300 MG capsule, Take 300 mg by mouth 4 times daily , Disp: , Rfl:      albuterol (ACCUNEB) 1.25 MG/3ML neb solution, Inhale 1.25 mg into the lungs (Patient not taking: Reported on 1/10/2023), Disp: , Rfl:      BANOPHEN 25 MG capsule, TAKE 2 CAPSULES BY MOUTH EVERY NIGHT AT BEDTIME AS NEEDED FOR SLEEP (Patient not taking: Reported on 1/10/2023), Disp: , Rfl:      benzocaine (ANBESOL) 10 % gel, Take by mouth 4 times daily as needed for moderate pain (apply to ulcer in mouth as needed) (Patient not taking: Reported on 6/23/2022), Disp: 9 g, Rfl: 1     bisacodyl (DULCOLAX) 5 MG EC tablet, Take 2 tablets by mouth at 10am the day before procedure. (Patient not taking: Reported on 3/9/2022), Disp: 2 tablet, Rfl: 0     Cholecalciferol (VITAMIN D3) 50 MCG (2000 UT) TABS, Take 1 tablet by mouth daily (Patient not taking: Reported on 6/23/2022), Disp: , Rfl:      folic acid 800 MCG tablet, Take 800 mcg by mouth daily (Patient not taking: Reported on 3/9/2022), Disp: , Rfl:      magnesium citrate solution, Drink entire bottle at 4pm two days prior to procedure. (Patient not taking: Reported on 3/9/2022), Disp: 296 mL, Rfl: 0     melatonin 5 MG tablet, Take 5 mg by mouth daily  (Patient not taking: Reported on 1/10/2023), Disp: , Rfl:      METHADONE HCL PO, Take 48 mg by mouth daily  (Patient not taking: Reported on 3/9/2022), Disp: , Rfl:      methylcellulose (CITRUCEL) powder, Take 0.85 g (3 teaspoonful) by mouth daily (Patient not taking: Reported on 3/9/2022), Disp: 90 g, Rfl: 3     minoxidil (ROGAINE) 2 % external solution, , Disp: , Rfl:      Multiple Vitamin (DAILY-JUSTIN MULTIVITAMIN) TABS, Take 1 tablet by mouth daily (Patient not taking: Reported on 3/9/2022), Disp: , Rfl:      omeprazole (PRILOSEC) 20 MG DR capsule, Take 1 capsule (20 mg) by mouth daily (Patient not taking: Reported on 3/9/2022), Disp: 30 capsule, Rfl: 1     polyethylene glycol (GOLYTELY) 236 g suspension, Take as directed. One day before your exam fill the first container with water. Cover and shake until mixed well. At 3:00pm drink one 8oz glass every 10-15 minutes until half of the first container is empty. Store the remainder in the refrigerator. At 8:00pm drink the second half of the first container until it is gone. Before you go to bed mix the second container with water and put in refrigerator. Six hours before your check in time drink one 8oz glass every 10-15 minutes until half of container is empty. Discard the remainder of solution. (Patient not taking: Reported on 3/9/2022), Disp: 8000 mL, Rfl: 0     polyethylene glycol (MIRALAX) 17 GM/Dose powder, Take 1 capful daily to have 1-2 soft BMs per day. Can increase up to 3 capfuls per day if needed to get desired result (Patient not taking: Reported on 6/23/2022), Disp: 507 g, Rfl: 3     Thiamine Mononitrate (B1) 100 MG TABS, Take 1 tablet by mouth daily (Patient not taking: Reported on 3/9/2022), Disp: , Rfl:      WAL-DRYL ALLERGY 25 MG tablet, TAKE 2 TABLETS BY MOUTH EVERY NIGHT AT BEDTIME AS NEEDED SLEEPLESSNESS (Patient not taking: No sig reported), Disp: , Rfl:      WAL-MUCIL 58.6 % powder, TAKE 2.5 GRAMS TWICE DAILY (Patient not taking: No sig  "reported), Disp: , Rfl:     SOCIAL HISTORY:  Social History     Socioeconomic History     Marital status: Single     Spouse name: Not on file     Number of children: Not on file     Years of education: Not on file     Highest education level: Not on file   Occupational History     Not on file   Tobacco Use     Smoking status: Former     Types: Cigarettes     Quit date: 2021     Years since quittin.0     Smokeless tobacco: Never   Substance and Sexual Activity     Alcohol use: No     Drug use: No     Sexual activity: Yes     Partners: Male   Other Topics Concern     Not on file   Social History Narrative     Not on file     Social Determinants of Health     Financial Resource Strain: Not on file   Food Insecurity: Not on file   Transportation Needs: Not on file   Physical Activity: Not on file   Stress: Not on file   Social Connections: Not on file   Intimate Partner Violence: Not on file   Housing Stability: Not on file       FAMILY HISTORY:  No family history on file.    Past/family/social history reviewed and no changes    PHYSICAL EXAMINATION:  Constitutional: aaox3, cooperative, pleasant, not dyspneic/diaphoretic, no acute distress  Vitals reviewed: Ht 1.6 m (5' 3\")   Wt 79.4 kg (175 lb)   BMI 31.00 kg/m    Wt:   Wt Readings from Last 2 Encounters:   01/10/23 79.4 kg (175 lb)   22 79 kg (174 lb 3.2 oz)      Constitutional - general appearance is well and in no acute distress. Body habitus normal  Eyes - No redness or discharge  Respiratory - No cough, unlabored breathing  Musculoskeletal - range of motion intact: Neck and arms  Skin - No discoloration or lesions  Neurological - No tremors, headaches  Psychiatric - No anxiety, alert & oriented      PERTINENT STUDIES:  Most recent CBC:  Recent Labs   Lab Test 22  0920 22  1458   WBC 12.5* 9.1   HGB 11.7 11.1*   HCT 35.0 32.3*    325     Most recent hepatic panel:  Recent Labs   Lab Test 22  0920 22  1458   ALT 14 20 "   AST 25 24     Most recent creatinine:  Recent Labs   Lab Test 08/11/21  1236 03/12/21  1450   CR 0.72 0.84                 Again, thank you for allowing me to participate in the care of your patient.      Sincerely,    Adam Brooke PA-C

## 2023-01-17 ENCOUNTER — INFUSION THERAPY VISIT (OUTPATIENT)
Dept: INFUSION THERAPY | Facility: CLINIC | Age: 53
End: 2023-01-17
Attending: INTERNAL MEDICINE
Payer: COMMERCIAL

## 2023-01-17 VITALS
TEMPERATURE: 98 F | RESPIRATION RATE: 16 BRPM | HEART RATE: 98 BPM | BODY MASS INDEX: 30.54 KG/M2 | SYSTOLIC BLOOD PRESSURE: 126 MMHG | OXYGEN SATURATION: 97 % | WEIGHT: 172.4 LBS | DIASTOLIC BLOOD PRESSURE: 88 MMHG

## 2023-01-17 DIAGNOSIS — K51.00 PANCOLITIS (H): Primary | ICD-10-CM

## 2023-01-17 PROCEDURE — 999N000127 HC STATISTIC PERIPHERAL IV START W US GUIDANCE

## 2023-01-17 PROCEDURE — 250N000011 HC RX IP 250 OP 636: Performed by: INTERNAL MEDICINE

## 2023-01-17 PROCEDURE — 258N000003 HC RX IP 258 OP 636: Performed by: INTERNAL MEDICINE

## 2023-01-17 PROCEDURE — 96413 CHEMO IV INFUSION 1 HR: CPT

## 2023-01-17 RX ORDER — EPINEPHRINE 1 MG/ML
0.3 INJECTION, SOLUTION INTRAMUSCULAR; SUBCUTANEOUS EVERY 5 MIN PRN
Status: CANCELLED | OUTPATIENT
Start: 2023-02-03

## 2023-01-17 RX ORDER — ALBUTEROL SULFATE 0.83 MG/ML
2.5 SOLUTION RESPIRATORY (INHALATION)
Status: CANCELLED | OUTPATIENT
Start: 2023-02-03

## 2023-01-17 RX ORDER — MEPERIDINE HYDROCHLORIDE 25 MG/ML
25 INJECTION INTRAMUSCULAR; INTRAVENOUS; SUBCUTANEOUS EVERY 30 MIN PRN
Status: CANCELLED | OUTPATIENT
Start: 2023-02-03

## 2023-01-17 RX ORDER — NALOXONE HYDROCHLORIDE 0.4 MG/ML
0.2 INJECTION, SOLUTION INTRAMUSCULAR; INTRAVENOUS; SUBCUTANEOUS
Status: CANCELLED | OUTPATIENT
Start: 2023-02-03

## 2023-01-17 RX ORDER — METHYLPREDNISOLONE SODIUM SUCCINATE 125 MG/2ML
125 INJECTION, POWDER, LYOPHILIZED, FOR SOLUTION INTRAMUSCULAR; INTRAVENOUS
Status: CANCELLED
Start: 2023-02-03

## 2023-01-17 RX ORDER — DIPHENHYDRAMINE HYDROCHLORIDE 50 MG/ML
50 INJECTION INTRAMUSCULAR; INTRAVENOUS
Status: CANCELLED
Start: 2023-02-03

## 2023-01-17 RX ORDER — DIPHENHYDRAMINE HCL 25 MG
25 CAPSULE ORAL ONCE
Status: CANCELLED
Start: 2023-02-03 | End: 2023-02-03

## 2023-01-17 RX ORDER — ALBUTEROL SULFATE 90 UG/1
1-2 AEROSOL, METERED RESPIRATORY (INHALATION)
Status: CANCELLED
Start: 2023-02-03

## 2023-01-17 RX ORDER — ACETAMINOPHEN 325 MG/1
650 TABLET ORAL ONCE
Status: CANCELLED
Start: 2023-02-03 | End: 2023-02-03

## 2023-01-17 RX ADMIN — SODIUM CHLORIDE 600 MG: 9 INJECTION, SOLUTION INTRAVENOUS at 09:36

## 2023-01-17 NOTE — LETTER
Date:January 18, 2023      Provider requested that no letter be sent. Do not send.       [facility]

## 2023-01-17 NOTE — PATIENT INSTRUCTIONS
Dear Shelly Cha    Thank you for choosing [facility] ([facility]) for your infliximab infusion.  The following information is a summary of our appointment as well as important reminders.      EDUCATION POST BIOLOGICAL/CHEMOTHERAPY INFUSION  Call the triage nurse at your clinic or seek medical attention if you have chills and/or temperature greater than or equal to 100.5, uncontrolled nausea/vomiting, diarrhea, constipation, dizziness, shortness of breath, chest pain, heart palpitations, weakness or any other new or concerning symptoms, questions or concerns.  You can not have any live virus vaccines prior to or during treatment or up to 6 months post infusion.  If you have an upcoming surgery, medical procedure or dental procedure during treatment, this should be discussed with your ordering physician and your surgeon/dentist.  If you are having any concerning symptom, if you are unsure if you should get your next infusion or wish to speak to a provider before your next infusion, please call your care coordinator or triage nurse at your clinic to notify them so we can adequately serve you.     We look forward in seeing you on your next appointment here at [facility] ([facility]).  Please don t hesitate to call us at [phone] to reschedule any of your appointments or to speak with one of the [facility] registered nurses.  It was a pleasure taking care of you today.    Sincerely,    [facility]  [facility]  [address] [place] [address]  [id number]  Phone:  [phone]

## 2023-01-17 NOTE — LETTER
1/17/2023         RE: Shelly Cha  [address]  [place] [address]        Dear Colleague,    Thank you for referring your patient, Shelly Cha, to the [facility]. Please see a copy of my visit note below.    Nursing Note  Shlely Cha [facility] for:   Chief Complaint   Patient presents with     Infusion     Infliximab     During today's [facility] appointment, orders from Dr. Kwasi Nolasco were completed.  Frequency: every 6 weeks    Progress note:  Patient identification verified by name and date of birth.  Assessment completed.  Vitals recorded in Doc Flowsheets.  Patient was provided with education regarding medication/procedure and possible side effects.  Patient verbalized understanding.     present during visit today: Not Applicable.    Treatment Conditions: ~~~ NOTE: If the patient answers yes to any of the questions below, hold the infusion and contact ordering provider or on-call provider.    1. Have you recently had an elevated temperature, fever, chills, productive cough, coughing for 3 weeks or longer or hemoptysis, abnormal vital signs, night sweats,  chest pain or have you noticed a decrease in your appetite, unexplained weight loss or fatigue? No  2. Do you have any open wounds or new incisions? No  3. Do you have any recent or upcoming hospitalizations, surgeries or dental procedures? No  4. Do you currently have or recently have had any signs of illness or infection or are you on any antibiotics? No  5. Have you had any new, sudden or worsening abdominal pain? No  6. Have you or anyone in your household received a live vaccination in the past 4 weeks? Please note:  No live vaccines while on biologic/chemotherapy until 6 months after the last treatment.  Patient can receive the flu vaccine (shot only) and the pneumovax.  It is optimal for the  patient to get these vaccines mid cycle, but they can be given at any time as long as it is not on the day of the infusion. No  7. Have you recently been diagnosed with any new nervous system diseases (ie. Multiple sclerosis, Guillain [place], seizures, neurological changes) or cancer diagnosis? No  8. Are you on any form of radiation or chemotherapy? No  9. Are you pregnant or breast feeding or do you have plans of pregnancy in the future? No  10. Have you been having any signs of worsening depression or suicidal ideations?  (benlysta only) NA  11. Have there been any other new onset medical symptoms? No      Premedications: historically patient has not taken pre-medications prior to infusion.    Drug Waste Record: No    Infusion length and rate:  infusion given over approximately 60 minutes at 275 ml/hr.    Labs: were not ordered for this appointment.    Vascular access: peripheral IV was placed by vascular access nurse.    Is the next appt scheduled? 2/28    Post Infusion Assessment:  Patient tolerated infusion without incident.     Discharge Plan:   Follow up plan of care with: ongoing infusions at [facility]., ordering provider as scheduled. and after visit summary given to patient  Discharge instructions were reviewed with patient.  Patient/representative verbalized understanding of discharge instructions and all questions answered.  Patient discharged from [facility] in stable condition.    Estella Reynolds RN       Administrations This Visit     inFLIXimab-dyyb (INFLECTRA) 600 mg in sodium chloride 0.9 % 275 mL infusion     Admin Date  01/17/2023 Action  New Bag Dose  600 mg Rate  275 mL/hr Route  Intravenous Administered By  Estella Reynolds RN                /88   Pulse 98   Temp 98  F (36.7  C) (Oral)   Resp 16   Wt 78.2 kg (172 lb 6.4 oz)   SpO2 97%   BMI 30.54 kg/m          Again, thank you for allowing me to participate in the care of  your patient.        Sincerely,        Specialty Infusion Nurse

## 2023-01-17 NOTE — PROGRESS NOTES
Nursing Note  Shelly Cha presents [facility] for:   Chief Complaint   Patient presents with     Infusion     Infliximab     During today's [facility] appointment, orders from Dr. Kwasi Nolasco were completed.  Frequency: every 6 weeks    Progress note:  Patient identification verified by name and date of birth.  Assessment completed.  Vitals recorded in Doc Flowsheets.  Patient was provided with education regarding medication/procedure and possible side effects.  Patient verbalized understanding.     present during visit today: Not Applicable.    Treatment Conditions: ~~~ NOTE: If the patient answers yes to any of the questions below, hold the infusion and contact ordering provider or on-call provider.    1. Have you recently had an elevated temperature, fever, chills, productive cough, coughing for 3 weeks or longer or hemoptysis, abnormal vital signs, night sweats,  chest pain or have you noticed a decrease in your appetite, unexplained weight loss or fatigue? No  2. Do you have any open wounds or new incisions? No  3. Do you have any recent or upcoming hospitalizations, surgeries or dental procedures? No  4. Do you currently have or recently have had any signs of illness or infection or are you on any antibiotics? No  5. Have you had any new, sudden or worsening abdominal pain? No  6. Have you or anyone in your household received a live vaccination in the past 4 weeks? Please note:  No live vaccines while on biologic/chemotherapy until 6 months after the last treatment.  Patient can receive the flu vaccine (shot only) and the pneumovax.  It is optimal for the patient to get these vaccines mid cycle, but they can be given at any time as long as it is not on the day of the infusion. No  7. Have you recently been diagnosed with any new nervous system diseases (ie. Multiple sclerosis, Guillain [place], seizures, neurological changes) or  cancer diagnosis? No  8. Are you on any form of radiation or chemotherapy? No  9. Are you pregnant or breast feeding or do you have plans of pregnancy in the future? No  10. Have you been having any signs of worsening depression or suicidal ideations?  (benlysta only) NA  11. Have there been any other new onset medical symptoms? No      Premedications: historically patient has not taken pre-medications prior to infusion.    Drug Waste Record: No    Infusion length and rate:  infusion given over approximately 60 minutes at 275 ml/hr.    Labs: were not ordered for this appointment.    Vascular access: peripheral IV was placed by vascular access nurse.    Is the next appt scheduled? 2/28    Post Infusion Assessment:  Patient tolerated infusion without incident.     Discharge Plan:   Follow up plan of care with: ongoing infusions at [facility]., ordering provider as scheduled. and after visit summary given to patient  Discharge instructions were reviewed with patient.  Patient/representative verbalized understanding of discharge instructions and all questions answered.  Patient discharged from [facility] in stable condition.    Estella Reynolds RN       Administrations This Visit     inFLIXimab-dyyb (INFLECTRA) 600 mg in sodium chloride 0.9 % 275 mL infusion     Admin Date  01/17/2023 Action  New Bag Dose  600 mg Rate  275 mL/hr Route  Intravenous Administered By  Estella Reynolds, RN                /88   Pulse 98   Temp 98  F (36.7  C) (Oral)   Resp 16   Wt 78.2 kg (172 lb 6.4 oz)   SpO2 97%   BMI 30.54 kg/m

## 2023-01-23 ENCOUNTER — TELEPHONE (OUTPATIENT)
Dept: GASTROENTEROLOGY | Facility: CLINIC | Age: 53
End: 2023-01-23
Payer: COMMERCIAL

## 2023-01-23 ENCOUNTER — HOSPITAL ENCOUNTER (OUTPATIENT)
Facility: AMBULATORY SURGERY CENTER | Age: 53
End: 2023-01-23
Attending: INTERNAL MEDICINE
Payer: COMMERCIAL

## 2023-01-23 NOTE — TELEPHONE ENCOUNTER
Patient does want Dr Stoner to do the procedure since he did it last time.  Order was for Dr Nolasco    Screening Questions  BLUE  KIND OF PREP RED  LOCATION [review exclusion criteria] GREEN  SEDATION TYPE        Yes Are you active on [employer]?       [facility] Ordering/Referring Provider?        [facility] What type of coverage do you have?      N Have you had a positive covid test in the last 14 days?     30.54 1. BMI  [BMI 40+ - review exclusion criteria]    Yes  2. Are you able to give consent for your medical care? [IF NO,RN REVIEW]          N  3. Are you taking any prescription pain medications on a routine schedule   (ex narcotics: tramadol, oxycodone, roxicodone, oxycontin,  and percocet)?        NA 3a. EXTENDED PREP What kind of prescription?     N 4. Do you have any chemical dependencies such as alcohol, street drugs, or methadone?        **If yes 3- 5 , please schedule with MAC sedation.**          IF YES TO ANY 6 - 10 - HOSPITAL SETTING ONLY.     N 6.   Do you need assistance transferring?     N 7.   Have you had a heart or lung transplant?    N 8.   Are you currently on dialysis?   N 9.   Do you use daily home oxygen?   N 10. Do you take nitroglycerin?   10a. NA If yes, how often?     11. [FEMALES]  NA Are you currently pregnant?    11a. NA If yes, how many weeks? [ Greater than 12 weeks, OR NEEDED]    N 12. Do you have Pulmonary Hypertension? *NEED PAC APPT AT [facility]*     N 13. [review exclusion criteria]  Do you have any implantable devices in your body (pacemaker, defib, LVAD)?    N 14. In the past 6 months, have you had any heart related issues including cardiomyopathy or heart attack?     14a. N If yes, did it require cardiac stenting if so when?     N 15. Have you had a stroke or Transient ischemic attack (TIA - aka  mini stroke ) within 6 months?      N 16. Do you have mod to severe Obstructive Sleep Apnea?  [Hospital only]    N 17. Do you have SEVERE AND UNCONTROLLED asthma? *NEED PAC APPT AT  "[facility]*     N 18. Are you currently taking any blood thinners?     18a. If yes, inform patient to \"follow up w/ ordering provider for bridging instructions.\"    N 19. Do you take the medication Phentermine?    19a. If yes, \"Hold for 7 days before procedure.  Please consult your prescribing provider if you have questions about holding this medication.\"     N  20. Do you have chronic kidney disease?      N  21. Do you have a diagnosis of diabetes?     Yes  22. On a regular basis do you go 3-5 days between bowel movements?      23. Preferred LOCAL Pharmacy for Pre Prescription    [ LIST ONLY ONE PHARMACY]     [facility]      - CLOSING REMINDERS -    Informed patient they will need an adult    Cannot take any type of public or medical transportation alone    Conscious Sedation- Needs  for 6 hours after the procedure       MAC/General-Needs  for 24 hours after procedure    Pre-Procedure Covid test to be completed [[facility] PCR Testing Required]    Confirmed Nurse will call to complete assessment       - SCHEDULING DETAILS -  N Hospital Setting Required? If yes, what is the exclusion?: MARIS Stoner-patient requested Dr Stoner since he did it last time.  Order was for Dr Nolasco  Surgeon    4/19/23  Date of Procedure  Lower Endoscopy [Colonoscopy]  Type of Procedure Scheduled  [facility] Location   [facility] EXTENDED - If you answer yes to questions #1, #3, #22 ([place] and CF pts)Which Colonoscopy Prep was Sent?   Constipation  MAC per order Sedation Type     No PAC / Pre-op Required                 "

## 2023-01-30 ENCOUNTER — MEDICAL CORRESPONDENCE (OUTPATIENT)
Dept: HEALTH INFORMATION MANAGEMENT | Facility: CLINIC | Age: 53
End: 2023-01-30
Payer: COMMERCIAL

## 2023-02-14 ENCOUNTER — TRANSCRIBE ORDERS (OUTPATIENT)
Dept: OTHER | Age: 53
End: 2023-02-14

## 2023-02-14 DIAGNOSIS — Z00.00 VISIT FOR PREVENTIVE HEALTH EXAMINATION: Primary | ICD-10-CM

## 2023-02-28 ENCOUNTER — INFUSION THERAPY VISIT (OUTPATIENT)
Dept: INFUSION THERAPY | Facility: CLINIC | Age: 53
End: 2023-02-28
Attending: INTERNAL MEDICINE
Payer: COMMERCIAL

## 2023-02-28 VITALS
HEART RATE: 91 BPM | TEMPERATURE: 98.5 F | DIASTOLIC BLOOD PRESSURE: 83 MMHG | BODY MASS INDEX: 30.45 KG/M2 | RESPIRATION RATE: 16 BRPM | SYSTOLIC BLOOD PRESSURE: 126 MMHG | WEIGHT: 171.9 LBS | OXYGEN SATURATION: 96 %

## 2023-02-28 DIAGNOSIS — K51.00 PANCOLITIS (H): Primary | ICD-10-CM

## 2023-02-28 LAB
ALBUMIN SERPL BCG-MCNC: 4.3 G/DL (ref 3.5–5.2)
ALP SERPL-CCNC: 66 U/L (ref 35–104)
ALT SERPL W P-5'-P-CCNC: 11 U/L (ref 10–35)
AST SERPL W P-5'-P-CCNC: 27 U/L (ref 10–35)
BASOPHILS # BLD AUTO: 0.1 10E3/UL (ref 0–0.2)
BASOPHILS NFR BLD AUTO: 1 %
BILIRUB DIRECT SERPL-MCNC: <0.2 MG/DL (ref 0–0.3)
BILIRUB SERPL-MCNC: 0.3 MG/DL
CRP SERPL-MCNC: <3 MG/L
EOSINOPHIL # BLD AUTO: 0.2 10E3/UL (ref 0–0.7)
EOSINOPHIL NFR BLD AUTO: 2 %
ERYTHROCYTE [DISTWIDTH] IN BLOOD BY AUTOMATED COUNT: 15.4 % (ref 10–15)
ERYTHROCYTE [SEDIMENTATION RATE] IN BLOOD BY WESTERGREN METHOD: 11 MM/HR (ref 0–30)
HCT VFR BLD AUTO: 37.7 % (ref 35–47)
HGB BLD-MCNC: 12.5 G/DL (ref 11.7–15.7)
IMM GRANULOCYTES # BLD: 0 10E3/UL
IMM GRANULOCYTES NFR BLD: 0 %
LYMPHOCYTES # BLD AUTO: 3.3 10E3/UL (ref 0.8–5.3)
LYMPHOCYTES NFR BLD AUTO: 29 %
MCH RBC QN AUTO: 28.1 PG (ref 26.5–33)
MCHC RBC AUTO-ENTMCNC: 33.2 G/DL (ref 31.5–36.5)
MCV RBC AUTO: 85 FL (ref 78–100)
MONOCYTES # BLD AUTO: 0.8 10E3/UL (ref 0–1.3)
MONOCYTES NFR BLD AUTO: 7 %
NEUTROPHILS # BLD AUTO: 7.1 10E3/UL (ref 1.6–8.3)
NEUTROPHILS NFR BLD AUTO: 61 %
NRBC # BLD AUTO: 0 10E3/UL
NRBC BLD AUTO-RTO: 0 /100
PLATELET # BLD AUTO: 377 10E3/UL (ref 150–450)
PROT SERPL-MCNC: 7.5 G/DL (ref 6.4–8.3)
RBC # BLD AUTO: 4.45 10E6/UL (ref 3.8–5.2)
WBC # BLD AUTO: 11.5 10E3/UL (ref 4–11)

## 2023-02-28 PROCEDURE — 96413 CHEMO IV INFUSION 1 HR: CPT

## 2023-02-28 PROCEDURE — 85652 RBC SED RATE AUTOMATED: CPT | Performed by: INTERNAL MEDICINE

## 2023-02-28 PROCEDURE — 85025 COMPLETE CBC W/AUTO DIFF WBC: CPT | Performed by: INTERNAL MEDICINE

## 2023-02-28 PROCEDURE — 36415 COLL VENOUS BLD VENIPUNCTURE: CPT | Performed by: INTERNAL MEDICINE

## 2023-02-28 PROCEDURE — 250N000011 HC RX IP 250 OP 636: Performed by: INTERNAL MEDICINE

## 2023-02-28 PROCEDURE — 86140 C-REACTIVE PROTEIN: CPT | Performed by: INTERNAL MEDICINE

## 2023-02-28 PROCEDURE — 82040 ASSAY OF SERUM ALBUMIN: CPT | Performed by: INTERNAL MEDICINE

## 2023-02-28 PROCEDURE — 258N000003 HC RX IP 258 OP 636: Performed by: INTERNAL MEDICINE

## 2023-02-28 RX ORDER — NALOXONE HYDROCHLORIDE 0.4 MG/ML
0.2 INJECTION, SOLUTION INTRAMUSCULAR; INTRAVENOUS; SUBCUTANEOUS
Status: CANCELLED | OUTPATIENT
Start: 2023-03-17

## 2023-02-28 RX ORDER — DIPHENHYDRAMINE HCL 25 MG
25 CAPSULE ORAL ONCE
Status: CANCELLED
Start: 2023-03-17 | End: 2023-03-17

## 2023-02-28 RX ORDER — MEPERIDINE HYDROCHLORIDE 25 MG/ML
25 INJECTION INTRAMUSCULAR; INTRAVENOUS; SUBCUTANEOUS EVERY 30 MIN PRN
Status: CANCELLED | OUTPATIENT
Start: 2023-03-17

## 2023-02-28 RX ORDER — EPINEPHRINE 1 MG/ML
0.3 INJECTION, SOLUTION INTRAMUSCULAR; SUBCUTANEOUS EVERY 5 MIN PRN
Status: CANCELLED | OUTPATIENT
Start: 2023-03-17

## 2023-02-28 RX ORDER — ACETAMINOPHEN 325 MG/1
650 TABLET ORAL ONCE
Status: CANCELLED
Start: 2023-03-17 | End: 2023-03-17

## 2023-02-28 RX ORDER — METHYLPREDNISOLONE SODIUM SUCCINATE 125 MG/2ML
125 INJECTION, POWDER, LYOPHILIZED, FOR SOLUTION INTRAMUSCULAR; INTRAVENOUS
Status: CANCELLED
Start: 2023-03-17

## 2023-02-28 RX ORDER — ALBUTEROL SULFATE 90 UG/1
1-2 AEROSOL, METERED RESPIRATORY (INHALATION)
Status: CANCELLED
Start: 2023-03-17

## 2023-02-28 RX ORDER — ALBUTEROL SULFATE 0.83 MG/ML
2.5 SOLUTION RESPIRATORY (INHALATION)
Status: CANCELLED | OUTPATIENT
Start: 2023-03-17

## 2023-02-28 RX ORDER — DIPHENHYDRAMINE HYDROCHLORIDE 50 MG/ML
50 INJECTION INTRAMUSCULAR; INTRAVENOUS
Status: CANCELLED
Start: 2023-03-17

## 2023-02-28 RX ADMIN — SODIUM CHLORIDE 600 MG: 9 INJECTION, SOLUTION INTRAVENOUS at 15:51

## 2023-02-28 NOTE — PROGRESS NOTES
Chief Complaint   Patient presents with     Infusion     IV Inflectra     Infusion Nursing Note:  Shelly Cha presents today for IV Inflectra, every 6 weeks.    Patient seen by provider today: No   present during visit today: Not Applicable.    Note:   Inflectra infused over 1 hour.     Intravenous Access:  Peripheral IV placed.  Labs drawn per orders     Treatment Conditions:  Biological Infusion Checklist:  ~~~ NOTE: If the patient answers yes to any of the questions below, hold the infusion and contact ordering provider or on-call provider.    1. Have you recently had an elevated temperature, fever, chills, productive cough, coughing for 3 weeks or longer or hemoptysis, abnormal vital signs, night sweats,  chest pain or have you noticed a decrease in your appetite, unexplained weight loss or fatigue? No  2. Do you have any open wounds or new incisions? No  3. Do you have any recent or upcoming hospitalizations, surgeries or dental procedures? No  4. Do you currently have or recently have had any signs of illness or infection or are you on any antibiotics? No  5. Have you had any new, sudden or worsening abdominal pain? No  6. Have you or anyone in your household received a live vaccination in the past 4 weeks? Please note:  No live vaccines while on biologic/chemotherapy until 6 months after the last treatment.  Patient can receive the flu vaccine (shot only) and the pneumovax.  It is optimal for the patient to get these vaccines mid cycle, but they can be given at any time as long as it is not on the day of the infusion. No  7. Have you recently been diagnosed with any new nervous system diseases (ie. Multiple sclerosis, Guillain [place], seizures, neurological changes) or cancer diagnosis? No  8. Are you on any form of radiation or chemotherapy? No  9. Are you pregnant or breast feeding or do you have plans of pregnancy in the future? No  10. Have you been having any signs of worsening depression or  "suicidal ideations?  (benlysta only) No  11. Have there been any other new onset medical symptoms? No    Post Infusion Assessment:  Patient tolerated infusion without incident.  Blood return noted pre and post infusion.  Site patent and intact, free from redness, edema or discomfort.  No evidence of extravasations.  Access discontinued per protocol.     POST-INFUSION OF BIOLOGICAL MEDICATION:  Reviewed with patient.  Given biologic medication or medication hand-out. Inform patient if any fever, chills or signs of infection, new symptoms, abdominal pain, heart palpitations, shortness of breath, reaction, weakness, neurological changes, seek medical attention immediately and should not receive infusions. No live virus vaccines prior to or during treatment or up to 6 months post infusion. If the patient has an upcoming procedure or surgery, this should be discussed with the rheumatologist and surgeon or provider.    Discharge Plan:   AVS to patient via [employer].  Patient will return 4/12 for next appointment.   Patient discharged in stable condition accompanied by: self.  Departure Mode: Ambulatory.    Administrations This Visit     inFLIXimab-dyyb (INFLECTRA) 600 mg in sodium chloride 0.9 % 275 mL infusion     Admin Date  02/28/2023 Action  $New Bag Dose  600 mg Rate  275 mL/hr Route  Intravenous Administered By  Taylor Washington RN              Vital signs:  Temp: 98.5  F (36.9  C) Temp src: Oral BP: (!) 142/89 Pulse: 94   Resp: 18 SpO2: 96 % O2 Device: None (Room air)     Weight: 78 kg (171 lb 14.4 oz)  Estimated body mass index is 30.45 kg/m  as calculated from the following:    Height as of 1/10/23: 1.6 m (5' 3\").    Weight as of this encounter: 78 kg (171 lb 14.4 oz).                          "

## 2023-02-28 NOTE — LETTER
Date:March 1, 2023      Provider requested that no letter be sent. Do not send.       [facility]

## 2023-02-28 NOTE — LETTER
2/28/2023         RE: Shelly Cha  [address]  [place] [address]        Dear Colleague,    Thank you for referring your patient, Shelly Cha, to the [facility]. Please see a copy of my visit note below.    Chief Complaint   Patient presents with     Infusion     IV Inflectra     Infusion Nursing Note:  Shelly Cha presents today for IV Inflectra, every 6 weeks.    Patient seen by provider today: No   present during visit today: Not Applicable.    Note:   Inflectra infused over 1 hour.     Intravenous Access:  Peripheral IV placed.  Labs drawn per orders     Treatment Conditions:  Biological Infusion Checklist:  ~~~ NOTE: If the patient answers yes to any of the questions below, hold the infusion and contact ordering provider or on-call provider.    1. Have you recently had an elevated temperature, fever, chills, productive cough, coughing for 3 weeks or longer or hemoptysis, abnormal vital signs, night sweats,  chest pain or have you noticed a decrease in your appetite, unexplained weight loss or fatigue? No  2. Do you have any open wounds or new incisions? No  3. Do you have any recent or upcoming hospitalizations, surgeries or dental procedures? No  4. Do you currently have or recently have had any signs of illness or infection or are you on any antibiotics? No  5. Have you had any new, sudden or worsening abdominal pain? No  6. Have you or anyone in your household received a live vaccination in the past 4 weeks? Please note:  No live vaccines while on biologic/chemotherapy until 6 months after the last treatment.  Patient can receive the flu vaccine (shot only) and the pneumovax.  It is optimal for the patient to get these vaccines mid cycle, but they can be given at any time as long as it is not on the day of the infusion. No  7. Have you recently been diagnosed with any new nervous system diseases (ie. Multiple sclerosis,  "Guillain [place], seizures, neurological changes) or cancer diagnosis? No  8. Are you on any form of radiation or chemotherapy? No  9. Are you pregnant or breast feeding or do you have plans of pregnancy in the future? No  10. Have you been having any signs of worsening depression or suicidal ideations?  (benlysta only) No  11. Have there been any other new onset medical symptoms? No    Post Infusion Assessment:  Patient tolerated infusion without incident.  Blood return noted pre and post infusion.  Site patent and intact, free from redness, edema or discomfort.  No evidence of extravasations.  Access discontinued per protocol.     POST-INFUSION OF BIOLOGICAL MEDICATION:  Reviewed with patient.  Given biologic medication or medication hand-out. Inform patient if any fever, chills or signs of infection, new symptoms, abdominal pain, heart palpitations, shortness of breath, reaction, weakness, neurological changes, seek medical attention immediately and should not receive infusions. No live virus vaccines prior to or during treatment or up to 6 months post infusion. If the patient has an upcoming procedure or surgery, this should be discussed with the rheumatologist and surgeon or provider.    Discharge Plan:   AVS to patient via MYCHART.  Patient will return 4/12 for next appointment.   Patient discharged in stable condition accompanied by: self.  Departure Mode: Ambulatory.    Administrations This Visit     inFLIXimab-dyyb (INFLECTRA) 600 mg in sodium chloride 0.9 % 275 mL infusion     Admin Date  02/28/2023 Action  $New Bag Dose  600 mg Rate  275 mL/hr Route  Intravenous Administered By  Taylor Washington, RN              Vital signs:  Temp: 98.5  F (36.9  C) Temp src: Oral BP: (!) 142/89 Pulse: 94   Resp: 18 SpO2: 96 % O2 Device: None (Room air)     Weight: 78 kg (171 lb 14.4 oz)  Estimated body mass index is 30.45 kg/m  as calculated from the following:    Height as of 1/10/23: 1.6 m (5' 3\").    Weight as of this " encounter: 78 kg (171 lb 14.4 oz).                              Again, thank you for allowing me to participate in the care of your patient.        Sincerely,        Specialty Infusion Nurse

## 2023-02-28 NOTE — PATIENT INSTRUCTIONS
Dear Shelly Cha    Thank you for choosing [facility] ([facility]) for your infusion.  The following information is a summary of our appointment as well as important reminders.      EDUCATION POST BIOLOGICAL/CHEMOTHERAPY INFUSION  Call the triage nurse at your clinic or seek medical attention if you have chills and/or temperature greater than or equal to 100.5, uncontrolled nausea/vomiting, diarrhea, constipation, dizziness, shortness of breath, chest pain, heart palpitations, weakness or any other new or concerning symptoms, questions or concerns.  You can not have any live virus vaccines prior to or during treatment or up to 6 months post infusion.  If you have an upcoming surgery, medical procedure or dental procedure during treatment, this should be discussed with your ordering physician and your surgeon/dentist.  If you are having any concerning symptom, if you are unsure if you should get your next infusion or wish to speak to a provider before your next infusion, please call your care coordinator or triage nurse at your clinic to notify them so we can adequately serve you.     We look forward in seeing you on your next appointment here at [facility] ([facility]).  Please don t hesitate to call us at [phone] to reschedule any of your appointments or to speak with one of the [facility] registered nurses.  It was a pleasure taking care of you today.    Sincerely,    [facility]  [facility]  [address] [place] [address]  [address]  Phone:  [phone]

## 2023-03-02 ENCOUNTER — ANCILLARY PROCEDURE (OUTPATIENT)
Dept: MAMMOGRAPHY | Facility: CLINIC | Age: 53
End: 2023-03-02
Attending: FAMILY MEDICINE
Payer: COMMERCIAL

## 2023-03-02 ENCOUNTER — OFFICE VISIT (OUTPATIENT)
Dept: OPHTHALMOLOGY | Facility: CLINIC | Age: 53
End: 2023-03-02
Payer: COMMERCIAL

## 2023-03-02 DIAGNOSIS — K51.00 PANCOLITIS (H): ICD-10-CM

## 2023-03-02 DIAGNOSIS — H52.13 MYOPIA OF BOTH EYES: Primary | ICD-10-CM

## 2023-03-02 DIAGNOSIS — Z00.00 VISIT FOR PREVENTIVE HEALTH EXAMINATION: ICD-10-CM

## 2023-03-02 PROCEDURE — 92015 DETERMINE REFRACTIVE STATE: CPT | Performed by: OPTOMETRIST

## 2023-03-02 PROCEDURE — 77063 BREAST TOMOSYNTHESIS BI: CPT

## 2023-03-02 PROCEDURE — 77067 SCR MAMMO BI INCL CAD: CPT

## 2023-03-02 PROCEDURE — 92004 COMPRE OPH EXAM NEW PT 1/>: CPT | Performed by: OPTOMETRIST

## 2023-03-02 ASSESSMENT — SLIT LAMP EXAM - LIDS
COMMENTS: NORMAL
COMMENTS: NORMAL

## 2023-03-02 ASSESSMENT — CUP TO DISC RATIO
OD_RATIO: 0.5
OS_RATIO: 0.6

## 2023-03-02 ASSESSMENT — VISUAL ACUITY
OS_SC: 20/20
OD_SC: 20/20
OS_SC+: -3
METHOD: SNELLEN - LINEAR

## 2023-03-02 ASSESSMENT — REFRACTION_MANIFEST
OS_ADD: +1.50
OS_CYLINDER: SPHERE
OD_ADD: +1.50
OS_SPHERE: -0.75
OD_CYLINDER: SPHERE
OD_SPHERE: -0.50

## 2023-03-02 ASSESSMENT — CONF VISUAL FIELD
OS_NORMAL: 1
OD_INFERIOR_TEMPORAL_RESTRICTION: 0
OD_NORMAL: 1
OS_SUPERIOR_TEMPORAL_RESTRICTION: 0
OD_SUPERIOR_NASAL_RESTRICTION: 0
OS_SUPERIOR_NASAL_RESTRICTION: 0
METHOD: COUNTING FINGERS
OD_SUPERIOR_TEMPORAL_RESTRICTION: 0
OD_INFERIOR_NASAL_RESTRICTION: 0
OS_INFERIOR_NASAL_RESTRICTION: 0
OS_INFERIOR_TEMPORAL_RESTRICTION: 0

## 2023-03-02 ASSESSMENT — TONOMETRY
OS_IOP_MMHG: 17
OD_IOP_MMHG: 17
IOP_METHOD: ICARE

## 2023-03-02 ASSESSMENT — EXTERNAL EXAM - RIGHT EYE: OD_EXAM: NORMAL

## 2023-03-02 ASSESSMENT — EXTERNAL EXAM - LEFT EYE: OS_EXAM: NORMAL

## 2023-03-02 NOTE — NURSING NOTE
Chief Complaints and History of Present Illnesses   Patient presents with     Annual Eye Exam     Chief Complaint(s) and History of Present Illness(es)     Annual Eye Exam            Laterality: both eyes    Associated symptoms: Negative for eye pain, redness, flashes and floaters    Treatments tried: no treatments    Pain scale: 0/10          Comments    Patient present for annual exam. Patient does not currently use distance correction but otc readers.  YULIANA Lopez March 2, 2023 11:41 AM

## 2023-03-02 NOTE — PROGRESS NOTES
History  HPI     Annual Eye Exam    In both eyes.  Associated symptoms include Negative for eye pain, redness, flashes and floaters.  Treatments tried include no treatments.  Pain was noted as 0/10.           Comments    Patient present for annual exam. Patient does not currently use distance correction. 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   YULIANA Lopez March 2, 2023 11:41 AM           Last edited by Sarah Casanova on 3/2/2023 12:11 PM.          Assessment/Plan  (H52.13) Myopia of both eyes  (primary encounter diagnosis)  Comment: Myopia with presbyopia both eyes   Plan: REFRACTION         Educated patient on condition and clinical findings. Dispensed spectacle prescriptions for distance and near only. Monitor annually.    (K51.00) Pancolitis  Comment: No ocular inflammation.  Plan:  Monitor annually.    Ocular health normal on examination today.    Return to clinic in 1 year for comprehensive eye exam.    Complete documentation of historical and exam elements from today's encounter can  be found in the full encounter summary report (not reduplicated in this progress  note). I personally obtained the chief complaint(s) and history of present illness. I  confirmed and edited as necessary the review of systems, past medical/surgical  history, family history, social history, and examination findings as documented by  others; and I examined the patient myself. I personally reviewed the relevant tests,  images, and reports as documented above. I formulated and edited as necessary the  assessment and plan and discussed the findings and management plan with the  patient and family.    Remi Parham, OD, FAAO

## 2023-03-06 ENCOUNTER — MEDICAL CORRESPONDENCE (OUTPATIENT)
Dept: HEALTH INFORMATION MANAGEMENT | Facility: CLINIC | Age: 53
End: 2023-03-06
Payer: COMMERCIAL

## 2023-03-13 ENCOUNTER — ANCILLARY PROCEDURE (OUTPATIENT)
Dept: MAMMOGRAPHY | Facility: CLINIC | Age: 53
End: 2023-03-13
Attending: FAMILY MEDICINE
Payer: COMMERCIAL

## 2023-03-13 DIAGNOSIS — R92.8 ABNORMAL MAMMOGRAM OF LEFT BREAST: ICD-10-CM

## 2023-03-13 PROCEDURE — 77065 DX MAMMO INCL CAD UNI: CPT | Mod: LT | Performed by: RADIOLOGY

## 2023-03-14 ENCOUNTER — TELEPHONE (OUTPATIENT)
Dept: MAMMOGRAPHY | Facility: CLINIC | Age: 53
End: 2023-03-14
Payer: COMMERCIAL

## 2023-03-14 NOTE — TELEPHONE ENCOUNTER
Reached out to Shelly to schedule her recommended breast biopsy.  She was unable to talk to be at the time a requested that I call her back tomorrow if she doesn't call me back before than.  Writer will call back tomorrow to help schedule her.

## 2023-03-17 ENCOUNTER — ANCILLARY PROCEDURE (OUTPATIENT)
Dept: MAMMOGRAPHY | Facility: CLINIC | Age: 53
End: 2023-03-17
Attending: FAMILY MEDICINE
Payer: COMMERCIAL

## 2023-03-17 DIAGNOSIS — R92.8 ABNORMAL MAMMOGRAM OF LEFT BREAST: ICD-10-CM

## 2023-03-17 PROCEDURE — 88305 TISSUE EXAM BY PATHOLOGIST: CPT | Mod: TC | Performed by: FAMILY MEDICINE

## 2023-03-17 PROCEDURE — 88305 TISSUE EXAM BY PATHOLOGIST: CPT | Mod: 26 | Performed by: PATHOLOGY

## 2023-03-17 PROCEDURE — 19081 BX BREAST 1ST LESION STRTCTC: CPT | Mod: LT | Performed by: RADIOLOGY

## 2023-03-17 RX ORDER — LIDOCAINE HYDROCHLORIDE AND EPINEPHRINE 10; 10 MG/ML; UG/ML
10 INJECTION, SOLUTION INFILTRATION; PERINEURAL ONCE
Status: COMPLETED | OUTPATIENT
Start: 2023-03-17 | End: 2023-03-17

## 2023-03-17 RX ADMIN — LIDOCAINE HYDROCHLORIDE AND EPINEPHRINE 10 ML: 10; 10 INJECTION, SOLUTION INFILTRATION; PERINEURAL at 10:11

## 2023-03-20 ENCOUNTER — TELEPHONE (OUTPATIENT)
Dept: MAMMOGRAPHY | Facility: CLINIC | Age: 53
End: 2023-03-20
Payer: COMMERCIAL

## 2023-03-20 NOTE — TELEPHONE ENCOUNTER
Spoke to Shelly about the benign findings from her breast biopsy done last week.  We discussed the Radiologist's recommendation of continuing on with her yearly screening mammograms.  Shelly verbalized understanding and all questions and concerns were answered at this time.

## 2023-04-06 ENCOUNTER — TELEPHONE (OUTPATIENT)
Dept: GASTROENTEROLOGY | Facility: CLINIC | Age: 53
End: 2023-04-06

## 2023-04-06 DIAGNOSIS — K50.10 CROHN'S COLITIS, WITHOUT COMPLICATIONS (H): Primary | ICD-10-CM

## 2023-04-06 RX ORDER — BISACODYL 5 MG/1
TABLET, DELAYED RELEASE ORAL
Qty: 4 TABLET | Refills: 0 | Status: ON HOLD | OUTPATIENT
Start: 2023-04-06 | End: 2024-10-11

## 2023-04-06 NOTE — TELEPHONE ENCOUNTER
Pre assessment questions completed for upcoming Colonoscopy  procedure scheduled on 4/19/23    COVID policy reviewed.     Pre-op exam? N/A    Reviewed procedural arrival time 0800, procedure time 0900 and facility location [facility]; [address] [place] [address], [address] Floor, [place] [address]    Designated  policy reviewed. Instructed to have someone stay 24 hours post procedure.     Anticoagulation/blood thinners? No    Electronic implanted devices? No    Diabetic? No    Procedure indication: Crohn's    Bowel prep recommendation: Extended prep Golytely - d/t constipation    Reviewed procedure prep instructions.     Prep instructions sent via [employer].  Bowel prep script sent to     [id number] - [place], [place] - [address]  AT [facility] [address].     Patient verbalized understanding and had no questions or concerns at this time.    Jessica Rodriguez RN  Endoscopy Procedure Pre Assessment RN

## 2023-04-13 ENCOUNTER — TELEPHONE (OUTPATIENT)
Dept: GASTROENTEROLOGY | Facility: CLINIC | Age: 53
End: 2023-04-13
Payer: COMMERCIAL

## 2023-04-13 DIAGNOSIS — K51.00 PANCOLITIS (H): Primary | ICD-10-CM

## 2023-04-13 RX ORDER — METHYLPREDNISOLONE SODIUM SUCCINATE 125 MG/2ML
125 INJECTION, POWDER, LYOPHILIZED, FOR SOLUTION INTRAMUSCULAR; INTRAVENOUS
Status: CANCELLED
Start: 2023-04-13

## 2023-04-13 RX ORDER — DIPHENHYDRAMINE HYDROCHLORIDE 50 MG/ML
50 INJECTION INTRAMUSCULAR; INTRAVENOUS
Status: CANCELLED
Start: 2023-04-13

## 2023-04-13 RX ORDER — DIPHENHYDRAMINE HCL 25 MG
25 CAPSULE ORAL ONCE
Status: CANCELLED
Start: 2023-04-13 | End: 2023-04-13

## 2023-04-13 RX ORDER — ALBUTEROL SULFATE 90 UG/1
1-2 AEROSOL, METERED RESPIRATORY (INHALATION)
Status: CANCELLED
Start: 2023-04-13

## 2023-04-13 RX ORDER — ACETAMINOPHEN 325 MG/1
650 TABLET ORAL ONCE
Status: CANCELLED
Start: 2023-04-13 | End: 2023-04-13

## 2023-04-13 RX ORDER — MEPERIDINE HYDROCHLORIDE 25 MG/ML
25 INJECTION INTRAMUSCULAR; INTRAVENOUS; SUBCUTANEOUS EVERY 30 MIN PRN
Status: CANCELLED | OUTPATIENT
Start: 2023-04-13

## 2023-04-13 RX ORDER — ALBUTEROL SULFATE 0.83 MG/ML
2.5 SOLUTION RESPIRATORY (INHALATION)
Status: CANCELLED | OUTPATIENT
Start: 2023-04-13

## 2023-04-13 RX ORDER — EPINEPHRINE 1 MG/ML
0.3 INJECTION, SOLUTION, CONCENTRATE INTRAVENOUS EVERY 5 MIN PRN
Status: CANCELLED | OUTPATIENT
Start: 2023-04-13

## 2023-04-13 NOTE — TELEPHONE ENCOUNTER
----- Message from Kwasi Nolasco MD sent at 2023  6:16 PM CDT -----  Regarding: FW: new orders  Can you update the orders please?  Thanks    ----- Message -----  From: Zaynab Manzo RN  Sent: 2023   4:17 PM CDT  To: Concetta Dougherty; Kwasi Nolasco MD  Subject: new orders                                       Dr. Nolasco,    Please update Shelly's Infliximab therapy plan.     Thanks,  Randi    ----- Message -----  From: Concetta Dougherty  Sent: 2023   4:09 PM CDT  To: Zaynab Manzo RN  Subject: RE: no show                                      The soonest available apt I have is May 05 but her orders  . I think she should get new orders before we try to schedule more?      Thank you,  Concetta NICHOLSON [facility] - 2nd Floor  [facility] Scheduling  [phone] (option 3, then option 2)    ----- Message -----  From: Zaynab Manzo RN  Sent: 2023   4:04 PM CDT  To: [facility] Scheduling [facility]  Subject: no show                                          Patient did not show up for her Infliximab appointment today. I was unable to reach her over the phone. Please call her to reschedule.    ThanksRandi

## 2023-04-13 NOTE — TELEPHONE ENCOUNTER
Inflectra therapy plan orders ; plan has been updated. Patient remains on the same medication regimen. Standing lab orders placed. TB Quant Gold last drawn 2022, will add to next infusion appointment. Hep B is up to date, last drawn 2/15/21. Next office visit not currently scheduled, sent My[employer] message with instructions to schedule. Last seen 2023, with instructions to follow up in three months.

## 2023-04-17 ENCOUNTER — TELEPHONE (OUTPATIENT)
Dept: GASTROENTEROLOGY | Facility: CLINIC | Age: 53
End: 2023-04-17
Payer: COMMERCIAL

## 2023-04-17 NOTE — TELEPHONE ENCOUNTER
Caller: Shelly Cha    Reason for Reschedule/Cancellation (please be detailed, any staff messages or encounters to note?): POOR PREP      Prior to reschedule please review:    Ordering Provider:Adam Brooke PA-C     Sedation per order:MAC    Does patient have any ASC Exclusions, please identify?: N      Notes on Cancelled Procedure:    Procedure:Lower Endoscopy [Colonoscopy]     Date: 4/19    Location:[facility]; [address] [place] [address], [address] Floor, [place] [address]    Surgeon: DIGNA        Rescheduled: No, wants to go to [facility]

## 2023-04-25 ENCOUNTER — TELEPHONE (OUTPATIENT)
Dept: OPHTHALMOLOGY | Facility: CLINIC | Age: 53
End: 2023-04-25

## 2023-04-25 NOTE — TELEPHONE ENCOUNTER
[facility] Call Center    Phone Message    May a detailed message be left on voicemail: yes     Reason for Call: Form or Letter   Type or form/letter needing completion: [facility]  Provider: Dr. Parham  Date form needed: ASAP  Once completed: Fax form to: [place] @ [phone]      Action Taken: Message routed to:  [facility] ([facility] EYE    Travel Screening: Not Applicable

## 2023-04-26 ENCOUNTER — INFUSION THERAPY VISIT (OUTPATIENT)
Dept: INFUSION THERAPY | Facility: CLINIC | Age: 53
End: 2023-04-26
Attending: INTERNAL MEDICINE
Payer: COMMERCIAL

## 2023-04-26 VITALS
DIASTOLIC BLOOD PRESSURE: 83 MMHG | HEART RATE: 90 BPM | BODY MASS INDEX: 30.13 KG/M2 | SYSTOLIC BLOOD PRESSURE: 127 MMHG | WEIGHT: 170.1 LBS

## 2023-04-26 DIAGNOSIS — K51.00 PANCOLITIS (H): Primary | ICD-10-CM

## 2023-04-26 PROCEDURE — 86481 TB AG RESPONSE T-CELL SUSP: CPT

## 2023-04-26 PROCEDURE — 999N000127 HC STATISTIC PERIPHERAL IV START W US GUIDANCE

## 2023-04-26 PROCEDURE — 250N000011 HC RX IP 250 OP 636: Performed by: INTERNAL MEDICINE

## 2023-04-26 PROCEDURE — 96413 CHEMO IV INFUSION 1 HR: CPT

## 2023-04-26 PROCEDURE — 258N000003 HC RX IP 258 OP 636: Performed by: INTERNAL MEDICINE

## 2023-04-26 PROCEDURE — 36415 COLL VENOUS BLD VENIPUNCTURE: CPT

## 2023-04-26 RX ORDER — DIPHENHYDRAMINE HCL 25 MG
25 CAPSULE ORAL ONCE
Status: CANCELLED
Start: 2023-06-07 | End: 2023-06-07

## 2023-04-26 RX ORDER — EPINEPHRINE 1 MG/ML
0.3 INJECTION, SOLUTION INTRAMUSCULAR; SUBCUTANEOUS EVERY 5 MIN PRN
Status: CANCELLED | OUTPATIENT
Start: 2023-06-07

## 2023-04-26 RX ORDER — MEPERIDINE HYDROCHLORIDE 25 MG/ML
25 INJECTION INTRAMUSCULAR; INTRAVENOUS; SUBCUTANEOUS EVERY 30 MIN PRN
Status: CANCELLED | OUTPATIENT
Start: 2023-06-07

## 2023-04-26 RX ORDER — ALBUTEROL SULFATE 90 UG/1
1-2 AEROSOL, METERED RESPIRATORY (INHALATION)
Status: CANCELLED
Start: 2023-06-07

## 2023-04-26 RX ORDER — ACETAMINOPHEN 325 MG/1
650 TABLET ORAL ONCE
Status: CANCELLED
Start: 2023-06-07 | End: 2023-06-07

## 2023-04-26 RX ORDER — DIPHENHYDRAMINE HYDROCHLORIDE 50 MG/ML
50 INJECTION INTRAMUSCULAR; INTRAVENOUS
Status: CANCELLED
Start: 2023-06-07

## 2023-04-26 RX ORDER — METHYLPREDNISOLONE SODIUM SUCCINATE 125 MG/2ML
125 INJECTION, POWDER, LYOPHILIZED, FOR SOLUTION INTRAMUSCULAR; INTRAVENOUS
Status: CANCELLED
Start: 2023-06-07

## 2023-04-26 RX ORDER — ALBUTEROL SULFATE 0.83 MG/ML
2.5 SOLUTION RESPIRATORY (INHALATION)
Status: CANCELLED | OUTPATIENT
Start: 2023-06-07

## 2023-04-26 RX ADMIN — SODIUM CHLORIDE 600 MG: 9 INJECTION, SOLUTION INTRAVENOUS at 14:29

## 2023-04-26 NOTE — PROGRESS NOTES
~~~ NOTE: If the patient answers yes to any of the questions below, hold the infusion and contact ordering provider or on-call provider.    1. Have you recently had an elevated temperature, fever, chills, productive cough, coughing for 3 weeks or longer or hemoptysis,  abnormal vital signs, night sweats,  chest pain or have you noticed a decrease in your appetite, unexplained weight loss or fatigue? No  2. Do you have any open wounds or new incisions? no  3. Do you have any upcoming hospitalizations or surgeries? Does not include esophagogastroduodenoscopy, colonoscopy, endoscopic retrograde cholangiopancreatography (ERCP), endoscopic ultrasound (EUS), dental procedures or joint aspiration/steroid injections No  4. Do you currently have any signs of illness or infection or are you on any antibiotics? No  5. Have you had any new, sudden or worsening abdominal pain? No  6. Have you or anyone in your household received a live vaccination in the past 4 weeks? Please note: No live vaccines while on biologic/chemotherapy until 6 months after the last treatment. Patient can receive the flu vaccine (shot only), pneumovax and the Covid vaccine. It is optimal for the patient to get these vaccines mid cycle, but they can be given at any time as long as it is not on the day of the infusion. No  7. Have you recently been diagnosed with any new nervous system diseases (ie. Multiple sclerosis, Guillain [place], seizures, neurological changes) or cancer diagnosis? Are you on any form of radiation or chemotherapy? No  8. Are you pregnant or breast feeding or do you have plans of pregnancy in the future? No  9. Have you been having any signs of worsening depression or suicidal ideations?  (benlysta only)n/a  10. Have there been any other new onset medical symptoms? No  11. Have you had any new blood clots? (IVIG only)no

## 2023-04-26 NOTE — PATIENT INSTRUCTIONS
Dear Shelly Cha    Thank you for choosing [facility] ([facility]) for your infusion.  The following information is a summary of our appointment as well as important reminders.      EDUCATION POST BIOLOGICAL/CHEMOTHERAPY INFUSION  Call the triage nurse at your clinic or seek medical attention if you have chills and/or temperature greater than or equal to 100.5, uncontrolled nausea/vomiting, diarrhea, constipation, dizziness, shortness of breath, chest pain, heart palpitations, weakness or any other new or concerning symptoms, questions or concerns.  You can not have any live virus vaccines prior to or during treatment or up to 6 months post infusion.  If you have an upcoming surgery, medical procedure or dental procedure during treatment, this should be discussed with your ordering physician and your surgeon/dentist.  If you are having any concerning symptom, if you are unsure if you should get your next infusion or wish to speak to a provider before your next infusion, please call your care coordinator or triage nurse at your clinic to notify them so we can adequately serve you.     We look forward in seeing you on your next appointment here at [facility] ([facility]).  Please don t hesitate to call us at [phone] to reschedule any of your appointments or to speak with one of the [facility] registered nurses.  It was a pleasure taking care of you today.    Sincerely,    [facility]  [facility]  [address] [place] [address]  [address]  Phone:  [phone]

## 2023-04-26 NOTE — PROGRESS NOTES
Nursing Note  Shelly Cha [facility] today [facility] for:   Chief Complaint   Patient presents with     Infusion     Infliximab     During today's [facility] appointment, orders from Dr. Nolasco were completed.  Frequency: every 6 weeks    Progress note:  Patient identification verified by name and date of birth.  Assessment completed.  Vitals recorded in Doc Flowsheets.  Patient was provided with education regarding medication/procedure and possible side effects.  Patient verbalized understanding.     present during visit today: Not Applicable.    Treatment Conditions: ~~~ NOTE: If the patient answers yes to any of the questions below, hold the infusion and contact ordering provider or on-call provider.    1. Have you recently had an elevated temperature, fever, chills, productive cough, coughing for 3 weeks or longer or hemoptysis, abnormal vital signs, night sweats,  chest pain or have you noticed a decrease in your appetite, unexplained weight loss or fatigue? No  2. Do you have any open wounds or new incisions? No  3. Do you have any recent or upcoming hospitalizations, surgeries or dental procedures? No  4. Do you currently have or recently have had any signs of illness or infection or are you on any antibiotics? No  5. Have you had any new, sudden or worsening abdominal pain? No  6. Have you or anyone in your household received a live vaccination in the past 4 weeks? Please note:  No live vaccines while on biologic/chemotherapy until 6 months after the last treatment.  Patient can receive the flu vaccine (shot only) and the pneumovax.  It is optimal for the patient to get these vaccines mid cycle, but they can be given at any time as long as it is not on the day of the infusion. No  7. Have you recently been diagnosed with any new nervous system diseases (ie. Multiple sclerosis, Guillain [place], seizures, neurological changes) or cancer  diagnosis? No  8. Are you on any form of radiation or chemotherapy? No  9. Are you pregnant or breast feeding or do you have plans of pregnancy in the future? No  10. Have you been having any signs of worsening depression or suicidal ideations?  (benlysta only) No  11. Have there been any other new onset medical symptoms? No    Infusion length and rate:  infusion given over approximately 1 hours    Labs: were drawn per orders.     Vascular access: peripheral IV was placed by vascular access nurse.    Is the next appt scheduled? Yes-sent to scheduling    Post Infusion Assessment:  Patient tolerated infusion without incident.     Discharge Plan:   Follow up plan of care with: ongoing infusions at [facility].  Discharge instructions were reviewed with patient.  Patient/representative verbalized understanding of discharge instructions and all questions answered.  Patient discharged from [facility] in stable condition.    Sirena Melgoza RN    Administrations This Visit     inFLIXimab-dyyb (INFLECTRA) 600 mg in sodium chloride 0.9 % 275 mL infusion     Admin Date  04/26/2023 Action  $New Bag Dose  600 mg Rate  275 mL/hr Route  Intravenous Administered By  Sirena Melgoza RN                Wt 77.2 kg (170 lb 1.6 oz)   BMI 30.13 kg/m

## 2023-04-27 LAB
QUANTIFERON MITOGEN: 10 IU/ML
QUANTIFERON NIL TUBE: 0.04 IU/ML
QUANTIFERON TB1 TUBE: 0.05 IU/ML
QUANTIFERON TB2 TUBE: 0.07

## 2023-04-28 LAB
GAMMA INTERFERON BACKGROUND BLD IA-ACNC: 0.04 IU/ML
M TB IFN-G BLD-IMP: NEGATIVE
M TB IFN-G CD4+ BCKGRND COR BLD-ACNC: 9.96 IU/ML
MITOGEN IGNF BCKGRD COR BLD-ACNC: 0.01 IU/ML
MITOGEN IGNF BCKGRD COR BLD-ACNC: 0.03 IU/ML

## 2023-05-08 NOTE — PHARMACY-VANCOMYCIN DOSING SERVICE
Pharmacy Vancomycin Initial Note  Date of Service 2018  Patient's  1970  48 year old, female    Indication: Skin and Soft Tissue Infection    Current estimated CrCl = Estimated Creatinine Clearance: 111.5 mL/min (based on Cr of 0.62).    Creatinine for last 3 days  2018:  3:08 AM Creatinine 0.62 mg/dL    Recent Vancomycin Level(s) for last 3 days  No results found for requested labs within last 72 hours.      Vancomycin IV Administrations (past 72 hours)      No vancomycin orders with administrations in past 72 hours.                Nephrotoxins and other renal medications (Future)    Start     Dose/Rate Route Frequency Ordered Stop    18 0431  vancomycin (VANCOCIN) 1000 mg in dextrose 5% 200 mL PREMIX      1,000 mg  200 mL/hr over 1 Hours Intravenous EVERY 12 HOURS 18 0430            Contrast Orders - past 72 hours     None                Plan:  1.  Start vancomycin  1000 mg IV q12h.   2.  Goal Trough Level: 10-15 mg/L   3.  Pharmacy will check trough levels as appropriate in 1-3 Days.    4. Serum creatinine levels will be ordered a minimum of twice weekly.    5. Ira method utilized to dose vancomycin therapy: Method 2    Mitesh Resendez, PharmD   show

## 2023-06-07 ENCOUNTER — INFUSION THERAPY VISIT (OUTPATIENT)
Dept: INFUSION THERAPY | Facility: CLINIC | Age: 53
End: 2023-06-07
Attending: INTERNAL MEDICINE
Payer: COMMERCIAL

## 2023-06-07 ENCOUNTER — PATIENT OUTREACH (OUTPATIENT)
Dept: GASTROENTEROLOGY | Facility: CLINIC | Age: 53
End: 2023-06-07

## 2023-06-07 VITALS
OXYGEN SATURATION: 97 % | HEART RATE: 85 BPM | DIASTOLIC BLOOD PRESSURE: 87 MMHG | RESPIRATION RATE: 16 BRPM | TEMPERATURE: 99.5 F | SYSTOLIC BLOOD PRESSURE: 132 MMHG

## 2023-06-07 DIAGNOSIS — K51.00 PANCOLITIS (H): Primary | ICD-10-CM

## 2023-06-07 LAB
ALBUMIN SERPL BCG-MCNC: 4 G/DL (ref 3.5–5.2)
ALP SERPL-CCNC: 77 U/L (ref 35–104)
ALT SERPL W P-5'-P-CCNC: 13 U/L (ref 10–35)
AST SERPL W P-5'-P-CCNC: 23 U/L (ref 10–35)
BASOPHILS # BLD AUTO: 0.1 10E3/UL (ref 0–0.2)
BASOPHILS NFR BLD AUTO: 1 %
BILIRUB DIRECT SERPL-MCNC: <0.2 MG/DL (ref 0–0.3)
BILIRUB SERPL-MCNC: 0.2 MG/DL
CRP SERPL-MCNC: 29.7 MG/L
EOSINOPHIL # BLD AUTO: 0.3 10E3/UL (ref 0–0.7)
EOSINOPHIL NFR BLD AUTO: 3 %
ERYTHROCYTE [DISTWIDTH] IN BLOOD BY AUTOMATED COUNT: 14.5 % (ref 10–15)
ERYTHROCYTE [SEDIMENTATION RATE] IN BLOOD BY WESTERGREN METHOD: 17 MM/HR (ref 0–30)
HCT VFR BLD AUTO: 36 % (ref 35–47)
HGB BLD-MCNC: 12.1 G/DL (ref 11.7–15.7)
IMM GRANULOCYTES # BLD: 0 10E3/UL
IMM GRANULOCYTES NFR BLD: 0 %
LYMPHOCYTES # BLD AUTO: 2.2 10E3/UL (ref 0.8–5.3)
LYMPHOCYTES NFR BLD AUTO: 20 %
MCH RBC QN AUTO: 28.9 PG (ref 26.5–33)
MCHC RBC AUTO-ENTMCNC: 33.6 G/DL (ref 31.5–36.5)
MCV RBC AUTO: 86 FL (ref 78–100)
MONOCYTES # BLD AUTO: 0.6 10E3/UL (ref 0–1.3)
MONOCYTES NFR BLD AUTO: 6 %
NEUTROPHILS # BLD AUTO: 7.5 10E3/UL (ref 1.6–8.3)
NEUTROPHILS NFR BLD AUTO: 70 %
NRBC # BLD AUTO: 0 10E3/UL
NRBC BLD AUTO-RTO: 0 /100
PLATELET # BLD AUTO: 372 10E3/UL (ref 150–450)
PROT SERPL-MCNC: 7 G/DL (ref 6.4–8.3)
RBC # BLD AUTO: 4.18 10E6/UL (ref 3.8–5.2)
WBC # BLD AUTO: 10.7 10E3/UL (ref 4–11)

## 2023-06-07 PROCEDURE — 36415 COLL VENOUS BLD VENIPUNCTURE: CPT | Performed by: INTERNAL MEDICINE

## 2023-06-07 PROCEDURE — 86140 C-REACTIVE PROTEIN: CPT | Performed by: INTERNAL MEDICINE

## 2023-06-07 PROCEDURE — 250N000011 HC RX IP 250 OP 636: Performed by: INTERNAL MEDICINE

## 2023-06-07 PROCEDURE — 258N000003 HC RX IP 258 OP 636: Performed by: INTERNAL MEDICINE

## 2023-06-07 PROCEDURE — 85652 RBC SED RATE AUTOMATED: CPT | Performed by: INTERNAL MEDICINE

## 2023-06-07 PROCEDURE — 96413 CHEMO IV INFUSION 1 HR: CPT

## 2023-06-07 PROCEDURE — 80076 HEPATIC FUNCTION PANEL: CPT | Performed by: INTERNAL MEDICINE

## 2023-06-07 PROCEDURE — 85025 COMPLETE CBC W/AUTO DIFF WBC: CPT | Performed by: INTERNAL MEDICINE

## 2023-06-07 PROCEDURE — 999N000128 HC STATISTIC PERIPHERAL IV START W/O US GUIDANCE

## 2023-06-07 RX ORDER — MEPERIDINE HYDROCHLORIDE 25 MG/ML
25 INJECTION INTRAMUSCULAR; INTRAVENOUS; SUBCUTANEOUS EVERY 30 MIN PRN
Status: CANCELLED | OUTPATIENT
Start: 2023-07-19

## 2023-06-07 RX ORDER — DIPHENHYDRAMINE HYDROCHLORIDE 50 MG/ML
50 INJECTION INTRAMUSCULAR; INTRAVENOUS
Status: CANCELLED
Start: 2023-07-19

## 2023-06-07 RX ORDER — ACETAMINOPHEN 325 MG/1
650 TABLET ORAL ONCE
Status: CANCELLED
Start: 2023-07-19 | End: 2023-07-19

## 2023-06-07 RX ORDER — EPINEPHRINE 1 MG/ML
0.3 INJECTION, SOLUTION INTRAMUSCULAR; SUBCUTANEOUS EVERY 5 MIN PRN
Status: CANCELLED | OUTPATIENT
Start: 2023-07-19

## 2023-06-07 RX ORDER — ALBUTEROL SULFATE 90 UG/1
1-2 AEROSOL, METERED RESPIRATORY (INHALATION)
Status: CANCELLED
Start: 2023-07-19

## 2023-06-07 RX ORDER — DIPHENHYDRAMINE HCL 25 MG
25 CAPSULE ORAL ONCE
Status: CANCELLED
Start: 2023-07-19 | End: 2023-07-19

## 2023-06-07 RX ORDER — METHYLPREDNISOLONE SODIUM SUCCINATE 125 MG/2ML
125 INJECTION, POWDER, LYOPHILIZED, FOR SOLUTION INTRAMUSCULAR; INTRAVENOUS
Status: CANCELLED
Start: 2023-07-19

## 2023-06-07 RX ORDER — ALBUTEROL SULFATE 0.83 MG/ML
2.5 SOLUTION RESPIRATORY (INHALATION)
Status: CANCELLED | OUTPATIENT
Start: 2023-07-19

## 2023-06-07 RX ADMIN — SODIUM CHLORIDE 600 MG: 9 INJECTION, SOLUTION INTRAVENOUS at 16:13

## 2023-06-07 NOTE — PATIENT INSTRUCTIONS
Dear Shelly Cha    Thank you for choosing [facility] ([facility]) for your infusion.  The following information is a summary of our appointment as well as important reminders.      EDUCATION POST BIOLOGICAL/CHEMOTHERAPY INFUSION  Call the triage nurse at your clinic or seek medical attention if you have chills and/or temperature greater than or equal to 100.5, uncontrolled nausea/vomiting, diarrhea, constipation, dizziness, shortness of breath, chest pain, heart palpitations, weakness or any other new or concerning symptoms, questions or concerns.  You can not have any live virus vaccines prior to or during treatment or up to 6 months post infusion.  If you have an upcoming surgery, medical procedure or dental procedure during treatment, this should be discussed with your ordering physician and your surgeon/dentist.  If you are having any concerning symptom, if you are unsure if you should get your next infusion or wish to speak to a provider before your next infusion, please call your care coordinator or triage nurse at your clinic to notify them so we can adequately serve you.     We look forward in seeing you on your next appointment here at [facility] ([facility]).  Please don t hesitate to call us at [phone] to reschedule any of your appointments or to speak with one of the [facility] registered nurses.  It was a pleasure taking care of you today.    Sincerely,    [facility]  [facility]  [address] [place] [address]  [address]  Phone:  [phone]

## 2023-06-07 NOTE — TELEPHONE ENCOUNTER
Received a call from infusion nurse   Patient at [facility] for infusion   Patient reports abdominal pain on and off for several months   Patient has not been compliant on infusions and clinic appointments.       Last colonsocopy in Jan 2022   Due to number of pseudopolyps, we will repeat endoscopic assessment.  -- Colonoscopy, dysplasia screening due to number of pseudopolyps  Will route to Ms Brooke to check if clinic appointment with her or colonoscopy patient would need mac

## 2023-06-07 NOTE — PROGRESS NOTES
Infusion Nursing Note:  Shelly Cha presents today for Inflectra.    Patient seen by provider today: No   present during visit today: Not Applicable.    Note: Inflectra infused over an hr.      Intravenous Access:  Labs drawn without difficulty.  Peripheral IV placed by vascular.    Treatment Conditions:  Biological Infusion Checklist:  ~~~ NOTE: If the patient answers yes to any of the questions below, hold the infusion and contact ordering provider or on-call provider.    1. Have you recently had an elevated temperature, fever, chills, productive cough, coughing for 3 weeks or longer or hemoptysis,  abnormal vital signs, night sweats,  chest pain or have you noticed a decrease in your appetite, unexplained weight loss or fatigue? No  2. Do you have any open wounds or new incisions? No  3. Do you have any upcoming hospitalizations or surgeries? Does not include esophagogastroduodenoscopy, colonoscopy, endoscopic retrograde cholangiopancreatography (ERCP), endoscopic ultrasound (EUS), dental procedures or joint aspiration/steroid injections No  4. Do you currently have any signs of illness or infection or are you on any antibiotics? No  5. Have you had any new, sudden or worsening abdominal pain? Yes...Patient had a new onset of abdominal pain on the left lower side. Per pt its like a side ache pain that comes and go. She had 3 episodes for the week. Paged Dr. Nolasco but he did not get back early so I called the [facility] and spoke to nurse Devora Mcfarlane. Per Devora, ok to proceed with the infusion and said someone will call the patient to arrange her  appointment with Dr. Nolasco.  6. Have you or anyone in your household received a live vaccination in the past 4 weeks? Please note: No live vaccines while on biologic/chemotherapy until 6 months after the last treatment. Patient can receive the flu vaccine (shot only), pneumovax and the Covid vaccine. It is optimal for the patient to get these vaccines mid  cycle, but they can be given at any time as long as it is not on the day of the infusion. No  7. Have you recently been diagnosed with any new nervous system diseases (ie. Multiple sclerosis, Guillain [place], seizures, neurological changes) or cancer diagnosis? Are you on any form of radiation or chemotherapy? No  8. Are you pregnant or breast feeding or do you have plans of pregnancy in the future? No  9. Have you been having any signs of worsening depression or suicidal ideations?  (benlysta only) No  10. Have there been any other new onset medical symptoms? No  11. Have you had any new blood clots? (IVIG only) No        Post Infusion Assessment:  Patient tolerated infusion without incident.  Blood return noted pre and post infusion.  Site patent and intact, free from redness, edema or discomfort.  No evidence of extravasations.  Access discontinued per protocol.  Biologic Infusion Post Education: Call the triage nurse at your clinic or seek medical attention if you have chills and/or temperature greater than or equal to 100.5, uncontrolled nausea/vomiting, diarrhea, constipation, dizziness, shortness of breath, chest pain, heart palpitations, weakness or any other new or concerning symptoms, questions or concerns.  You cannot have any live virus vaccines prior to or during treatment or up to 6 months post infusion.  If you have an upcoming surgery, medical procedure or dental procedure during treatment, this should be discussed with your ordering physician and your surgeon/dentist.  If you are having any concerning symptom, if you are unsure if you should get your next infusion or wish to speak to a provider before your next infusion, please call your care coordinator or triage nurse at your clinic to notify them so we can adequately serve you.       Discharge Plan:   Discharge instructions reviewed with: Patient.  Patient and/or family verbalized understanding of discharge instructions and all questions  answered.  AVS to patient via [email or web address].  Patient will return 7/19/23 for next appointment.   Patient discharged in stable condition accompanied by: self.  Departure Mode: Ambulatory.    Administrations This Visit     inFLIXimab-dyyb (INFLECTRA) 600 mg in sodium chloride 0.9 % 275 mL infusion     Admin Date  06/07/2023 Action  $New Bag Dose  600 mg Rate  275 mL/hr Route  Intravenous Administered By  Ashley Berumen, KAMERON              /87   Pulse 85   Temp 99.5  F (37.5  C) (Oral)   Resp 16   SpO2 97%     Ashley Berumen RN

## 2023-06-08 NOTE — TELEPHONE ENCOUNTER
Routed to Ms Arlen MELO as author of this note is retiring. Await recommendations from Dr Nolasco and Ms Brooke

## 2023-07-19 ENCOUNTER — INFUSION THERAPY VISIT (OUTPATIENT)
Dept: INFUSION THERAPY | Facility: CLINIC | Age: 53
End: 2023-07-19
Attending: INTERNAL MEDICINE
Payer: COMMERCIAL

## 2023-07-19 VITALS
OXYGEN SATURATION: 94 % | BODY MASS INDEX: 29.46 KG/M2 | RESPIRATION RATE: 16 BRPM | DIASTOLIC BLOOD PRESSURE: 81 MMHG | HEART RATE: 90 BPM | SYSTOLIC BLOOD PRESSURE: 124 MMHG | WEIGHT: 166.3 LBS | TEMPERATURE: 98.1 F

## 2023-07-19 DIAGNOSIS — K51.00 PANCOLITIS (H): Primary | ICD-10-CM

## 2023-07-19 PROCEDURE — 258N000003 HC RX IP 258 OP 636: Performed by: INTERNAL MEDICINE

## 2023-07-19 PROCEDURE — 999N000248 HC STATISTIC IV INSERT WITH US BY RN

## 2023-07-19 PROCEDURE — 250N000011 HC RX IP 250 OP 636: Mod: JZ | Performed by: INTERNAL MEDICINE

## 2023-07-19 PROCEDURE — 96413 CHEMO IV INFUSION 1 HR: CPT

## 2023-07-19 RX ORDER — ALBUTEROL SULFATE 0.83 MG/ML
2.5 SOLUTION RESPIRATORY (INHALATION)
Status: CANCELLED | OUTPATIENT
Start: 2023-08-30

## 2023-07-19 RX ORDER — ACETAMINOPHEN 325 MG/1
650 TABLET ORAL ONCE
Status: CANCELLED
Start: 2023-08-30 | End: 2023-08-30

## 2023-07-19 RX ORDER — MEPERIDINE HYDROCHLORIDE 25 MG/ML
25 INJECTION INTRAMUSCULAR; INTRAVENOUS; SUBCUTANEOUS EVERY 30 MIN PRN
Status: CANCELLED | OUTPATIENT
Start: 2023-08-30

## 2023-07-19 RX ORDER — EPINEPHRINE 1 MG/ML
0.3 INJECTION, SOLUTION INTRAMUSCULAR; SUBCUTANEOUS EVERY 5 MIN PRN
Status: CANCELLED | OUTPATIENT
Start: 2023-08-30

## 2023-07-19 RX ORDER — DIPHENHYDRAMINE HYDROCHLORIDE 50 MG/ML
50 INJECTION INTRAMUSCULAR; INTRAVENOUS
Status: CANCELLED
Start: 2023-08-30

## 2023-07-19 RX ORDER — METHYLPREDNISOLONE SODIUM SUCCINATE 125 MG/2ML
125 INJECTION, POWDER, LYOPHILIZED, FOR SOLUTION INTRAMUSCULAR; INTRAVENOUS
Status: CANCELLED
Start: 2023-08-30

## 2023-07-19 RX ORDER — ALBUTEROL SULFATE 90 UG/1
1-2 AEROSOL, METERED RESPIRATORY (INHALATION)
Status: CANCELLED
Start: 2023-08-30

## 2023-07-19 RX ORDER — DIPHENHYDRAMINE HCL 25 MG
25 CAPSULE ORAL ONCE
Status: CANCELLED
Start: 2023-08-30 | End: 2023-08-30

## 2023-07-19 RX ADMIN — SODIUM CHLORIDE 600 MG: 9 INJECTION, SOLUTION INTRAVENOUS at 15:56

## 2023-07-19 NOTE — PROGRESS NOTES
Infusion Nursing Note:  Shelly Cha presents today for Infliximab.    Patient seen by provider today: No   present during visit today: Not Applicable.    Intravenous Access:  Peripheral IV placed by Vascular Access Team.    Treatment Conditions:  Biological Infusion Checklist:  ~~~ NOTE: If the patient answers yes to any of the questions below, hold the infusion and contact ordering provider or on-call provider.    1. Have you recently had an elevated temperature, fever, chills, productive cough, coughing for 3 weeks or longer or hemoptysis,  abnormal vital signs, night sweats, chest pain or have you noticed a decrease in your appetite, unexplained weight loss or fatigue? No  2. Do you have any open wounds or new incisions? No  3. Do you have any upcoming hospitalizations or surgeries? Does not include esophagogastroduodenoscopy, colonoscopy, endoscopic retrograde cholangiopancreatography (ERCP), endoscopic ultrasound (EUS), dental procedures or joint aspiration/steroid injections No  4. Do you currently have any signs of illness or infection or are you on any antibiotics? No  5. Have you had any new, sudden or worsening abdominal pain? No  6. Have you or anyone in your household received a live vaccination in the past 4 weeks? Please note: No live vaccines while on biologic/chemotherapy until 6 months after the last treatment. Patient can receive the flu vaccine (shot only), pneumovax and the Covid vaccine. It is optimal for the patient to get these vaccines mid cycle, but they can be given at any time as long as it is not on the day of the infusion. No  7. Have you recently been diagnosed with any new nervous system diseases (ie. Multiple sclerosis, Guillain [place], seizures, neurological changes) or cancer diagnosis? Are you on any form of radiation or chemotherapy? No  8. Are you pregnant or breast feeding or do you have plans of pregnancy in the future? No  9. Have you been having any signs of  worsening depression or suicidal ideations?  (benlysta only) N/A  10. Have there been any other new onset medical symptoms? No  11. Have you had any new blood clots? (IVIG only) N/A        Post Infusion Assessment:  Patient tolerated infusion without incident.       Discharge Plan:   Patient and/or family verbalized understanding of discharge instructions and all questions answered.      Ricarda Graf RN

## 2023-07-27 PROCEDURE — 93005 ELECTROCARDIOGRAM TRACING: CPT

## 2023-07-27 PROCEDURE — 99285 EMERGENCY DEPT VISIT HI MDM: CPT | Mod: 25

## 2023-07-28 ENCOUNTER — APPOINTMENT (OUTPATIENT)
Dept: GENERAL RADIOLOGY | Facility: CLINIC | Age: 53
End: 2023-07-28
Attending: EMERGENCY MEDICINE
Payer: COMMERCIAL

## 2023-07-28 ENCOUNTER — HOSPITAL ENCOUNTER (EMERGENCY)
Facility: CLINIC | Age: 53
Discharge: HOME | End: 2023-07-28
Attending: EMERGENCY MEDICINE | Admitting: EMERGENCY MEDICINE
Payer: COMMERCIAL

## 2023-07-28 VITALS
RESPIRATION RATE: 16 BRPM | DIASTOLIC BLOOD PRESSURE: 72 MMHG | TEMPERATURE: 98.5 F | OXYGEN SATURATION: 95 % | BODY MASS INDEX: 29.58 KG/M2 | HEART RATE: 87 BPM | WEIGHT: 167 LBS | SYSTOLIC BLOOD PRESSURE: 139 MMHG

## 2023-07-28 DIAGNOSIS — J18.9 COMMUNITY ACQUIRED PNEUMONIA OF RIGHT LOWER LOBE OF LUNG: ICD-10-CM

## 2023-07-28 LAB
ANION GAP SERPL CALCULATED.3IONS-SCNC: 10 MMOL/L (ref 7–15)
ATRIAL RATE - MUSE: 87 BPM
BASOPHILS # BLD AUTO: 0.1 10E3/UL (ref 0–0.2)
BASOPHILS NFR BLD AUTO: 0 %
BUN SERPL-MCNC: 11 MG/DL (ref 6–20)
CALCIUM SERPL-MCNC: 9.3 MG/DL (ref 8.6–10)
CHLORIDE SERPL-SCNC: 101 MMOL/L (ref 98–107)
CREAT SERPL-MCNC: 0.59 MG/DL (ref 0.51–0.95)
D DIMER PPP FEU-MCNC: 0.38 UG/ML FEU (ref 0–0.5)
DEPRECATED HCO3 PLAS-SCNC: 24 MMOL/L (ref 22–29)
DIASTOLIC BLOOD PRESSURE - MUSE: NORMAL MMHG
EOSINOPHIL # BLD AUTO: 0.4 10E3/UL (ref 0–0.7)
EOSINOPHIL NFR BLD AUTO: 2 %
ERYTHROCYTE [DISTWIDTH] IN BLOOD BY AUTOMATED COUNT: 14.6 % (ref 10–15)
GFR SERPL CREATININE-BSD FRML MDRD: >90 ML/MIN/1.73M2
GLUCOSE SERPL-MCNC: 113 MG/DL (ref 70–99)
HCT VFR BLD AUTO: 37.8 % (ref 35–47)
HGB BLD-MCNC: 12.8 G/DL (ref 11.7–15.7)
IMM GRANULOCYTES # BLD: 0.1 10E3/UL
IMM GRANULOCYTES NFR BLD: 0 %
INTERPRETATION ECG - MUSE: NORMAL
LYMPHOCYTES # BLD AUTO: 3.3 10E3/UL (ref 0.8–5.3)
LYMPHOCYTES NFR BLD AUTO: 19 %
MCH RBC QN AUTO: 29.6 PG (ref 26.5–33)
MCHC RBC AUTO-ENTMCNC: 33.9 G/DL (ref 31.5–36.5)
MCV RBC AUTO: 88 FL (ref 78–100)
MONOCYTES # BLD AUTO: 1.5 10E3/UL (ref 0–1.3)
MONOCYTES NFR BLD AUTO: 9 %
NEUTROPHILS # BLD AUTO: 11.7 10E3/UL (ref 1.6–8.3)
NEUTROPHILS NFR BLD AUTO: 70 %
NRBC # BLD AUTO: 0 10E3/UL
NRBC BLD AUTO-RTO: 0 /100
NT-PROBNP SERPL-MCNC: 43 PG/ML (ref 0–900)
P AXIS - MUSE: 32 DEGREES
PLATELET # BLD AUTO: 327 10E3/UL (ref 150–450)
POTASSIUM SERPL-SCNC: 4.4 MMOL/L (ref 3.4–5.3)
PR INTERVAL - MUSE: 134 MS
QRS DURATION - MUSE: 78 MS
QT - MUSE: 378 MS
QTC - MUSE: 454 MS
R AXIS - MUSE: 27 DEGREES
RBC # BLD AUTO: 4.32 10E6/UL (ref 3.8–5.2)
SODIUM SERPL-SCNC: 135 MMOL/L (ref 136–145)
SYSTOLIC BLOOD PRESSURE - MUSE: NORMAL MMHG
T AXIS - MUSE: 33 DEGREES
TROPONIN T SERPL HS-MCNC: <6 NG/L
VENTRICULAR RATE- MUSE: 87 BPM
WBC # BLD AUTO: 17.1 10E3/UL (ref 4–11)

## 2023-07-28 PROCEDURE — 94640 AIRWAY INHALATION TREATMENT: CPT

## 2023-07-28 PROCEDURE — 250N000013 HC RX MED GY IP 250 OP 250 PS 637: Performed by: EMERGENCY MEDICINE

## 2023-07-28 PROCEDURE — 80048 BASIC METABOLIC PNL TOTAL CA: CPT | Performed by: EMERGENCY MEDICINE

## 2023-07-28 PROCEDURE — 71046 X-RAY EXAM CHEST 2 VIEWS: CPT

## 2023-07-28 PROCEDURE — 83880 ASSAY OF NATRIURETIC PEPTIDE: CPT | Performed by: EMERGENCY MEDICINE

## 2023-07-28 PROCEDURE — 250N000009 HC RX 250: Performed by: EMERGENCY MEDICINE

## 2023-07-28 PROCEDURE — 250N000012 HC RX MED GY IP 250 OP 636 PS 637: Performed by: EMERGENCY MEDICINE

## 2023-07-28 PROCEDURE — 85379 FIBRIN DEGRADATION QUANT: CPT | Performed by: EMERGENCY MEDICINE

## 2023-07-28 PROCEDURE — 36415 COLL VENOUS BLD VENIPUNCTURE: CPT | Performed by: EMERGENCY MEDICINE

## 2023-07-28 PROCEDURE — 85025 COMPLETE CBC W/AUTO DIFF WBC: CPT | Performed by: EMERGENCY MEDICINE

## 2023-07-28 PROCEDURE — 84484 ASSAY OF TROPONIN QUANT: CPT | Performed by: EMERGENCY MEDICINE

## 2023-07-28 RX ORDER — PREDNISONE 20 MG/1
TABLET ORAL
Qty: 8 TABLET | Refills: 0 | Status: SHIPPED | OUTPATIENT
Start: 2023-07-28

## 2023-07-28 RX ORDER — IPRATROPIUM BROMIDE AND ALBUTEROL SULFATE 2.5; .5 MG/3ML; MG/3ML
3 SOLUTION RESPIRATORY (INHALATION) ONCE
Status: COMPLETED | OUTPATIENT
Start: 2023-07-28 | End: 2023-07-28

## 2023-07-28 RX ORDER — PREDNISONE 20 MG/1
40 TABLET ORAL ONCE
Status: COMPLETED | OUTPATIENT
Start: 2023-07-28 | End: 2023-07-28

## 2023-07-28 RX ORDER — AMOXICILLIN 250 MG/1
1000 CAPSULE ORAL ONCE
Status: COMPLETED | OUTPATIENT
Start: 2023-07-28 | End: 2023-07-28

## 2023-07-28 RX ORDER — AZITHROMYCIN 250 MG/1
250 TABLET, FILM COATED ORAL DAILY
Qty: 4 TABLET | Refills: 0 | Status: SHIPPED | OUTPATIENT
Start: 2023-07-29 | End: 2023-08-02

## 2023-07-28 RX ORDER — AZITHROMYCIN 250 MG/1
500 TABLET, FILM COATED ORAL ONCE
Status: COMPLETED | OUTPATIENT
Start: 2023-07-28 | End: 2023-07-28

## 2023-07-28 RX ORDER — AMOXICILLIN 500 MG/1
1000 CAPSULE ORAL 3 TIMES DAILY
Qty: 42 CAPSULE | Refills: 0 | Status: SHIPPED | OUTPATIENT
Start: 2023-07-28 | End: 2023-08-04

## 2023-07-28 RX ADMIN — AZITHROMYCIN MONOHYDRATE 500 MG: 250 TABLET ORAL at 03:15

## 2023-07-28 RX ADMIN — IPRATROPIUM BROMIDE AND ALBUTEROL SULFATE 3 ML: .5; 3 SOLUTION RESPIRATORY (INHALATION) at 01:31

## 2023-07-28 RX ADMIN — AMOXICILLIN 1000 MG: 250 CAPSULE ORAL at 03:15

## 2023-07-28 RX ADMIN — PREDNISONE 40 MG: 20 TABLET ORAL at 00:55

## 2023-07-28 ASSESSMENT — ACTIVITIES OF DAILY LIVING (ADL)
ADLS_ACUITY_SCORE: 35
ADLS_ACUITY_SCORE: 37

## 2023-07-28 NOTE — ED TRIAGE NOTES
Pt reports feeling SOB for the past three days. Symptoms become worse on activity. Pt reports hx of asthma and states the last time she felt like this is when she had  pneumonia.

## 2023-07-28 NOTE — ED PROVIDER NOTES
ED Provider Note  [facility]      History     Chief Complaint   Patient presents with    Shortness of Breath     HPI  Shelly Cha is a 53 year old female with asthma and ulcerative colitis who presents with 3 day onset of shortness of breath. SoB worsens with exertion and improves when resting. She endorses some dull pain in her lower back and diffusely across her chest which she attributes to increased work of breathing. This does not radiate to her shoulder or jaw.  Pt takes flovent daily and has been taking albuterol every 2 hours the last 3 days with mild improvement of symptoms.  Pt endorses occasional dry cough on exertion. Pt denies fever, aches, chills,     Past Medical History  Past Medical History:   Diagnosis Date    Pancolitis (H) 3/11/2021    Pancolitis (H) 3/11/2021     Past Surgical History:   Procedure Laterality Date    BIOPSY      right breast    CHOLECYSTECTOMY      GYN SURGERY      , tubal     albuterol (ACCUNEB) 1.25 MG/3ML neb solution  amoxicillin (AMOXIL) 500 MG capsule  [START ON 7/29/2023] azithromycin (ZITHROMAX) 250 MG tablet  BANOPHEN 25 MG capsule  benzocaine (ANBESOL) 10 % gel  bisacodyl (DULCOLAX) 5 MG EC tablet  bisacodyl (DULCOLAX) 5 MG EC tablet  Cholecalciferol (VITAMIN D3) 50 MCG (2000 UT) TABS  FLOVENT DISKUS 250 MCG/BLIST inhaler  folic acid 800 MCG tablet  gabapentin (NEURONTIN) 300 MG capsule  magnesium citrate solution  melatonin 5 MG tablet  METHADONE HCL PO  methylcellulose (CITRUCEL) powder  minoxidil (ROGAINE) 2 % external solution  Multiple Vitamin (DAILY-JUSTIN MULTIVITAMIN) TABS  omeprazole (PRILOSEC) 20 MG DR capsule  polyethylene glycol (GOLYTELY) 236 g suspension  polyethylene glycol (GOLYTELY) 236 g suspension  polyethylene glycol (MIRALAX) 17 GM/Dose powder  predniSONE (DELTASONE) 20 MG tablet  Thiamine Mononitrate (B1) 100 MG TABS  WAL-DRYL ALLERGY 25 MG tablet  WAL-MUCIL 58.6 % powder      Allergies   Allergen Reactions    Ibuprofen  Swelling     Eyes swelling    Lactose Diarrhea    Tylenol [Acetaminophen]      Feels like someone punched her in the stomach     Family History  Family History   Problem Relation Age of Onset    Glaucoma No family hx of     Macular Degeneration No family hx of      Social History   Social History     Tobacco Use    Smoking status: Former     Types: Cigarettes     Quit date: 2021     Years since quittin.5    Smokeless tobacco: Never   Substance Use Topics    Alcohol use: No    Drug use: No      Past medical history, past surgical history, medications, allergies, family history, and social history were reviewed with the patient. No additional pertinent items.      A medically appropriate review of systems was performed with pertinent positives and negatives noted in the HPI, and all other systems negative.    Physical Exam   BP: (!) 150/84  Pulse: 102  Temp: 97.9  F (36.6  C)  Resp: 16  Weight: 75.8 kg (167 lb)  SpO2: 98 %  Physical Exam  General Appearance: Pleasant. In no apparent acute distress.   HEENT: No cervical lymphadenopathy, throat/mouth without erythema or ulcers  Pulmonary: Mild end-expiratory wheezes in all fields   CV: Heart RRR, no rubs, murmurs  Abdomen: Non distended, soft  Extremities: Moves spontaneously. No edema    ED Course, Procedures, & Data      Care everywhere reviewed for  visit at [facility] for right lower lobe pneumonia.  No follow-up chest radiograph performed but no basilar infiltrates on abdomen/pelvis CT performed in .  Procedures       ED Course Selections:        EKG Interpretation:      Interpreted by DALLIN Alves MD, MD   Time reviewed: 50   Symptoms at time of EKG: Dyspnea on Exertion, Diffuse chest pain   Rhythm: normal sinus   Rate: 87  Axis: normal  Ectopy: none  Conduction: normal  ST Segments/ T Waves: No ST-T wave changes  Q Waves: none  Comparison to prior: No old EKG available    Clinical Impression: normal EKG                  Results for orders placed or performed during the hospital encounter of 07/28/23   XR Chest 2 Views     Status: None    Narrative    EXAM: XR CHEST 2 VIEWS  LOCATION: [facility]  DATE: 7/28/2023    INDICATION: SOA  COMPARISON: None.      Impression    IMPRESSION: Heart size is normal. Hazy airspace opacification in the right upper lobe suspicious for pneumonia. Recommend follow-up chest radiographs in 6 weeks to ensure resolution. Left lung is clear. No pneumothorax or pleural effusion.   Basic metabolic panel     Status: Abnormal   Result Value Ref Range    Sodium 135 (L) 136 - 145 mmol/L    Potassium 4.4 3.4 - 5.3 mmol/L    Chloride 101 98 - 107 mmol/L    Carbon Dioxide (CO2) 24 22 - 29 mmol/L    Anion Gap 10 7 - 15 mmol/L    Urea Nitrogen 11.0 6.0 - 20.0 mg/dL    Creatinine 0.59 0.51 - 0.95 mg/dL    Calcium 9.3 8.6 - 10.0 mg/dL    Glucose 113 (H) 70 - 99 mg/dL    GFR Estimate >90 >60 mL/min/1.73m2   Troponin T, High Sensitivity     Status: Normal   Result Value Ref Range    Troponin T, High Sensitivity <6 <=14 ng/L   Nt probnp inpatient (BNP)     Status: Normal   Result Value Ref Range    N terminal Pro BNP Inpatient 43 0 - 900 pg/mL   D dimer quantitative     Status: Normal   Result Value Ref Range    D-Dimer Quantitative 0.38 0.00 - 0.50 ug/mL FEU    Narrative    This D-dimer assay is intended for use in conjunction with a clinical pretest probability assessment model to exclude pulmonary embolism (PE) and deep venous thrombosis (DVT) in outpatients suspected of PE or DVT. The cut-off value is 0.50 ug/mL FEU.   CBC with platelets and differential     Status: Abnormal   Result Value Ref Range    WBC Count 17.1 (H) 4.0 - 11.0 10e3/uL    RBC Count 4.32 3.80 - 5.20 10e6/uL    Hemoglobin 12.8 11.7 - 15.7 g/dL    Hematocrit 37.8 35.0 - 47.0 %    MCV 88 78 - 100 fL    MCH 29.6 26.5 - 33.0 pg    MCHC 33.9 31.5 - 36.5 g/dL    RDW 14.6 10.0 - 15.0 %    Platelet Count 327  150 - 450 10e3/uL    % Neutrophils 70 %    % Lymphocytes 19 %    % Monocytes 9 %    % Eosinophils 2 %    % Basophils 0 %    % Immature Granulocytes 0 %    NRBCs per 100 WBC 0 <1 /100    Absolute Neutrophils 11.7 (H) 1.6 - 8.3 10e3/uL    Absolute Lymphocytes 3.3 0.8 - 5.3 10e3/uL    Absolute Monocytes 1.5 (H) 0.0 - 1.3 10e3/uL    Absolute Eosinophils 0.4 0.0 - 0.7 10e3/uL    Absolute Basophils 0.1 0.0 - 0.2 10e3/uL    Absolute Immature Granulocytes 0.1 <=0.4 10e3/uL    Absolute NRBCs 0.0 10e3/uL   EKG 12-lead, tracing only     Status: None (Preliminary result)   Result Value Ref Range    Systolic Blood Pressure  mmHg    Diastolic Blood Pressure  mmHg    Ventricular Rate 87 BPM    Atrial Rate 87 BPM    CA Interval 134 ms    QRS Duration 78 ms     ms    QTc 454 ms    P Axis 32 degrees    R AXIS 27 degrees    T Axis 33 degrees    Interpretation ECG Sinus rhythm  Normal ECG      CBC with platelets differential     Status: Abnormal    Narrative    The following orders were created for panel order CBC with platelets differential.  Procedure                               Abnormality         Status                     ---------                               -----------         ------                     CBC with platelets and d...[[id number]]  Abnormal            Final result                 Please view results for these tests on the individual orders.     Medications   ipratropium - albuterol 0.5 mg/2.5 mg/3 mL (DUONEB) neb solution 3 mL (3 mLs Nebulization $Given 7/28/23 0131)   predniSONE (DELTASONE) tablet 40 mg (40 mg Oral $Given 7/28/23 0055)   amoxicillin (AMOXIL) capsule 1,000 mg (1,000 mg Oral $Given 7/28/23 0315)   azithromycin (ZITHROMAX) tablet 500 mg (500 mg Oral $Given 7/28/23 0315)     Labs Ordered and Resulted from Time of ED Arrival to Time of ED Departure   BASIC METABOLIC PANEL - Abnormal       Result Value    Sodium 135 (*)     Potassium 4.4      Chloride 101      Carbon Dioxide (CO2) 24      Anion  Gap 10      Urea Nitrogen 11.0      Creatinine 0.59      Calcium 9.3      Glucose 113 (*)     GFR Estimate >90     CBC WITH PLATELETS AND DIFFERENTIAL - Abnormal    WBC Count 17.1 (*)     RBC Count 4.32      Hemoglobin 12.8      Hematocrit 37.8      MCV 88      MCH 29.6      MCHC 33.9      RDW 14.6      Platelet Count 327      % Neutrophils 70      % Lymphocytes 19      % Monocytes 9      % Eosinophils 2      % Basophils 0      % Immature Granulocytes 0      NRBCs per 100 WBC 0      Absolute Neutrophils 11.7 (*)     Absolute Lymphocytes 3.3      Absolute Monocytes 1.5 (*)     Absolute Eosinophils 0.4      Absolute Basophils 0.1      Absolute Immature Granulocytes 0.1      Absolute NRBCs 0.0     TROPONIN T, HIGH SENSITIVITY - Normal    Troponin T, High Sensitivity <6     NT PROBNP INPATIENT - Normal    N terminal Pro BNP Inpatient 43     D DIMER QUANTITATIVE - Normal    D-Dimer Quantitative 0.38       XR Chest 2 Views   Final Result   IMPRESSION: Heart size is normal. Hazy airspace opacification in the right upper lobe suspicious for pneumonia. Recommend follow-up chest radiographs in 6 weeks to ensure resolution. Left lung is clear. No pneumothorax or pleural effusion.             Critical care was not performed.     Medical Decision Making  The patient's presentation was of moderate complexity (an undiagnosed new problem with uncertain diagnosis).    The patient's evaluation involved:  review of external note(s) from 1 sources (see separate area of note for details)  ordering and/or review of 3+ test(s) in this encounter (see separate area of note for details)  review of 2 test result(s) ordered prior to this encounter (see separate area of note for details)    The patient's management necessitated moderate risk (prescription drug management including medications given in the ED).    Assessment & Plan      #Pneumonia  #Dyspnea on Exertion  Pt here with shortness of breath for 3 days. Ddx includes asthma  excerebration, PE, ACS, CAP pneumonia.  Less likely PE given negative D-dimer. Less likely to be ACS d/t normal EKG, normal troponin. Her chest/back pain sounds musculoskeletal from accessory muscle use.  Symptoms may be attributed to possible asthma exacerbation d/t pt's previous medical history of asthma and exam findings of mild wheezing. Will treat with prednisone to cover for this condition.  Most likely pneumonia d/t leukocytosis of 17 w/ left shift, CXR showing hazy airspace opacification in the right upper lobe. Pt vitally stable and is not in any apparent distress.     - Discharge home  - Amoxicillin x 7 days and Azithromycin for 5 day course  - Prednisone for 5 day course    --    ED Attending Physician Attestation    I DALLIN MARROQUIN MD, MD, cared for this patient with the Medical Student. I performed, or re-performed, the physical exam and medical decision-making. I have verified the accuracy of all the medical student findings and documentation above, and have edited as necessary.    Summary of HPI, PE, ED Course   Patient is a 53 year old female evaluated in the emergency department for RABAGO.  Exam and ED course notable for mild end-expiratory wheezing. After the completion of care in the emergency department, the patient was discharged.    Critical Care & Procedures  Not applicable.                DALLIN MARROQUIN MD, MD  Emergency Medicine      I have reviewed the nursing notes. I have reviewed the findings, diagnosis, plan and need for follow up with the patient.    New Prescriptions    AMOXICILLIN (AMOXIL) 500 MG CAPSULE    Take 2 capsules (1,000 mg) by mouth 3 times daily for 7 days    AZITHROMYCIN (ZITHROMAX) 250 MG TABLET    Take 1 tablet (250 mg) by mouth daily for 4 days    PREDNISONE (DELTASONE) 20 MG TABLET    Take two tablets (= 40mg) each day for 4 (four) days       Final diagnoses:   Community acquired pneumonia of right lower lobe of lung     Chart documentation was completed with Barry  voice-recognition software. Even though reviewed, this chart may still contain some grammatical, spelling, and word errors.   Jimmy Webber,  MS4     Joseph Hart MD  07/28/23 2976

## 2023-07-28 NOTE — DISCHARGE INSTRUCTIONS
Take complete course of amoxicillin and azithromycin.  Your next dose of azithromycin is due Saturday morning.  Take complete course of prednisone.  Drink plenty of fluids.    Return if worse or other concerns.    Follow-up with your primary care clinic next week if not improving

## 2023-08-30 ENCOUNTER — INFUSION THERAPY VISIT (OUTPATIENT)
Dept: INFUSION THERAPY | Facility: CLINIC | Age: 53
End: 2023-08-30
Attending: INTERNAL MEDICINE
Payer: COMMERCIAL

## 2023-08-30 ENCOUNTER — MEDICAL CORRESPONDENCE (OUTPATIENT)
Dept: HEALTH INFORMATION MANAGEMENT | Facility: CLINIC | Age: 53
End: 2023-08-30

## 2023-08-30 VITALS
DIASTOLIC BLOOD PRESSURE: 62 MMHG | WEIGHT: 166.1 LBS | BODY MASS INDEX: 29.42 KG/M2 | RESPIRATION RATE: 16 BRPM | TEMPERATURE: 98 F | OXYGEN SATURATION: 100 % | SYSTOLIC BLOOD PRESSURE: 100 MMHG | HEART RATE: 70 BPM

## 2023-08-30 DIAGNOSIS — K51.00 PANCOLITIS (H): Primary | ICD-10-CM

## 2023-08-30 LAB
ALBUMIN SERPL BCG-MCNC: 4 G/DL (ref 3.5–5.2)
ALP SERPL-CCNC: 71 U/L (ref 35–104)
ALT SERPL W P-5'-P-CCNC: 14 U/L (ref 0–50)
AST SERPL W P-5'-P-CCNC: 25 U/L (ref 0–45)
BASOPHILS # BLD AUTO: 0.1 10E3/UL (ref 0–0.2)
BASOPHILS NFR BLD AUTO: 1 %
BILIRUB DIRECT SERPL-MCNC: <0.2 MG/DL (ref 0–0.3)
BILIRUB SERPL-MCNC: 0.4 MG/DL
CRP SERPL-MCNC: 7.4 MG/L
EOSINOPHIL # BLD AUTO: 0.5 10E3/UL (ref 0–0.7)
EOSINOPHIL NFR BLD AUTO: 5 %
ERYTHROCYTE [DISTWIDTH] IN BLOOD BY AUTOMATED COUNT: 15.2 % (ref 10–15)
ERYTHROCYTE [SEDIMENTATION RATE] IN BLOOD BY WESTERGREN METHOD: 11 MM/HR (ref 0–30)
HCT VFR BLD AUTO: 38.3 % (ref 35–47)
HGB BLD-MCNC: 13 G/DL (ref 11.7–15.7)
IMM GRANULOCYTES # BLD: 0 10E3/UL
IMM GRANULOCYTES NFR BLD: 0 %
LYMPHOCYTES # BLD AUTO: 4.5 10E3/UL (ref 0.8–5.3)
LYMPHOCYTES NFR BLD AUTO: 41 %
MCH RBC QN AUTO: 29.1 PG (ref 26.5–33)
MCHC RBC AUTO-ENTMCNC: 33.9 G/DL (ref 31.5–36.5)
MCV RBC AUTO: 86 FL (ref 78–100)
MONOCYTES # BLD AUTO: 1 10E3/UL (ref 0–1.3)
MONOCYTES NFR BLD AUTO: 9 %
NEUTROPHILS # BLD AUTO: 4.9 10E3/UL (ref 1.6–8.3)
NEUTROPHILS NFR BLD AUTO: 44 %
NRBC # BLD AUTO: 0 10E3/UL
NRBC BLD AUTO-RTO: 0 /100
PLATELET # BLD AUTO: 389 10E3/UL (ref 150–450)
PROT SERPL-MCNC: 6.9 G/DL (ref 6.4–8.3)
RBC # BLD AUTO: 4.47 10E6/UL (ref 3.8–5.2)
WBC # BLD AUTO: 11 10E3/UL (ref 4–11)

## 2023-08-30 PROCEDURE — 36415 COLL VENOUS BLD VENIPUNCTURE: CPT | Performed by: INTERNAL MEDICINE

## 2023-08-30 PROCEDURE — 96413 CHEMO IV INFUSION 1 HR: CPT

## 2023-08-30 PROCEDURE — 258N000003 HC RX IP 258 OP 636: Performed by: INTERNAL MEDICINE

## 2023-08-30 PROCEDURE — 80076 HEPATIC FUNCTION PANEL: CPT | Performed by: INTERNAL MEDICINE

## 2023-08-30 PROCEDURE — 85652 RBC SED RATE AUTOMATED: CPT | Performed by: INTERNAL MEDICINE

## 2023-08-30 PROCEDURE — 250N000011 HC RX IP 250 OP 636: Mod: JZ | Performed by: INTERNAL MEDICINE

## 2023-08-30 PROCEDURE — 86140 C-REACTIVE PROTEIN: CPT | Performed by: INTERNAL MEDICINE

## 2023-08-30 PROCEDURE — 85018 HEMOGLOBIN: CPT | Performed by: INTERNAL MEDICINE

## 2023-08-30 PROCEDURE — 999N000248 HC STATISTIC IV INSERT WITH US BY RN

## 2023-08-30 RX ORDER — ALBUTEROL SULFATE 90 UG/1
1-2 AEROSOL, METERED RESPIRATORY (INHALATION)
Status: CANCELLED
Start: 2023-10-11

## 2023-08-30 RX ORDER — EPINEPHRINE 1 MG/ML
0.3 INJECTION, SOLUTION INTRAMUSCULAR; SUBCUTANEOUS EVERY 5 MIN PRN
Status: CANCELLED | OUTPATIENT
Start: 2023-10-11

## 2023-08-30 RX ORDER — METHYLPREDNISOLONE SODIUM SUCCINATE 125 MG/2ML
125 INJECTION, POWDER, LYOPHILIZED, FOR SOLUTION INTRAMUSCULAR; INTRAVENOUS
Status: CANCELLED
Start: 2023-10-11

## 2023-08-30 RX ORDER — DIPHENHYDRAMINE HYDROCHLORIDE 50 MG/ML
50 INJECTION INTRAMUSCULAR; INTRAVENOUS
Status: CANCELLED
Start: 2023-10-11

## 2023-08-30 RX ORDER — LORATADINE 10 MG/1
10 TABLET ORAL DAILY
COMMUNITY

## 2023-08-30 RX ORDER — ALBUTEROL SULFATE 0.83 MG/ML
2.5 SOLUTION RESPIRATORY (INHALATION)
Status: CANCELLED | OUTPATIENT
Start: 2023-10-11

## 2023-08-30 RX ORDER — MEPERIDINE HYDROCHLORIDE 25 MG/ML
25 INJECTION INTRAMUSCULAR; INTRAVENOUS; SUBCUTANEOUS EVERY 30 MIN PRN
Status: CANCELLED | OUTPATIENT
Start: 2023-10-11

## 2023-08-30 RX ORDER — DIPHENHYDRAMINE HCL 25 MG
25 CAPSULE ORAL ONCE
Status: CANCELLED
Start: 2023-10-11 | End: 2023-10-11

## 2023-08-30 RX ORDER — ACETAMINOPHEN 325 MG/1
650 TABLET ORAL ONCE
Status: CANCELLED
Start: 2023-10-11 | End: 2023-10-11

## 2023-08-30 RX ADMIN — SODIUM CHLORIDE 600 MG: 9 INJECTION, SOLUTION INTRAVENOUS at 15:30

## 2023-08-30 NOTE — PROGRESS NOTES
Infusion Nursing Note:  Shelly Cha presents today for Infliximab.    Patient seen by provider today: No   present during visit today: Not Applicable.    Note: Pt given Infliximab by rapid infusion over one hour without incident.      Intravenous Access:  Labs drawn without difficulty.  Peripheral IV placed.    Treatment Conditions:  ~~~ NOTE: If the patient answers yes to any of the questions below, hold the infusion and contact ordering provider or on-call provider.    Do you currently have any signs of illness or infection or are you on any antibiotics? No  Have you recently had an elevated temperature, fever, chills, productive cough, coughing for 3 weeks or longer or hemoptysis, abnormal vital signs, night sweats, chest pain or have you noticed a decrease in your appetite, unexplained weight loss or fatigue? No  Have you had any new, sudden, or worsening abdominal pain? No  Do you have any open wounds or new incisions? (exclude for patients with hidradenitis suppurativa) No  Have you recently been diagnosed with any new nervous system diseases (ie. Multiple sclerosis, Guillain [place], seizures, neurological changes) or cancer diagnosis? Are you on any form of radiation or chemotherapy? No  Have there been any other new onset medical symptoms? No  Are you pregnant or breast feeding or do you have plans of pregnancy in the future? No; N/A  Do you have any upcoming hospitalizations or surgeries? Does not include esophagogastroduodenoscopy, colonoscopy, endoscopic retrograde cholangiopancreatography (ERCP), endoscopic ultrasound (EUS), dental procedures (including cleanings, fillings, implants, extractions)  or joint aspiration/steroid injections No  Have you or anyone in your household received a live vaccination in the past 4 weeks? Please note: No live vaccines while on biologic/chemotherapy until 6 months after the last treatment. Patient can receive the flu vaccine (shot only).  It is optimal for  the patient to get it mid cycle, but it can be given at any time as long as it is not on the day of the infusion. No  If applicable to prescribed medication, confirm negative PPD or quantiferon gold MTB. If positive, verify has negative chest x-ray or the patient is at least 4 weeks post initiation of INH/B6 therapy and have clearance from provider before infusion ([id number]/N:[id number])  If applicable to prescribed medication, confirm negative hepatitis B surface antigen or hepatitis C. If positive, clearance from provider before infusion. (Y/N: [id number])  Rheumatology patients receiving tocilizumab (Actemra): If labs were drawn within the past week, hold dosing until cleared to infuse If AST/ALT > 2 X upper limit normal; ANC < 1.0. N/A  Patients receiving belimumab (Benlysta): Have you been having any signs of worsening depression or suicidal ideations? N/A        Post Infusion Assessment:  Patient tolerated infusion without incident.  Blood return noted pre and post infusion.  Site patent and intact, free from redness, edema or discomfort.  No evidence of extravasations.  Access discontinued per protocol.  Biologic Infusion Post Education: Call the triage nurse at your clinic or seek medical attention if you have chills and/or temperature greater than or equal to 100.5, uncontrolled nausea/vomiting, diarrhea, constipation, dizziness, shortness of breath, chest pain, heart palpitations, weakness or any other new or concerning symptoms, questions or concerns.  You cannot have any live virus vaccines prior to or during treatment or up to 6 months post infusion.  If you have an upcoming surgery, medical procedure or dental procedure during treatment, this should be discussed with your ordering physician and your surgeon/dentist.  If you are having any concerning symptom, if you are unsure if you should get your next infusion or wish to speak to a provider before your next infusion, please call your care coordinator or triage  nurse at your clinic to notify them so we can adequately serve you.       Discharge Plan:   Discharge instructions reviewed with: Patient.  Patient and/or family verbalized understanding of discharge instructions and all questions answered.  AVS to patient via SifteoT.  Patient will return per scheduling (message sent to [facility] scheduling) for next appointment.   Patient discharged in stable condition accompanied by: self.  Departure Mode: Ambulatory.    Administrations This Visit       inFLIXimab-dyyb (INFLECTRA) 600 mg in sodium chloride 0.9 % 275 mL infusion       Admin Date  08/30/2023 Action  $New Bag Dose  600 mg Rate  275 mL/hr Route  Intravenous Administered By  Delores Ordonez, RN                      Delores Ordonez, RN

## 2023-08-31 ENCOUNTER — TELEPHONE (OUTPATIENT)
Dept: GASTROENTEROLOGY | Facility: CLINIC | Age: 53
End: 2023-08-31
Payer: COMMERCIAL

## 2023-08-31 NOTE — TELEPHONE ENCOUNTER
Contacted patient and assisted with scheduling virtual follow up appointment with Nahomi Soriano on 9/7. Inbasket message sent to .

## 2023-08-31 NOTE — TELEPHONE ENCOUNTER
----- Message from Kwasi Nolasco MD sent at 8/30/2023  4:59 PM CDT -----  Regarding: Overdue for appt  Shelly Brewer is overdue for an appointment (her labs just came to me).    Can we work to get her in with my or Adam or Nahomi?    Thanks  J

## 2023-09-20 ENCOUNTER — MYC MEDICAL ADVICE (OUTPATIENT)
Dept: GASTROENTEROLOGY | Facility: CLINIC | Age: 53
End: 2023-09-20
Payer: COMMERCIAL

## 2023-09-25 NOTE — TELEPHONE ENCOUNTER
Called to remind patient of their upcoming appointment with our GI clinic, on Tuesday 10/3/2023 at 7:45AM with Nahomi Soriano. This appointment is scheduled as an in-person appt. Please arrive 15 minutes early to check in for your appointment. , if your appointment is virtual (video or telephone) you need to be in [place] for the visit. To reschedule or cancel appointment patient needs to call [phone] option 1.  To confirm appointment patient advised to call back.     Betty Bender MA

## 2023-10-03 ENCOUNTER — TELEPHONE (OUTPATIENT)
Dept: GASTROENTEROLOGY | Facility: CLINIC | Age: 53
End: 2023-10-03

## 2023-10-03 ENCOUNTER — DOCUMENTATION ONLY (OUTPATIENT)
Dept: GASTROENTEROLOGY | Facility: CLINIC | Age: 53
End: 2023-10-03

## 2023-10-03 ENCOUNTER — OFFICE VISIT (OUTPATIENT)
Dept: GASTROENTEROLOGY | Facility: CLINIC | Age: 53
End: 2023-10-03
Payer: COMMERCIAL

## 2023-10-03 VITALS
DIASTOLIC BLOOD PRESSURE: 83 MMHG | WEIGHT: 166.8 LBS | OXYGEN SATURATION: 97 % | HEIGHT: 63 IN | BODY MASS INDEX: 29.55 KG/M2 | HEART RATE: 83 BPM | SYSTOLIC BLOOD PRESSURE: 122 MMHG

## 2023-10-03 DIAGNOSIS — K50.10 CROHN'S COLITIS, WITHOUT COMPLICATIONS (H): Primary | ICD-10-CM

## 2023-10-03 PROCEDURE — 99215 OFFICE O/P EST HI 40 MIN: CPT | Performed by: DIETITIAN, REGISTERED

## 2023-10-03 RX ORDER — ALBUTEROL SULFATE 90 UG/1
AEROSOL, METERED RESPIRATORY (INHALATION)
COMMUNITY
Start: 2023-08-24

## 2023-10-03 ASSESSMENT — ENCOUNTER SYMPTOMS
HALLUCINATIONS: 0
TROUBLE SWALLOWING: 0
HEMOPTYSIS: 0
DECREASED APPETITE: 0
SINUS PAIN: 0
SEIZURES: 0
MYALGIAS: 0
SORE THROAT: 0
SKIN CHANGES: 1
COUGH: 1
POSTURAL DYSPNEA: 1
SPEECH CHANGE: 0
ALTERED TEMPERATURE REGULATION: 0
NIGHT SWEATS: 0
SMELL DISTURBANCE: 0
ARTHRALGIAS: 0
NECK PAIN: 1
DYSPNEA ON EXERTION: 1
WEIGHT LOSS: 0
INCREASED ENERGY: 1
WHEEZING: 1
HOARSE VOICE: 0
MEMORY LOSS: 0
SNORES LOUDLY: 0
COUGH DISTURBING SLEEP: 1
SHORTNESS OF BREATH: 1
POOR WOUND HEALING: 0
TINGLING: 0
POLYPHAGIA: 0
POLYDIPSIA: 0
BACK PAIN: 1
NUMBNESS: 1
HEADACHES: 0
MUSCLE WEAKNESS: 0
SPUTUM PRODUCTION: 1
DISTURBANCES IN COORDINATION: 0
CHILLS: 0
FATIGUE: 1
STIFFNESS: 0
WEIGHT GAIN: 0
WEAKNESS: 1
NAIL CHANGES: 0
PARALYSIS: 0
TREMORS: 0
JOINT SWELLING: 0
FEVER: 0
SINUS CONGESTION: 1
TASTE DISTURBANCE: 0
LOSS OF CONSCIOUSNESS: 0
DIZZINESS: 0

## 2023-10-03 ASSESSMENT — PAIN SCALES - GENERAL: PAINLEVEL: NO PAIN (0)

## 2023-10-03 NOTE — TELEPHONE ENCOUNTER
Infliximab level ordered. Added into therapy plan and appointment notes for next infusion date of 10/12. In[employer]ron[employer]set message sent to Ms. Whitlock to complete [employer] form.

## 2023-10-03 NOTE — TELEPHONE ENCOUNTER
----- Message from Nahomi Soriano PA-C sent at 10/3/2023  9:14 AM CDT -----  Regarding: IFX level  Hey Vinny -    Can we get a drug level with next infusion? Trough is great!    Thanks!  Nahomi

## 2023-10-03 NOTE — PATIENT INSTRUCTIONS
It was a pleasure taking care of you today.  I've included a brief summary of our discussion and care plan from today's visit below.  Please review this information with your primary care provider.  ______________________________________________________________________    My recommendations are summarized as follows:  -- Continue infliximab 7.5 mg/kg every 6 weeks  -- Labs every 3 months  -- Check drug level  -- Schedule colonoscopy  -- Continue to work to completely stop tobacco  -- Patient with IBD we recommend supplementation vitamin D 1000 units daily and calcium 500 mg twice daily.  -- Yearly Dermatology visit for skin check while on immunosuppressive therapy. Can call [phone] to schedule. Referral sent  -- No NSAIDs (ibuprofen, or anything containing ibuprofen)     If you have any questions please do not hesitate to call my nurse coordinator, Nurys Mckinley, at [phone].      For additional resources about inflammatory bowel disease visit [email or web address]    -- please see scheduling information provided below     Return to GI Clinic in 6 months to review your progress.    ______________________________________________________________________    How do I schedule labs, imaging studies, or procedures that were ordered in clinic today?     Labs: To schedule lab appointment at the [facility], use my chart or call [phone]. If you have a [facility] lab closer to home where you are regularly seen you can give them a call.     Procedures: If a colonoscopy, upper endoscopy, breath test, esophageal manometry, or pH impedence was ordered today, our endoscopy team will call you to schedule this. If you have not heard from our endoscopy team within a week, please call [phone] to schedule.     Imaging Studies: If you were scheduled for a CT scan, X-ray, MRI, ultrasound, HIDA scan or other imaging study, please call [phone] to have this scheduled.     Referral:  If a referral to another specialty was ordered, expect a phone call or follow instructions above. If you have not heard from anyone regarding your referral in a week, please call our clinic to check the status.     Who do I call with any questions after my visit?  Please be in touch if there are any further questions that arise following today's visit.  There are multiple ways to contact your gastroenterology care team.      During business hours, you may reach a Gastroenterology nurse at [phone]    To schedule or reschedule an appointment, please call [phone].     You can always send a secure message through Byban.  Byban messages are answered by your nurse or doctor typically within 24 hours.  Please allow extra time on weekends and holidays.      For urgent/emergent questions after business hours, you may reach the on-call GI Fellow by contacting the [facility]  at [phone].     How will I get the results of any tests ordered?    You will receive all of your results.  If you have signed up for userADgentst, any tests ordered at your visit will be available to you after your provider reviews them.  Typically this takes 1-2 weeks.  If there are urgent results that require a change in your care plan, your provider or nurse will call you to discuss the next steps.      What is Byban?  Byban is a secure way for you to access all of your healthcare records from the [facility].  It is a web based computer program, so you can sign on to it from any location.  It also allows you to send secure messages to your care team.  I recommend signing up for Byban access if you have not already done so and are comfortable with using a computer.      How to I schedule a follow-up visit?  If you did not schedule a follow-up visit today, please call [phone] to schedule a follow-up office visit.      Sincerely,    Nahomi Soriano PA-C  Division of Gastroenterology, Hepatology &  Nutrition  [facility]

## 2023-10-03 NOTE — NURSING NOTE
"Chief Complaint   Patient presents with    Follow Up       Vitals:    10/03/23 0751   BP: 122/83   Pulse: 83   SpO2: 97%   Weight: 75.7 kg (166 lb 12.8 oz)   Height: 1.6 m (5' 3\")       Body mass index is 29.55 kg/m .    [employer]    "

## 2023-10-03 NOTE — PROGRESS NOTES
IBD CLINIC VISIT    CC/REFERRING MD:  No ref. provider found    REASON FOR CONSULTATION: follow up IBD colitis    ASSESSMENT/PLAN:  #. Severe colitis:   Current medication: infliximab 7.5mg/kg every 6 weeks  Current clinical disease activity: remission   Current endoscopic disease activity: Haile 0 on colonoscopy 1/17/22, no active disease on bx, however has significant number of pseudopolyps     Clinically doing well on infliximab therapy. Colonoscopy showing endoscopic remission at last assessment. CRP slightly elevated on most recent labs (7.4), however improved from previous. She does note slight waning effect of infliximab week leading into infusion. We will get IFX level with next infusion. Due to number of pseudopolyps, we will repeat endoscopic assessment. We can assess disease activity at this time as well.    -- Colonoscopy, dysplasia screening due to number of pseudopolyps  -- Continue IFX (7.5 mg/kg q 6 wks)  -- Labs with infusions (CBC, CRP, ESR, CRP)     #. Constipation: Likely driven by methadone, now discontinued   Despite discontinuing methodone, patinet continues with 2-3 BM per week. She is taking Citrucel which she finds helpful, though taking inconsistently - recommend daily use of 1-2 Tbs per day. She also has Miralax at home, recommend utilitizing if going >2 days without BM, can use 1-3 capful daily.       #. H pylori gastritis: Status post triple therapy.  Stool antigen now negative.       #. Hepatitis C: Spontaneously cleared.  No further evaluation or treatment is needed.    #Hair Loss: noted over past 2-3 months. Will refer to dermatoloy. Also due for full body skin exam.    IBD HISTORY  Age at diagnosis: 51  Extent of disease: colon (pan-colitis - sparing of rectosigmoid - distal 15 cm)  Disease phenotype: inflammatory  Oneida-anal disease: none  Current CD medications: infliximab 7.5 mg/kg q 6 weeks (dose increase 9/2021)  Prior IBD surgeries: none  Prior IBD Medications: none    DRUG  MONITORING  TPMT enzyme activity: adequate    6-TGN/6-MMPN levels: none    Biologic concentration:  -IFX 8/11/21 - 3 with no antibodies    DISEASE ASSESSMENT  Labs  Recent Labs   Lab Test 08/30/23  1514 06/07/23  1550 12/03/22  0920 08/24/22  1458 06/01/22  1526   CRP  --   --   --  3.5 <2.9   SED 11 17   < > 20 21    < > = values in this interval not displayed.     Fecal calprotectin: 5000 prior to treatment -->>113 (8/12/21)  - colonoscopy - severe inflammation colon (distal 15 cm spared). Ileum not seen  - normal EGD but gastric biopsis + h pylori  Enterography: MRE with normal small bowel   C diff: negative 1/22/21    sIBDQ:        No data to display                IBD Health Care Maintenance:    Vaccinations:  All patients on biologics should avoid live vaccines.    -- Influenza (every year)  -- TdaP (every 10 years)  -- Pneumococcal Pneumonia (once plus booster at 5 years)  -- Yearly assessment for latent Tb (verbal screening and exam, PPD or QuantiFERON-Tb testing)     One time confirmation of immunity or serologies:  -- Hepatitis A (serologies or immunizations)  -- Hepatitis B (serologies or immunizations)  -- Varicella  -- MMR  -- HPV (all aged 18-26)  -- Meningococcal meningitis (all patients at risk for meningitis)  -- Due to the immunosuppression in this patient, I would not advise administration of live vaccines such as varicella/VZV, intranasal influenza, MMR, or yellow fever vaccine (if travelling).       Bone mineral density screening   -- Recommend all patients supplement with calcium and vitamin D     Cancer Screening:  Colon cancer screening:  Given panncolonic disease, recommend patient undergo regular dysplasia surveillance   Next dysplasia screening is recommended: given pseudopolyps I recommend we start dysplasia surveillance on next colonoscopy in 6-12 months.  Dysplasia surveillance will be difficult given the number of pseudopolyps that she is having.     Cervical cancer screening: Annual  due to immunosupression     Skin cancer screening: Annual visual exam of skin by dermatologist since patient is immunocompromised     Depression Screening:  -- Over the last month, have you felt down, depressed, or hopeless? no  -- Over the last month, have you felt little interest or pleasure doing things? no     Misc:  -- Avoid tobacco use  -- Avoid NSAIDs as there is potentially a 25% chance of causing an IBD flare    Return to clinic in 3 months     Thank you for this consultation.  51 minutes spent on the date of the encounter doing chart review, history and exam, documentation and further activities as noted above.  It was a pleasure to participate in the care of this patient; please contact us with any further questions.      Nahomi Soriano PA-C  Division of Gastroenterology, Hepatology & Nutrition  [facility]          HPI:   Here today for follow up.    Last labs 8/30 with elevated CRP to 7, though down from 2 months prior (29). Other labs normal. Has not had colonoscopy yet.    Continues on IFX 7.5 mg/lg every 6 weeks. No issues with this. Sometimes notices symptoms week leading into infusion - stools loosen up.    Reports she currently has ~3 bowel movements per week. Formed (bristol type 3-4). Occasional straining, feels empty after. No blood in stool. No urgency. No tenesmus. Using Citrucel 2 Tbs every few days. Dislikes Miralax but has at home if needed, does not take consistently.    Endorses intermittent abdominal pain - longstanding, no recent change. Crampy type pain that comes before bowel movement and improves after. Not daily. Can be worse if going longer between bowel movements. No change with eating.    Weight stable. No fevers or chills. No EIM.    Getting moles, has not had full skin exam. Also notes hair loss over past 3 months.    She is no longer on methadone (for history of opioid abuse).  Uses cigarettes, not daily, trying to quit.    ROS:  Complete 10 System ROS performed.  All are negative except as documented below, in the HPI, or in patient questionnaire from today's visit.  No fevers or chills  No weight loss  No blurry vision, double vision or change in vision  No sore throat  No lymphadenopathy  No headache, paraesthesias, or weakness in a limb  No shortness of breath or wheezing  No chest pain or pressure  No arthralgias or myalgias  No rashes or skin changes  No odynophagia or dysphagia  No BRBPR, hematochezia, melena  No dysuria, frequency or urgency  No hot/cold intolerance or polyria  No anxiety or depression    Extra intestinal manifestations of IBD:  No uveitis/episcleritis  No aphthous ulcers   No arthritis   No erythema nodosum/pyoderma gangrenosum.       PERTINENT PAST MEDICAL HISTORY:  Past Medical History:   Diagnosis Date    Pancolitis (H) 3/11/2021    Pancolitis (H) 3/11/2021       PREVIOUS SURGERIES:  Past Surgical History:   Procedure Laterality Date    BIOPSY      right breast    CHOLECYSTECTOMY      GYN SURGERY      , tubal       PREVIOUS ENDOSCOPY:  No results found for this or any previous visit.]    ALLERGIES:     Allergies   Allergen Reactions    Ibuprofen Swelling     Eyes swelling    Lactose Diarrhea    Tylenol [Acetaminophen]      Feels like someone punched her in the stomach       PERTINENT MEDICATIONS:    Current Outpatient Medications:     albuterol (ACCUNEB) 1.25 MG/3ML neb solution, Inhale 1.25 mg into the lungs, Disp: , Rfl:     gabapentin (NEURONTIN) 300 MG capsule, Take 300 mg by mouth 4 times daily , Disp: , Rfl:     BANOPHEN 25 MG capsule, , Disp: , Rfl:     benzocaine (ANBESOL) 10 % gel, Take by mouth 4 times daily as needed for moderate pain (apply to ulcer in mouth as needed) (Patient not taking: Reported on 10/3/2023), Disp: 9 g, Rfl: 1    bisacodyl (DULCOLAX) 5 MG EC tablet, 2 days prior to procedure, take 2 tablets at 4 pm. 1 day prior to procedure, take 2 tablets at 4 pm. For additional instructions refer to your colonoscopy prep  instructions. (Patient not taking: Reported on 10/3/2023), Disp: 4 tablet, Rfl: 0    bisacodyl (DULCOLAX) 5 MG EC tablet, Take 2 tablets by mouth at 10am the day before procedure. (Patient not taking: Reported on 10/3/2023), Disp: 2 tablet, Rfl: 0    Cholecalciferol (VITAMIN D3) 50 MCG (2000 UT) TABS, Take 1 tablet by mouth daily (Patient not taking: Reported on 10/3/2023), Disp: , Rfl:     FLOVENT DISKUS 250 MCG/BLIST inhaler, INHALE 1 PUFF INTO THE LUNGS TWICE DAILY (Patient not taking: Reported on 10/3/2023), Disp: , Rfl:     folic acid 800 MCG tablet, Take 800 mcg by mouth daily (Patient not taking: Reported on 10/3/2023), Disp: , Rfl:     loratadine (CLARITIN) 10 MG tablet, Take 10 mg by mouth daily (Patient not taking: Reported on 10/3/2023), Disp: , Rfl:     magnesium citrate solution, Drink entire bottle at 4pm two days prior to procedure. (Patient not taking: Reported on 10/3/2023), Disp: 296 mL, Rfl: 0    melatonin 5 MG tablet, Take 5 mg by mouth daily (Patient not taking: Reported on 10/3/2023), Disp: , Rfl:     METHADONE HCL PO, Take 48 mg by mouth daily (Patient not taking: Reported on 10/3/2023), Disp: , Rfl:     methylcellulose (CITRUCEL) powder, Take 0.85 g (3 teaspoonful) by mouth daily (Patient not taking: Reported on 10/3/2023), Disp: 90 g, Rfl: 3    minoxidil (ROGAINE) 2 % external solution, , Disp: , Rfl:     Multiple Vitamin (DAILY-JUSTIN MULTIVITAMIN) TABS, Take 1 tablet by mouth daily (Patient not taking: Reported on 10/3/2023), Disp: , Rfl:     omeprazole (PRILOSEC) 20 MG DR capsule, Take 1 capsule (20 mg) by mouth daily (Patient not taking: Reported on 10/3/2023), Disp: 30 capsule, Rfl: 1    polyethylene glycol (GOLYTELY) 236 g suspension, 2 days prior at 5pm, mix and drink half of a jug of Golytely. Drink an 8 oz. glass of Golytely every 15 minutes until half of the jug is gone. Place remainder of Golytely in the refrigerator. 1 day prior at 5 pm, drink the 2nd half of a jug of Golytely  bowel prep. 6 hours before your check-in time, drink an 8 oz. glass of Golytely every 15 minutes until half of the 2nd jug of Golytely is gone. Discard remainder of second jug. (Patient not taking: Reported on 10/3/2023), Disp: 8000 mL, Rfl: 0    polyethylene glycol (GOLYTELY) 236 g suspension, Take as directed. One day before your exam fill the first container with water. Cover and shake until mixed well. At 3:00pm drink one 8oz glass every 10-15 minutes until half of the first container is empty. Store the remainder in the refrigerator. At 8:00pm drink the second half of the first container until it is gone. Before you go to bed mix the second container with water and put in refrigerator. Six hours before your check in time drink one 8oz glass every 10-15 minutes until half of container is empty. Discard the remainder of solution. (Patient not taking: Reported on 10/3/2023), Disp: 8000 mL, Rfl: 0    polyethylene glycol (MIRALAX) 17 GM/Dose powder, Take 1 capful daily to have 1-2 soft BMs per day. Can increase up to 3 capfuls per day if needed to get desired result (Patient not taking: Reported on 10/3/2023), Disp: 507 g, Rfl: 3    predniSONE (DELTASONE) 20 MG tablet, Take two tablets (= 40mg) each day for 4 (four) days (Patient not taking: Reported on 10/3/2023), Disp: 8 tablet, Rfl: 0    Thiamine Mononitrate (B1) 100 MG TABS, Take 1 tablet by mouth daily (Patient not taking: Reported on 10/3/2023), Disp: , Rfl:     VENTOLIN  (90 Base) MCG/ACT inhaler, INHALE 2 PUFFS INTO THE LUNGS EVERY 4 HOURS AS NEEDED FOR SHORTNESS OF BREATH OR WHEEZING (Patient not taking: Reported on 10/3/2023), Disp: , Rfl:     WAL-DRYL ALLERGY 25 MG tablet, TAKE 2 TABLETS BY MOUTH EVERY NIGHT AT BEDTIME AS NEEDED SLEEPLESSNESS (Patient not taking: Reported on 10/3/2023), Disp: , Rfl:     WAL-MUCIL 58.6 % powder, TAKE 2.5 GRAMS TWICE DAILY (Patient not taking: Reported on 10/3/2023), Disp: , Rfl:     SOCIAL HISTORY:  Social History  "    Socioeconomic History    Marital status: Single     Spouse name: Not on file    Number of children: Not on file    Years of education: Not on file    Highest education level: Not on file   Occupational History    Not on file   Tobacco Use    Smoking status: Some Days     Types: Cigarettes     Last attempt to quit: 2021     Years since quittin.7    Smokeless tobacco: Never   Substance and Sexual Activity    Alcohol use: No    Drug use: No    Sexual activity: Yes     Partners: Male   Other Topics Concern    Not on file   Social History Narrative    Not on file     Social Determinants of Health     Financial Resource Strain: Not on file   Food Insecurity: Not on file   Transportation Needs: Not on file   Physical Activity: Not on file   Stress: Not on file   Social Connections: Not on file   Interpersonal Safety: Not on file   Housing Stability: Not on file       FAMILY HISTORY:  Family History   Problem Relation Age of Onset    Glaucoma No family hx of     Macular Degeneration No family hx of        Past/family/social history reviewed and no changes    PHYSICAL EXAMINATION:  Constitutional: aaox3, cooperative, pleasant, not dyspneic/diaphoretic, no acute distress  Vitals reviewed: /83   Pulse 83   Ht 1.6 m (5' 3\")   Wt 75.7 kg (166 lb 12.8 oz)   SpO2 97%   BMI 29.55 kg/m    Wt:   Wt Readings from Last 2 Encounters:   10/03/23 75.7 kg (166 lb 12.8 oz)   23 75.3 kg (166 lb 1.6 oz)      Constitutional - general appearance is well and in no acute distress. Body habitus normal  Eyes - No redness or discharge  Respiratory - No cough, unlabored breathing  Musculoskeletal - range of motion intact: Neck and arms  Skin - No discoloration or lesions  Neurological - No tremors, headaches  Psychiatric - No anxiety, alert & oriented      PERTINENT STUDIES:  Most recent CBC:  Recent Labs   Lab Test 23  1514 23  0127   WBC 11.0 17.1*   HGB 13.0 12.8   HCT 38.3 37.8    327     Most recent " hepatic panel:  Recent Labs   Lab Test 08/30/23  1514 06/07/23  1550   ALT 14 13   AST 25 23     Most recent creatinine:  Recent Labs   Lab Test 07/28/23  0127 08/11/21  1236   CR 0.59 0.72

## 2023-10-03 NOTE — LETTER
10/3/2023         RE: Shelly Cha  [address]  [facility] [address]        Dear Colleague,    Thank you for referring your patient, Shelly Cha, to the [facility] [place]. Please see a copy of my visit note below.    IBD CLINIC VISIT    CC/REFERRING MD:  No ref. provider found    REASON FOR CONSULTATION: follow up IBD colitis    ASSESSMENT/PLAN:  #. Severe colitis:   Current medication: infliximab 7.5mg/kg every 6 weeks  Current clinical disease activity: remission   Current endoscopic disease activity: Haile 0 on colonoscopy 1/17/22, no active disease on bx, however has significant number of pseudopolyps     Clinically doing well on infliximab therapy. Colonoscopy showing endoscopic remission at last assessment. CRP slightly elevated on most recent labs (7.4), however improved from previous. She does note slight waning effect of infliximab week leading into infusion. We will get IFX level with next infusion. Due to number of pseudopolyps, we will repeat endoscopic assessment. We can assess disease activity at this time as well.    -- Colonoscopy, dysplasia screening due to number of pseudopolyps  -- Continue IFX (7.5 mg/kg q 6 wks)  -- Labs with infusions (CBC, CRP, ESR, CRP)     #. Constipation: Likely driven by methadone, now discontinued   Despite discontinuing methodone, patinet continues with 2-3 BM per week. She is taking Citrucel which she finds helpful, though taking inconsistently - recommend daily use of 1-2 Tbs per day. She also has Miralax at home, recommend utilitizing if going >2 days without BM, can use 1-3 capful daily.       #. H pylori gastritis: Status post triple therapy.  Stool antigen now negative.       #. Hepatitis C: Spontaneously cleared.  No further evaluation or treatment is needed.    #Hair Loss: noted over past 2-3 months. Will refer to dermatoloy. Also due for full body skin exam.    IBD HISTORY  Age at diagnosis: 51  Extent of  disease: colon (pan-colitis - sparing of rectosigmoid - distal 15 cm)  Disease phenotype: inflammatory  Oneida-anal disease: none  Current CD medications: infliximab 7.5 mg/kg q 6 weeks (dose increase 9/2021)  Prior IBD surgeries: none  Prior IBD Medications: none    DRUG MONITORING  TPMT enzyme activity: adequate    6-TGN/6-MMPN levels: none    Biologic concentration:  -IFX 8/11/21 - 3 with no antibodies    DISEASE ASSESSMENT  Labs  Recent Labs   Lab Test 08/30/23  1514 06/07/23  1550 12/03/22  0920 08/24/22  1458 06/01/22  1526   CRP  --   --   --  3.5 <2.9   SED 11 17   < > 20 21    < > = values in this interval not displayed.     Fecal calprotectin: 5000 prior to treatment -->>113 (8/12/21)  - colonoscopy - severe inflammation colon (distal 15 cm spared). Ileum not seen  - normal EGD but gastric biopsis + h pylori  Enterography: MRE with normal small bowel   C diff: negative 1/22/21    sIBDQ:        No data to display                IBD Health Care Maintenance:    Vaccinations:  All patients on biologics should avoid live vaccines.    -- Influenza (every year)  -- TdaP (every 10 years)  -- Pneumococcal Pneumonia (once plus booster at 5 years)  -- Yearly assessment for latent Tb (verbal screening and exam, PPD or QuantiFERON-Tb testing)     One time confirmation of immunity or serologies:  -- Hepatitis A (serologies or immunizations)  -- Hepatitis B (serologies or immunizations)  -- Varicella  -- MMR  -- HPV (all aged 18-26)  -- Meningococcal meningitis (all patients at risk for meningitis)  -- Due to the immunosuppression in this patient, I would not advise administration of live vaccines such as varicella/VZV, intranasal influenza, MMR, or yellow fever vaccine (if travelling).       Bone mineral density screening   -- Recommend all patients supplement with calcium and vitamin D     Cancer Screening:  Colon cancer screening:  Given panncolonic disease, recommend patient undergo regular dysplasia surveillance    Next dysplasia screening is recommended: given pseudopolyps I recommend we start dysplasia surveillance on next colonoscopy in 6-12 months.  Dysplasia surveillance will be difficult given the number of pseudopolyps that she is having.     Cervical cancer screening: Annual due to immunosupression     Skin cancer screening: Annual visual exam of skin by dermatologist since patient is immunocompromised     Depression Screening:  -- Over the last month, have you felt down, depressed, or hopeless? no  -- Over the last month, have you felt little interest or pleasure doing things? no     Misc:  -- Avoid tobacco use  -- Avoid NSAIDs as there is potentially a 25% chance of causing an IBD flare    Return to clinic in 3 months     Thank you for this consultation.  51 minutes spent on the date of the encounter doing chart review, history and exam, documentation and further activities as noted above.  It was a pleasure to participate in the care of this patient; please contact us with any further questions.      Nahomi Soriano PA-C  Division of Gastroenterology, Hepatology & Nutrition  [facility]          HPI:   Here today for follow up.    Last labs 8/30 with elevated CRP to 7, though down from 2 months prior (29). Other labs normal. Has not had colonoscopy yet.    Continues on IFX 7.5 mg/lg every 6 weeks. No issues with this. Sometimes notices symptoms week leading into infusion - stools loosen up.    Reports she currently has ~3 bowel movements per week. Formed (bristol type 3-4). Occasional straining, feels empty after. No blood in stool. No urgency. No tenesmus. Using Citrucel 2 Tbs every few days. Dislikes Miralax but has at home if needed, does not take consistently.    Endorses intermittent abdominal pain - longstanding, no recent change. Crampy type pain that comes before bowel movement and improves after. Not daily. Can be worse if going longer between bowel movements. No change with eating.    Weight  stable. No fevers or chills. No EIM.    Getting moles, has not had full skin exam. Also notes hair loss over past 3 months.    She is no longer on methadone (for history of opioid abuse).  Uses cigarettes, not daily, trying to quit.    ROS:  Complete 10 System ROS performed. All are negative except as documented below, in the HPI, or in patient questionnaire from today's visit.  No fevers or chills  No weight loss  No blurry vision, double vision or change in vision  No sore throat  No lymphadenopathy  No headache, paraesthesias, or weakness in a limb  No shortness of breath or wheezing  No chest pain or pressure  No arthralgias or myalgias  No rashes or skin changes  No odynophagia or dysphagia  No BRBPR, hematochezia, melena  No dysuria, frequency or urgency  No hot/cold intolerance or polyria  No anxiety or depression    Extra intestinal manifestations of IBD:  No uveitis/episcleritis  No aphthous ulcers   No arthritis   No erythema nodosum/pyoderma gangrenosum.       PERTINENT PAST MEDICAL HISTORY:  Past Medical History:   Diagnosis Date    Pancolitis (H) 3/11/2021    Pancolitis (H) 3/11/2021       PREVIOUS SURGERIES:  Past Surgical History:   Procedure Laterality Date    BIOPSY      right breast    CHOLECYSTECTOMY      GYN SURGERY      , tubal       PREVIOUS ENDOSCOPY:  No results found for this or any previous visit.]    ALLERGIES:     Allergies   Allergen Reactions    Ibuprofen Swelling     Eyes swelling    Lactose Diarrhea    Tylenol [Acetaminophen]      Feels like someone punched her in the stomach       PERTINENT MEDICATIONS:    Current Outpatient Medications:     albuterol (ACCUNEB) 1.25 MG/3ML neb solution, Inhale 1.25 mg into the lungs, Disp: , Rfl:     gabapentin (NEURONTIN) 300 MG capsule, Take 300 mg by mouth 4 times daily , Disp: , Rfl:     BANOPHEN 25 MG capsule, , Disp: , Rfl:     benzocaine (ANBESOL) 10 % gel, Take by mouth 4 times daily as needed for moderate pain (apply to ulcer in mouth as  needed) (Patient not taking: Reported on 10/3/2023), Disp: 9 g, Rfl: 1    bisacodyl (DULCOLAX) 5 MG EC tablet, 2 days prior to procedure, take 2 tablets at 4 pm. 1 day prior to procedure, take 2 tablets at 4 pm. For additional instructions refer to your colonoscopy prep instructions. (Patient not taking: Reported on 10/3/2023), Disp: 4 tablet, Rfl: 0    bisacodyl (DULCOLAX) 5 MG EC tablet, Take 2 tablets by mouth at 10am the day before procedure. (Patient not taking: Reported on 10/3/2023), Disp: 2 tablet, Rfl: 0    Cholecalciferol (VITAMIN D3) 50 MCG (2000 UT) TABS, Take 1 tablet by mouth daily (Patient not taking: Reported on 10/3/2023), Disp: , Rfl:     FLOVENT DISKUS 250 MCG/BLIST inhaler, INHALE 1 PUFF INTO THE LUNGS TWICE DAILY (Patient not taking: Reported on 10/3/2023), Disp: , Rfl:     folic acid 800 MCG tablet, Take 800 mcg by mouth daily (Patient not taking: Reported on 10/3/2023), Disp: , Rfl:     loratadine (CLARITIN) 10 MG tablet, Take 10 mg by mouth daily (Patient not taking: Reported on 10/3/2023), Disp: , Rfl:     magnesium citrate solution, Drink entire bottle at 4pm two days prior to procedure. (Patient not taking: Reported on 10/3/2023), Disp: 296 mL, Rfl: 0    melatonin 5 MG tablet, Take 5 mg by mouth daily (Patient not taking: Reported on 10/3/2023), Disp: , Rfl:     METHADONE HCL PO, Take 48 mg by mouth daily (Patient not taking: Reported on 10/3/2023), Disp: , Rfl:     methylcellulose (CITRUCEL) powder, Take 0.85 g (3 teaspoonful) by mouth daily (Patient not taking: Reported on 10/3/2023), Disp: 90 g, Rfl: 3    minoxidil (ROGAINE) 2 % external solution, , Disp: , Rfl:     Multiple Vitamin (DAILY-JUSTIN MULTIVITAMIN) TABS, Take 1 tablet by mouth daily (Patient not taking: Reported on 10/3/2023), Disp: , Rfl:     omeprazole (PRILOSEC) 20 MG DR capsule, Take 1 capsule (20 mg) by mouth daily (Patient not taking: Reported on 10/3/2023), Disp: 30 capsule, Rfl: 1    polyethylene glycol (GOLYTELY) 236  g suspension, 2 days prior at 5pm, mix and drink half of a jug of Golytely. Drink an 8 oz. glass of Golytely every 15 minutes until half of the jug is gone. Place remainder of Golytely in the refrigerator. 1 day prior at 5 pm, drink the 2nd half of a jug of Golytely bowel prep. 6 hours before your check-in time, drink an 8 oz. glass of Golytely every 15 minutes until half of the 2nd jug of Golytely is gone. Discard remainder of second jug. (Patient not taking: Reported on 10/3/2023), Disp: 8000 mL, Rfl: 0    polyethylene glycol (GOLYTELY) 236 g suspension, Take as directed. One day before your exam fill the first container with water. Cover and shake until mixed well. At 3:00pm drink one 8oz glass every 10-15 minutes until half of the first container is empty. Store the remainder in the refrigerator. At 8:00pm drink the second half of the first container until it is gone. Before you go to bed mix the second container with water and put in refrigerator. Six hours before your check in time drink one 8oz glass every 10-15 minutes until half of container is empty. Discard the remainder of solution. (Patient not taking: Reported on 10/3/2023), Disp: 8000 mL, Rfl: 0    polyethylene glycol (MIRALAX) 17 GM/Dose powder, Take 1 capful daily to have 1-2 soft BMs per day. Can increase up to 3 capfuls per day if needed to get desired result (Patient not taking: Reported on 10/3/2023), Disp: 507 g, Rfl: 3    predniSONE (DELTASONE) 20 MG tablet, Take two tablets (= 40mg) each day for 4 (four) days (Patient not taking: Reported on 10/3/2023), Disp: 8 tablet, Rfl: 0    Thiamine Mononitrate (B1) 100 MG TABS, Take 1 tablet by mouth daily (Patient not taking: Reported on 10/3/2023), Disp: , Rfl:     VENTOLIN  (90 Base) MCG/ACT inhaler, INHALE 2 PUFFS INTO THE LUNGS EVERY 4 HOURS AS NEEDED FOR SHORTNESS OF BREATH OR WHEEZING (Patient not taking: Reported on 10/3/2023), Disp: , Rfl:     WAL-DRYL ALLERGY 25 MG tablet, TAKE 2  "TABLETS BY MOUTH EVERY NIGHT AT BEDTIME AS NEEDED SLEEPLESSNESS (Patient not taking: Reported on 10/3/2023), Disp: , Rfl:     WAL-MUCIL 58.6 % powder, TAKE 2.5 GRAMS TWICE DAILY (Patient not taking: Reported on 10/3/2023), Disp: , Rfl:     SOCIAL HISTORY:  Social History     Socioeconomic History    Marital status: Single     Spouse name: Not on file    Number of children: Not on file    Years of education: Not on file    Highest education level: Not on file   Occupational History    Not on file   Tobacco Use    Smoking status: Some Days     Types: Cigarettes     Last attempt to quit: 2021     Years since quittin.7    Smokeless tobacco: Never   Substance and Sexual Activity    Alcohol use: No    Drug use: No    Sexual activity: Yes     Partners: Male   Other Topics Concern    Not on file   Social History Narrative    Not on file     Social Determinants of Health     Financial Resource Strain: Not on file   Food Insecurity: Not on file   Transportation Needs: Not on file   Physical Activity: Not on file   Stress: Not on file   Social Connections: Not on file   Interpersonal Safety: Not on file   Housing Stability: Not on file       FAMILY HISTORY:  Family History   Problem Relation Age of Onset    Glaucoma No family hx of     Macular Degeneration No family hx of        Past/family/social history reviewed and no changes    PHYSICAL EXAMINATION:  Constitutional: aaox3, cooperative, pleasant, not dyspneic/diaphoretic, no acute distress  Vitals reviewed: /83   Pulse 83   Ht 1.6 m (5' 3\")   Wt 75.7 kg (166 lb 12.8 oz)   SpO2 97%   BMI 29.55 kg/m    Wt:   Wt Readings from Last 2 Encounters:   10/03/23 75.7 kg (166 lb 12.8 oz)   23 75.3 kg (166 lb 1.6 oz)      Constitutional - general appearance is well and in no acute distress. Body habitus normal  Eyes - No redness or discharge  Respiratory - No cough, unlabored breathing  Musculoskeletal - range of motion intact: Neck and arms  Skin - No " discoloration or lesions  Neurological - No tremors, headaches  Psychiatric - No anxiety, alert & oriented      PERTINENT STUDIES:  Most recent CBC:  Recent Labs   Lab Test 08/30/23  1514 07/28/23  0127   WBC 11.0 17.1*   HGB 13.0 12.8   HCT 38.3 37.8    327     Most recent hepatic panel:  Recent Labs   Lab Test 08/30/23  1514 06/07/23  1550   ALT 14 13   AST 25 23     Most recent creatinine:  Recent Labs   Lab Test 07/28/23  0127 08/11/21  1236   CR 0.59 0.72       Again, thank you for allowing me to participate in the care of your patient.      Sincerely,    Nahomi Soriano PA-C

## 2023-10-12 ENCOUNTER — INFUSION THERAPY VISIT (OUTPATIENT)
Dept: INFUSION THERAPY | Facility: CLINIC | Age: 53
End: 2023-10-12
Attending: INTERNAL MEDICINE
Payer: COMMERCIAL

## 2023-10-12 VITALS
OXYGEN SATURATION: 94 % | SYSTOLIC BLOOD PRESSURE: 116 MMHG | DIASTOLIC BLOOD PRESSURE: 78 MMHG | WEIGHT: 164.5 LBS | BODY MASS INDEX: 29.14 KG/M2 | TEMPERATURE: 97.8 F | HEART RATE: 70 BPM

## 2023-10-12 DIAGNOSIS — K51.00 PANCOLITIS (H): Primary | ICD-10-CM

## 2023-10-12 PROCEDURE — 36415 COLL VENOUS BLD VENIPUNCTURE: CPT | Performed by: INTERNAL MEDICINE

## 2023-10-12 PROCEDURE — 96413 CHEMO IV INFUSION 1 HR: CPT

## 2023-10-12 PROCEDURE — 250N000011 HC RX IP 250 OP 636: Mod: JZ | Performed by: INTERNAL MEDICINE

## 2023-10-12 PROCEDURE — 80230 DRUG ASSAY INFLIXIMAB: CPT | Performed by: INTERNAL MEDICINE

## 2023-10-12 PROCEDURE — 258N000003 HC RX IP 258 OP 636: Performed by: INTERNAL MEDICINE

## 2023-10-12 RX ORDER — ALBUTEROL SULFATE 90 UG/1
1-2 AEROSOL, METERED RESPIRATORY (INHALATION)
Status: CANCELLED
Start: 2023-11-22

## 2023-10-12 RX ORDER — ALBUTEROL SULFATE 0.83 MG/ML
2.5 SOLUTION RESPIRATORY (INHALATION)
Status: CANCELLED | OUTPATIENT
Start: 2023-11-22

## 2023-10-12 RX ORDER — DIPHENHYDRAMINE HYDROCHLORIDE 50 MG/ML
50 INJECTION INTRAMUSCULAR; INTRAVENOUS
Status: CANCELLED
Start: 2023-11-22

## 2023-10-12 RX ORDER — MEPERIDINE HYDROCHLORIDE 25 MG/ML
25 INJECTION INTRAMUSCULAR; INTRAVENOUS; SUBCUTANEOUS EVERY 30 MIN PRN
Status: CANCELLED | OUTPATIENT
Start: 2023-11-22

## 2023-10-12 RX ORDER — METHYLPREDNISOLONE SODIUM SUCCINATE 125 MG/2ML
125 INJECTION, POWDER, LYOPHILIZED, FOR SOLUTION INTRAMUSCULAR; INTRAVENOUS
Status: CANCELLED
Start: 2023-11-22

## 2023-10-12 RX ORDER — DIPHENHYDRAMINE HCL 25 MG
25 CAPSULE ORAL ONCE
Status: CANCELLED
Start: 2023-11-22 | End: 2023-11-22

## 2023-10-12 RX ORDER — ACETAMINOPHEN 325 MG/1
650 TABLET ORAL ONCE
Status: CANCELLED
Start: 2023-11-22 | End: 2023-11-22

## 2023-10-12 RX ORDER — EPINEPHRINE 1 MG/ML
0.3 INJECTION, SOLUTION INTRAMUSCULAR; SUBCUTANEOUS EVERY 5 MIN PRN
Status: CANCELLED | OUTPATIENT
Start: 2023-11-22

## 2023-10-12 RX ADMIN — SODIUM CHLORIDE 600 MG: 9 INJECTION, SOLUTION INTRAVENOUS at 15:38

## 2023-10-12 ASSESSMENT — PAIN SCALES - GENERAL: PAINLEVEL: NO PAIN (0)

## 2023-10-12 NOTE — PATIENT INSTRUCTIONS
Dear Shelly Cha    Thank you for choosing [facility] ([facility]) for your infusion.  The following information is a summary of our appointment as well as important reminders.      EDUCATION POST BIOLOGICAL/CHEMOTHERAPY INFUSION  Call the triage nurse at your clinic or seek medical attention if you have chills and/or temperature greater than or equal to 100.5, uncontrolled nausea/vomiting, diarrhea, constipation, dizziness, shortness of breath, chest pain, heart palpitations, weakness or any other new or concerning symptoms, questions or concerns.  You can not have any live virus vaccines prior to or during treatment or up to 6 months post infusion.  If you have an upcoming surgery, medical procedure or dental procedure during treatment, this should be discussed with your ordering physician and your surgeon/dentist.  If you are having any concerning symptom, if you are unsure if you should get your next infusion or wish to speak to a provider before your next infusion, please call your care coordinator or triage nurse at your clinic to notify them so we can adequately serve you.     If you are a transplant patient and require transplant education, please click on this link: [email or web address].    We look forward in seeing you on your next appointment here at [facility] ([facility]).  Please don t hesitate to call us at [phone] to reschedule any of your appointments or to speak with one of the [facility] registered nurses.  It was a pleasure taking care of you today.    Sincerely,    [facility]  [address] [place] [address]  [id number]  Phone:  [phone]

## 2023-10-12 NOTE — PROGRESS NOTES
Infusion Nursing Note:  Shelly Cha presents today for inflectra.    Patient seen by provider today: No   present during visit today: Not Applicable.    Intravenous Access:  Peripheral [facility] by [facility]  Labs drawn    Treatment Conditions:  Biological Infusion Checklist:  ~~~ NOTE: If the patient answers yes to any of the questions below, hold the infusion and contact ordering provider or on-call provider.    Have you recently had an elevated temperature, fever, chills, productive cough, coughing for 3 weeks or longer or hemoptysis,  abnormal vital signs, night sweats,  chest pain or have you noticed a decrease in your appetite, unexplained weight loss or fatigue? No  Do you have any open wounds or new incisions? No  Do you have any upcoming hospitalizations or surgeries? Does not include esophagogastroduodenoscopy, colonoscopy, endoscopic retrograde cholangiopancreatography (ERCP), endoscopic ultrasound (EUS), dental procedures or joint aspiration/steroid injections No  Do you currently have any signs of illness or infection or are you on any antibiotics? No  Have you had any new, sudden or worsening abdominal pain? No  Have you or anyone in your household received a live vaccination in the past 4 weeks? Please note: No live vaccines while on biologic/chemotherapy until 6 months after the last treatment. Patient can receive the flu vaccine (shot only), pneumovax and the Covid vaccine. It is optimal for the patient to get these vaccines mid cycle, but they can be given at any time as long as it is not on the day of the infusion. No  Have you recently been diagnosed with any new nervous system diseases (ie. Multiple sclerosis, Guillain [place], seizures, neurological changes) or cancer diagnosis? Are you on any form of radiation or chemotherapy? No  Are you pregnant or breast feeding or do you have plans of pregnancy in the future? No  Have you been having any signs of worsening depression or suicidal  ideations?  (benlysta only) N/A  Have there been any other new onset medical symptoms? No  Have you had any new blood clots? (IVIG only) N/A    Post Infusion Assessment:  Patient tolerated infusion without incident.  Blood return noted pre and post infusion.  Site patent and intact, free from redness, edema or discomfort.  No evidence of extravasations.  Access discontinued per protocol.     POST-INFUSION OF BIOLOGICAL MEDICATION:     Reviewed with patient.  Given biologic medication or medication hand-out. Inform patient if any fever, chills or signs of infection, new symptoms, abdominal pain, heart palpitations, shortness of breath, reaction, weakness, neurological changes, seek medical attention immediately and should not receive infusions. No live virus vaccines prior to or during treatment or up to 6 months post infusion. If the patient has an upcoming procedure or surgery, this should be discussed with the rheumatologist and surgeon or provider.    Discharge Plan:   Discharge instructions reviewed with: Patient.  Patient and/or family verbalized understanding of discharge instructions and all questions answered.  AVS to patient via nScaledT.  Patient will return 11/22/23 for next appointment.   Patient discharged in stable condition accompanied by: self.  Departure Mode: Ambulatory.    Administrations This Visit       inFLIXimab-dyyb (INFLECTRA) 600 mg in sodium chloride 0.9 % 275 mL infusion       Admin Date  10/12/2023 Action  $New Bag Dose  600 mg Rate  275 mL/hr Route  Intravenous Documented By  Ellen Villegas, RN                    Ellen Villegas, RN

## 2023-10-18 ENCOUNTER — TELEPHONE (OUTPATIENT)
Dept: GASTROENTEROLOGY | Facility: CLINIC | Age: 53
End: 2023-10-18
Payer: COMMERCIAL

## 2023-10-18 LAB
INFLIXIMAB AB SERPL IA-MCNC: <3.1 U/ML
INFLIXIMAB SERPL-MCNC: 12.5 UG/ML

## 2023-10-18 NOTE — TELEPHONE ENCOUNTER
Spoke with patient in regards to scheduling the 6 mo follow-up order. They declined scheduling at this time as they want to schedule colonoscopy prior to appointment. Patient said they will call to schedule.    Follow-up with Nahomi Soriano (around April 2024). RTN IBD, in person or virtual

## 2023-11-03 ENCOUNTER — MEDICAL CORRESPONDENCE (OUTPATIENT)
Dept: HEALTH INFORMATION MANAGEMENT | Facility: CLINIC | Age: 53
End: 2023-11-03
Payer: COMMERCIAL

## 2023-11-06 ENCOUNTER — TELEPHONE (OUTPATIENT)
Dept: PSYCHIATRY | Facility: CLINIC | Age: 53
End: 2023-11-06
Payer: COMMERCIAL

## 2023-11-06 NOTE — TELEPHONE ENCOUNTER
PSYCHIATRY CLINIC PHONE INTAKE     SERVICES REQUESTED / INTERESTED IN          Med Management    Presenting Problem and Brief History                              What would you like to be seen for? (brief description):  Pt was referred by [facility] clinic. Pt was diagnosed in 2020. She takes gabapentin 300mg, 1 pill in the morning and 2 pills at night. No concerns with medication. Issues with sleep, could be falling asleep or staying asleep.     Have you received a mental health diagnosis? Yes   Which one (s): Severe depression, anxiety, and personality disorder   Is there any history of developmental delay?  No   Are you currently seeing a mental health provider?  No            Who / month last seen:  NA  Do you have mental health records elsewhere?  Yes - Treatment at [facility]  Will you sign a release so we can obtain them?  Yes    Have you ever been hospitalized for psychiatric reasons?  No  Describe:  But went to ER several times. Never admitted.     Do you have current thoughts of self-harm?  No    Do you currently have thoughts of harming others?  No    Do you have any safety concerns? No   If yes to these, offer to reach out to a  for follow up.      Substance Use History     Do you have any history of alcohol / illicit drug use?  Yes Describe:  Treatment in  2020 for 6 months - for opioid abuse. Pt has been sober since 2020.   Have you ever received treatment for this?  Yes    Describe:  [facility]     Social History     Who is the patient's a guardian?  No    Name / number: NA  Have you had an ACT team in last 12 months?  No  Describe: NA   OK to leave a detailed voicemail?  Yes    Would you be interested in learning more about research opportunities for which you or your child may qualify? We can connect you with a team member for more information.  Yes  If yes, send an TNC message to Edwige Timmons    Medical/ Surgical History                                   Patient Active Problem List    Diagnosis    Cellulitis of hand    Pancolitis (H)          Medications             Have you taken >3 psychiatric medications in your past?  No  Do you currently take 5 or more medications, including prescriptions, supplements, and other over the counter products?  Yes. Vitamin D and benedryl    If YES to at least one of these questions:   As part of your evaluation in our clinic, we have specially trained pharmacists as part of your care team. Your provider would like for you to meet with one of our pharmacists to review your current and past medications, ensure your med list is up to date, and queue up any questions or concerns you have about medications. They will review all of your medications, not just for mental health, to help ensure you know what you re taking and that everything is working together.     Please schedule patient in [facility] PSY[facility]RY (Tania Hunter or Nadege Goins) for 60m MTM in any green space as virtual (video), telephone, or in person (designated in person days per Epic templates).  -Appt notes can say  Psych eval on xx/xx   -Route telephone encounter to the pharmacist who will be seeing the patient.  If patient has questions about insurance coverage or billing, please still schedule the visit and refer them to call the [facility] coordinators at [phone].    Current Outpatient Medications   Medication Sig Dispense Refill    albuterol (ACCUNEB) 1.25 MG/3ML neb solution Inhale 1.25 mg into the lungs      BANOPHEN 25 MG capsule  (Patient not taking: Reported on 10/3/2023)      benzocaine (ANBESOL) 10 % gel Take by mouth 4 times daily as needed for moderate pain (apply to ulcer in mouth as needed) (Patient not taking: Reported on 10/3/2023) 9 g 1    bisacodyl (DULCOLAX) 5 MG EC tablet 2 days prior to procedure, take 2 tablets at 4 pm. 1 day prior to procedure, take 2 tablets at 4 pm. For additional instructions refer to your colonoscopy prep instructions. (Patient not taking: Reported on  10/3/2023) 4 tablet 0    bisacodyl (DULCOLAX) 5 MG EC tablet Take 2 tablets by mouth at 10am the day before procedure. (Patient not taking: Reported on 10/3/2023) 2 tablet 0    Cholecalciferol (VITAMIN D3) 50 MCG (2000 UT) TABS Take 1 tablet by mouth daily (Patient not taking: Reported on 10/3/2023)      FLOVENT DISKUS 250 MCG/BLIST inhaler INHALE 1 PUFF INTO THE LUNGS TWICE DAILY (Patient not taking: Reported on 10/3/2023)      folic acid 800 MCG tablet Take 800 mcg by mouth daily (Patient not taking: Reported on 10/3/2023)      gabapentin (NEURONTIN) 300 MG capsule Take 300 mg by mouth 4 times daily       loratadine (CLARITIN) 10 MG tablet Take 10 mg by mouth daily (Patient not taking: Reported on 10/3/2023)      magnesium citrate solution Drink entire bottle at 4pm two days prior to procedure. (Patient not taking: Reported on 10/3/2023) 296 mL 0    melatonin 5 MG tablet Take 5 mg by mouth daily (Patient not taking: Reported on 10/3/2023)      METHADONE HCL PO Take 48 mg by mouth daily (Patient not taking: Reported on 10/3/2023)      methylcellulose (CITRUCEL) powder Take 0.85 g (3 teaspoonful) by mouth daily (Patient not taking: Reported on 10/3/2023) 90 g 3    minoxidil (ROGAINE) 2 % external solution  (Patient not taking: Reported on 10/3/2023)      Multiple Vitamin (DAILY-JUSTIN MULTIVITAMIN) TABS Take 1 tablet by mouth daily (Patient not taking: Reported on 10/3/2023)      omeprazole (PRILOSEC) 20 MG DR capsule Take 1 capsule (20 mg) by mouth daily (Patient not taking: Reported on 10/3/2023) 30 capsule 1    polyethylene glycol (GOLYTELY) 236 g suspension 2 days prior at 5pm, mix and drink half of a jug of Golytely. Drink an 8 oz. glass of Golytely every 15 minutes until half of the jug is gone. Place remainder of Golytely in the refrigerator. 1 day prior at 5 pm, drink the 2nd half of a jug of Golytely bowel prep. 6 hours before your check-in time, drink an 8 oz. glass of Golytely every 15 minutes until half of  the 2nd jug of Golytely is gone. Discard remainder of second jug. (Patient not taking: Reported on 10/3/2023) 8000 mL 0    polyethylene glycol (GOLYTELY) 236 g suspension Take as directed. One day before your exam fill the first container with water. Cover and shake until mixed well. At 3:00pm drink one 8oz glass every 10-15 minutes until half of the first container is empty. Store the remainder in the refrigerator. At 8:00pm drink the second half of the first container until it is gone. Before you go to bed mix the second container with water and put in refrigerator. Six hours before your check in time drink one 8oz glass every 10-15 minutes until half of container is empty. Discard the remainder of solution. (Patient not taking: Reported on 10/3/2023) 8000 mL 0    polyethylene glycol (MIRALAX) 17 GM/Dose powder Take 1 capful daily to have 1-2 soft BMs per day. Can increase up to 3 capfuls per day if needed to get desired result (Patient not taking: Reported on 10/3/2023) 507 g 3    predniSONE (DELTASONE) 20 MG tablet Take two tablets (= 40mg) each day for 4 (four) days (Patient not taking: Reported on 10/3/2023) 8 tablet 0    Thiamine Mononitrate (B1) 100 MG TABS Take 1 tablet by mouth daily (Patient not taking: Reported on 10/3/2023)      VENTOLIN  (90 Base) MCG/ACT inhaler INHALE 2 PUFFS INTO THE LUNGS EVERY 4 HOURS AS NEEDED FOR SHORTNESS OF BREATH OR WHEEZING (Patient not taking: Reported on 10/3/2023)      WAL-DRYL ALLERGY 25 MG tablet TAKE 2 TABLETS BY MOUTH EVERY NIGHT AT BEDTIME AS NEEDED SLEEPLESSNESS (Patient not taking: Reported on 10/3/2023)      WAL-MUCIL 58.6 % powder TAKE 2.5 GRAMS TWICE DAILY (Patient not taking: Reported on 10/3/2023)           DISPOSITION      11/6/23 Intake complete. Scheduled for CAT Eval on 12/29/23 at 1:10pm w/ Kimberley Ma.     Dolores Hernandez, Lead Complex

## 2023-11-06 NOTE — TELEPHONE ENCOUNTER
[facility] Call Center    Phone Message    May a detailed message be left on voicemail: yes     Reason for Call: Other: Received 18 pages of medical records and JARED from [facility] via [employer] on 11/6/23 ar 9:35am. Sent to HIM for scanning.      Action Taken: Other: No further action needed    Travel Screening: Not Applicable

## 2023-11-08 ENCOUNTER — MEDICAL CORRESPONDENCE (OUTPATIENT)
Dept: HEALTH INFORMATION MANAGEMENT | Facility: CLINIC | Age: 53
End: 2023-11-08
Payer: COMMERCIAL

## 2023-11-09 ENCOUNTER — ANCILLARY PROCEDURE (OUTPATIENT)
Dept: GENERAL RADIOLOGY | Facility: CLINIC | Age: 53
End: 2023-11-09
Attending: NURSE PRACTITIONER
Payer: COMMERCIAL

## 2023-11-09 DIAGNOSIS — M79.672 LEFT FOOT PAIN: ICD-10-CM

## 2023-11-09 PROCEDURE — 73620 X-RAY EXAM OF FOOT: CPT | Mod: 52 | Performed by: RADIOLOGY

## 2023-11-10 ENCOUNTER — TRANSCRIBE ORDERS (OUTPATIENT)
Dept: OTHER | Age: 53
End: 2023-11-10

## 2023-11-10 ENCOUNTER — TELEPHONE (OUTPATIENT)
Dept: PSYCHIATRY | Facility: CLINIC | Age: 53
End: 2023-11-10
Payer: COMMERCIAL

## 2023-11-10 DIAGNOSIS — M79.672 LEFT FOOT PAIN: Primary | ICD-10-CM

## 2023-11-22 ENCOUNTER — INFUSION THERAPY VISIT (OUTPATIENT)
Dept: INFUSION THERAPY | Facility: CLINIC | Age: 53
End: 2023-11-22
Attending: INTERNAL MEDICINE
Payer: COMMERCIAL

## 2023-11-22 VITALS
TEMPERATURE: 97.9 F | BODY MASS INDEX: 29.09 KG/M2 | SYSTOLIC BLOOD PRESSURE: 128 MMHG | RESPIRATION RATE: 16 BRPM | HEART RATE: 80 BPM | DIASTOLIC BLOOD PRESSURE: 73 MMHG | OXYGEN SATURATION: 97 % | WEIGHT: 164.2 LBS

## 2023-11-22 DIAGNOSIS — K51.00 PANCOLITIS (H): Primary | ICD-10-CM

## 2023-11-22 PROCEDURE — 258N000003 HC RX IP 258 OP 636: Performed by: INTERNAL MEDICINE

## 2023-11-22 PROCEDURE — 250N000011 HC RX IP 250 OP 636: Mod: JZ | Performed by: INTERNAL MEDICINE

## 2023-11-22 PROCEDURE — 96413 CHEMO IV INFUSION 1 HR: CPT

## 2023-11-22 RX ORDER — ALBUTEROL SULFATE 90 UG/1
1-2 AEROSOL, METERED RESPIRATORY (INHALATION)
Status: CANCELLED
Start: 2023-11-23

## 2023-11-22 RX ORDER — EPINEPHRINE 1 MG/ML
0.3 INJECTION, SOLUTION INTRAMUSCULAR; SUBCUTANEOUS EVERY 5 MIN PRN
Status: CANCELLED | OUTPATIENT
Start: 2023-11-23

## 2023-11-22 RX ORDER — METHYLPREDNISOLONE SODIUM SUCCINATE 125 MG/2ML
125 INJECTION, POWDER, LYOPHILIZED, FOR SOLUTION INTRAMUSCULAR; INTRAVENOUS
Status: CANCELLED
Start: 2023-11-23

## 2023-11-22 RX ORDER — DIPHENHYDRAMINE HYDROCHLORIDE 50 MG/ML
50 INJECTION INTRAMUSCULAR; INTRAVENOUS
Status: CANCELLED
Start: 2023-11-23

## 2023-11-22 RX ORDER — DIPHENHYDRAMINE HCL 25 MG
25 CAPSULE ORAL ONCE
Status: CANCELLED
Start: 2023-11-23 | End: 2023-11-23

## 2023-11-22 RX ORDER — ALBUTEROL SULFATE 0.83 MG/ML
2.5 SOLUTION RESPIRATORY (INHALATION)
Status: CANCELLED | OUTPATIENT
Start: 2023-11-23

## 2023-11-22 RX ORDER — MEPERIDINE HYDROCHLORIDE 25 MG/ML
25 INJECTION INTRAMUSCULAR; INTRAVENOUS; SUBCUTANEOUS EVERY 30 MIN PRN
Status: CANCELLED | OUTPATIENT
Start: 2023-11-23

## 2023-11-22 RX ORDER — ACETAMINOPHEN 325 MG/1
650 TABLET ORAL ONCE
Status: CANCELLED
Start: 2023-11-23 | End: 2023-11-23

## 2023-11-22 RX ADMIN — SODIUM CHLORIDE 600 MG: 9 INJECTION, SOLUTION INTRAVENOUS at 14:16

## 2023-11-22 NOTE — PROGRESS NOTES
Infusion Nursing Note:  Shelly Cha presents today for   Chief Complaint   Patient presents with    Infusion     Infliximab   Patient seen by provider today: No   present during visit today: Not Applicable.    Note:  Frequency: Every 42 days  Labs: None ordered for this appointment.  Premedications/PRNs: Historically patient has not taken pre-medications prior to infusion.  Infusion Rate/Length: Infliximab infused over 60 minutes    Intravenous Access:  Peripheral IV placed by Vascular Access Team    Treatment Conditions:  Biological Infusion Checklist:  ~~~ NOTE: If the patient answers yes to any of the questions below, hold the infusion and contact ordering provider or on-call provider.    Have you recently had an elevated temperature, fever, chills, productive cough, coughing for 3 weeks or longer or hemoptysis,  abnormal vital signs, night sweats,  chest pain or have you noticed a decrease in your appetite, unexplained weight loss or fatigue? No  Do you have any open wounds or new incisions? No  Do you have any upcoming hospitalizations or surgeries? Does not include esophagogastroduodenoscopy, colonoscopy, endoscopic retrograde cholangiopancreatography (ERCP), endoscopic ultrasound (EUS), dental procedures or joint aspiration/steroid injections No  Do you currently have any signs of illness or infection or are you on any antibiotics? No  Have you had any new, sudden or worsening abdominal pain? No  Have you or anyone in your household received a live vaccination in the past 4 weeks? Please note: No live vaccines while on biologic/chemotherapy until 6 months after the last treatment. Patient can receive the flu vaccine (shot only), pneumovax and the Covid vaccine. It is optimal for the patient to get these vaccines mid cycle, but they can be given at any time as long as it is not on the day of the infusion. No  Have you recently been diagnosed with any new nervous system diseases (ie. Multiple  sclerosis, Guillain [place], seizures, neurological changes) or cancer diagnosis? Are you on any form of radiation or chemotherapy? No  Are you pregnant or breast feeding or do you have plans of pregnancy in the future? No  Have you been having any signs of worsening depression or suicidal ideations?  (benlysta only) N/A  Have there been any other new onset medical symptoms? No  Have you had any new blood clots? (IVIG only) N/A  /73   Pulse 80   Temp 97.9  F (36.6  C) (Oral)   Resp 16   Wt 74.5 kg (164 lb 3.2 oz)   SpO2 97%   BMI 29.09 kg/m      Post Infusion Assessment:  Patient tolerated infusion without incident.  Blood return noted pre and post infusion.  Site patent and intact, free from redness, edema or discomfort.  No evidence of extravasations.  Access discontinued per protocol.     POST-INFUSION OF BIOLOGICAL MEDICATION:  Reviewed with patient. Given biologic medication or medication hand-out. Informed patient if any fever, chills or signs of infection, new symptoms, abdominal pain, heart palpitations, shortness of breath, reaction, weakness, neurological changes, to seek medical attention immediately and that patient should not receive infusions. No live virus vaccines prior to or during treatment or up to 6 months post infusion. If the patient has an upcoming procedure or surgery, this should be discussed with the rheumatologist and surgeon or provider. Patient verbalized understanding.    Discharge Plan:   Discharge instructions reviewed with: Patient.  Patient and/or family verbalized understanding of discharge instructions and all questions answered.  AVS to patient via [email or web address]. Message sent to scheduling to contact patient to set up future appointments.  Patient discharged in stable condition accompanied by: self.  Departure Mode: Ambulatory.    Administrations This Visit       inFLIXimab-dyyb (INFLECTRA) 600 mg in sodium chloride 0.9 % 275 mL infusion       Admin Date  11/22/2023  Action  $New Bag Dose  600 mg Rate  275 mL/hr Route  Intravenous Documented By  Ricarda Graf, RN                Medication Waste Record:  Not Applicable      Ricarda Graf, RN

## 2023-11-22 NOTE — PATIENT INSTRUCTIONS
Dear Shelly Cha    Thank you for choosing [facility] ([facility]) for your infusion.  The following information is a summary of our appointment as well as important reminders.      We look forward in seeing you on your next appointment here at [facility] ([facility]).  Please don t hesitate to call us at [phone] to reschedule any of your appointments or to speak with one of the [facility] registered nurses.  It was a pleasure taking care of you today.    Sincerely,    [facility]  [facility]  [address] [place] [address]  [address]  Phone:  [phone]     EDUCATION POST BIOLOGICAL/CHEMOTHERAPY INFUSION  Call the triage nurse at your clinic or seek medical attention if you have chills and/or temperature greater than or equal to 100.5, uncontrolled nausea/vomiting, diarrhea, constipation, dizziness, shortness of breath, chest pain, heart palpitations, weakness or any other new or concerning symptoms, questions or concerns.  You can not have any live virus vaccines prior to or during treatment or up to 6 months post infusion.  If you have an upcoming surgery, medical procedure or dental procedure during treatment, this should be discussed with your ordering physician and your surgeon/dentist.  If you are having any concerning symptom, if you are unsure if you should get your next infusion or wish to speak to a provider before your next infusion, please call your care coordinator or triage nurse at your clinic to notify them so we can adequately serve you.

## 2023-11-23 NOTE — TELEPHONE ENCOUNTER
DIAGNOSIS: Bilateral Grade 2-3 Bunions  Left foot pain [M79.672]  - Primary   APPOINTMENT DATE: 12/18/2023   NOTES STATUS DETAILS   OFFICE NOTE from referring provider Care Everywhere 11/08/2023 - Adam Mcgill CNP - [facility]   MEDICATION LIST Care Everywhere  Internal    XRAYS (IMAGES & REPORTS) PACS Internal:  11/09/2023, 07/02/2008 - Left Foot

## 2023-12-18 ENCOUNTER — PRE VISIT (OUTPATIENT)
Dept: ORTHOPEDICS | Facility: CLINIC | Age: 53
End: 2023-12-18

## 2023-12-26 ENCOUNTER — TELEPHONE (OUTPATIENT)
Dept: GASTROENTEROLOGY | Facility: CLINIC | Age: 53
End: 2023-12-26
Payer: COMMERCIAL

## 2023-12-26 NOTE — TELEPHONE ENCOUNTER
Patient called requesting information about when she started Infliximab infusions. Dates provided appropriately.

## 2024-01-10 ENCOUNTER — INFUSION THERAPY VISIT (OUTPATIENT)
Dept: INFUSION THERAPY | Facility: CLINIC | Age: 54
End: 2024-01-10
Attending: INTERNAL MEDICINE
Payer: COMMERCIAL

## 2024-01-10 VITALS
HEART RATE: 89 BPM | TEMPERATURE: 97.5 F | RESPIRATION RATE: 16 BRPM | WEIGHT: 165.1 LBS | DIASTOLIC BLOOD PRESSURE: 91 MMHG | OXYGEN SATURATION: 99 % | BODY MASS INDEX: 29.25 KG/M2 | SYSTOLIC BLOOD PRESSURE: 140 MMHG

## 2024-01-10 DIAGNOSIS — K51.00 PANCOLITIS (H): Primary | ICD-10-CM

## 2024-01-10 LAB
ALBUMIN SERPL BCG-MCNC: 4.5 G/DL (ref 3.5–5.2)
ALP SERPL-CCNC: 72 U/L (ref 40–150)
ALT SERPL W P-5'-P-CCNC: 13 U/L (ref 0–50)
AST SERPL W P-5'-P-CCNC: 27 U/L (ref 0–45)
BASOPHILS # BLD AUTO: 0.1 10E3/UL (ref 0–0.2)
BASOPHILS NFR BLD AUTO: 1 %
BILIRUB DIRECT SERPL-MCNC: <0.2 MG/DL (ref 0–0.3)
BILIRUB SERPL-MCNC: 0.4 MG/DL
CRP SERPL-MCNC: <3 MG/L
EOSINOPHIL # BLD AUTO: 0.4 10E3/UL (ref 0–0.7)
EOSINOPHIL NFR BLD AUTO: 4 %
ERYTHROCYTE [DISTWIDTH] IN BLOOD BY AUTOMATED COUNT: 14.1 % (ref 10–15)
ERYTHROCYTE [SEDIMENTATION RATE] IN BLOOD BY WESTERGREN METHOD: 13 MM/HR (ref 0–30)
HCT VFR BLD AUTO: 40.5 % (ref 35–47)
HGB BLD-MCNC: 14 G/DL (ref 11.7–15.7)
IMM GRANULOCYTES # BLD: 0 10E3/UL
IMM GRANULOCYTES NFR BLD: 0 %
LYMPHOCYTES # BLD AUTO: 2.9 10E3/UL (ref 0.8–5.3)
LYMPHOCYTES NFR BLD AUTO: 30 %
MCH RBC QN AUTO: 29.7 PG (ref 26.5–33)
MCHC RBC AUTO-ENTMCNC: 34.6 G/DL (ref 31.5–36.5)
MCV RBC AUTO: 86 FL (ref 78–100)
MONOCYTES # BLD AUTO: 0.8 10E3/UL (ref 0–1.3)
MONOCYTES NFR BLD AUTO: 8 %
NEUTROPHILS # BLD AUTO: 5.7 10E3/UL (ref 1.6–8.3)
NEUTROPHILS NFR BLD AUTO: 57 %
NRBC # BLD AUTO: 0 10E3/UL
NRBC BLD AUTO-RTO: 0 /100
PLATELET # BLD AUTO: 337 10E3/UL (ref 150–450)
PROT SERPL-MCNC: 7.7 G/DL (ref 6.4–8.3)
RBC # BLD AUTO: 4.71 10E6/UL (ref 3.8–5.2)
WBC # BLD AUTO: 9.8 10E3/UL (ref 4–11)

## 2024-01-10 PROCEDURE — 85652 RBC SED RATE AUTOMATED: CPT | Performed by: INTERNAL MEDICINE

## 2024-01-10 PROCEDURE — 80076 HEPATIC FUNCTION PANEL: CPT | Performed by: INTERNAL MEDICINE

## 2024-01-10 PROCEDURE — 36415 COLL VENOUS BLD VENIPUNCTURE: CPT | Performed by: INTERNAL MEDICINE

## 2024-01-10 PROCEDURE — 85004 AUTOMATED DIFF WBC COUNT: CPT | Performed by: INTERNAL MEDICINE

## 2024-01-10 PROCEDURE — 250N000011 HC RX IP 250 OP 636: Mod: JZ | Performed by: INTERNAL MEDICINE

## 2024-01-10 PROCEDURE — 258N000003 HC RX IP 258 OP 636: Performed by: INTERNAL MEDICINE

## 2024-01-10 PROCEDURE — 96413 CHEMO IV INFUSION 1 HR: CPT

## 2024-01-10 PROCEDURE — 86140 C-REACTIVE PROTEIN: CPT | Performed by: INTERNAL MEDICINE

## 2024-01-10 RX ORDER — DIPHENHYDRAMINE HCL 25 MG
25 CAPSULE ORAL ONCE
Status: CANCELLED
Start: 2024-02-15 | End: 2024-02-15

## 2024-01-10 RX ORDER — ALBUTEROL SULFATE 0.83 MG/ML
2.5 SOLUTION RESPIRATORY (INHALATION)
Status: CANCELLED | OUTPATIENT
Start: 2024-02-15

## 2024-01-10 RX ORDER — EPINEPHRINE 1 MG/ML
0.3 INJECTION, SOLUTION INTRAMUSCULAR; SUBCUTANEOUS EVERY 5 MIN PRN
Status: CANCELLED | OUTPATIENT
Start: 2024-02-15

## 2024-01-10 RX ORDER — DIPHENHYDRAMINE HYDROCHLORIDE 50 MG/ML
50 INJECTION INTRAMUSCULAR; INTRAVENOUS
Status: CANCELLED
Start: 2024-02-15

## 2024-01-10 RX ORDER — METHYLPREDNISOLONE SODIUM SUCCINATE 125 MG/2ML
125 INJECTION, POWDER, LYOPHILIZED, FOR SOLUTION INTRAMUSCULAR; INTRAVENOUS
Status: CANCELLED
Start: 2024-02-15

## 2024-01-10 RX ORDER — ACETAMINOPHEN 325 MG/1
650 TABLET ORAL ONCE
Status: CANCELLED
Start: 2024-02-15 | End: 2024-02-15

## 2024-01-10 RX ORDER — MEPERIDINE HYDROCHLORIDE 25 MG/ML
25 INJECTION INTRAMUSCULAR; INTRAVENOUS; SUBCUTANEOUS EVERY 30 MIN PRN
Status: CANCELLED | OUTPATIENT
Start: 2024-02-15

## 2024-01-10 RX ORDER — ALBUTEROL SULFATE 90 UG/1
1-2 AEROSOL, METERED RESPIRATORY (INHALATION)
Status: CANCELLED
Start: 2024-02-15

## 2024-01-10 RX ADMIN — SODIUM CHLORIDE 600 MG: 9 INJECTION, SOLUTION INTRAVENOUS at 16:13

## 2024-01-10 NOTE — PROGRESS NOTES
Nursing Note  Shelly Cha [facility] for:   Chief Complaint   Patient presents with    Infusion     Infliximab     During today's [facility] appointment, orders from Dr. Nolasco were completed.  Frequency: every 6 weeks    Progress note:  Patient identification verified by name and date of birth.  Assessment completed.  Vitals recorded in Doc Flowsheets.  Patient was provided with education regarding medication/procedure and possible side effects.  Patient verbalized understanding.     present during visit today: Not Applicable.    Treatment Conditions: ~~~ NOTE: If the patient answers yes to any of the questions below, hold the infusion and contact ordering provider or on-call provider.    Have you recently had an elevated temperature, fever, chills, productive cough, coughing for 3 weeks or longer or hemoptysis,  abnormal vital signs, night sweats,  chest pain or have you noticed a decrease in your appetite, unexplained weight loss or fatigue? No  Do you have any open wounds or new incisions? No  Do you have any upcoming hospitalizations or surgeries? Does not include esophagogastroduodenoscopy, colonoscopy, endoscopic retrograde cholangiopancreatography (ERCP), endoscopic ultrasound (EUS), dental procedures or joint aspiration/steroid injections No  Do you currently have any signs of illness or infection or are you on any antibiotics? No  Have you had any new, sudden or worsening abdominal pain? No  Have you or anyone in your household received a live vaccination in the past 4 weeks? Please note: No live vaccines while on biologic/chemotherapy until 6 months after the last treatment. Patient can receive the flu vaccine (shot only), pneumovax and the Covid vaccine. It is optimal for the patient to get these vaccines mid cycle, but they can be given at any time as long as it is not on the day of the infusion. No  Have you  recently been diagnosed with any new nervous system diseases (ie. Multiple sclerosis, Guillain [place], seizures, neurological changes) or cancer diagnosis? Are you on any form of radiation or chemotherapy? No  Are you pregnant or breast feeding or do you have plans of pregnancy in the future? No  Have there been any other new onset medical symptoms? No    Premedications: were not ordered.    Drug Waste Record: No    Infusion length and rate:  infusion given over approximately  60 minutes    Labs: were drawn per orders.     Vascular access: peripheral IV was placed by vascular access nurse.    Is the next appt scheduled? No, scheduling team messaged    Post Infusion Assessment:  Patient tolerated infusion without incident.     Discharge Plan:   Follow up plan of care with: ongoing infusions at [facility]. and ordering provider as scheduled.  Discharge instructions were reviewed with patient.  Patient/representative verbalized understanding of discharge instructions and all questions answered.  Patient discharged from [facility] in stable condition.    Zaynab Manzo RN    Administrations This Visit       inFLIXimab-dyyb (INFLECTRA) 600 mg in sodium chloride 0.9 % 275 mL infusion       Admin Date  01/10/2024 Action  $New Bag Dose  600 mg Rate  275 mL/hr Route  Intravenous Documented By  Zaynab Manzo, RN                    BP (!) 140/91 (BP Location: Left arm, Patient Position: Sitting, Cuff Size: Adult Regular)   Pulse 89   Temp 97.5  F (36.4  C) (Oral)   Resp 16   Wt 74.9 kg (165 lb 1.6 oz)   SpO2 99%   BMI 29.25 kg/m

## 2024-02-21 ENCOUNTER — INFUSION THERAPY VISIT (OUTPATIENT)
Dept: INFUSION THERAPY | Facility: CLINIC | Age: 54
End: 2024-02-21
Attending: INTERNAL MEDICINE
Payer: COMMERCIAL

## 2024-02-21 VITALS
RESPIRATION RATE: 18 BRPM | SYSTOLIC BLOOD PRESSURE: 121 MMHG | BODY MASS INDEX: 29.6 KG/M2 | WEIGHT: 167.1 LBS | OXYGEN SATURATION: 99 % | HEART RATE: 69 BPM | DIASTOLIC BLOOD PRESSURE: 70 MMHG | TEMPERATURE: 97.8 F

## 2024-02-21 DIAGNOSIS — K51.00 PANCOLITIS (H): Primary | ICD-10-CM

## 2024-02-21 PROCEDURE — 96413 CHEMO IV INFUSION 1 HR: CPT

## 2024-02-21 PROCEDURE — 250N000011 HC RX IP 250 OP 636: Mod: JZ | Performed by: INTERNAL MEDICINE

## 2024-02-21 PROCEDURE — 258N000003 HC RX IP 258 OP 636: Performed by: INTERNAL MEDICINE

## 2024-02-21 RX ORDER — ALBUTEROL SULFATE 90 UG/1
1-2 AEROSOL, METERED RESPIRATORY (INHALATION)
Status: CANCELLED
Start: 2024-04-03

## 2024-02-21 RX ORDER — MEPERIDINE HYDROCHLORIDE 25 MG/ML
25 INJECTION INTRAMUSCULAR; INTRAVENOUS; SUBCUTANEOUS EVERY 30 MIN PRN
Status: CANCELLED | OUTPATIENT
Start: 2024-04-03

## 2024-02-21 RX ORDER — DIPHENHYDRAMINE HYDROCHLORIDE 50 MG/ML
50 INJECTION INTRAMUSCULAR; INTRAVENOUS
Status: CANCELLED
Start: 2024-04-03

## 2024-02-21 RX ORDER — METHYLPREDNISOLONE SODIUM SUCCINATE 125 MG/2ML
125 INJECTION, POWDER, LYOPHILIZED, FOR SOLUTION INTRAMUSCULAR; INTRAVENOUS
Status: CANCELLED
Start: 2024-04-03

## 2024-02-21 RX ORDER — ACETAMINOPHEN 325 MG/1
650 TABLET ORAL ONCE
Status: CANCELLED
Start: 2024-04-03 | End: 2024-04-03

## 2024-02-21 RX ORDER — DIPHENHYDRAMINE HCL 25 MG
25 CAPSULE ORAL ONCE
Status: CANCELLED
Start: 2024-04-03 | End: 2024-04-03

## 2024-02-21 RX ORDER — EPINEPHRINE 1 MG/ML
0.3 INJECTION, SOLUTION INTRAMUSCULAR; SUBCUTANEOUS EVERY 5 MIN PRN
Status: CANCELLED | OUTPATIENT
Start: 2024-04-03

## 2024-02-21 RX ORDER — ALBUTEROL SULFATE 0.83 MG/ML
2.5 SOLUTION RESPIRATORY (INHALATION)
Status: CANCELLED | OUTPATIENT
Start: 2024-04-03

## 2024-02-21 RX ADMIN — SODIUM CHLORIDE 600 MG: 9 INJECTION, SOLUTION INTRAVENOUS at 15:55

## 2024-02-21 NOTE — PROGRESS NOTES
Infusion Nursing Note:  Shelly Cha presents today for Infliximab.    Patient seen by provider today: No   present during visit today: Not Applicable.    Note: Infliximab infused over 1 hour. Per pt statement, does not take pre meds. No labs ordered to be drawn.       Intravenous Access:  Peripheral IV placed by vascular access.    Treatment Conditions:  Biological Infusion Checklist:  ~~~ NOTE: If the patient answers yes to any of the questions below, hold the infusion and contact ordering provider or on-call provider.    Have you recently had an elevated temperature, fever, chills, productive cough, coughing for 3 weeks or longer or hemoptysis,  abnormal vital signs, night sweats,  chest pain or have you noticed a decrease in your appetite, unexplained weight loss or fatigue? No  Do you have any open wounds or new incisions? No  Do you have any upcoming hospitalizations or surgeries? Does not include esophagogastroduodenoscopy, colonoscopy, endoscopic retrograde cholangiopancreatography (ERCP), endoscopic ultrasound (EUS), dental procedures or joint aspiration/steroid injections No  Do you currently have any signs of illness or infection or are you on any antibiotics? No  Have you had any new, sudden or worsening abdominal pain? No  Have you or anyone in your household received a live vaccination in the past 4 weeks? Please note: No live vaccines while on biologic/chemotherapy until 6 months after the last treatment. Patient can receive the flu vaccine (shot only), pneumovax and the Covid vaccine. It is optimal for the patient to get these vaccines mid cycle, but they can be given at any time as long as it is not on the day of the infusion. No  Have you recently been diagnosed with any new nervous system diseases (ie. Multiple sclerosis, Guillain [place], seizures, neurological changes) or cancer diagnosis? Are you on any form of radiation or chemotherapy? No  Are you pregnant or breast feeding or do you  have plans of pregnancy in the future? No  Have you been having any signs of worsening depression or suicidal ideations?  (benlysta only) No  Have there been any other new onset medical symptoms? No  Have you had any new blood clots? (IVIG only) No      Discharge Plan:   Discharge instructions reviewed with: Patient.  Patient and/or family verbalized understanding of discharge instructions and all questions answered.  AVS to patient via [email or web address].  Patient will return 4/3/24 for next appointment.   Patient discharged in stable condition accompanied by: self.  Departure Mode: Ambulatory.    Administrations This Visit       inFLIXimab-dyyb (INFLECTRA) 600 mg in sodium chloride 0.9 % 275 mL infusion       Admin Date  02/21/2024 Action  $New Bag Dose  600 mg Rate  275 mL/hr Route  Intravenous Documented By  Pauline Chu RN                    /82   Pulse 85   Temp 97.8  F (36.6  C)   Resp 18   Wt 75.8 kg (167 lb 1.6 oz)   SpO2 99%   BMI 29.60 kg/m      Care handed off to late nurse at 1615.     Pauline Chu RN

## 2024-02-21 NOTE — PATIENT INSTRUCTIONS
Dear Shelly Cha    Thank you for choosing [facility] ([facility]) for your infusion.  The following information is a summary of our appointment as well as important reminders.      We look forward in seeing you on your next appointment here at [facility] ([facility]).  Please don t hesitate to call us at [phone] to reschedule any of your appointments or to speak with one of the [facility] registered nurses.  It was a pleasure taking care of you today.    Sincerely,    [facility]  [facility]  [address] [place] [address]  [address]  Phone:  [phone]

## 2024-02-22 ENCOUNTER — TELEPHONE (OUTPATIENT)
Dept: DERMATOLOGY | Facility: CLINIC | Age: 54
End: 2024-02-22
Payer: COMMERCIAL

## 2024-02-26 ENCOUNTER — TELEPHONE (OUTPATIENT)
Dept: DERMATOLOGY | Facility: CLINIC | Age: 54
End: 2024-02-26
Payer: COMMERCIAL

## 2024-02-26 NOTE — TELEPHONE ENCOUNTER
Unable to lvm 2nd attempts, sent a letter informing pt that the following has been cancelled     Appointment type: New  Provider: Dr. Bush  Return date: 5/16/24  Specialty phone number: [phone]

## 2024-03-17 ENCOUNTER — HEALTH MAINTENANCE LETTER (OUTPATIENT)
Age: 54
End: 2024-03-17

## 2024-04-03 ENCOUNTER — INFUSION THERAPY VISIT (OUTPATIENT)
Dept: INFUSION THERAPY | Facility: CLINIC | Age: 54
End: 2024-04-03
Attending: INTERNAL MEDICINE
Payer: COMMERCIAL

## 2024-04-03 ENCOUNTER — PATIENT OUTREACH (OUTPATIENT)
Dept: GASTROENTEROLOGY | Facility: CLINIC | Age: 54
End: 2024-04-03

## 2024-04-03 VITALS
SYSTOLIC BLOOD PRESSURE: 112 MMHG | OXYGEN SATURATION: 99 % | TEMPERATURE: 98.4 F | WEIGHT: 168.3 LBS | RESPIRATION RATE: 18 BRPM | BODY MASS INDEX: 29.81 KG/M2 | DIASTOLIC BLOOD PRESSURE: 74 MMHG | HEART RATE: 70 BPM

## 2024-04-03 DIAGNOSIS — K51.00 PANCOLITIS (H): Primary | ICD-10-CM

## 2024-04-03 DIAGNOSIS — K50.10 CROHN'S COLITIS, WITHOUT COMPLICATIONS (H): ICD-10-CM

## 2024-04-03 LAB
ALBUMIN SERPL BCG-MCNC: 4.1 G/DL (ref 3.5–5.2)
ALP SERPL-CCNC: 73 U/L (ref 40–150)
ALT SERPL W P-5'-P-CCNC: 10 U/L (ref 0–50)
AST SERPL W P-5'-P-CCNC: 23 U/L (ref 0–45)
BASOPHILS # BLD AUTO: 0 10E3/UL (ref 0–0.2)
BASOPHILS NFR BLD AUTO: 1 %
BILIRUB DIRECT SERPL-MCNC: <0.2 MG/DL (ref 0–0.3)
BILIRUB SERPL-MCNC: 0.3 MG/DL
CRP SERPL-MCNC: <3 MG/L
EOSINOPHIL # BLD AUTO: 0.3 10E3/UL (ref 0–0.7)
EOSINOPHIL NFR BLD AUTO: 3 %
ERYTHROCYTE [DISTWIDTH] IN BLOOD BY AUTOMATED COUNT: 13.8 % (ref 10–15)
ERYTHROCYTE [SEDIMENTATION RATE] IN BLOOD BY WESTERGREN METHOD: 16 MM/HR (ref 0–30)
HCT VFR BLD AUTO: 35.8 % (ref 35–47)
HGB BLD-MCNC: 12.4 G/DL (ref 11.7–15.7)
IMM GRANULOCYTES # BLD: 0 10E3/UL
IMM GRANULOCYTES NFR BLD: 0 %
LYMPHOCYTES # BLD AUTO: 2.2 10E3/UL (ref 0.8–5.3)
LYMPHOCYTES NFR BLD AUTO: 26 %
MCH RBC QN AUTO: 30.5 PG (ref 26.5–33)
MCHC RBC AUTO-ENTMCNC: 34.6 G/DL (ref 31.5–36.5)
MCV RBC AUTO: 88 FL (ref 78–100)
MONOCYTES # BLD AUTO: 0.9 10E3/UL (ref 0–1.3)
MONOCYTES NFR BLD AUTO: 10 %
NEUTROPHILS # BLD AUTO: 5.3 10E3/UL (ref 1.6–8.3)
NEUTROPHILS NFR BLD AUTO: 60 %
NRBC # BLD AUTO: 0 10E3/UL
NRBC BLD AUTO-RTO: 0 /100
PLATELET # BLD AUTO: 325 10E3/UL (ref 150–450)
PROT SERPL-MCNC: 7 G/DL (ref 6.4–8.3)
RBC # BLD AUTO: 4.07 10E6/UL (ref 3.8–5.2)
WBC # BLD AUTO: 8.7 10E3/UL (ref 4–11)

## 2024-04-03 PROCEDURE — 96413 CHEMO IV INFUSION 1 HR: CPT

## 2024-04-03 PROCEDURE — 258N000003 HC RX IP 258 OP 636: Performed by: INTERNAL MEDICINE

## 2024-04-03 PROCEDURE — 80076 HEPATIC FUNCTION PANEL: CPT | Performed by: INTERNAL MEDICINE

## 2024-04-03 PROCEDURE — 85025 COMPLETE CBC W/AUTO DIFF WBC: CPT | Performed by: INTERNAL MEDICINE

## 2024-04-03 PROCEDURE — 85652 RBC SED RATE AUTOMATED: CPT | Performed by: INTERNAL MEDICINE

## 2024-04-03 PROCEDURE — 250N000011 HC RX IP 250 OP 636: Mod: JZ | Performed by: INTERNAL MEDICINE

## 2024-04-03 PROCEDURE — 86140 C-REACTIVE PROTEIN: CPT | Performed by: INTERNAL MEDICINE

## 2024-04-03 PROCEDURE — 36415 COLL VENOUS BLD VENIPUNCTURE: CPT | Performed by: INTERNAL MEDICINE

## 2024-04-03 RX ORDER — DIPHENHYDRAMINE HYDROCHLORIDE 50 MG/ML
50 INJECTION INTRAMUSCULAR; INTRAVENOUS
Status: CANCELLED
Start: 2024-05-15

## 2024-04-03 RX ORDER — ALBUTEROL SULFATE 90 UG/1
1-2 AEROSOL, METERED RESPIRATORY (INHALATION)
Status: CANCELLED
Start: 2024-05-15

## 2024-04-03 RX ORDER — ACETAMINOPHEN 325 MG/1
650 TABLET ORAL ONCE
Status: CANCELLED
Start: 2024-05-15 | End: 2024-05-15

## 2024-04-03 RX ORDER — EPINEPHRINE 1 MG/ML
0.3 INJECTION, SOLUTION INTRAMUSCULAR; SUBCUTANEOUS EVERY 5 MIN PRN
Status: CANCELLED | OUTPATIENT
Start: 2024-05-15

## 2024-04-03 RX ORDER — DIPHENHYDRAMINE HCL 25 MG
25 CAPSULE ORAL ONCE
Status: CANCELLED
Start: 2024-05-15 | End: 2024-05-15

## 2024-04-03 RX ORDER — ALBUTEROL SULFATE 0.83 MG/ML
2.5 SOLUTION RESPIRATORY (INHALATION)
Status: CANCELLED | OUTPATIENT
Start: 2024-05-15

## 2024-04-03 RX ORDER — MEPERIDINE HYDROCHLORIDE 25 MG/ML
25 INJECTION INTRAMUSCULAR; INTRAVENOUS; SUBCUTANEOUS EVERY 30 MIN PRN
Status: CANCELLED | OUTPATIENT
Start: 2024-05-15

## 2024-04-03 RX ORDER — METHYLPREDNISOLONE SODIUM SUCCINATE 125 MG/2ML
125 INJECTION, POWDER, LYOPHILIZED, FOR SOLUTION INTRAMUSCULAR; INTRAVENOUS
Status: CANCELLED
Start: 2024-05-15

## 2024-04-03 RX ADMIN — SODIUM CHLORIDE 600 MG: 9 INJECTION, SOLUTION INTRAVENOUS at 16:01

## 2024-04-03 NOTE — TELEPHONE ENCOUNTER
Inflectra therapy plan orders ; plan has been updated. Patient remains on the same medication regimen 7.5mg/kg q6wks. Standing lab orders placed. TB Quantiferon Gold due this month, added into therapy plan for next infusion date. Hepatitis B serologies are up to date- 2021. Next office visit not yet scheduled. [employer] message sent to the patient with instructions to schedule.

## 2024-04-03 NOTE — PROGRESS NOTES
Nursing Note  Shelly Cha [facility] for:   Chief Complaint   Patient presents with    Infusion     Infliximab       During [facility]'s [facility] appointment, orders from Dr Nolasco were completed.  Frequency: every 6 weeks    Progress note:  Patient identification verified by name and date of birth.  Assessment completed.  Vitals recorded in Doc Flowsheets.  Patient was provided with education regarding medication/procedure and possible side effects.  Patient verbalized understanding.     present during visit today: Not Applicable.    Treatment Conditions: ~~~ NOTE: If the patient answers yes to any of the questions below, hold the infusion and contact ordering provider or on-call provider.    Have you recently had an elevated temperature, fever, chills, productive cough, coughing for 3 weeks or longer or hemoptysis,  abnormal vital signs, night sweats,  chest pain or have you noticed a decrease in your appetite, unexplained weight loss or fatigue? No  Do you have any open wounds or new incisions? No  Do you have any upcoming hospitalizations or surgeries? Does not include esophagogastroduodenoscopy, colonoscopy, endoscopic retrograde cholangiopancreatography (ERCP), endoscopic ultrasound (EUS), dental procedures or joint aspiration/steroid injections No  Do you currently have any signs of illness or infection or are you on any antibiotics? No  Have you had any new, sudden or worsening abdominal pain? No  Have you or anyone in your household received a live vaccination in the past 4 weeks? Please note: No live vaccines while on biologic/chemotherapy until 6 months after the last treatment. Patient can receive the flu vaccine (shot only), pneumovax and the Covid vaccine. It is optimal for the patient to get these vaccines mid cycle, but they can be given at any time as long as it is not on the day of the infusion. No  Have you  recently been diagnosed with any new nervous system diseases (ie. Multiple sclerosis, Guillain [place], seizures, neurological changes) or cancer diagnosis? Are you on any form of radiation or chemotherapy? No  Are you pregnant or breast feeding or do you have plans of pregnancy in the future? No  Have there been any other new onset medical symptoms? No    Premedications: were not ordered.    Drug Waste Record: No    Infusion length and rate:  infusion given over approximately  60 minutes    Labs: were drawn per orders.     Vascular access: peripheral IV was placed by vascular access nurse.    Is the next appt scheduled? Scheduling team messaged    Post Infusion Assessment:  Patient tolerated infusion without incident.     Discharge Plan:   Follow up plan of care with: ongoing infusions at [facility]. and ordering provider as scheduled.  Discharge instructions were reviewed with patient.  Patient/representative verbalized understanding of discharge instructions and all questions answered.  Patient discharged [facility] in stable condition.    Zaynab Manzo RN    Administrations This Visit       inFLIXimab-dyyb (INFLECTRA) 600 mg in sodium chloride 0.9 % 275 mL infusion       Admin Date  04/03/2024 Action  $New Bag Dose  600 mg Rate  275 mL/hr Route  Intravenous Documented By  Zaynab Manzo, RN                    /74 (BP Location: Left arm, Patient Position: Sitting, Cuff Size: Adult Regular)   Pulse 70   Temp 98.4  F (36.9  C) (Oral)   Resp 18   Wt 76.3 kg (168 lb 4.8 oz)   SpO2 99%   BMI 29.81 kg/m

## 2024-05-15 ENCOUNTER — INFUSION THERAPY VISIT (OUTPATIENT)
Dept: INFUSION THERAPY | Facility: CLINIC | Age: 54
End: 2024-05-15
Attending: INTERNAL MEDICINE
Payer: COMMERCIAL

## 2024-05-15 VITALS
DIASTOLIC BLOOD PRESSURE: 75 MMHG | TEMPERATURE: 98.4 F | HEART RATE: 78 BPM | BODY MASS INDEX: 27.49 KG/M2 | OXYGEN SATURATION: 97 % | RESPIRATION RATE: 16 BRPM | WEIGHT: 155.2 LBS | SYSTOLIC BLOOD PRESSURE: 115 MMHG

## 2024-05-15 DIAGNOSIS — K51.00 PANCOLITIS (H): Primary | ICD-10-CM

## 2024-05-15 DIAGNOSIS — K50.10 CROHN'S COLITIS, WITHOUT COMPLICATIONS (H): ICD-10-CM

## 2024-05-15 PROCEDURE — 96413 CHEMO IV INFUSION 1 HR: CPT

## 2024-05-15 PROCEDURE — 258N000003 HC RX IP 258 OP 636: Performed by: INTERNAL MEDICINE

## 2024-05-15 PROCEDURE — 250N000011 HC RX IP 250 OP 636: Mod: JZ | Performed by: INTERNAL MEDICINE

## 2024-05-15 PROCEDURE — 36415 COLL VENOUS BLD VENIPUNCTURE: CPT

## 2024-05-15 PROCEDURE — 86481 TB AG RESPONSE T-CELL SUSP: CPT

## 2024-05-15 RX ORDER — EPINEPHRINE 1 MG/ML
0.3 INJECTION, SOLUTION INTRAMUSCULAR; SUBCUTANEOUS EVERY 5 MIN PRN
Status: CANCELLED | OUTPATIENT
Start: 2024-06-26

## 2024-05-15 RX ORDER — ALBUTEROL SULFATE 90 UG/1
1-2 AEROSOL, METERED RESPIRATORY (INHALATION)
Status: CANCELLED
Start: 2024-06-26

## 2024-05-15 RX ORDER — METHYLPREDNISOLONE SODIUM SUCCINATE 125 MG/2ML
125 INJECTION, POWDER, LYOPHILIZED, FOR SOLUTION INTRAMUSCULAR; INTRAVENOUS
Status: CANCELLED
Start: 2024-06-26

## 2024-05-15 RX ORDER — MEPERIDINE HYDROCHLORIDE 25 MG/ML
25 INJECTION INTRAMUSCULAR; INTRAVENOUS; SUBCUTANEOUS EVERY 30 MIN PRN
Status: CANCELLED | OUTPATIENT
Start: 2024-06-26

## 2024-05-15 RX ORDER — ALBUTEROL SULFATE 0.83 MG/ML
2.5 SOLUTION RESPIRATORY (INHALATION)
Status: CANCELLED | OUTPATIENT
Start: 2024-06-26

## 2024-05-15 RX ORDER — ACETAMINOPHEN 325 MG/1
650 TABLET ORAL ONCE
Status: CANCELLED
Start: 2024-06-26 | End: 2024-06-26

## 2024-05-15 RX ORDER — DIPHENHYDRAMINE HYDROCHLORIDE 50 MG/ML
50 INJECTION INTRAMUSCULAR; INTRAVENOUS
Status: CANCELLED
Start: 2024-06-26

## 2024-05-15 RX ORDER — DIPHENHYDRAMINE HCL 25 MG
25 CAPSULE ORAL ONCE
Status: CANCELLED
Start: 2024-06-26 | End: 2024-06-26

## 2024-05-15 RX ADMIN — SODIUM CHLORIDE 500 MG: 9 INJECTION, SOLUTION INTRAVENOUS at 16:05

## 2024-05-15 ASSESSMENT — PAIN SCALES - GENERAL: PAINLEVEL: NO PAIN (0)

## 2024-05-15 NOTE — PATIENT INSTRUCTIONS
EDUCATION POST BIOLOGICAL/CHEMOTHERAPY INFUSION  Call the triage nurse at your clinic or seek medical attention if you have chills and/or temperature greater than or equal to 100.5, uncontrolled nausea/vomiting, diarrhea, constipation, dizziness, shortness of breath, chest pain, heart palpitations, weakness or any other new or concerning symptoms, questions or concerns.  You can not have any live virus vaccines prior to or during treatment or up to 6 months post infusion.  If you have an upcoming surgery, medical procedure or dental procedure during treatment, this should be discussed with your ordering physician and your surgeon/dentist.  If you are having any concerning symptom, if you are unsure if you should get your next infusion or wish to speak to a provider before your next infusion, please call your care coordinator or triage nurse at your clinic to notify them so we can adequately serve you.     Dear Shelly Cha    Thank you for choosing [facility] ([facility]) for your infusion.  The following information is a summary of our appointment as well as important reminders.        If you are a transplant patient and require transplant education, please click on this link: [email or web address].    We look forward in seeing you on your next appointment here at [facility] ([facility]).  Please don t hesitate to call us at [phone] to reschedule any of your appointments or to speak with one of the [facility] registered nurses.  It was a pleasure taking care of you today.    Sincerely,    [facility]  [facility]  [address] [place] [address]  [address]  Phone:  [phone]

## 2024-05-15 NOTE — PROGRESS NOTES
Infusion Nursing Note:  Shelly Cha presents today for Infliximab.    Patient seen by provider today: No   present during visit today: Not Applicable.    Note: Pt received Infliximab over one hour without incident.      Intravenous Access:  Labs drawn without difficulty (Quantiferon tests only, due for other labs next infusion).  Peripheral IV placed.    Treatment Conditions:  ~~~ NOTE: If the patient answers yes to any of the questions below, hold the infusion and contact ordering provider or on-call provider.    Do you currently have any signs of illness or infection or are you on any antibiotics? No  Have you recently had an elevated temperature, fever, chills, productive cough, coughing for 3 weeks or longer or hemoptysis, abnormal vital signs, night sweats, chest pain or have you noticed a decrease in your appetite, unexplained weight loss or fatigue? No  Have you had any new, sudden, or worsening abdominal pain? No  Do you have any open wounds or new incisions? (exclude for patients with hidradenitis suppurativa) No  Have you recently been diagnosed with any new nervous system diseases (ie. Multiple sclerosis, Guillain [place], seizures, neurological changes) or cancer diagnosis? Are you on any form of radiation or chemotherapy? No  Have there been any other new onset medical symptoms? No  Are you pregnant or breast feeding or do you have plans of pregnancy in the future? No; N/A  Do you have any upcoming hospitalizations or surgeries? Does not include esophagogastroduodenoscopy, colonoscopy, endoscopic retrograde cholangiopancreatography (ERCP), endoscopic ultrasound (EUS), dental procedures (including cleanings, fillings, implants, extractions)  or joint aspiration/steroid injections No  Have you or anyone in your household received a live vaccination in the past 4 weeks? Please note: No live vaccines while on biologic/chemotherapy until 6 months after the last treatment. Patient can receive the  flu vaccine (shot only).  It is optimal for the patient to get it mid cycle, but it can be given at any time as long as it is not on the day of the infusion. No  If applicable to prescribed medication, confirm negative PPD or quantiferon gold MTB. If positive, verify has negative chest x-ray or the patient is at least 4 weeks post initiation of INH/B6 therapy and have clearance from provider before infusion   If applicable to prescribed medication, confirm negative hepatitis B surface antigen or hepatitis C. If positive, clearance from provider before infusion.   Rheumatology patients receiving tocilizumab (Actemra): If labs were drawn within the past week, hold dosing until cleared to infuse If AST/ALT > 2 X upper limit normal; ANC < 1.0. ; N/A  Patients receiving belimumab (Benlysta): Have you been having any signs of worsening depression or suicidal ideations? N/A        Post Infusion Assessment:  Patient tolerated infusion without incident.  Blood return noted pre and post infusion.  Site patent and intact, free from redness, edema or discomfort.  No evidence of extravasations.  Access discontinued per protocol.  Biologic Infusion Post Education: Call the triage nurse at your clinic or seek medical attention if you have chills and/or temperature greater than or equal to 100.5, uncontrolled nausea/vomiting, diarrhea, constipation, dizziness, shortness of breath, chest pain, heart palpitations, weakness or any other new or concerning symptoms, questions or concerns.  You cannot have any live virus vaccines prior to or during treatment or up to 6 months post infusion.  If you have an upcoming surgery, medical procedure or dental procedure during treatment, this should be discussed with your ordering physician and your surgeon/dentist.  If you are having any concerning symptom, if you are unsure if you should get your next infusion or wish to speak to a provider before your next infusion, please call your care  coordinator or triage nurse at your clinic to notify them so we can adequately serve you.       Discharge Plan:   Discharge instructions reviewed with: Patient.  Patient and/or family verbalized understanding of discharge instructions and all questions answered.  AVS to patient via [employer].  Patient will return 06/26 for next appointment.   Patient discharged in stable condition accompanied by: self.  Departure Mode: Ambulatory.      Administrations This Visit       inFLIXimab-dyyb (INFLECTRA) 500 mg in sodium chloride 0.9 % 275 mL infusion       Admin Date  05/15/2024 Action  $New Bag Dose  500 mg Rate  275 mL/hr Route  Intravenous Documented By  Delores Ordonez, RN                        Delores Ordonez, RN

## 2024-05-17 LAB
GAMMA INTERFERON BACKGROUND BLD IA-ACNC: 0 IU/ML
M TB IFN-G BLD-IMP: NEGATIVE
M TB IFN-G CD4+ BCKGRND COR BLD-ACNC: 10 IU/ML
MITOGEN IGNF BCKGRD COR BLD-ACNC: 0 IU/ML
MITOGEN IGNF BCKGRD COR BLD-ACNC: 0.01 IU/ML
QUANTIFERON MITOGEN: 10 IU/ML
QUANTIFERON NIL TUBE: 0 IU/ML
QUANTIFERON TB1 TUBE: 0.01 IU/ML
QUANTIFERON TB2 TUBE: 0

## 2024-06-26 ENCOUNTER — INFUSION THERAPY VISIT (OUTPATIENT)
Dept: INFUSION THERAPY | Facility: CLINIC | Age: 54
End: 2024-06-26
Attending: INTERNAL MEDICINE
Payer: COMMERCIAL

## 2024-06-26 VITALS
SYSTOLIC BLOOD PRESSURE: 104 MMHG | OXYGEN SATURATION: 98 % | DIASTOLIC BLOOD PRESSURE: 68 MMHG | RESPIRATION RATE: 12 BRPM | BODY MASS INDEX: 27.71 KG/M2 | HEART RATE: 75 BPM | TEMPERATURE: 99 F | WEIGHT: 156.4 LBS

## 2024-06-26 DIAGNOSIS — K51.00 PANCOLITIS (H): Primary | ICD-10-CM

## 2024-06-26 LAB
ALBUMIN SERPL BCG-MCNC: 4.1 G/DL (ref 3.5–5.2)
ALP SERPL-CCNC: 75 U/L (ref 40–150)
ALT SERPL W P-5'-P-CCNC: 15 U/L (ref 0–50)
AST SERPL W P-5'-P-CCNC: 20 U/L (ref 0–45)
BASOPHILS # BLD AUTO: 0.1 10E3/UL (ref 0–0.2)
BASOPHILS NFR BLD AUTO: 1 %
BILIRUB DIRECT SERPL-MCNC: <0.2 MG/DL (ref 0–0.3)
BILIRUB SERPL-MCNC: 0.3 MG/DL
CRP SERPL-MCNC: <3 MG/L
EOSINOPHIL # BLD AUTO: 0.3 10E3/UL (ref 0–0.7)
EOSINOPHIL NFR BLD AUTO: 4 %
ERYTHROCYTE [DISTWIDTH] IN BLOOD BY AUTOMATED COUNT: 13 % (ref 10–15)
HCT VFR BLD AUTO: 36.5 % (ref 35–47)
HGB BLD-MCNC: 12.7 G/DL (ref 11.7–15.7)
IMM GRANULOCYTES # BLD: 0 10E3/UL
IMM GRANULOCYTES NFR BLD: 0 %
LYMPHOCYTES # BLD AUTO: 2.2 10E3/UL (ref 0.8–5.3)
LYMPHOCYTES NFR BLD AUTO: 25 %
MCH RBC QN AUTO: 30.4 PG (ref 26.5–33)
MCHC RBC AUTO-ENTMCNC: 34.8 G/DL (ref 31.5–36.5)
MCV RBC AUTO: 87 FL (ref 78–100)
MONOCYTES # BLD AUTO: 0.7 10E3/UL (ref 0–1.3)
MONOCYTES NFR BLD AUTO: 8 %
NEUTROPHILS # BLD AUTO: 5.3 10E3/UL (ref 1.6–8.3)
NEUTROPHILS NFR BLD AUTO: 62 %
NRBC # BLD AUTO: 0 10E3/UL
NRBC BLD AUTO-RTO: 0 /100
PLATELET # BLD AUTO: 264 10E3/UL (ref 150–450)
PROT SERPL-MCNC: 6.8 G/DL (ref 6.4–8.3)
RBC # BLD AUTO: 4.18 10E6/UL (ref 3.8–5.2)
WBC # BLD AUTO: 8.5 10E3/UL (ref 4–11)

## 2024-06-26 PROCEDURE — 86140 C-REACTIVE PROTEIN: CPT | Performed by: INTERNAL MEDICINE

## 2024-06-26 PROCEDURE — 96413 CHEMO IV INFUSION 1 HR: CPT

## 2024-06-26 PROCEDURE — 80076 HEPATIC FUNCTION PANEL: CPT | Performed by: INTERNAL MEDICINE

## 2024-06-26 PROCEDURE — 250N000011 HC RX IP 250 OP 636: Mod: JZ | Performed by: INTERNAL MEDICINE

## 2024-06-26 PROCEDURE — 36415 COLL VENOUS BLD VENIPUNCTURE: CPT | Performed by: INTERNAL MEDICINE

## 2024-06-26 PROCEDURE — 84450 TRANSFERASE (AST) (SGOT): CPT | Performed by: INTERNAL MEDICINE

## 2024-06-26 PROCEDURE — 85049 AUTOMATED PLATELET COUNT: CPT | Performed by: INTERNAL MEDICINE

## 2024-06-26 PROCEDURE — 258N000003 HC RX IP 258 OP 636: Performed by: INTERNAL MEDICINE

## 2024-06-26 PROCEDURE — 84155 ASSAY OF PROTEIN SERUM: CPT | Performed by: INTERNAL MEDICINE

## 2024-06-26 RX ORDER — EPINEPHRINE 1 MG/ML
0.3 INJECTION, SOLUTION INTRAMUSCULAR; SUBCUTANEOUS EVERY 5 MIN PRN
Status: CANCELLED | OUTPATIENT
Start: 2024-08-07

## 2024-06-26 RX ORDER — ALBUTEROL SULFATE 90 UG/1
1-2 AEROSOL, METERED RESPIRATORY (INHALATION)
Status: CANCELLED
Start: 2024-08-07

## 2024-06-26 RX ORDER — ACETAMINOPHEN 325 MG/1
650 TABLET ORAL ONCE
Status: CANCELLED
Start: 2024-08-07 | End: 2024-08-07

## 2024-06-26 RX ORDER — ALBUTEROL SULFATE 0.83 MG/ML
2.5 SOLUTION RESPIRATORY (INHALATION)
Status: CANCELLED | OUTPATIENT
Start: 2024-08-07

## 2024-06-26 RX ORDER — METHYLPREDNISOLONE SODIUM SUCCINATE 125 MG/2ML
125 INJECTION, POWDER, LYOPHILIZED, FOR SOLUTION INTRAMUSCULAR; INTRAVENOUS
Status: CANCELLED
Start: 2024-08-07

## 2024-06-26 RX ORDER — DIPHENHYDRAMINE HYDROCHLORIDE 50 MG/ML
50 INJECTION INTRAMUSCULAR; INTRAVENOUS
Status: CANCELLED
Start: 2024-08-07

## 2024-06-26 RX ORDER — DIPHENHYDRAMINE HCL 25 MG
25 CAPSULE ORAL ONCE
Status: CANCELLED
Start: 2024-08-07 | End: 2024-08-07

## 2024-06-26 RX ORDER — MEPERIDINE HYDROCHLORIDE 25 MG/ML
25 INJECTION INTRAMUSCULAR; INTRAVENOUS; SUBCUTANEOUS EVERY 30 MIN PRN
Status: CANCELLED | OUTPATIENT
Start: 2024-08-07

## 2024-06-26 RX ADMIN — SODIUM CHLORIDE 500 MG: 9 INJECTION, SOLUTION INTRAVENOUS at 16:27

## 2024-06-26 ASSESSMENT — PAIN SCALES - GENERAL: PAINLEVEL: NO PAIN (0)

## 2024-06-26 NOTE — PROGRESS NOTES
Infusion Nursing Note:  Shelly Cha presents today for   Chief Complaint   Patient presents with    Infusion     inFLIXimab-dyyb (INFLECTRA)       Patient seen by provider today: No   present during visit today: Not Applicable.    Note:  -Orders from Kwasi Nolasco MD completed. Frequency: every 6 weeks.  -Labs drawn via PIV by RN.  -500mg Inflectra IV over 60min.    Intravenous Access:  Peripheral IV placed in Lt forearm by [facility].    Treatment Conditions:  Biological Infusion Checklist:  ~~~ NOTE: If the patient answers yes to any of the questions below, hold the infusion and contact ordering provider or on-call provider.    Have you recently had an elevated temperature, fever, chills, productive cough, coughing for 3 weeks or longer or hemoptysis,  abnormal vital signs, night sweats,  chest pain or have you noticed a decrease in your appetite, unexplained weight loss or fatigue? No  Do you have any open wounds or new incisions? No  Do you have any upcoming hospitalizations or surgeries? Does not include esophagogastroduodenoscopy, colonoscopy, endoscopic retrograde cholangiopancreatography (ERCP), endoscopic ultrasound (EUS), dental procedures or joint aspiration/steroid injections No  Do you currently have any signs of illness or infection or are you on any antibiotics? No  Have you had any new, sudden or worsening abdominal pain? No  Have you or anyone in your household received a live vaccination in the past 4 weeks? Please note: No live vaccines while on biologic/chemotherapy until 6 months after the last treatment. Patient can receive the flu vaccine (shot only), pneumovax and the Covid vaccine. It is optimal for the patient to get these vaccines mid cycle, but they can be given at any time as long as it is not on the day of the infusion. No  Have you recently been diagnosed with any new nervous system diseases (ie. Multiple sclerosis, Guillain [place], seizures, neurological changes) or cancer  diagnosis? Are you on any form of radiation or chemotherapy? No  Are you pregnant or breast feeding or do you have plans of pregnancy in the future? No  Have you been having any signs of worsening depression or suicidal ideations?  (benlysta only) N/A  Have there been any other new onset medical symptoms? No  Have you had any new blood clots? (IVIG only) N/A    Post Infusion Assessment:  Patient tolerated infusion without incident.  Blood return noted pre and post infusion.  Site patent and intact, free from redness, edema or discomfort.  No evidence of extravasations.  Access discontinued per protocol.     Discharge Plan:   Discharge instructions reviewed with: Patient.  Patient and/or family verbalized understanding of discharge instructions and all questions answered.  AVS to patient via [employer].      Scheduling will call patient for next appt.     Patient discharged in stable condition accompanied by: self.  Departure Mode: Ambulatory.      Maddison García RN    /68   Pulse 75   Temp 99  F (37.2  C)   Resp 12   Wt 70.9 kg (156 lb 6.4 oz)   SpO2 98%   BMI 27.71 kg/m      Administrations This Visit       inFLIXimab-dyyb (INFLECTRA) 500 mg in sodium chloride 0.9 % 275 mL infusion       Admin Date  06/26/2024 Action  $New Bag Dose  500 mg Rate  275 mL/hr Route  Intravenous Documented By  Maddison García, KAMERON

## 2024-08-07 ENCOUNTER — INFUSION THERAPY VISIT (OUTPATIENT)
Dept: INFUSION THERAPY | Facility: CLINIC | Age: 54
End: 2024-08-07
Attending: INTERNAL MEDICINE
Payer: COMMERCIAL

## 2024-08-07 VITALS
OXYGEN SATURATION: 98 % | SYSTOLIC BLOOD PRESSURE: 153 MMHG | WEIGHT: 150.4 LBS | HEART RATE: 65 BPM | DIASTOLIC BLOOD PRESSURE: 88 MMHG | BODY MASS INDEX: 26.64 KG/M2 | RESPIRATION RATE: 16 BRPM | TEMPERATURE: 98.5 F

## 2024-08-07 DIAGNOSIS — K51.00 PANCOLITIS (H): Primary | ICD-10-CM

## 2024-08-07 PROCEDURE — 250N000011 HC RX IP 250 OP 636: Performed by: INTERNAL MEDICINE

## 2024-08-07 PROCEDURE — 96413 CHEMO IV INFUSION 1 HR: CPT

## 2024-08-07 PROCEDURE — 258N000003 HC RX IP 258 OP 636: Performed by: INTERNAL MEDICINE

## 2024-08-07 RX ORDER — DIPHENHYDRAMINE HYDROCHLORIDE 50 MG/ML
50 INJECTION INTRAMUSCULAR; INTRAVENOUS
Start: 2024-09-18

## 2024-08-07 RX ORDER — EPINEPHRINE 1 MG/ML
0.3 INJECTION, SOLUTION INTRAMUSCULAR; SUBCUTANEOUS EVERY 5 MIN PRN
OUTPATIENT
Start: 2024-09-18

## 2024-08-07 RX ORDER — DIPHENHYDRAMINE HCL 25 MG
25 CAPSULE ORAL ONCE
Start: 2024-09-18 | End: 2024-09-18

## 2024-08-07 RX ORDER — METHYLPREDNISOLONE SODIUM SUCCINATE 125 MG/2ML
125 INJECTION, POWDER, LYOPHILIZED, FOR SOLUTION INTRAMUSCULAR; INTRAVENOUS
Start: 2024-09-18

## 2024-08-07 RX ORDER — ALBUTEROL SULFATE 90 UG/1
1-2 AEROSOL, METERED RESPIRATORY (INHALATION)
Start: 2024-09-18

## 2024-08-07 RX ORDER — ALBUTEROL SULFATE 0.83 MG/ML
2.5 SOLUTION RESPIRATORY (INHALATION)
OUTPATIENT
Start: 2024-09-18

## 2024-08-07 RX ORDER — ACETAMINOPHEN 325 MG/1
650 TABLET ORAL ONCE
Start: 2024-09-18 | End: 2024-09-18

## 2024-08-07 RX ORDER — MEPERIDINE HYDROCHLORIDE 25 MG/ML
25 INJECTION INTRAMUSCULAR; INTRAVENOUS; SUBCUTANEOUS EVERY 30 MIN PRN
OUTPATIENT
Start: 2024-09-18

## 2024-08-07 RX ADMIN — SODIUM CHLORIDE 500 MG: 9 INJECTION, SOLUTION INTRAVENOUS at 15:44

## 2024-08-07 NOTE — PATIENT INSTRUCTIONS
Dear Shelly Cha    Thank you for choosing [facility] ([facility] for your infusion.  The following information is a summary of our appointment as well as important reminders.        If you are a transplant patient and require transplant education, please click on this link: [email or web address].    If you have any questions on your upcoming [facility] appointments, please call scheduling at [phone].  It was a pleasure taking care of you today.    Sincerely,    [facility]  [address] [place] [address]  [address]  Phone:  [phone]

## 2024-08-07 NOTE — PROGRESS NOTES
Infusion Nursing Note:  Shelly Cha presents today for Infliximab.    Patient seen by provider today: No   present during visit today: Not Applicable.    Note: Pt received Infliximab over an hour without incident.    Intravenous Access:  Peripheral IV placed.    Treatment Conditions:  See above note by Nayeli Zuñiga RN      Post Infusion Assessment:  Patient tolerated infusion without incident.  Blood return noted pre and post infusion.  Site patent and intact, free from redness, edema or discomfort.  No evidence of extravasations.  Access discontinued per protocol.       Discharge Plan:   Discharge instructions reviewed with: Patient.  Patient and/or family verbalized understanding of discharge instructions and all questions answered.  AVS to patient via [employer].  Patient will return per scheduling (message sent to [facility] scheduling) for next appointment.   Patient discharged in stable condition accompanied by: self.  Departure Mode: Ambulatory.    Administrations This Visit       inFLIXimab-dyyb (INFLECTRA) 500 mg in sodium chloride 0.9 % 275 mL infusion       Admin Date  08/07/2024 Action  $New Bag Dose  500 mg Rate  275 mL/hr Route  Intravenous Documented By  Delores Ordonez RN                      Delores Ordonez RN

## 2024-08-07 NOTE — PROGRESS NOTES
"          PRIOR TO INFUSION OF BIOLOGICAL MEDICATIONS OR ANY OF THESE AS LISTED: Remicaide (infliximab) \".rheumbiologicalchecklist\"    Prior to Infusion of biological medications or any of these as listed:    1. Elevated temperature, fever, chills, productive cough or abnormal vital signs, night sweats, coughing up blood or sputum, no appetite or abnormal vital signs : NO    2. Open wounds or new incisions: NO    3. Recent hospitalization: NO    4.  Recent surgeries:  NO    5. Any upcoming surgeries or dental procedures?:NO    6. Any current or recent bouts of illness or infection? On any antibiotics? : NO    7. Any new, sudden or worsening abdominal pain :NO    8. Vaccination within 4 weeks? Patient or someone in the household is scheduled to receive vaccination? No live virus vaccines prior to or during treatment :NO    9. Any nervous system diseases [i.e. multiple sclerosis, Guillain-[place], seizures, neurological  changes]: NO    10. Pregnant or breast feeding; or plans on pregnancy in the future: NO    11. Signs of worsening depression or suicidal ideations while taking benlysta:NO    12. New-onset medical symptoms: NO    13.  New cancer diagnosis or on chemotherapy or radiation NO    14.  Evaluate for any sign of active TB [Unexplained weight loss, Loss of appetite, Night sweats, Fever, Fatigue, Chills, Coughing for 3 weeks or longer, Hemoptysis (coughing up blood), Chest pain]: NO    **Note: If answered yes to any of the above, hold the infusion and contact ordering rheumatologist or on-call rheumatologist.   "

## 2024-09-18 ENCOUNTER — INFUSION THERAPY VISIT (OUTPATIENT)
Dept: INFUSION THERAPY | Facility: CLINIC | Age: 54
End: 2024-09-18
Attending: INTERNAL MEDICINE
Payer: COMMERCIAL

## 2024-09-18 VITALS
BODY MASS INDEX: 23.37 KG/M2 | RESPIRATION RATE: 16 BRPM | HEART RATE: 67 BPM | DIASTOLIC BLOOD PRESSURE: 75 MMHG | WEIGHT: 131.9 LBS | SYSTOLIC BLOOD PRESSURE: 115 MMHG | TEMPERATURE: 97.5 F

## 2024-09-18 DIAGNOSIS — K51.00 PANCOLITIS (H): Primary | ICD-10-CM

## 2024-09-18 DIAGNOSIS — D64.9 NORMOCYTIC ANEMIA: ICD-10-CM

## 2024-09-18 LAB
ALBUMIN SERPL BCG-MCNC: 3.3 G/DL (ref 3.5–5.2)
ALP SERPL-CCNC: 66 U/L (ref 40–150)
ALT SERPL W P-5'-P-CCNC: 14 U/L (ref 0–50)
AST SERPL W P-5'-P-CCNC: 24 U/L (ref 0–45)
BASOPHILS # BLD AUTO: 0 10E3/UL (ref 0–0.2)
BASOPHILS NFR BLD AUTO: 1 %
BILIRUB DIRECT SERPL-MCNC: <0.2 MG/DL (ref 0–0.3)
BILIRUB SERPL-MCNC: 0.3 MG/DL
CRP SERPL-MCNC: 4.51 MG/L
EOSINOPHIL # BLD AUTO: 0.3 10E3/UL (ref 0–0.7)
EOSINOPHIL NFR BLD AUTO: 4 %
ERYTHROCYTE [DISTWIDTH] IN BLOOD BY AUTOMATED COUNT: 13.6 % (ref 10–15)
HCT VFR BLD AUTO: 31.9 % (ref 35–47)
HGB BLD-MCNC: 10.7 G/DL (ref 11.7–15.7)
IMM GRANULOCYTES # BLD: 0 10E3/UL
IMM GRANULOCYTES NFR BLD: 0 %
LYMPHOCYTES # BLD AUTO: 1.8 10E3/UL (ref 0.8–5.3)
LYMPHOCYTES NFR BLD AUTO: 23 %
MCH RBC QN AUTO: 30.4 PG (ref 26.5–33)
MCHC RBC AUTO-ENTMCNC: 33.5 G/DL (ref 31.5–36.5)
MCV RBC AUTO: 91 FL (ref 78–100)
MONOCYTES # BLD AUTO: 0.8 10E3/UL (ref 0–1.3)
MONOCYTES NFR BLD AUTO: 10 %
NEUTROPHILS # BLD AUTO: 4.8 10E3/UL (ref 1.6–8.3)
NEUTROPHILS NFR BLD AUTO: 62 %
NRBC # BLD AUTO: 0 10E3/UL
NRBC BLD AUTO-RTO: 0 /100
PLATELET # BLD AUTO: 317 10E3/UL (ref 150–450)
PROT SERPL-MCNC: 6.2 G/DL (ref 6.4–8.3)
RBC # BLD AUTO: 3.52 10E6/UL (ref 3.8–5.2)
WBC # BLD AUTO: 7.7 10E3/UL (ref 4–11)

## 2024-09-18 PROCEDURE — 80076 HEPATIC FUNCTION PANEL: CPT | Performed by: INTERNAL MEDICINE

## 2024-09-18 PROCEDURE — 250N000011 HC RX IP 250 OP 636: Performed by: INTERNAL MEDICINE

## 2024-09-18 PROCEDURE — 96413 CHEMO IV INFUSION 1 HR: CPT

## 2024-09-18 PROCEDURE — 86140 C-REACTIVE PROTEIN: CPT | Performed by: INTERNAL MEDICINE

## 2024-09-18 PROCEDURE — 36415 COLL VENOUS BLD VENIPUNCTURE: CPT | Performed by: INTERNAL MEDICINE

## 2024-09-18 PROCEDURE — 85025 COMPLETE CBC W/AUTO DIFF WBC: CPT | Performed by: INTERNAL MEDICINE

## 2024-09-18 PROCEDURE — 82728 ASSAY OF FERRITIN: CPT

## 2024-09-18 PROCEDURE — 258N000003 HC RX IP 258 OP 636: Performed by: INTERNAL MEDICINE

## 2024-09-18 RX ORDER — ACETAMINOPHEN 325 MG/1
650 TABLET ORAL ONCE
Start: 2024-10-30 | End: 2024-10-30

## 2024-09-18 RX ORDER — METHYLPREDNISOLONE SODIUM SUCCINATE 125 MG/2ML
125 INJECTION, POWDER, LYOPHILIZED, FOR SOLUTION INTRAMUSCULAR; INTRAVENOUS
Start: 2024-10-30

## 2024-09-18 RX ORDER — ALBUTEROL SULFATE 0.83 MG/ML
2.5 SOLUTION RESPIRATORY (INHALATION)
OUTPATIENT
Start: 2024-10-30

## 2024-09-18 RX ORDER — EPINEPHRINE 1 MG/ML
0.3 INJECTION, SOLUTION INTRAMUSCULAR; SUBCUTANEOUS EVERY 5 MIN PRN
OUTPATIENT
Start: 2024-10-30

## 2024-09-18 RX ORDER — ALBUTEROL SULFATE 90 UG/1
1-2 AEROSOL, METERED RESPIRATORY (INHALATION)
Start: 2024-10-30

## 2024-09-18 RX ORDER — MEPERIDINE HYDROCHLORIDE 25 MG/ML
25 INJECTION INTRAMUSCULAR; INTRAVENOUS; SUBCUTANEOUS EVERY 30 MIN PRN
OUTPATIENT
Start: 2024-10-30

## 2024-09-18 RX ORDER — DIPHENHYDRAMINE HCL 25 MG
25 CAPSULE ORAL ONCE
Start: 2024-10-30 | End: 2024-10-30

## 2024-09-18 RX ORDER — DIPHENHYDRAMINE HYDROCHLORIDE 50 MG/ML
50 INJECTION INTRAMUSCULAR; INTRAVENOUS
Start: 2024-10-30

## 2024-09-18 RX ADMIN — SODIUM CHLORIDE 500 MG: 9 INJECTION, SOLUTION INTRAVENOUS at 16:05

## 2024-09-18 NOTE — PROGRESS NOTES
Post Infusion Assessment:  Patient tolerated infusion without incident.  Site patent and intact, free from redness, edema or discomfort.  No evidence of extravasations.  Access discontinued per protocol.     Discharge Plan:   AVS to patient via [employer].    Patient discharged in stable condition accompanied by: self.  Departure Mode: Ambulatory.    Sabiha Walter RN.    /75 (BP Location: Right arm, Patient Position: Semi-Corbett's)   Pulse 67   Temp 97.5  F (36.4  C) (Oral)   Resp 16   Wt 59.8 kg (131 lb 14.4 oz)   BMI 23.37 kg/m

## 2024-09-18 NOTE — PROGRESS NOTES
Infusion Nursing Note:  Shelly Cha presents today for IV infusion; Infliximab.    Patient seen by provider today: No   present during visit today: Not Applicable.    Note:   Administrations This Visit       inFLIXimab-dyyb (INFLECTRA) 500 mg in sodium chloride 0.9 % 275 mL infusion       Admin Date  09/18/2024 Action  $New Bag Dose  500 mg Rate  275 mL/hr Route  Intravenous Documented By  Sabiha Walter RN                  Temp: 97.5  F (36.4  C) Temp src: Oral BP: (!) 142/88 Pulse: 88   Resp: 16        131 lbs 14.4 oz    Intravenous Access:  Labs drawn without difficulty; Hepatic Panel, CRP, and a CBC with platelets.  Peripheral IV placed.    Treatment Conditions:  Biological Infusion Checklist:  ~~~ NOTE: If the patient answers yes to any of the questions below, hold the infusion and contact ordering provider or on-call provider.    Have you recently had an elevated temperature, fever, chills, productive cough, coughing for 3 weeks or longer or hemoptysis,  abnormal vital signs, night sweats,  chest pain or have you noticed a decrease in your appetite, unexplained weight loss or fatigue? No  Do you have any open wounds or new incisions? No  Do you have any upcoming hospitalizations or surgeries? Does not include esophagogastroduodenoscopy, colonoscopy, endoscopic retrograde cholangiopancreatography (ERCP), endoscopic ultrasound (EUS), dental procedures or joint aspiration/steroid injections No  Do you currently have any signs of illness or infection or are you on any antibiotics? No  Have you had any new, sudden or worsening abdominal pain? No  Have you or anyone in your household received a live vaccination in the past 4 weeks? Please note: No live vaccines while on biologic/chemotherapy until 6 months after the last treatment. Patient can receive the flu vaccine (shot only), pneumovax and the Covid vaccine. It is optimal for the patient to get these vaccines mid cycle, but they can be given at any  time as long as it is not on the day of the infusion. No  Have you recently been diagnosed with any new nervous system diseases (ie. Multiple sclerosis, Guillain [place], seizures, neurological changes) or cancer diagnosis? Are you on any form of radiation or chemotherapy? No  Are you pregnant or breast feeding or do you have plans of pregnancy in the future? No  Have you been having any signs of worsening depression or suicidal ideations?  (benlysta only) N/A  Have there been any other new onset medical symptoms? No  Have you had any new blood clots? (IVIG only) N/A    Jose Ferrara RN

## 2024-09-18 NOTE — PATIENT INSTRUCTIONS
Dear Shelly Cha    Thank you for choosing [facility] ([facility] for your infusion.  The following information is a summary of our appointment as well as important reminders.      If you have any questions on your upcoming [facility] appointments, please call scheduling at [phone].  It was a pleasure taking care of you today.    Sincerely,    [facility]  [facility]  [address] [place] [address]  [address]  Phone:  [phone]

## 2024-09-20 ENCOUNTER — CARE COORDINATION (OUTPATIENT)
Dept: GASTROENTEROLOGY | Facility: CLINIC | Age: 54
End: 2024-09-20
Payer: COMMERCIAL

## 2024-09-20 ENCOUNTER — TELEPHONE (OUTPATIENT)
Dept: GASTROENTEROLOGY | Facility: CLINIC | Age: 54
End: 2024-09-20
Payer: COMMERCIAL

## 2024-09-20 DIAGNOSIS — D64.9 NORMOCYTIC ANEMIA: Primary | ICD-10-CM

## 2024-09-20 DIAGNOSIS — K50.10 CROHN'S COLITIS, WITHOUT COMPLICATIONS (H): Primary | ICD-10-CM

## 2024-09-20 DIAGNOSIS — D64.9 NORMOCYTIC ANEMIA: ICD-10-CM

## 2024-09-20 LAB — FERRITIN SERPL-MCNC: 38 NG/ML (ref 11–328)

## 2024-10-01 NOTE — TELEPHONE ENCOUNTER
----- Message from Kwasi Nolasco sent at 9/20/2024  8:31 AM CDT -----  Regarding: Needs to get in to IBD clinic and needs colonoscopy  Hi Shelly Casillas's labs came back. She's more anemic than she has been. I added a ferritin but I would like to check a IFX predict PK when the time is right in the cycle. At that time can we also get a B12/folate and a TSH.    Also, she is overdue for IBD clinic visit - can we get in with me, me/fellow or IBD CHRIS sometime in next 2 months.     She also needs a colonoscopy scheduled - not urgent.    Thanks!  Kwasi  
Attempted to contact the patient again this evening, however no answer. Unable to leave VM as the VM box is full. [employer] message remains unread.   
Attempted to contact the patient again this evening, however no answer. Unable to leave VM as the VM box is full. [employer]u message remains unread.   
Attempted to contact the patient again, however no answer. Left detailed voicemail requesting callback.   
Attempted to contact the patient to discuss next steps. Cell phone number rang x3, then went to a busy signal. Then tried calling the home number, which went to voicemail, however the voicemail box was full, unable to leave a message. Rebls message sent.     All lab orders in place - including Predict PK which is due to be drawn ~10/9. Last infusion received on .     New procedure ordered, as previous one is due to  next month.     Available appointment held on Dr. Nolasco's schedule on 10/17. Offered in [employer] message.   
Attempted to contact the patient, however no answer. Writer was able to leave a voicemail, therefore detailed voicemail was left requesting callback.   
I have reviewed and confirmed nurses' notes...

## 2024-10-11 ENCOUNTER — APPOINTMENT (OUTPATIENT)
Dept: ULTRASOUND IMAGING | Facility: CLINIC | Age: 54
End: 2024-10-11
Payer: COMMERCIAL

## 2024-10-11 ENCOUNTER — ANESTHESIA EVENT (OUTPATIENT)
Dept: SURGERY | Facility: CLINIC | Age: 54
End: 2024-10-11
Payer: COMMERCIAL

## 2024-10-11 ENCOUNTER — APPOINTMENT (OUTPATIENT)
Dept: MRI IMAGING | Facility: CLINIC | Age: 54
End: 2024-10-11
Payer: COMMERCIAL

## 2024-10-11 ENCOUNTER — HOSPITAL ENCOUNTER (INPATIENT)
Facility: CLINIC | Age: 54
LOS: 3 days | Discharge: LEFT BEFORE DISCHARGE | End: 2024-10-14
Attending: EMERGENCY MEDICINE | Admitting: INTERNAL MEDICINE
Payer: COMMERCIAL

## 2024-10-11 ENCOUNTER — APPOINTMENT (OUTPATIENT)
Dept: GENERAL RADIOLOGY | Facility: CLINIC | Age: 54
End: 2024-10-11
Attending: EMERGENCY MEDICINE
Payer: COMMERCIAL

## 2024-10-11 ENCOUNTER — ANESTHESIA (OUTPATIENT)
Dept: SURGERY | Facility: CLINIC | Age: 54
End: 2024-10-11
Payer: COMMERCIAL

## 2024-10-11 DIAGNOSIS — L08.9 INFECTION OF RIGHT HAND: ICD-10-CM

## 2024-10-11 DIAGNOSIS — Z79.620 INFLIXIMAB (REMICADE) LONG-TERM USE: ICD-10-CM

## 2024-10-11 DIAGNOSIS — R73.9 HYPERGLYCEMIA: ICD-10-CM

## 2024-10-11 DIAGNOSIS — K51.90 ULCERATIVE COLITIS WITHOUT COMPLICATIONS, UNSPECIFIED LOCATION (H): Primary | ICD-10-CM

## 2024-10-11 LAB
ABO/RH(D): NORMAL
ALBUMIN SERPL BCG-MCNC: 3.4 G/DL (ref 3.5–5.2)
ALP SERPL-CCNC: 86 U/L (ref 40–150)
ALT SERPL W P-5'-P-CCNC: 13 U/L (ref 0–50)
ANION GAP SERPL CALCULATED.3IONS-SCNC: 10 MMOL/L (ref 7–15)
ANION GAP SERPL CALCULATED.3IONS-SCNC: 9 MMOL/L (ref 7–15)
ANTIBODY SCREEN: NEGATIVE
APTT PPP: 33 SECONDS (ref 22–38)
AST SERPL W P-5'-P-CCNC: 14 U/L (ref 0–45)
BASOPHILS # BLD MANUAL: 0 10E3/UL (ref 0–0.2)
BASOPHILS NFR BLD MANUAL: 0 %
BILIRUB SERPL-MCNC: 0.3 MG/DL
BUN SERPL-MCNC: 14.1 MG/DL (ref 6–20)
BUN SERPL-MCNC: 14.6 MG/DL (ref 6–20)
BURR CELLS BLD QL SMEAR: SLIGHT
CALCIUM SERPL-MCNC: 8.7 MG/DL (ref 8.8–10.4)
CALCIUM SERPL-MCNC: 8.8 MG/DL (ref 8.8–10.4)
CHLORIDE SERPL-SCNC: 100 MMOL/L (ref 98–107)
CHLORIDE SERPL-SCNC: 101 MMOL/L (ref 98–107)
CREAT SERPL-MCNC: 0.6 MG/DL (ref 0.51–0.95)
CREAT SERPL-MCNC: 0.6 MG/DL (ref 0.51–0.95)
CRP SERPL-MCNC: 174.63 MG/L
EGFRCR SERPLBLD CKD-EPI 2021: >90 ML/MIN/1.73M2
EGFRCR SERPLBLD CKD-EPI 2021: >90 ML/MIN/1.73M2
EOSINOPHIL # BLD MANUAL: 0.2 10E3/UL (ref 0–0.7)
EOSINOPHIL NFR BLD MANUAL: 1 %
ERYTHROCYTE [DISTWIDTH] IN BLOOD BY AUTOMATED COUNT: 13.1 % (ref 10–15)
ERYTHROCYTE [SEDIMENTATION RATE] IN BLOOD BY WESTERGREN METHOD: 51 MM/HR (ref 0–30)
GLUCOSE SERPL-MCNC: 208 MG/DL (ref 70–99)
GLUCOSE SERPL-MCNC: 209 MG/DL (ref 70–99)
HCO3 SERPL-SCNC: 23 MMOL/L (ref 22–29)
HCO3 SERPL-SCNC: 23 MMOL/L (ref 22–29)
HCT VFR BLD AUTO: 31.3 % (ref 35–47)
HGB BLD-MCNC: 10.6 G/DL (ref 11.7–15.7)
HOLD SPECIMEN: NORMAL
INR PPP: 1.21 (ref 0.85–1.15)
LYMPHOCYTES # BLD MANUAL: 1.2 10E3/UL (ref 0.8–5.3)
LYMPHOCYTES NFR BLD MANUAL: 5 %
MCH RBC QN AUTO: 31.3 PG (ref 26.5–33)
MCHC RBC AUTO-ENTMCNC: 33.9 G/DL (ref 31.5–36.5)
MCV RBC AUTO: 92 FL (ref 78–100)
MONOCYTES # BLD MANUAL: 1.8 10E3/UL (ref 0–1.3)
MONOCYTES NFR BLD MANUAL: 8 %
NEUTROPHILS # BLD MANUAL: 19.7 10E3/UL (ref 1.6–8.3)
NEUTROPHILS NFR BLD MANUAL: 86 %
PLAT MORPH BLD: ABNORMAL
PLATELET # BLD AUTO: 292 10E3/UL (ref 150–450)
POTASSIUM SERPL-SCNC: 3.6 MMOL/L (ref 3.4–5.3)
POTASSIUM SERPL-SCNC: 3.7 MMOL/L (ref 3.4–5.3)
PROT SERPL-MCNC: 6.8 G/DL (ref 6.4–8.3)
RBC # BLD AUTO: 3.39 10E6/UL (ref 3.8–5.2)
RBC MORPH BLD: ABNORMAL
SODIUM SERPL-SCNC: 132 MMOL/L (ref 135–145)
SODIUM SERPL-SCNC: 134 MMOL/L (ref 135–145)
SPECIMEN EXPIRATION DATE: NORMAL
WBC # BLD AUTO: 22.9 10E3/UL (ref 4–11)

## 2024-10-11 PROCEDURE — 80053 COMPREHEN METABOLIC PANEL: CPT | Performed by: EMERGENCY MEDICINE

## 2024-10-11 PROCEDURE — 250N000011 HC RX IP 250 OP 636: Performed by: EMERGENCY MEDICINE

## 2024-10-11 PROCEDURE — 99285 EMERGENCY DEPT VISIT HI MDM: CPT | Performed by: EMERGENCY MEDICINE

## 2024-10-11 PROCEDURE — 87070 CULTURE OTHR SPECIMN AEROBIC: CPT | Performed by: ORTHOPAEDIC SURGERY

## 2024-10-11 PROCEDURE — 710N000010 HC RECOVERY PHASE 1, LEVEL 2, PER MIN: Performed by: ORTHOPAEDIC SURGERY

## 2024-10-11 PROCEDURE — 999N000141 HC STATISTIC PRE-PROCEDURE NURSING ASSESSMENT: Performed by: ORTHOPAEDIC SURGERY

## 2024-10-11 PROCEDURE — 87040 BLOOD CULTURE FOR BACTERIA: CPT | Performed by: EMERGENCY MEDICINE

## 2024-10-11 PROCEDURE — 250N000011 HC RX IP 250 OP 636

## 2024-10-11 PROCEDURE — 120N000002 HC R&B MED SURG/OB UMMC

## 2024-10-11 PROCEDURE — 250N000011 HC RX IP 250 OP 636: Performed by: INTERNAL MEDICINE

## 2024-10-11 PROCEDURE — 86900 BLOOD TYPING SEROLOGIC ABO: CPT

## 2024-10-11 PROCEDURE — 258N000003 HC RX IP 258 OP 636

## 2024-10-11 PROCEDURE — 26145 TENDON EXCISION PALM/FINGER: CPT

## 2024-10-11 PROCEDURE — 258N000003 HC RX IP 258 OP 636: Performed by: EMERGENCY MEDICINE

## 2024-10-11 PROCEDURE — 36415 COLL VENOUS BLD VENIPUNCTURE: CPT

## 2024-10-11 PROCEDURE — 86140 C-REACTIVE PROTEIN: CPT | Performed by: EMERGENCY MEDICINE

## 2024-10-11 PROCEDURE — 36415 COLL VENOUS BLD VENIPUNCTURE: CPT | Performed by: EMERGENCY MEDICINE

## 2024-10-11 PROCEDURE — 370N000017 HC ANESTHESIA TECHNICAL FEE, PER MIN: Performed by: ORTHOPAEDIC SURGERY

## 2024-10-11 PROCEDURE — 85007 BL SMEAR W/DIFF WBC COUNT: CPT | Performed by: EMERGENCY MEDICINE

## 2024-10-11 PROCEDURE — 250N000011 HC RX IP 250 OP 636: Performed by: ANESTHESIOLOGY

## 2024-10-11 PROCEDURE — 250N000009 HC RX 250

## 2024-10-11 PROCEDURE — 26145 TENDON EXCISION PALM/FINGER: CPT | Performed by: ANESTHESIOLOGY

## 2024-10-11 PROCEDURE — 96365 THER/PROPH/DIAG IV INF INIT: CPT | Performed by: EMERGENCY MEDICINE

## 2024-10-11 PROCEDURE — 87077 CULTURE AEROBIC IDENTIFY: CPT | Performed by: ORTHOPAEDIC SURGERY

## 2024-10-11 PROCEDURE — 87641 MR-STAPH DNA AMP PROBE: CPT | Performed by: INTERNAL MEDICINE

## 2024-10-11 PROCEDURE — 85610 PROTHROMBIN TIME: CPT

## 2024-10-11 PROCEDURE — 99285 EMERGENCY DEPT VISIT HI MDM: CPT | Mod: 25 | Performed by: EMERGENCY MEDICINE

## 2024-10-11 PROCEDURE — 73218 MRI UPPER EXTREMITY W/O DYE: CPT | Mod: 26 | Performed by: RADIOLOGY

## 2024-10-11 PROCEDURE — 96375 TX/PRO/DX INJ NEW DRUG ADDON: CPT | Performed by: EMERGENCY MEDICINE

## 2024-10-11 PROCEDURE — 250N000025 HC SEVOFLURANE, PER MIN: Performed by: ORTHOPAEDIC SURGERY

## 2024-10-11 PROCEDURE — 99222 1ST HOSP IP/OBS MODERATE 55: CPT | Performed by: INTERNAL MEDICINE

## 2024-10-11 PROCEDURE — 87102 FUNGUS ISOLATION CULTURE: CPT | Performed by: ORTHOPAEDIC SURGERY

## 2024-10-11 PROCEDURE — 76882 US LMTD JT/FCL EVL NVASC XTR: CPT | Mod: RT

## 2024-10-11 PROCEDURE — 250N000013 HC RX MED GY IP 250 OP 250 PS 637: Performed by: INTERNAL MEDICINE

## 2024-10-11 PROCEDURE — 258N000001 HC RX 258: Performed by: OBSTETRICS & GYNECOLOGY

## 2024-10-11 PROCEDURE — 73218 MRI UPPER EXTREMITY W/O DYE: CPT | Mod: RT

## 2024-10-11 PROCEDURE — 272N000001 HC OR GENERAL SUPPLY STERILE: Performed by: ORTHOPAEDIC SURGERY

## 2024-10-11 PROCEDURE — 360N000075 HC SURGERY LEVEL 2, PER MIN: Performed by: ORTHOPAEDIC SURGERY

## 2024-10-11 PROCEDURE — 85652 RBC SED RATE AUTOMATED: CPT | Performed by: EMERGENCY MEDICINE

## 2024-10-11 PROCEDURE — 85027 COMPLETE CBC AUTOMATED: CPT | Performed by: EMERGENCY MEDICINE

## 2024-10-11 PROCEDURE — 87075 CULTR BACTERIA EXCEPT BLOOD: CPT | Performed by: ORTHOPAEDIC SURGERY

## 2024-10-11 PROCEDURE — 85730 THROMBOPLASTIN TIME PARTIAL: CPT

## 2024-10-11 PROCEDURE — 0LB70ZZ EXCISION OF RIGHT HAND TENDON, OPEN APPROACH: ICD-10-PCS | Performed by: ORTHOPAEDIC SURGERY

## 2024-10-11 PROCEDURE — 250N000009 HC RX 250: Performed by: INTERNAL MEDICINE

## 2024-10-11 PROCEDURE — 73130 X-RAY EXAM OF HAND: CPT | Mod: RT

## 2024-10-11 PROCEDURE — 99223 1ST HOSP IP/OBS HIGH 75: CPT | Performed by: INTERNAL MEDICINE

## 2024-10-11 RX ORDER — PIPERACILLIN SODIUM, TAZOBACTAM SODIUM 4; .5 G/20ML; G/20ML
4.5 INJECTION, POWDER, LYOPHILIZED, FOR SOLUTION INTRAVENOUS EVERY 6 HOURS
Status: DISCONTINUED | OUTPATIENT
Start: 2024-10-11 | End: 2024-10-14 | Stop reason: HOSPADM

## 2024-10-11 RX ORDER — AMOXICILLIN 250 MG
1 CAPSULE ORAL 2 TIMES DAILY PRN
Status: DISCONTINUED | OUTPATIENT
Start: 2024-10-11 | End: 2024-10-14 | Stop reason: HOSPADM

## 2024-10-11 RX ORDER — LIDOCAINE 40 MG/G
CREAM TOPICAL
Status: DISCONTINUED | OUTPATIENT
Start: 2024-10-11 | End: 2024-10-11

## 2024-10-11 RX ORDER — ONDANSETRON 2 MG/ML
4 INJECTION INTRAMUSCULAR; INTRAVENOUS EVERY 30 MIN PRN
Status: DISCONTINUED | OUTPATIENT
Start: 2024-10-11 | End: 2024-10-11 | Stop reason: HOSPADM

## 2024-10-11 RX ORDER — ONDANSETRON 2 MG/ML
INJECTION INTRAMUSCULAR; INTRAVENOUS PRN
Status: DISCONTINUED | OUTPATIENT
Start: 2024-10-11 | End: 2024-10-11

## 2024-10-11 RX ORDER — ONDANSETRON 4 MG/1
4 TABLET, ORALLY DISINTEGRATING ORAL EVERY 30 MIN PRN
Status: DISCONTINUED | OUTPATIENT
Start: 2024-10-11 | End: 2024-10-11 | Stop reason: HOSPADM

## 2024-10-11 RX ORDER — LIDOCAINE 40 MG/G
CREAM TOPICAL
Status: DISCONTINUED | OUTPATIENT
Start: 2024-10-11 | End: 2024-10-14 | Stop reason: HOSPADM

## 2024-10-11 RX ORDER — HYDROMORPHONE HYDROCHLORIDE 1 MG/ML
0.2 INJECTION, SOLUTION INTRAMUSCULAR; INTRAVENOUS; SUBCUTANEOUS EVERY 5 MIN PRN
Status: DISCONTINUED | OUTPATIENT
Start: 2024-10-11 | End: 2024-10-11 | Stop reason: HOSPADM

## 2024-10-11 RX ORDER — LIDOCAINE HYDROCHLORIDE 20 MG/ML
INJECTION, SOLUTION INFILTRATION; PERINEURAL PRN
Status: DISCONTINUED | OUTPATIENT
Start: 2024-10-11 | End: 2024-10-11

## 2024-10-11 RX ORDER — FENTANYL CITRATE 50 UG/ML
INJECTION, SOLUTION INTRAMUSCULAR; INTRAVENOUS PRN
Status: DISCONTINUED | OUTPATIENT
Start: 2024-10-11 | End: 2024-10-11

## 2024-10-11 RX ORDER — ONDANSETRON 2 MG/ML
4 INJECTION INTRAMUSCULAR; INTRAVENOUS EVERY 6 HOURS PRN
Status: DISCONTINUED | OUTPATIENT
Start: 2024-10-11 | End: 2024-10-14 | Stop reason: HOSPADM

## 2024-10-11 RX ORDER — OXYCODONE HYDROCHLORIDE 5 MG/1
5 TABLET ORAL EVERY 4 HOURS PRN
Status: DISCONTINUED | OUTPATIENT
Start: 2024-10-11 | End: 2024-10-14 | Stop reason: HOSPADM

## 2024-10-11 RX ORDER — TRANEXAMIC ACID 10 MG/ML
1 INJECTION, SOLUTION INTRAVENOUS ONCE
Status: COMPLETED | OUTPATIENT
Start: 2024-10-11 | End: 2024-10-11

## 2024-10-11 RX ORDER — VANCOMYCIN HYDROCHLORIDE 1 G/200ML
1000 INJECTION, SOLUTION INTRAVENOUS EVERY 12 HOURS
Status: DISCONTINUED | OUTPATIENT
Start: 2024-10-11 | End: 2024-10-13

## 2024-10-11 RX ORDER — HYDRALAZINE HYDROCHLORIDE 20 MG/ML
2.5-5 INJECTION INTRAMUSCULAR; INTRAVENOUS EVERY 10 MIN PRN
Status: DISCONTINUED | OUTPATIENT
Start: 2024-10-11 | End: 2024-10-11 | Stop reason: HOSPADM

## 2024-10-11 RX ORDER — DEXAMETHASONE SODIUM PHOSPHATE 4 MG/ML
INJECTION, SOLUTION INTRA-ARTICULAR; INTRALESIONAL; INTRAMUSCULAR; INTRAVENOUS; SOFT TISSUE PRN
Status: DISCONTINUED | OUTPATIENT
Start: 2024-10-11 | End: 2024-10-11

## 2024-10-11 RX ORDER — LORAZEPAM 1 MG/1
1 TABLET ORAL
Status: DISCONTINUED | OUTPATIENT
Start: 2024-10-11 | End: 2024-10-14 | Stop reason: HOSPADM

## 2024-10-11 RX ORDER — FENTANYL CITRATE 50 UG/ML
50 INJECTION, SOLUTION INTRAMUSCULAR; INTRAVENOUS EVERY 5 MIN PRN
Status: DISCONTINUED | OUTPATIENT
Start: 2024-10-11 | End: 2024-10-11 | Stop reason: HOSPADM

## 2024-10-11 RX ORDER — DEXAMETHASONE SODIUM PHOSPHATE 4 MG/ML
4 INJECTION, SOLUTION INTRA-ARTICULAR; INTRALESIONAL; INTRAMUSCULAR; INTRAVENOUS; SOFT TISSUE
Status: DISCONTINUED | OUTPATIENT
Start: 2024-10-11 | End: 2024-10-11 | Stop reason: HOSPADM

## 2024-10-11 RX ORDER — FENTANYL CITRATE 50 UG/ML
25 INJECTION, SOLUTION INTRAMUSCULAR; INTRAVENOUS EVERY 5 MIN PRN
Status: DISCONTINUED | OUTPATIENT
Start: 2024-10-11 | End: 2024-10-11 | Stop reason: HOSPADM

## 2024-10-11 RX ORDER — HYDROMORPHONE HYDROCHLORIDE 1 MG/ML
0.4 INJECTION, SOLUTION INTRAMUSCULAR; INTRAVENOUS; SUBCUTANEOUS EVERY 5 MIN PRN
Status: DISCONTINUED | OUTPATIENT
Start: 2024-10-11 | End: 2024-10-11 | Stop reason: HOSPADM

## 2024-10-11 RX ORDER — HYDROMORPHONE HYDROCHLORIDE 1 MG/ML
0.5 INJECTION, SOLUTION INTRAMUSCULAR; INTRAVENOUS; SUBCUTANEOUS
Status: DISCONTINUED | OUTPATIENT
Start: 2024-10-11 | End: 2024-10-14 | Stop reason: HOSPADM

## 2024-10-11 RX ORDER — PROPOFOL 10 MG/ML
INJECTION, EMULSION INTRAVENOUS PRN
Status: DISCONTINUED | OUTPATIENT
Start: 2024-10-11 | End: 2024-10-11

## 2024-10-11 RX ORDER — NALOXONE HYDROCHLORIDE 0.4 MG/ML
0.1 INJECTION, SOLUTION INTRAMUSCULAR; INTRAVENOUS; SUBCUTANEOUS
Status: DISCONTINUED | OUTPATIENT
Start: 2024-10-11 | End: 2024-10-11 | Stop reason: HOSPADM

## 2024-10-11 RX ORDER — METOPROLOL TARTRATE 1 MG/ML
1-2 INJECTION, SOLUTION INTRAVENOUS EVERY 5 MIN PRN
Status: DISCONTINUED | OUTPATIENT
Start: 2024-10-11 | End: 2024-10-11 | Stop reason: HOSPADM

## 2024-10-11 RX ORDER — POLYETHYLENE GLYCOL 3350 17 G/17G
17 POWDER, FOR SOLUTION ORAL 2 TIMES DAILY PRN
Status: DISCONTINUED | OUTPATIENT
Start: 2024-10-11 | End: 2024-10-14 | Stop reason: HOSPADM

## 2024-10-11 RX ORDER — TRANEXAMIC ACID 650 MG/1
1950 TABLET ORAL ONCE
Status: CANCELLED | OUTPATIENT
Start: 2024-10-11 | End: 2024-10-11

## 2024-10-11 RX ORDER — AMOXICILLIN 250 MG
2 CAPSULE ORAL 2 TIMES DAILY PRN
Status: DISCONTINUED | OUTPATIENT
Start: 2024-10-11 | End: 2024-10-14 | Stop reason: HOSPADM

## 2024-10-11 RX ORDER — CALCIUM CARBONATE 500 MG/1
1000 TABLET, CHEWABLE ORAL 4 TIMES DAILY PRN
Status: DISCONTINUED | OUTPATIENT
Start: 2024-10-11 | End: 2024-10-11

## 2024-10-11 RX ORDER — SODIUM CHLORIDE, SODIUM LACTATE, POTASSIUM CHLORIDE, CALCIUM CHLORIDE 600; 310; 30; 20 MG/100ML; MG/100ML; MG/100ML; MG/100ML
INJECTION, SOLUTION INTRAVENOUS CONTINUOUS PRN
Status: DISCONTINUED | OUTPATIENT
Start: 2024-10-11 | End: 2024-10-11

## 2024-10-11 RX ORDER — ONDANSETRON 4 MG/1
4 TABLET, ORALLY DISINTEGRATING ORAL EVERY 6 HOURS PRN
Status: DISCONTINUED | OUTPATIENT
Start: 2024-10-11 | End: 2024-10-14 | Stop reason: HOSPADM

## 2024-10-11 RX ORDER — CALCIUM CARBONATE 500 MG/1
1000 TABLET, CHEWABLE ORAL 4 TIMES DAILY PRN
Status: DISCONTINUED | OUTPATIENT
Start: 2024-10-11 | End: 2024-10-14 | Stop reason: HOSPADM

## 2024-10-11 RX ADMIN — HYDROMORPHONE HYDROCHLORIDE 0.5 MG: 1 INJECTION, SOLUTION INTRAMUSCULAR; INTRAVENOUS; SUBCUTANEOUS at 07:03

## 2024-10-11 RX ADMIN — PIPERACILLIN AND TAZOBACTAM 4.5 G: 4; .5 INJECTION, POWDER, LYOPHILIZED, FOR SOLUTION INTRAVENOUS at 16:17

## 2024-10-11 RX ADMIN — HYDROMORPHONE HYDROCHLORIDE 0.4 MG: 1 INJECTION, SOLUTION INTRAMUSCULAR; INTRAVENOUS; SUBCUTANEOUS at 19:32

## 2024-10-11 RX ADMIN — SODIUM CHLORIDE, POTASSIUM CHLORIDE, SODIUM LACTATE AND CALCIUM CHLORIDE: 600; 310; 30; 20 INJECTION, SOLUTION INTRAVENOUS at 17:07

## 2024-10-11 RX ADMIN — VANCOMYCIN HYDROCHLORIDE 1250 MG: 10 INJECTION, POWDER, LYOPHILIZED, FOR SOLUTION INTRAVENOUS at 06:40

## 2024-10-11 RX ADMIN — FENTANYL CITRATE 25 MCG: 50 INJECTION INTRAMUSCULAR; INTRAVENOUS at 17:19

## 2024-10-11 RX ADMIN — FENTANYL CITRATE 25 MCG: 50 INJECTION INTRAMUSCULAR; INTRAVENOUS at 17:16

## 2024-10-11 RX ADMIN — ONDANSETRON 4 MG: 2 INJECTION INTRAMUSCULAR; INTRAVENOUS at 17:07

## 2024-10-11 RX ADMIN — FENTANYL CITRATE 50 MCG: 50 INJECTION INTRAMUSCULAR; INTRAVENOUS at 19:15

## 2024-10-11 RX ADMIN — MIDAZOLAM 2 MG: 1 INJECTION INTRAMUSCULAR; INTRAVENOUS at 16:40

## 2024-10-11 RX ADMIN — DEXAMETHASONE SODIUM PHOSPHATE 4 MG: 4 INJECTION, SOLUTION INTRAMUSCULAR; INTRAVENOUS at 17:07

## 2024-10-11 RX ADMIN — OXYCODONE HYDROCHLORIDE 5 MG: 5 TABLET ORAL at 11:28

## 2024-10-11 RX ADMIN — PHENYLEPHRINE HYDROCHLORIDE 100 MCG: 10 INJECTION INTRAVENOUS at 17:19

## 2024-10-11 RX ADMIN — HYDROMORPHONE HYDROCHLORIDE 0.4 MG: 1 INJECTION, SOLUTION INTRAMUSCULAR; INTRAVENOUS; SUBCUTANEOUS at 19:59

## 2024-10-11 RX ADMIN — LIDOCAINE HYDROCHLORIDE 60 MG: 20 INJECTION, SOLUTION INFILTRATION; PERINEURAL at 16:50

## 2024-10-11 RX ADMIN — FENTANYL CITRATE 25 MCG: 50 INJECTION INTRAMUSCULAR; INTRAVENOUS at 17:40

## 2024-10-11 RX ADMIN — PHENYLEPHRINE HYDROCHLORIDE 100 MCG: 10 INJECTION INTRAVENOUS at 16:55

## 2024-10-11 RX ADMIN — PHENYLEPHRINE HYDROCHLORIDE 100 MCG: 10 INJECTION INTRAVENOUS at 17:04

## 2024-10-11 RX ADMIN — HYDROMORPHONE HYDROCHLORIDE 0.4 MG: 1 INJECTION, SOLUTION INTRAMUSCULAR; INTRAVENOUS; SUBCUTANEOUS at 19:43

## 2024-10-11 RX ADMIN — PROPOFOL 20 MG: 10 INJECTION, EMULSION INTRAVENOUS at 18:07

## 2024-10-11 RX ADMIN — FENTANYL CITRATE 25 MCG: 50 INJECTION INTRAMUSCULAR; INTRAVENOUS at 17:22

## 2024-10-11 RX ADMIN — PIPERACILLIN AND TAZOBACTAM 4.5 G: 4; .5 INJECTION, POWDER, LYOPHILIZED, FOR SOLUTION INTRAVENOUS at 09:24

## 2024-10-11 RX ADMIN — FENTANYL CITRATE 50 MCG: 50 INJECTION INTRAMUSCULAR; INTRAVENOUS at 19:20

## 2024-10-11 RX ADMIN — PROPOFOL 120 MG: 10 INJECTION, EMULSION INTRAVENOUS at 16:50

## 2024-10-11 RX ADMIN — TRANEXAMIC ACID 1 G: 10 INJECTION, SOLUTION INTRAVENOUS at 16:55

## 2024-10-11 RX ADMIN — PHENYLEPHRINE HYDROCHLORIDE 100 MCG: 10 INJECTION INTRAVENOUS at 17:09

## 2024-10-11 ASSESSMENT — ACTIVITIES OF DAILY LIVING (ADL)
ADLS_ACUITY_SCORE: 37
ADLS_ACUITY_SCORE: 38
ADLS_ACUITY_SCORE: 39
ADLS_ACUITY_SCORE: 37
ADLS_ACUITY_SCORE: 39
ADLS_ACUITY_SCORE: 37
ADLS_ACUITY_SCORE: 37
ADLS_ACUITY_SCORE: 38
ADLS_ACUITY_SCORE: 37
ADLS_ACUITY_SCORE: 37
ADLS_ACUITY_SCORE: 38
ADLS_ACUITY_SCORE: 39
ADLS_ACUITY_SCORE: 38
ADLS_ACUITY_SCORE: 37

## 2024-10-11 ASSESSMENT — COLUMBIA-SUICIDE SEVERITY RATING SCALE - C-SSRS
2. HAVE YOU ACTUALLY HAD ANY THOUGHTS OF KILLING YOURSELF IN THE PAST MONTH?: NO
6. HAVE YOU EVER DONE ANYTHING, STARTED TO DO ANYTHING, OR PREPARED TO DO ANYTHING TO END YOUR LIFE?: NO
1. IN THE PAST MONTH, HAVE YOU WISHED YOU WERE DEAD OR WISHED YOU COULD GO TO SLEEP AND NOT WAKE UP?: NO

## 2024-10-11 NOTE — PROGRESS NOTES
"Pt refused to have family members listed for update; pt's boyfriend is in room 839, but pt says \"leave him alone, I just want to get this done with.\"  "

## 2024-10-11 NOTE — ED PROVIDER NOTES
ED Provider Note  [facility]      History     Chief Complaint   Patient presents with    Arm Pain     Pt had small cut on the right middle finger 3 weeks ago. Pt noticed worsening swelling and pus noted.      HPI  Shelly Cha is a 54 year old female who past medical history of asthma, ulcerative colitis, pancolitis currently on immunotherapy with Infliximab every 6 weeks who presents to the emergency department for evaluation of right hand pain.  Patient is a right-handed feet male who reports that approximately 3 weeks ago she scraped the top of her right 3rd digit. Patient reports that yesterday she developed increased pain, redness, and swelling to her right third digit along with pus at the location of her prior abrasion.  Patient reports significantly worsening pain, swelling, and redness from her fingers to her hand and into her forearm over the past 24 hours.  Patient reports fever, denies any nausea, vomiting, chest pain, shortness of breath.  Patient is currently immunocompromise with her immunotherapy.  Patient denies any substance use, denies any IV drug use.  Denies any other trauma or injury.  No other complaints.    Past Medical History  Past Medical History:   Diagnosis Date    Pancolitis (H) 3/11/2021    Pancolitis (H) 3/11/2021     Past Surgical History:   Procedure Laterality Date    BIOPSY      right breast    CHOLECYSTECTOMY      GYN SURGERY      , tubal     albuterol (ACCUNEB) 1.25 MG/3ML neb solution  BANOPHEN 25 MG capsule  benzocaine (ANBESOL) 10 % gel  bisacodyl (DULCOLAX) 5 MG EC tablet  bisacodyl (DULCOLAX) 5 MG EC tablet  Cholecalciferol (VITAMIN D3) 50 MCG (2000 UT) TABS  FLOVENT DISKUS 250 MCG/BLIST inhaler  folic acid 800 MCG tablet  gabapentin (NEURONTIN) 300 MG capsule  loratadine (CLARITIN) 10 MG tablet  magnesium citrate solution  melatonin 5 MG tablet  METHADONE HCL PO  methylcellulose (CITRUCEL) powder  minoxidil (ROGAINE) 2 % external  "solution  Multiple Vitamin (DAILY-JUSTIN MULTIVITAMIN) TABS  omeprazole (PRILOSEC) 20 MG DR capsule  polyethylene glycol (GOLYTELY) 236 g suspension  polyethylene glycol (GOLYTELY) 236 g suspension  polyethylene glycol (MIRALAX) 17 GM/Dose powder  predniSONE (DELTASONE) 20 MG tablet  Thiamine Mononitrate (B1) 100 MG TABS  VENTOLIN  (90 Base) MCG/ACT inhaler  WAL-DRYL ALLERGY 25 MG tablet  WAL-MUCIL 58.6 % powder      Allergies   Allergen Reactions    Ibuprofen Swelling     Eyes swelling    LegacyRecord#[id number]   Other reaction(s): Edema, Urticaria (hives)   Eyes swelling   Eyes swelling    Lactose Diarrhea    Tylenol [Acetaminophen]      Feels like someone punched her in the stomach     Family History  Family History   Problem Relation Age of Onset    Glaucoma No family hx of     Macular Degeneration No family hx of      Social History   Social History     Tobacco Use    Smoking status: Some Days     Current packs/day: 0.00     Types: Cigarettes     Last attempt to quit: 01/2021     Years since quitting: 3.7    Smokeless tobacco: Never   Substance Use Topics    Alcohol use: No    Drug use: No      Past medical history, past surgical history, medications, allergies, family history, and social history were reviewed with the patient. No additional pertinent items.   A medically appropriate review of systems was performed with pertinent positives and negatives noted in the HPI, and all other systems negative.    Physical Exam   BP: 132/79  Pulse: 112  Temp: 100.1  F (37.8  C)  Resp: 18  Height: 162.6 cm (5' 4\")  Weight: 62.4 kg (137 lb 9.6 oz)  SpO2: 96 %  Physical Exam  General: Afebrile, moderate distress 2/2 to pain  HEENT: Normocephalic, atraumatic, conjunctivae normal. MMM  Neck: non-tender, supple  Cardio: regular rate. regular rhythm   Resp: Normal work of breathing, no respiratory distress, lungs clear bilaterally, no wheezing, rhonchi, rales  Chest/Back: no visual signs of trauma, no CVA tenderness "   Abdomen: soft, non distension, no tenderness, no peritoneal signs   Neuro: alert and fully oriented. CN II-XII grossly intact. Grossly normal strength and sensation in all extremities.   MSK: Right hand with significant pain, swelling, erythema from fingertips to mid forearm, +TTP, Decreased/minimal ROM at wrist, MCP, PIP, DIP 2/2 to pain, swelling, right 3rd (middle) digit with wound with purulent drainage to DIP  Integumentary/Skin: no rash visualized, normal color  Psych: normal affect, normal behavior            ED Course, Procedures, & Data      Procedures    Results for orders placed or performed during the hospital encounter of 10/11/24   XR Hand Right G/E 3 Views     Status: None    Narrative    EXAM: XR HAND RIGHT G/E 3 VIEWS  LOCATION: [facility]  DATE: 10/11/2024    INDICATION: Redness, swelling.  COMPARISON: None.      Impression    IMPRESSION: Flexion of the 2-5 digits. No fracture or dislocation involving the bones and joints of the right hand. Minor degenerative changes at the STT, first CMC and the first MCP joints. Diffuse soft tissue swelling. No opaque foreign body, soft   tissue gas, lytic or destructive process identified. No x-ray evidence of avascular necrosis.   Comprehensive metabolic panel     Status: Abnormal   Result Value Ref Range    Sodium 134 (L) 135 - 145 mmol/L    Potassium 3.6 3.4 - 5.3 mmol/L    Carbon Dioxide (CO2) 23 22 - 29 mmol/L    Anion Gap 10 7 - 15 mmol/L    Urea Nitrogen 14.6 6.0 - 20.0 mg/dL    Creatinine 0.60 0.51 - 0.95 mg/dL    GFR Estimate >90 >60 mL/min/1.73m2    Calcium 8.8 8.8 - 10.4 mg/dL    Chloride 101 98 - 107 mmol/L    Glucose 209 (H) 70 - 99 mg/dL    Alkaline Phosphatase 86 40 - 150 U/L    AST 14 0 - 45 U/L    ALT 13 0 - 50 U/L    Protein Total 6.8 6.4 - 8.3 g/dL    Albumin 3.4 (L) 3.5 - 5.2 g/dL    Bilirubin Total 0.3 <=1.2 mg/dL   CRP inflammation     Status: Abnormal   Result Value Ref Range    CRP  Inflammation 174.63 (H) <5.00 mg/L   Erythrocyte sedimentation rate auto     Status: Abnormal   Result Value Ref Range    Erythrocyte Sedimentation Rate 51 (H) 0 - 30 mm/hr   CBC with platelets and differential     Status: Abnormal   Result Value Ref Range    WBC Count 22.9 (H) 4.0 - 11.0 10e3/uL    RBC Count 3.39 (L) 3.80 - 5.20 10e6/uL    Hemoglobin 10.6 (L) 11.7 - 15.7 g/dL    Hematocrit 31.3 (L) 35.0 - 47.0 %    MCV 92 78 - 100 fL    MCH 31.3 26.5 - 33.0 pg    MCHC 33.9 31.5 - 36.5 g/dL    RDW 13.1 10.0 - 15.0 %    Platelet Count 292 150 - 450 10e3/uL   Extra Tube     Status: None (In process)    Narrative    The following orders were created for panel order Extra Tube.  Procedure                               Abnormality         Status                     ---------                               -----------         ------                     Extra Green Top (Lithium...[[id number]]                      In process                   Please view results for these tests on the individual orders.   CBC with platelets differential     Status: Abnormal (In process)    Narrative    The following orders were created for panel order CBC with platelets differential.  Procedure                               Abnormality         Status                     ---------                               -----------         ------                     CBC with platelets and d...[[id number]]  Abnormal            Final result               Manual Differential[[id number]]                              In process                   Please view results for these tests on the individual orders.     Medications   HYDROmorphone (PF) (DILAUDID) injection 0.5 mg (has no administration in time range)   vancomycin (VANCOCIN) 1,250 mg in 0.9% NaCl 262.5 mL intermittent infusion (1,250 mg Intravenous $New Bag 10/11/24 8470)   vancomycin place carter - receiving intermittent dosing (has no administration in time range)     Labs Ordered and Resulted from  Time of ED Arrival to Time of ED Departure   COMPREHENSIVE METABOLIC PANEL - Abnormal       Result Value    Sodium 134 (*)     Potassium 3.6      Carbon Dioxide (CO2) 23      Anion Gap 10      Urea Nitrogen 14.6      Creatinine 0.60      GFR Estimate >90      Calcium 8.8      Chloride 101      Glucose 209 (*)     Alkaline Phosphatase 86      AST 14      ALT 13      Protein Total 6.8      Albumin 3.4 (*)     Bilirubin Total 0.3     CRP INFLAMMATION - Abnormal    CRP Inflammation 174.63 (*)    ERYTHROCYTE SEDIMENTATION RATE AUTO - Abnormal    Erythrocyte Sedimentation Rate 51 (*)    CBC WITH PLATELETS AND DIFFERENTIAL - Abnormal    WBC Count 22.9 (*)     RBC Count 3.39 (*)     Hemoglobin 10.6 (*)     Hematocrit 31.3 (*)     MCV 92      MCH 31.3      MCHC 33.9      RDW 13.1      Platelet Count 292     DIFFERENTIAL   BLOOD CULTURE     XR Hand Right G/E 3 Views   Final Result   IMPRESSION: Flexion of the 2-5 digits. No fracture or dislocation involving the bones and joints of the right hand. Minor degenerative changes at the STT, first CMC and the first MCP joints. Diffuse soft tissue swelling. No opaque foreign body, soft    tissue gas, lytic or destructive process identified. No x-ray evidence of avascular necrosis.             Critical care was not performed.     Medical Decision Making  The patient's presentation was of high complexity (an acute health issue posing potential threat to life or bodily function).    The patient's evaluation involved:  review of external note(s) from 3+ sources (prior office visits, infusion visits, ED visits)  ordering and/or review of 3+ test(s) in this encounter (labs, xrays)  independent interpretation of testing performed by another health professional (xrays)  discussion of management or test interpretation with another health professional (orthopedics, internal medicine, morning provider)    The patient's management necessitated moderate risk (prescription drug management  including medications given in the ED), high risk (a parenteral controlled substance), high risk (a decision regarding hospitalization), and further care after sign-out to morning provider (see their note for further management).    Assessment & Plan    Shelly Cha is a 54 year old female who past medical history of asthma, ulcerative colitis, pancolitis currently on immunotherapy with Infliximab every 6 weeks who presents to the emergency department for evaluation of right finger/hand pain and swelling.  On arrival patient is nontoxic-appearing, moderate distress secondary to pain.  Patient is febrile 100.1, tachycardic with a heart rate of 112 bpm, oxygen 96% on room air.  Patient with significant erythema, swelling to her right fingers, hand, extending into her forearm with decreased range of motion secondary to the swelling.  Concern for soft tissue infection versus abscess among others.  Upon arrival comprehensive labs were performed including blood culture.  Patient was treated with IV Dilaudid for pain and IV vancomycin was started.    I discussed patient management with orthopedics who will evaluate the patient in the emergency department.  Patient with leukocytosis of 22.9, ESR elevated at 51, .63, sodium 134, no other acute metabolic or electrolyte abnormality, glucose elevated at 209.    Patient signed out to morning provider pending orthopedic evaluation, and admission (pending internal medicine callback)    I have reviewed the nursing notes. I have reviewed the findings, diagnosis, plan and need for follow up with the patient.    New Prescriptions    No medications on file       Final diagnoses:   Infection of right hand       Pauline Harrison MD  [facility] EMERGENCY DEPARTMENT  10/11/2024     Pauline Harrison MD  10/11/24 0654

## 2024-10-11 NOTE — ED TRIAGE NOTES
Triage Assessment (Adult)       Row Name 10/11/24 0429          Triage Assessment    Airway WDL WDL        Respiratory WDL    Respiratory WDL WDL        Skin Circulation/Temperature WDL    Skin Circulation/Temperature WDL WDL        Cardiac WDL    Cardiac WDL WDL        Peripheral/Neurovascular WDL    Peripheral Neurovascular WDL WDL        Cognitive/Neuro/Behavioral WDL    Cognitive/Neuro/Behavioral WDL WDL

## 2024-10-11 NOTE — H&P
[facility]    History and Physical - Hospitalist Service, GOLD TEAM 19       Date of Admission:  10/11/2024    Assessment & Plan      Shelly Cha is a 54 year old female admitted on 10/11/2024. She has a past medical history of ulcerative colitis (on infliximab) pancolitis presenting to the ER with acute-subacute onset of worsening right hand pain concerning for complicated soft tissue infection +/- septic joint.  Patient is also found to meet criteria for sepsis.      # Sepsis secondary to complicated soft tissue infection  # Concern for septic joint involving right upper extremity  -Reported about 2 weeks ago was clearing her garden and may have scraped her hand  -Gets every 6 weeks infliximab given history of ulcerative colitis  -Right hand swelling worsened 1 day prior to admission.  Pain became severe and so she come to the hospital.  -Found to have leukocytosis, elevated inflammatory markers  -Denies any fever in the outpatient setting.  -Orthopedic surgery consulted, plan for possible or later today  -Infectious disease consulted, appreciate recs.  For now continue IV vancomycin and Zosyn  -Follow-up MRSA nares  -Multimodal pain control with oral and IV opioids as needed.  -Last Tdap vaccine was 2018.    # Preop eval-no history of CHF, CKD, CVA.  Patient METS >4.  Denies any chest pain.  At this time, patient is fully optimized to proceed to surgery no further workup indicated prior to surgery.    # History of ulcerative colitis-no concerns for flare at this time.  Recommend continue outpatient follow-up.            Diet: NPO per Anesthesia Guidelines for Procedure/Surgery Except for: No Exceptions    DVT Prophylaxis: Pneumatic Compression Devices  Kyle Catheter: Not present  Lines: None     Cardiac Monitoring: None  Code Status: Full Code      Clinically Significant Risk Factors Present on Admission         # Hyponatremia: Lowest Na = 134 mmol/L in last 2 days,  will monitor as appropriate       # Hypoalbuminemia: Lowest albumin = 3.4 g/dL at 10/11/2024  6:24 AM, will monitor as appropriate        # Anemia: based on hgb <11                  Disposition Plan     Medically Ready for Discharge: Anticipated in 5+ Days           IGOR ALMENDAREZ MD  Hospitalist Service, GOLD TEAM 19  [facility]  Securely message with Who@ (more info)  Text page via [facility] Paging/Directory   See signed in provider for up to date coverage information    ______________________________________________________________________    Chief Complaint   Right hand pain    History is obtained from the patient    History of Present Illness   Shelly Cha is a 54 year old female who has a past medical history of ulcerative colitis (on infliximab infusion outpatient setting), presenting from home due to pain and swelling involving her right hand.  Patient noted at about 2 weeks ago, she was gardening without using any gloves/protection.  She did note scraping her hand in the process.  She subsequently developed some redness and swelling in her right long finger before yesterday she noticed increased swelling involving all of her right hand.  As of today, this has been associated with pain prompting her to come to the ER for further evaluation.  She also reports some pus drainage from her right long finger.  She denies any fever, chills, chest pain or shortness of breath.  Patient's last Tdap vaccine was in April 2018.      Past Medical History    Past Medical History:   Diagnosis Date    Pancolitis (H) 3/11/2021    Pancolitis (H) 3/11/2021       Past Surgical History   Past Surgical History:   Procedure Laterality Date    BIOPSY      right breast    CHOLECYSTECTOMY      GYN SURGERY      , tubal       Prior to Admission Medications   Prior to Admission Medications   Prescriptions Last Dose Informant Patient Reported? Taking?   BANOPHEN 25 MG capsule   Yes No    Cholecalciferol (VITAMIN D3) 50 MCG (2000) TABS   Yes No   Sig: Take 1 tablet by mouth daily   FLOVENT DISKUS 250 MCG/BLIST inhaler   Yes No   METHADONE HCL PO   Yes No   Sig: Take 48 mg by mouth daily   Multiple Vitamin (DAILY-JUSTIN MULTIVITAMIN) TABS   Yes No   Sig: Take 1 tablet by mouth daily   Thiamine Mononitrate (B1) 100 MG TABS   Yes No   Sig: Take 1 tablet by mouth daily   VENTOLIN  (90 Base) MCG/ACT inhaler   Yes No   WAL-DRYL ALLERGY 25 MG tablet   Yes No   WAL-MUCIL 58.6 % powder   Yes No   albuterol (ACCUNEB) 1.25 MG/3ML neb solution   Yes No   Sig: Inhale 1.25 mg into the lungs   benzocaine (ANBESOL) 10 % gel   No No   Sig: Take by mouth 4 times daily as needed for moderate pain (apply to ulcer in mouth as needed)   bisacodyl (DULCOLAX) 5 MG EC tablet   No No   Sig: Take 2 tablets by mouth at 10am the day before procedure.   bisacodyl (DULCOLAX) 5 MG EC tablet   No No   Si days prior to procedure, take 2 tablets at 4 pm. 1 day prior to procedure, take 2 tablets at 4 pm. For additional instructions refer to your colonoscopy prep instructions.   folic acid 800 MCG tablet   Yes No   Sig: Take 800 mcg by mouth daily   gabapentin (NEURONTIN) 300 MG capsule   Yes No   Sig: Take 300 mg by mouth 4 times daily    loratadine (CLARITIN) 10 MG tablet   Yes No   Sig: Take 10 mg by mouth daily   magnesium citrate solution   No No   Sig: Drink entire bottle at 4pm two days prior to procedure.   melatonin 5 MG tablet   Yes No   Sig: Take 5 mg by mouth daily   methylcellulose (CITRUCEL) powder   No No   Sig: Take 0.85 g (3 teaspoonful) by mouth daily   minoxidil (ROGAINE) 2 % external solution   Yes No   omeprazole (PRILOSEC) 20 MG DR capsule   No No   Sig: Take 1 capsule (20 mg) by mouth daily   polyethylene glycol (GOLYTELY) 236 g suspension   No No   Sig: Take as directed. One day before your exam fill the first container with water. Cover and shake until mixed well. At 3:00pm drink one 8oz glass  every 10-15 minutes until half of the first container is empty. Store the remainder in the refrigerator. At 8:00pm drink the second half of the first container until it is gone. Before you go to bed mix the second container with water and put in refrigerator. Six hours before your check in time drink one 8oz glass every 10-15 minutes until half of container is empty. Discard the remainder of solution.   polyethylene glycol (GOLYTELY) 236 g suspension   No No   Si days prior at 5pm, mix and drink half of a jug of Golytely. Drink an 8 oz. glass of Golytely every 15 minutes until half of the jug is gone. Place remainder of Golytely in the refrigerator. 1 day prior at 5 pm, drink the 2nd half of a jug of Golytely bowel prep. 6 hours before your check-in time, drink an 8 oz. glass of Golytely every 15 minutes until half of the 2nd jug of Golytely is gone. Discard remainder of second jug.   polyethylene glycol (MIRALAX) 17 GM/Dose powder   No No   Sig: Take 1 capful daily to have 1-2 soft BMs per day. Can increase up to 3 capfuls per day if needed to get desired result   predniSONE (DELTASONE) 20 MG tablet   No No   Sig: Take two tablets (= 40mg) each day for 4 (four) days      Facility-Administered Medications: None           Physical Exam   Vital Signs: Temp: 100.1  F (37.8  C) Temp src: Oral BP: 132/79 Pulse: 112   Resp: 18 SpO2: 96 % O2 Device: None (Room air)    Weight: 137 lbs 9.6 oz    General Appearance: Lying comfortably in bed, in no acute distress or discomfort  Respiratory: Normal work of breathing  Cardiovascular: Normal S1-S2 no murmurs rubs or gallops  GI: Abdomen soft nontender nondistended  MSK: Noted to have significant swelling, erythema and tenderness in her right hand as well as distal aspect of forearm.  Patient is unable to make a fist.  See image below                Medical Decision Making       65 MINUTES SPENT BY ME on the date of service doing chart review, history, exam, documentation &  further activities per the note.      Data   ------------------------- PAST 24 HR DATA REVIEWED -----------------------------------------------    I have personally reviewed the following data over the past 24 hrs:    22.9 (H)  \   10.6 (L)   / 292     134 (L) 101 14.6 /  209 (H)   3.6 23 0.60 \     ALT: 13 AST: 14 AP: 86 TBILI: 0.3   ALB: 3.4 (L) TOT PROTEIN: 6.8 LIPASE: N/A     Procal: N/A CRP: 174.63 (H) Lactic Acid: N/A         Imaging results reviewed over the past 24 hrs:   Recent Results (from the past 24 hour(s))   XR Hand Right G/E 3 Views    Narrative    EXAM: XR HAND RIGHT G/E 3 VIEWS  LOCATION: [facility]  DATE: 10/11/2024    INDICATION: Redness, swelling.  COMPARISON: None.      Impression    IMPRESSION: Flexion of the 2-5 digits. No fracture or dislocation involving the bones and joints of the right hand. Minor degenerative changes at the STT, first CMC and the first MCP joints. Diffuse soft tissue swelling. No opaque foreign body, soft   tissue gas, lytic or destructive process identified. No x-ray evidence of avascular necrosis.   US Upper Extremity Non Vascular Right    Narrative    US UPPER EXTREMITY NON VASCULAR RIGHT 10/11/2024 11:09 AM    HISTORY: R hand infection extending to elbow after scrape in  immunocompromised patient. eval for abscess    COMPARISON: None.    FINDINGS: There is extensive edema and fluid in the subcutaneous soft  tissues of the the dorsal aspect of the right distal forearm, right  wrist and hand. A focal complex fluid collection in the dorsal hand  along the little finger/ulnar side measuring 3.3 x 2.9 x 1.5 cm      Impression    IMPRESSION: Extensive edema and fluid in the subcutaneous tissues of  the right distal forearm, wrist and hand with findings suspicious for  an focal abscess in the ulnar/little finger side of the right hand.    LORELEI GARCIA MD         SYSTEM ID:  [id number]

## 2024-10-11 NOTE — OR NURSING
PACU to Inpatient Nursing Handoff    Patient Shelly Cha is a 54 year old female who speaks English.   Procedure Procedure(s):  Incision and drainage hand, combined   Surgeon(s) Primary: Eros Summers MD  Resident - Assisting: Jonny Pang MD     Allergies   Allergen Reactions    Ibuprofen Swelling     Eyes swelling    LegacyRecord#[id number]   Other reaction(s): Edema, Urticaria (hives)   Eyes swelling   Eyes swelling    Lactose Diarrhea    Tylenol [Acetaminophen]      Feels like someone punched her in the stomach       Isolation  [unfilled]     Past Medical History   has a past medical history of Pancolitis (H) (3/11/2021) and Pancolitis (H) (3/11/2021).    Anesthesia General   Dermatome Level     Preop Meds Not applicable   Nerve block Not applicable   Intraop Meds dexamethasone (Decadron)  fentanyl (Sublimaze): 100 mcg total  ondansetron (Zofran): last given at 1707  TXA 1 g    Local Meds No   Antibiotics Vancocin 1250 @ 0640     Pain Patient Currently in Pain: yes   PACU meds  Fentanyl 100 mg last dose @  1920  Dilaudid 1.2 mg last dose @ 1959   PCA / epidural No   Capnography     Telemetry ECG Rhythm: Normal sinus rhythm   Inpatient Telemetry Monitor Ordered? No        Labs Glucose Lab Results   Component Value Date     10/11/2024     10/11/2024     08/11/2021    GLC 87 03/12/2021       Hgb Lab Results   Component Value Date    HGB 10.6 10/11/2024    HGB 11.2 06/16/2021       INR Lab Results   Component Value Date    INR 1.21 10/11/2024      PACU Imaging Not applicable     Wound/Incision Incision/Surgical Site 10/11/24 Posterior;Right Hand (Active)   Incision Assessment UTV 10/11/24 1836   Dressing Dry gauze;Xeroform 10/11/24 1816   [place]-Incision Assessment UTV 10/11/24 1816   Closure Other (Comment) 10/11/24 1836   Incision Drainage Amount Small 10/11/24 1810   Dressing Intervention Clean, dry, intact 10/11/24 1836   Number of days: 0      CMS        Equipment Not applicable    Other LDA       IV Access Peripheral IV 10/11/24 Left Upper forearm (Active)   Site Assessment WDL 10/11/24 1816   Line Status Saline locked 10/11/24 1816   Dressing Transparent 10/11/24 1816   Dressing Status clean;dry;intact 10/11/24 1816   Phlebitis Scale 0-->no symptoms 10/11/24 1816   Infiltration? no 10/11/24 1816   Number of days: 0      Blood Products Not applicable EBL 23 mL   Intake/Output Date 10/11/24 0700 - 10/12/24 0659   Shift 4730-1327 4288-8972 9050-3357 24 Hour Total   INTAKE   I.V.  500  500   Shift Total(mL/kg)  500(8.01)  500(8.01)   OUTPUT   Blood  23  23   Shift Total(mL/kg)  23(0.37)  23(0.37)   Weight (kg) 62.41 62.41 62.41 62.41      Drains / Kyle Open Drain Right Other (Comment) 1/4 penrose drain (Active)   Site Description UTV 10/11/24 1836   Dressing Status Normal: Clean, dry & intact 10/11/24 1836   Number of days: 0      Time of void PreOp      PostOp      Diapered? No   Bladder Scan     PO    water ,ice cream, crackers     Vitals    B/P: 111/67  T: 97.9  F (36.6  C)    Temp src: Axillary  P:  Pulse: 70 (10/11/24 1816)          R: 15  O2:  SpO2: 100 %    O2 Device: Simple face mask (10/11/24 1816)    Oxygen Delivery: 6 LPM (10/11/24 1816)         Family/support present significant other   Patient belongings     Patient transported on cart   DC meds/scripts (obs/outpt) Not applicable   Inpatient Pain Meds Released? Yes       Special needs/considerations None   Tasks needing completion None       Ene Gupta RN  syl

## 2024-10-11 NOTE — CONSULTS
[place] [facility] [place]  Shelly Cha, Date of birth 1970, Medical record number [id number]  Date of Admission: 10/11/2024  Date of Visit:  10/11/2024  Requesting Provider: Morgan Hernandez         Assessment and Recommendations:   Problem List:    # Right hand/wrist/forearm infection    # Ulcerative colitis on infliximab     Discussion:    Shelly Cha is a 54 year old female who past medical history of asthma, ulcerative colitis, pancolitis currently on immunotherapy with Infliximab every 6 weeks who presented to the emergency department on 10/11 for evaluation of right hand pain and concern for infection.     Patient  reports that approximately 3 weeks prior to admission, she scraped the top of her right 3rd digit. She does not remember what kind of object punctures/scraped her skin but she states it was not a sharon metal. The area never really healed and she reports that on 10/10/24 she developed increased pain, redness, and swelling to her right third digit along with pus at the location of her prior abrasion.  Patient reports significantly worsening pain, swelling, and redness from her fingers to her hand and into her forearm over the 24 hours prior to admission.  Patient reports fever and denies any nausea, vomiting, chest pain, shortness of breath. Patient denies any substance use, denies any IV drug use.  Denies any other trauma or injury.      On arrival she had 100.1F of temperature and had . BP was 132/79. White count was elevated at 22.9. CRP was 174. ESR was 51. Blood culture obtained on 10/11/24 and in process. X ray of the right hand showed diffuse soft tissue swelling. No fracture or dislocation. No foreign body, no sift tissue gas, no lytic or destructive process. Patient was started on vancomycin and zosyn on 10/11/24.  US of the right hand performed later this morning and showed extensive edema on the right hand/wrist/forearm with  findings suspicious for focal abscess in the ulnar/little finger side of the right hand (3.3 x 2.9 x 1.5 cm). Ortho consulted and recommended MRI (ordered).     Recommendations:      I agree with broad spectrum coverage with vancomycin and zosyn    I agree with MRI for better delineation of the collection and depth of infection    I appreciate ortho involvement for surgical debridement and drainage of abscess. Once surgery is performed, please send material to Gram stain, bacterial (aerobic and anaerobic) cultures     Patient will likely need tetanus vaccination booster since the most recent one was over 5 years ago      Recommendations discussed with ortho and medicine.    Thank you for this consult. The General ID team will continue to follow this patient. Please feel free to call with any questions.     BERTA BATEMAN MD  Date of Service: 10/11/24  Pager: [phone]       History of Present Illness:   Shelly Cha is a 54 year old female who past medical history of asthma, ulcerative colitis, pancolitis currently on immunotherapy with Infliximab every 6 weeks who presented to the emergency department on 10/11 for evaluation of right hand pain and concern for infection.     Patient  reports that approximately 3 weeks prior to admission, she scraped the top of her right 3rd digit. She does not remember what kind of object punctures/scraped her skin but she states it was not a sharon metal. The area never really healed and she reports that on 10/10/24 she developed increased pain, redness, and swelling to her right third digit along with pus at the location of her prior abrasion.  Patient reports significantly worsening pain, swelling, and redness from her fingers to her hand and into her forearm over the 24 hours prior to admission.  Patient reports fever and denies any nausea, vomiting, chest pain, shortness of breath. Patient denies any substance use, denies any IV drug use.  Denies any other trauma or  injury.      On arrival she had 100.1F of temperature and had . BP was 132/79. White count was elevated at 22.9. CRP was 174. ESR was 51. Blood culture obtained on 10/11/24 and in process. X ray of the right hand showed diffuse soft tissue swelling. No fracture or dislocation. No foreign body, no sift tissue gas, no lytic or destructive process. Patient was started on vancomycin and zosyn on 10/11/24.  US of the right hand performed later this morning and showed extensive edema on the right hand/wrist/forearm with findings suspicious for focal abscess in the ulnar/little finger side of the right hand (3.3 x 2.9 x 1.5 cm). Ortho consulted and recommended MRI (ordered).          Review of Systems:     CONSTITUTIONAL:  See HPI  INTEGUMENTARY/SKIN: See HPI  EYES: Negative for icterus, vision changes or irritation  ENT/MOUTH:  Negative for oral lesions and sore throat  RESPIRATORY:  Negative for cough and dyspnea  CARDIOVASCULAR:  Negative for chest pain, palpitations and  shortness of breath  GASTROINTESTINAL:  Negative for abdominal pain, nausea, vomiting, diarrhea and constipation  GENITOURINARY:  Negative for dysuria, hematuria, frequency and urgency  MUSCULOSKELETAL: See HPI  NEURO:  Negative for headache, altered mental status, numbness or weakness  PSYCHIATRIC: Negative for changes in mood or affect  HEMATOLOGIC/LYMPHATIC: negative for lymphadenopathy or bleeding  ALLERGIC/IMMUNOLOGIC: Negative for allergic reaction   ENDOCRINE: Negative for temperature intolerance, skin/hair changes       Past Medical History:     Past Medical History:   Diagnosis Date    Pancolitis (H) 3/11/2021    Pancolitis (H) 3/11/2021         Allergies:      Allergies   Allergen Reactions    Ibuprofen Swelling     Eyes swelling    LegacyRecord#[id number]   Other reaction(s): Edema, Urticaria (hives)   Eyes swelling   Eyes swelling    Lactose Diarrhea    Tylenol [Acetaminophen]      Feels like someone punched her in the stomach           "Family History:     Family History   Problem Relation Age of Onset    Glaucoma No family hx of     Macular Degeneration No family hx of             Social History:     Social History     Socioeconomic History    Marital status: Single     Spouse name: Not on file    Number of children: Not on file    Years of education: Not on file    Highest education level: Not on file   Occupational History    Not on file   Tobacco Use    Smoking status: Some Days     Current packs/day: 0.00     Types: Cigarettes     Last attempt to quit: 2021     Years since quitting: 3.7    Smokeless tobacco: Never   Substance and Sexual Activity    Alcohol use: No    Drug use: No    Sexual activity: Yes     Partners: Male   Other Topics Concern    Not on file   Social History Narrative    Not on file     Social Determinants of Health     Financial Resource Strain: Not on File (11/10/2019)    Received from [facility]    Financial Resource Strain     Financial Resource Strain: 0   Food Insecurity: Not on File (2024)    Received from [facility]    Food Insecurity     Food: 0   Transportation Needs: Not on File (11/10/2019)    Received from [facility]    Transportation Needs     Transportation: 0   Physical Activity: Not on File (11/10/2019)    Received from [facility]    Physical Activity     Physical Activity: 0   Stress: Not on File (11/10/2019)    Received from [facility]    Stress     Stress: 0   Social Connections: Not on File (2024)    Received from [facility]    Social Connections     Connectedness: 0   Interpersonal Safety: Not on file   Housing Stability: Not on File (11/10/2019)    Received from [facility]    Housing Stability     Housin     TOBACCO/ALCOHOL/RECREATIONAL DRUGS:   Reports that she smokes cigarettes intermittently  Denies alcohol use  Denies drug use           Physical Exam:   /79   Pulse 112   Temp 100.1  F (37.8  C) (Oral)   Resp 18   Ht 1.626 m (5' 4\")   Wt 62.4 kg (137 lb 9.6 oz)   SpO2 96% "   BMI 23.62 kg/m         Exam:  GENERAL:  Not in acute distress.   HEAD: Normocephalic and atraumatic  ENT:  No hearing impairment, oral mucous membranes moist  EYES:  Eyes grossly normal to inspection  LUNGS:  Clear to auscultation - no rales, rhonchi or wheezes  CARDIOVASCULAR:  Regular rate and rhythm, normal S1 S2,  no murmur  ABDOMEN:  Soft, non-tender  EXT/MS/SKIN:  Significant swelling, erythema and induration on the right hand/wrist/proximal forearm. Skin wound with   NEUROLOGIC:  Grossly nonfocal. Mentation intact and speech normal         Laboratory Data:     Creatinine   Date Value Ref Range Status   10/11/2024 0.60 0.51 - 0.95 mg/dL Final   07/28/2023 0.59 0.51 - 0.95 mg/dL Final   08/11/2021 0.72 0.52 - 1.04 mg/dL Final   03/12/2021 0.84 0.52 - 1.04 mg/dL Final   01/22/2021 0.83 0.52 - 1.04 mg/dL Final   04/14/2018 0.60 0.52 - 1.04 mg/dL Final   04/13/2018 0.49 (L) 0.52 - 1.04 mg/dL Final   04/12/2018 0.65 0.52 - 1.04 mg/dL Final     WBC   Date Value Ref Range Status   06/16/2021 10.4 4.0 - 11.0 10e9/L Final   05/19/2021 8.4 4.0 - 11.0 10e9/L Final   05/05/2021 8.5 4.0 - 11.0 10e9/L Final   03/12/2021 8.7 4.0 - 11.0 10e9/L Final   01/22/2021 13.7 (H) 4.0 - 11.0 10e9/L Final     WBC Count   Date Value Ref Range Status   10/11/2024 22.9 (H) 4.0 - 11.0 10e3/uL Final   09/18/2024 7.7 4.0 - 11.0 10e3/uL Final   06/26/2024 8.5 4.0 - 11.0 10e3/uL Final   04/03/2024 8.7 4.0 - 11.0 10e3/uL Final   01/10/2024 9.8 4.0 - 11.0 10e3/uL Final     Hemoglobin   Date Value Ref Range Status   10/11/2024 10.6 (L) 11.7 - 15.7 g/dL Final   06/16/2021 11.2 (L) 11.7 - 15.7 g/dL Final     Platelet Count   Date Value Ref Range Status   10/11/2024 292 150 - 450 10e3/uL Final   06/16/2021 328 150 - 450 10e9/L Final     Lab Results   Component Value Date     (L) 10/11/2024    BUN 14.6 10/11/2024    CO2 23 10/11/2024     CRP Inflammation   Date Value Ref Range Status   08/24/2022 3.5 0.0 - 8.0 mg/L Final   06/01/2022 <2.9 0.0  "- 8.0 mg/L Final   04/20/2022 5.0 0.0 - 8.0 mg/L Final   01/26/2022 <2.9 0.0 - 8.0 mg/L Final   11/03/2021 13.0 (H) 0.0 - 8.0 mg/L Final   06/16/2021 <2.9 0.0 - 8.0 mg/L Final   05/19/2021 <2.9 0.0 - 8.0 mg/L Final   05/05/2021 4.0 0.0 - 8.0 mg/L Final   03/12/2021 5.4 0.0 - 8.0 mg/L Final   02/01/2021 16.0 (H) 0.0 - 8.0 mg/L Final           Pertinent Recent Microbiology Data:   No results for input(s): \"CULT\", \"SDES\" in the last 168 hours.         Imaging:     Recent Results (from the past 48 hour(s))   XR Hand Right G/E 3 Views    Narrative    EXAM: XR HAND RIGHT G/E 3 VIEWS  LOCATION: [facility]  DATE: 10/11/2024    INDICATION: Redness, swelling.  COMPARISON: None.      Impression    IMPRESSION: Flexion of the 2-5 digits. No fracture or dislocation involving the bones and joints of the right hand. Minor degenerative changes at the STT, first CMC and the first MCP joints. Diffuse soft tissue swelling. No opaque foreign body, soft   tissue gas, lytic or destructive process identified. No x-ray evidence of avascular necrosis.        "

## 2024-10-11 NOTE — PROVIDER NOTIFICATION
"Dr. Summers notified of unable to release Pre-op orders and unable to give TXA in preop, per Dr. Summers \"hold on TXA, the CRNA will give IV.\"  "

## 2024-10-11 NOTE — ED NOTES
Handoff report given to KAMERON Soto.    RN briefly informed of ED course thus far and plan of care.

## 2024-10-11 NOTE — ANESTHESIA PREPROCEDURE EVALUATION
Anesthesia Pre-Procedure Evaluation    Patient: Shelly Cha   MRN: [id number] : 1970        Procedure : Procedure(s):  Incision and drainage hand, combined          Past Medical History:   Diagnosis Date    Pancolitis (H) 3/11/2021    Pancolitis (H) 3/11/2021      Past Surgical History:   Procedure Laterality Date    BIOPSY      right breast    CHOLECYSTECTOMY      GYN SURGERY      , tubal      Allergies   Allergen Reactions    Ibuprofen Swelling     Eyes swelling    LegacyRecord#[id number]   Other reaction(s): Edema, Urticaria (hives)   Eyes swelling   Eyes swelling    Lactose Diarrhea    Tylenol [Acetaminophen]      Feels like someone punched her in the stomach      Social History     Tobacco Use    Smoking status: Some Days     Current packs/day: 0.00     Types: Cigarettes     Last attempt to quit: 2021     Years since quitting: 3.7    Smokeless tobacco: Never   Substance Use Topics    Alcohol use: No      Wt Readings from Last 1 Encounters:   10/11/24 62.4 kg (137 lb 9.6 oz)        Anesthesia Evaluation            ROS/MED HX  ENT/Pulmonary:  - neg pulmonary ROS     Neurologic:  - neg neurologic ROS     Cardiovascular:    (-) murmur   METS/Exercise Tolerance: >4 METS    Hematologic:     (+)      anemia,          Musculoskeletal:       GI/Hepatic: Comment: Ulcerative colitis    (+)       Inflammatory bowel disease,             Renal/Genitourinary:  - neg Renal ROS     Endo:  - neg endo ROS     Psychiatric/Substance Use:  - neg psychiatric ROS     Infectious Disease: Comment: Infected hand      Malignancy:       Other:            Physical Exam    Airway        Mallampati: I   TM distance: > 3 FB   Neck ROM: full   Mouth opening: > 3 cm    Respiratory Devices and Support         Dental       (+) Minor Abnormalities - some fillings, tiny chips      Cardiovascular          Rhythm and rate: regular and normal (-) no murmur    Pulmonary   pulmonary exam normal        breath sounds clear to auscultation  "          OUTSIDE LABS:  CBC:   CBC RESULTS:   Recent Labs   Lab Test 10/11/24  0624 09/18/24  1714 06/26/24  1631 04/03/24  1607   WBC 22.9* 7.7 8.5 8.7   HGB 10.6* 10.7* 12.7 12.4   HCT 31.3* 31.9* 36.5 35.8    317 264 325       Lab Results   Component Value Date    WBC 22.9 (H) 10/11/2024    WBC 7.7 09/18/2024    HGB 10.6 (L) 10/11/2024    HGB 10.7 (L) 09/18/2024    HCT 31.3 (L) 10/11/2024    HCT 31.9 (L) 09/18/2024     10/11/2024     09/18/2024     BMP:   Lab Results   Component Value Date     (L) 10/11/2024     (L) 10/11/2024    POTASSIUM 3.6 10/11/2024    POTASSIUM 3.7 10/11/2024    CHLORIDE 101 10/11/2024    CHLORIDE 100 10/11/2024    CO2 23 10/11/2024    CO2 23 10/11/2024    BUN 14.6 10/11/2024    BUN 14.1 10/11/2024    CR 0.60 10/11/2024    CR 0.60 10/11/2024     (H) 10/11/2024     (H) 10/11/2024     COAGS:   Lab Results   Component Value Date    PTT 33 10/11/2024    INR 1.21 (H) 10/11/2024     POC: No results found for: \"BGM\", \"HCG\", \"HCGS\"  HEPATIC:   Lab Results   Component Value Date    ALBUMIN 3.4 (L) 10/11/2024    PROTTOTAL 6.8 10/11/2024    ALT 13 10/11/2024    AST 14 10/11/2024    ALKPHOS 86 10/11/2024    BILITOTAL 0.3 10/11/2024     OTHER:   Lab Results   Component Value Date    LETTY 8.8 10/11/2024    LETTY 8.7 (L) 10/11/2024    TSH 3.95 02/01/2021    CRP 3.5 08/24/2022    SED 51 (H) 10/11/2024       Anesthesia Plan    ASA Status:  2    NPO Status:  NPO Appropriate    Anesthesia Type: General.     - Airway: LMA   Induction: Intravenous, Propofol.   Maintenance: Balanced.        Consents    Anesthesia Plan(s) and associated risks, benefits, and realistic alternatives discussed. Questions answered and patient/representative(s) expressed understanding.     - Discussed:     - Discussed with:  Patient      - Extended Intubation/Ventilatory Support Discussed: Yes.      - Patient is DNR/DNI Status: No     Use of blood products discussed: Yes.     - Discussed " with: Patient.     - Consented: consented to blood products     Postoperative Care    Pain management: IV analgesics, Oral pain medications, Multi-modal analgesia.   PONV prophylaxis: Ondansetron (or other 5HT-3)     Comments:    Other Comments: Discussed plan for general anesthetic with LMA. Discussed risks of sore throat, post op pain/nausea, oropharyngeal damage, rare major complications.      Will need to replace IV after induction as is in the operative arm.            Real Gan MD    I have reviewed the pertinent notes and labs in the chart from the past 30 days and (re)examined the patient.  Any updates or changes from those notes are reflected in this note.     # Hyponatremia: Lowest Na = 132 mmol/L in last 2 days, will monitor as appropriate       # Hypoalbuminemia: Lowest albumin = 3.4 g/dL at 10/11/2024  6:24 AM, will monitor as appropriate  # Coagulation Defect: INR = 1.21 (Ref range: 0.85 - 1.15) and/or PTT = 33 Seconds (Ref range: 22 - 38 Seconds), will monitor for bleeding         # Anemia: based on hgb <11

## 2024-10-11 NOTE — CONSULTS
Orthopaedic Surgery Consultation Note    Shelly Cha MRN# [id number]   Age: 54 year old YOB: 1970     Date of Admission:  10/11/2024    Reason for consult: Hand infection       Requesting physician: ER         Assessment and Plan:   Assessment:  Pt is a 54 year old female right-hand-dominant female with past medical history of asthma, ulcerative colitis, pancolitis currently on immunotherapy with Infliximab every 6 weeks (last 3 weeks ago right before injury) who presents to the emergency department for evaluation of right hand pain, for which Orthopedics is consulted, with right hand infection, found to have cellulitis and abscess with concern for possible horseshoe abscess.     MRI ordered for further evaluation, will plan for operative intervention with irrigation and debridement of the right hand/forearm, consent obtained after discussion of risks and benefits.     Plan:  Obtain MRI of right hand/forearm, without contrast  Plan for OR 10/11/2024 for irrigation and debridement of right upper extremity  Maintain n.p.o. until OR 10/11/2024  Antibiotics per infectious disease - currently on vancomycin and pip/tazo    Medicine Primary  Activity: as tolerated  Weight bearing status: WBAT  Antibiotics: per ID  Diet: NPO for OR 10/11/24  DVT prophylaxis: per primary, hold for OR from orthopedic perspective   Bracing/Splinting: None  Elevation: Elevate RUE on pillows  Wound Care: None pendign OR  Pain management: utilize all oral meds first, utilize IV meds for severe breakthrough pain after PO meds given adequate time to take effect  Imaging: MRI RUE to be completed   Labs: Pre-op labs pending, ordered  Cultures: Pending, follow blood culture results closely.  Consults: per primary  Disposition: TBD  Follow-up: TBD          History of Present Illness:   Patient was seen and examined by me. History, PMH, Meds, SH, complete ROS (10 organ systems) and PE reviewed with patient and prior medical records.       Pt is a 54 year old right-hand-dominant female with past medical history of asthma, ulcerative colitis, pancolitis currently on immunotherapy with Infliximab every 6 weeks (last 3 weeks ago right before injury) who presents to the emergency department for evaluation of right hand pain, for which Orthopedics is consulted.     Per patient, approximately 3 weeks ago she scraped the top of her right 3rd digit while gardening. Patient reports that yesterday, 10/10/2024, she developed increased pain, redness, and swelling to her right third digit along with pus at the location of her prior abrasion.  Patient reports significantly worsening pain, swelling, and redness from her fingers to her hand and into her forearm over the past 24 hours. Reports mild paresthesias in hand but denies numbness.  Patient reports chills and headaches, denies fever, denies any nausea, vomiting, chest pain, shortness of breath, cough.  Patient is currently immunocompromise with her immunotherapy.  Patient denies any substance use, denies any IV drug use.  Denies any other trauma or injury, denies history of surgery or previous infection of the right upper extremity.  No other complaints.     Denies bleeding/coagulation disorders, does not take blood thinners  Denies problems with anesthesia except at dentist with tachycardia and anxiety         Past Medical History:     Past Medical History:   Diagnosis Date    Pancolitis (H) 3/11/2021    Pancolitis (H) 3/11/2021             Past Surgical History:     Past Surgical History:   Procedure Laterality Date    BIOPSY      right breast    CHOLECYSTECTOMY      GYN SURGERY      , tubal   Hx of ligamentous injury to R knee, unrepaired 2/2 patient concern over cadaver ligment use           Social History:   Denies cigarette use, denies alcohol use, denies marijuana or other drug use, denies IV drug use.  Not currently employed.  Does not play musical instrument, do arts or crafts, lives in apartment  in [facility].    Accompanied by friend today.    Social History     Socioeconomic History    Marital status: Single     Spouse name: None    Number of children: None    Years of education: None    Highest education level: None   Tobacco Use    Smoking status: Some Days     Current packs/day: 0.00     Types: Cigarettes     Last attempt to quit: 2021     Years since quitting: 3.7    Smokeless tobacco: Never   Substance and Sexual Activity    Alcohol use: No    Drug use: No    Sexual activity: Yes     Partners: Male     Social Determinants of Health     Financial Resource Strain: Not on File (11/10/2019)    Received from [facility]    Financial Resource Strain     Financial Resource Strain: 0   Food Insecurity: Not on File (2024)    Received from [facility]    Food Insecurity     Food: 0   Transportation Needs: Not on File (11/10/2019)    Received from [facility]    Transportation Needs     Transportation: 0   Physical Activity: Not on File (11/10/2019)    Received from [facility]    Physical Activity     Physical Activity: 0   Stress: Not on File (11/10/2019)    Received from [facility]    Stress     Stress: 0   Social Connections: Not on File (2024)    Received from [facility]    Social Connections     Connectedness: 0   Housing Stability: Not on File (11/10/2019)    Received from [facility]    Housing Stability     Housin             Family History:     Family History   Problem Relation Age of Onset    Glaucoma No family hx of     Macular Degeneration No family hx of               Medications:     Current Facility-Administered Medications   Medication Dose Route Frequency Provider Last Rate Last Admin    calcium carbonate (TUMS) chewable tablet 1,000 mg  1,000 mg Oral 4x Daily PRN Cara Lal MD        HYDROmorphone (PF) (DILAUDID) injection 0.5 mg  0.5 mg Intravenous Q1H PRN Pauline Harrison MD   0.5 mg at 10/11/24 0703    lidocaine (LMX4) cream   Topical Q1H PRN Cara Lal MD         lidocaine 1 % 0.1-1 mL  0.1-1 mL Other Q1H PRN Cara Lal MD        LORazepam (ATIVAN) tablet 1 mg  1 mg Oral Once PRN Morgan Hernandez MD        ondansetron (ZOFRAN ODT) ODT tab 4 mg  4 mg Oral Q6H PRN Morgan Hernandez MD        Or    ondansetron (ZOFRAN) injection 4 mg  4 mg Intravenous Q6H PRN Morgan Hernandez MD        oxyCODONE (ROXICODONE) tablet 5 mg  5 mg Oral Q4H PRN Morgan Hernandez MD   5 mg at 10/11/24 1128    oxyCODONE IR (ROXICODONE) half-tab 2.5 mg  2.5 mg Oral Q4H PRN Morgan Hernandez MD        piperacillin-tazobactam (ZOSYN) 4.5 g vial to attach to  mL bag  4.5 g Intravenous Q6H Morgan Hernandez  mL/hr at 10/11/24 0924 4.5 g at 10/11/24 0924    polyethylene glycol (MIRALAX) Packet 17 g  17 g Oral BID PRN Morgan Hernandez MD        senna-docusate (SENOKOT-S/PERICOLACE) 8.6-50 MG per tablet 1 tablet  1 tablet Oral BID PRN Cara Lal MD        Or    senna-docusate (SENOKOT-S/PERICOLACE) 8.6-50 MG per tablet 2 tablet  2 tablet Oral BID PRN Cara Lal MD        sodium chloride (PF) 0.9% PF flush 3 mL  3 mL Intracatheter Q8H Cara Lal MD   3 mL at 10/11/24 0923    sodium chloride (PF) 0.9% PF flush 3 mL  3 mL Intracatheter q1 min prn Cara Lal MD        sodium chloride (PF) 0.9% PF flush 3 mL  3 mL Intracatheter Q8H Morgan Hernandez MD        sodium chloride (PF) 0.9% PF flush 3 mL  3 mL Intracatheter q1 min prn Morgan Hernandez MD        vancomycin place carter - receiving intermittent dosing  1 each Intravenous See Admin Instructions Pauline Harrison MD         Current Outpatient Medications   Medication Sig Dispense Refill    albuterol (ACCUNEB) 1.25 MG/3ML neb solution Inhale 1.25 mg into the lungs      BANOPHEN 25 MG capsule       benzocaine (ANBESOL) 10 % gel Take by mouth 4 times daily as needed for moderate pain (apply to ulcer in mouth as needed) 9 g 1    bisacodyl (DULCOLAX) 5 MG EC tablet 2 days prior to procedure, take 2 tablets at 4 pm. 1 day prior to  procedure, take 2 tablets at 4 pm. For additional instructions refer to your colonoscopy prep instructions. 4 tablet 0    bisacodyl (DULCOLAX) 5 MG EC tablet Take 2 tablets by mouth at 10am the day before procedure. 2 tablet 0    Cholecalciferol (VITAMIN D3) 50 MCG (2000 UT) TABS Take 1 tablet by mouth daily      FLOVENT DISKUS 250 MCG/BLIST inhaler       folic acid 800 MCG tablet Take 800 mcg by mouth daily      gabapentin (NEURONTIN) 300 MG capsule Take 300 mg by mouth 4 times daily       loratadine (CLARITIN) 10 MG tablet Take 10 mg by mouth daily      magnesium citrate solution Drink entire bottle at 4pm two days prior to procedure. 296 mL 0    melatonin 5 MG tablet Take 5 mg by mouth daily      METHADONE HCL PO Take 48 mg by mouth daily      methylcellulose (CITRUCEL) powder Take 0.85 g (3 teaspoonful) by mouth daily 90 g 3    minoxidil (ROGAINE) 2 % external solution       Multiple Vitamin (DAILY-JUSTIN MULTIVITAMIN) TABS Take 1 tablet by mouth daily      omeprazole (PRILOSEC) 20 MG DR capsule Take 1 capsule (20 mg) by mouth daily 30 capsule 1    polyethylene glycol (GOLYTELY) 236 g suspension 2 days prior at 5pm, mix and drink half of a jug of Golytely. Drink an 8 oz. glass of Golytely every 15 minutes until half of the jug is gone. Place remainder of Golytely in the refrigerator. 1 day prior at 5 pm, drink the 2nd half of a jug of Golytely bowel prep. 6 hours before your check-in time, drink an 8 oz. glass of Golytely every 15 minutes until half of the 2nd jug of Golytely is gone. Discard remainder of second jug. 8000 mL 0    polyethylene glycol (GOLYTELY) 236 g suspension Take as directed. One day before your exam fill the first container with water. Cover and shake until mixed well. At 3:00pm drink one 8oz glass every 10-15 minutes until half of the first container is empty. Store the remainder in the refrigerator. At 8:00pm drink the second half of the first container until it is gone. Before you go to bed  "mix the second container with water and put in refrigerator. Six hours before your check in time drink one 8oz glass every 10-15 minutes until half of container is empty. Discard the remainder of solution. 8000 mL 0    polyethylene glycol (MIRALAX) 17 GM/Dose powder Take 1 capful daily to have 1-2 soft BMs per day. Can increase up to 3 capfuls per day if needed to get desired result 507 g 3    predniSONE (DELTASONE) 20 MG tablet Take two tablets (= 40mg) each day for 4 (four) days 8 tablet 0    Thiamine Mononitrate (B1) 100 MG TABS Take 1 tablet by mouth daily      VENTOLIN  (90 Base) MCG/ACT inhaler       WAL-DRYL ALLERGY 25 MG tablet       WAL-MUCIL 58.6 % powder                Allergies:      Allergies   Allergen Reactions    Ibuprofen Swelling     Eyes swelling    LegacyRecord#[id number]   Other reaction(s): Edema, Urticaria (hives)   Eyes swelling   Eyes swelling    Lactose Diarrhea    Tylenol [Acetaminophen]      Feels like someone punched her in the stomach            Review of Systems:   A comprehensive 10 point review of systems (constitutional, ENT, cardiac, peripheral vascular, respiratory, GI, , Musculoskeletal, skin, Neurological) was performed and found to be negative except as described in this note.           Physical Exam:   COMPLETE EXAMINATION:   VITAL SIGNS: /79   Pulse 112   Temp 100.1  F (37.8  C) (Oral)   Resp 18   Ht 1.626 m (5' 4\")   Wt 62.4 kg (137 lb 9.6 oz)   SpO2 96%   BMI 23.62 kg/m    GENERAL:  No acute distress, calm and cooperative   RESP: Non labored breathing  MSK:  Right Upper Extremity       -Erythematous, indurated, swollen, fluctuant over the ulnar aspect of dorsal left hand, tender to palpation.  Over dorsal aspect of middle finger, there is a laceration with purulence that is expressible with gentle palpation.  - 5/5 Fires deltoid, biceps, triceps, 4/5 wrist extension/flexion, EPL, FPL, FDS/FDP/EDC all fingers, IO, range of motion and strength limited by " "swelling, pain from the elbow to the hand  -Finger cascade intact although limited by pain able to extend to nearly full extension although not full, limited by pain, swelling, flexion reaches within 5 cm of palm  - SILT to axillary, median, radial, and ulnar nerve distributions although reduced sensation in median, radial, ulnar nerve distributions  - 2+ radial pulse, fingers warm and well perfused    Left Upper Extremity  - No gross deformity. Skin intact  - All compartments soft and compressible  - 5/5 Fires deltoid, biceps, triceps, wrist extension/flexion, EPL, FPL, FDS/FDP/EDC all fingers, IO  - SILT to axillary, median, radial, and ulnar nerve distributions  - 2+ radial pulse, fingers warm and well perfused            Data:   All pertinent laboratory data reviewed  Recent Labs   Lab Test 10/11/24  0624 09/18/24  1714 06/26/24  1631 04/03/24  1607 01/10/24  1617 12/03/22  0920 08/24/22  1458 06/01/22  1526 04/20/22  1433   HGB 10.6* 10.7* 12.7 12.4 14.0   < > 11.1* 11.6* 12.7   SED 51*  --   --  16 13   < > 20 21 19   CRP  --   --   --   --   --   --  3.5 <2.9 5.0   WBC 22.9* 7.7 8.5 8.7 9.8   < > 9.1 10.0 11.6*    < > = values in this interval not displayed.     CRP Inflammation   Date Value Ref Range Status   10/11/2024 174.63 (H) <5.00 mg/L Final     Sed Rate   Date Value Ref Range Status   06/16/2021 26 (H) 0 - 20 mm/h Final     Erythrocyte Sedimentation Rate   Date Value Ref Range Status   10/11/2024 51 (H) 0 - 30 mm/hr Final       No results for input(s): \"FTYP\", \"FNEU\", \"FOTH\", \"FCOL\", \"FAPR\", \"FWBC\" in the last 93422 hours.    Imaging: No acute osseous abnormality on x-ray, significant soft tissue swelling.  No foreign body.  On ultrasound, extensive edema and fluid in the subcutaneous soft tissues of the dorsal aspect of the right distal forearm, right wrist, hand.  Focal fluid complex present in dorsal hand along the ulnar aspect, measuring 3.3 x 2.9 x 1.5 cm     This consultation has been discussed " with Dr. Pang, PGY-4. Orthopedic staff for this patient is Dr. Melina Mercado MD   Orthopedic Surgery PGY-1   Pager: [phone]  October 11, 2024, 8:52 AM    Please page me directly with any questions/concerns during regular weekday hours before 5pm. If there is no response, if it is a weekend, or if it is during evening hours, then please page the orthopedic surgery resident on call.

## 2024-10-11 NOTE — ANESTHESIA CARE TRANSFER NOTE
Patient: Shelly Cha    Procedure: Procedure(s):  Incision and drainage hand, combined       Diagnosis: Abscess, hand [L02.519]  Diagnosis Additional Information: No value filed.    Anesthesia Type:   No value filed.     Note:    Oropharynx: oropharynx clear of all foreign objects  Level of Consciousness: drowsy  Oxygen Supplementation: face mask  Level of Supplemental Oxygen (L/min / FiO2): 6  Independent Airway: airway patency satisfactory and stable  Dentition: dentition unchanged  Vital Signs Stable: post-procedure vital signs reviewed and stable  Report to RN Given: handoff report given  Patient transferred to: PACU    Handoff Report: Identifed the Patient, Identified the Reponsible Provider, Reviewed the pertinent medical history, Discussed the surgical course, Reviewed Intra-OP anesthesia mangement and issues during anesthesia, Set expectations for post-procedure period and Allowed opportunity for questions and acknowledgement of understanding      Vitals:  Vitals Value Taken Time   BP     Temp 36.6    Pulse 73 10/11/24 1820   Resp 13 10/11/24 1820   SpO2 100 % 10/11/24 1820   Vitals shown include unfiled device data.    Electronically Signed By: JAMIE Barajas CRNA  October 11, 2024  6:20 PM

## 2024-10-11 NOTE — ANESTHESIA PROCEDURE NOTES
Airway       Patient location during procedure: OR  Staff -        Anesthesiologist:  Real Gan MD       CRNA: Jeff Lazo APRN CRNA       Performed By: CRNA  Consent for Airway        Urgency: elective  Indications and Patient Condition       Indications for airway management: charlene-procedural       Induction type:intravenous       Mask difficulty assessment: 0 - not attempted    Final Airway Details       Final airway type: supraglottic airway    Supraglottic Airway Details        Type: LMA       Brand: Air-Q       LMA size: 4    Post intubation assessment        Placement verified by: capnometry, equal breath sounds and chest rise        Number of attempts at approach: 1       Secured with: tape       Ease of procedure: easy       Dentition: Intact and Unchanged

## 2024-10-11 NOTE — BRIEF OP NOTE
[facility]    Brief Operative Note    Pre-operative diagnosis: Abscess, hand [L02.519]  Post-operative diagnosis Same as pre-operative diagnosis    Procedure: Incision and drainage hand, combined, Right - Hand    Surgeon: Surgeons and Role:     * Eros Summers MD - Primary     * Jonny Pang MD - Resident - Assisting  Anesthesia: General   Estimated Blood Loss: 23 mL from 10/11/2024  4:44 PM to 10/11/2024  6:11 PM      Drains: Penrose drain, plan to pull at 48 hours  Specimens:   ID Type Source Tests Collected by Time Destination   A : Right dorsal hand 1 Abscess Hand, Right ANAEROBIC BACTERIAL CULTURE ROUTINE, FUNGAL OR YEAST CULTURE ROUTINE, AEROBIC BACTERIAL CULTURE ROUTINE Eros Summers MD 10/11/2024  5:17 PM    B : Right dorsal hand 2 Abscess Hand, Right ANAEROBIC BACTERIAL CULTURE ROUTINE, FUNGAL OR YEAST CULTURE ROUTINE, AEROBIC BACTERIAL CULTURE ROUTINE Eros Summers MD 10/11/2024  5:18 PM    C : Right dorsal hand 3 Abscess Hand, Right ANAEROBIC BACTERIAL CULTURE ROUTINE, FUNGAL OR YEAST CULTURE ROUTINE, AEROBIC BACTERIAL CULTURE ROUTINE Eros Summers MD 10/11/2024  5:18 PM      Findings:   Gross purulence of abscess, minimal tenosynovitis of EDC to ring and small fingers, yazmin bleeding tissue after debridement, friable skin on closure .  Complications: None.  Implants: * No implants in log *    Medicine Primary  Activity: Up as tolerated. Keep right upper extremity elevated on pillows  Pain management: Oral pain medications with IV for breakthrough. Wean IV as able.    Antibiotics: Continue broad spectrum antibiotics pending culture results  Diet: Begin with clear fluids and progress diet as tolerated. Louie nutritional supplement twice daily  DVT prophylaxis: Mechanical prophylaxis with SCD  Labs: Will trend CRP every 72 hours  Bracing/Splinting: None.   Dressings: Keep clean, dry and intact. Please change or reinforce as  necessary for strikethrough or saturation.   Drains: Penrose drain to be removed by orthopedic surgery 48 hours post, then plan to initiate soaks with hibiclens soap twice daily followed by dry dressings  Physical Therapy/Occupational Therapy: Eval and treat.  Consults: IM, SW, ID  Follow-up: Hand Surgery clinic in 2 weeks      Jonny Pang MD  Orthopaedic Surgery Resident  10/11/24

## 2024-10-11 NOTE — PROGRESS NOTES
Brief Orthopaedic Update:  Patient evaluated the bedside, reports symptoms started within the last 24 hours and she does endorse sustaining a cut to the right dorsum of her long finger 3 weeks prior while doing some yard work.  She reports there is mild paresthesias to the fingertips but denies morris numbness.  She endorses chills but not fevers, endorses general malaise.  Last food and drink was approximately 5:30 AM on 10/11/2024.    On exam of the right upper extremity there is erythema and swelling extending to the mid forearm with tenderness to palpation over the mid forearm but not involving the arm or elbow, point of maximum tenderness is over the ulnar aspect of the dorsal hand, there is palpable fluctuance over the dorsum of the hand and an approximately 2 cm area of violaceous discoloring, there is swelling involving the entire palm with tenderness palpation about the palm as well as the hand, the fingers are held in a slightly flexed position, the patient is able to flex fingers to within 5 cm of the palm, unable to fully extend fingers secondary to pain, sensation is intact and symmetric to the contralateral extremity in the median, radial, ulnar distributions.  Fingers are all warm well-perfused and radial pulse is intact.    Presentation is concerning for dorsal hand abscess with surrounding cellulitis of the hand and forearm with possible horseshoe abscess given volar pain and swelling. A noncontrast right hand/forearm MRI to evaluate for fluid collection and proximal forearm extension/volar hand involvement has been ordered and will be reviewed when available.    Plan for operative intervention in the form of irrigation debridement of the right hand/forearm, risks and benefits were discussed with the patient including but not limited to blood loss, damage to surround structures, recurrent infection, and need for future surgery.  Formal consent/paper consent was not completed but will be prior to the  OR.  Tentatively planning for OR evening of 10/11/2024 pending surgeon availability in OR availability.    Discussed with staff surgeon Dr. Wilson Summers who is in agreement with the documentation as outlined above.    Voice-to-text dictation software was utilized in the creation of this note therefore there may be unintended word substitutions, although errors are generally corrected real-time, there is the potential for a rare error to be present in the completed chart. Please do not hesitate to reach out for clarification.    Jonny Pang MD  Orthopaedic Surgery Resident  [facility]  10/11/24

## 2024-10-11 NOTE — ED NOTES
Contacted the MD with regards to the sepsis protocol that was triggered and asked if they wanted a Lactic ordered.

## 2024-10-11 NOTE — PROVIDER NOTIFICATION
"Dr. Pang paged \"pt was unaware of having surgery today; also, not able to release pre-op orders.\"  "

## 2024-10-11 NOTE — PHARMACY-VANCOMYCIN DOSING SERVICE
"Pharmacy Vancomycin Initial Note  Date of Service 2024  Patient's  1970  54 year old, female    Indication: Skin and Soft Tissue Infection    Current estimated CrCl = Estimated Creatinine Clearance: 108 mL/min (based on SCr of 0.6 mg/dL).    Creatinine for last 3 days  10/11/2024:  6:24 AM Creatinine 0.60 mg/dL    Recent Vancomycin Level(s) for last 3 days  No results found for requested labs within last 3 days.      Vancomycin IV Administrations (past 72 hours)                     vancomycin (VANCOCIN) 1,250 mg in 0.9% NaCl 262.5 mL intermittent infusion (mg) 1,250 mg New Bag 10/11/24 0640                    Nephrotoxins and other renal medications (From now, onward)      Start     Dose/Rate Route Frequency Ordered Stop    10/11/24 0835  piperacillin-tazobactam (ZOSYN) 4.5 g vial to attach to  mL bag        Note to Pharmacy: For [facility], [facility] and [facility]: For Zosyn-naive patients, use the \"Zosyn initial dose + extended infusion\" order panel.    4.5 g  over 30 Minutes Intravenous EVERY 6 HOURS 10/11/24 0834      10/11/24 0556  vancomycin place carter - receiving intermittent dosing         1 each Intravenous SEE ADMIN INSTRUCTIONS 10/11/24 0557              Contrast Orders - past 72 hours (72h ago, onward)      None            Insight[employer] Prediction of Planned Initial Vancomycin Regimen  Loading dose: 1250 mg IV once  Regimen: 1000 mg IV every 12 hours.  Exposure target: AUC24 (range)400-600 mg/L.hr   AUC24,ss: 482 mg/L.hr  Probability of AUC24 > 400: 69 %  Ctrough,ss: 14.1 mg/L  Probability of Ctrough,ss > 20: 24 %  Probability of nephrotoxicity (Lodise DEVENDRA ): 9 %        Plan:  Start vancomycin  1250 mg IV once (loading dose of approximately 20 mg/kg) followed by 1000 mg IV q12h.   Vancomycin monitoring method: AUC  Vancomycin therapeutic monitoring goal: 400-600 mg*h/L  Pharmacy will check vancomycin levels as appropriate in 1-3 Days.    Serum creatinine levels will be ordered daily for the first " week of therapy and at least twice weekly for subsequent weeks.      HOWARD LOCKHART, [facility]

## 2024-10-11 NOTE — OP NOTE
Orthopaedic Surgery Op-Note     Patient: Shelly Cha  : 1970  Date of Service: 10/11/2024 5:38 PM    Pre-operative Diagnosis:   Abscess, hand [L02.519]    Post-operative Diagnosis: SAME    Procedure(s) Performed:   Irrigation and debridement right hand with synovectomy of fourth extensor compartment    Staff: Dr. Eros Summers MD  Resident/Fellow:   Fred Hoff MD, PGY4      Implants: n/a  Drains: 1/4 inch penrose drain  Intra-op Labs/Cxs/Specimens:   ID Type Source Tests Collected by Time Destination   A : Right dorsal hand 1 Abscess Hand, Right ANAEROBIC BACTERIAL CULTURE ROUTINE, FUNGAL OR YEAST CULTURE ROUTINE, AEROBIC BACTERIAL CULTURE ROUTINE Eros Summers MD 10/11/2024  5:17 PM    B : Right dorsal hand 2 Abscess Hand, Right ANAEROBIC BACTERIAL CULTURE ROUTINE, FUNGAL OR YEAST CULTURE ROUTINE, AEROBIC BACTERIAL CULTURE ROUTINE Eros Summers MD 10/11/2024  5:18 PM    C : Right dorsal hand 3 Abscess Hand, Right ANAEROBIC BACTERIAL CULTURE ROUTINE, FUNGAL OR YEAST CULTURE ROUTINE, AEROBIC BACTERIAL CULTURE ROUTINE Eros Summers MD 10/11/2024  5:18 PM        Indication for Procedure:   Patient is a 54 year old female presenting with dorsal hand abscess and extensor tenosynovitis. She is indicated for irrigation and debridement to reduce infectious burden and inflammatory response within extensor compartments.  Discussed surgery with patient. Discussed risks of surgery including persistent infection, injury to tendons, ligaments, nerves, vessels. Patient voluntarily provided written informed consent.     Description of Procedure:   On the day of surgery I met the patient in the preoperative holding area.  We reviewed the surgical plan. I answered the patient's questions to the best of my ability. Site was marked and consent forms were signed by the patient and I.  OR and Anesthesia team were briefed. Patient was taken to the OR and general anesthesia administered with  endotracheal intubation. IV antibiotics and tranexamic acid were administered prior to incision. Patient was positioned in the supine position with nonsterile upper arm tourniquet.  The surgical site was prepped and draped in sterile fashion. Timeout was performed.  Upper arm tourniquet was inflated.     Longitudinal incision centered over the fourth extensor compartment measured 4.5 cm x 2.5 cm in width x 1.5 cm in depth.  Immediately upon incision we expressed phlegmonous fluid which was collected for cultures.  The superficial abscess was debrided with Leksell rongeur and scalpel. The fourth and fifth extensor compartments were then entered using a scalpel.  There was abundant murky serous fluid within the fourth extensor compartment.  We performed tenosynovectomy of fourth compartment using a rongeur.  The wound was then irrigated with 3 L sterile saline by cysto tubing. We then irrigated the wound with dilute betadine and then with additional sterile saline fluid.      The wound was explored and no additional necrotic or infectious tissue was appreciated.  The extensor retinaculum was then approximated with PDS suture.  A penrose drain was placed into the wound.  The dermis was then approximated with monocryl and skin with nylon sutures.  A sterile bandage was applied.      Drapes were removed and patient transferred to the hospital cart. Patient emerged from general anesthesia and was extubated. They were taken to the PACU in stable condition.     All sponge and needle counts were correct x 2.  I was present and scrubbed for the entire procedure.     Post op plan:   Ortho Spine Primary team  Activity: Up as tolerated. Keep right upper extremity elevated on pillows  Pain management: Oral pain medications with IV for breakthrough. Wean IV as able.    Antibiotics: Continue broad scectrum antibiotics pending culture results  Diet: Begin with clear fluids and progress diet as tolerated. Louie nutritional supplement  twice daily  DVT prophylaxis: Mechanical prophylaxis with SCD  Labs: Will trend CRP every 72 hours  Bracing/Splinting: None.   Dressings: Keep clean, dry and intact. Please change or reinforce as necessary for strikethrough or saturation.   Drains: Penrose drain to be removed by orthopedic surgery   Physical Therapy/Occupational Therapy: Eval and treat.  Consults: IM, SW, ID  Follow-up: Hand Surgery clinic in 2 weeks    Eros Summers MD  Orthopedic Surgeon  , Department of Orthopedic Surgery

## 2024-10-12 LAB
ALBUMIN SERPL BCG-MCNC: 2.9 G/DL (ref 3.5–5.2)
ALP SERPL-CCNC: 100 U/L (ref 40–150)
ALT SERPL W P-5'-P-CCNC: 16 U/L (ref 0–50)
ANION GAP SERPL CALCULATED.3IONS-SCNC: 8 MMOL/L (ref 7–15)
AST SERPL W P-5'-P-CCNC: 26 U/L (ref 0–45)
BASOPHILS # BLD AUTO: 0 10E3/UL (ref 0–0.2)
BASOPHILS # BLD MANUAL: 0 10E3/UL (ref 0–0.2)
BASOPHILS NFR BLD AUTO: 0 %
BASOPHILS NFR BLD MANUAL: 0 %
BILIRUB SERPL-MCNC: 0.4 MG/DL
BUN SERPL-MCNC: 14.8 MG/DL (ref 6–20)
BURR CELLS BLD QL SMEAR: SLIGHT
CALCIUM SERPL-MCNC: 8.9 MG/DL (ref 8.8–10.4)
CHLORIDE SERPL-SCNC: 103 MMOL/L (ref 98–107)
CREAT SERPL-MCNC: 0.65 MG/DL (ref 0.51–0.95)
CRP SERPL-MCNC: 192.35 MG/L
EGFRCR SERPLBLD CKD-EPI 2021: >90 ML/MIN/1.73M2
EOSINOPHIL # BLD AUTO: 0 10E3/UL (ref 0–0.7)
EOSINOPHIL # BLD MANUAL: 0 10E3/UL (ref 0–0.7)
EOSINOPHIL NFR BLD AUTO: 0 %
EOSINOPHIL NFR BLD MANUAL: 0 %
ERYTHROCYTE [DISTWIDTH] IN BLOOD BY AUTOMATED COUNT: 13.1 % (ref 10–15)
GLUCOSE SERPL-MCNC: 197 MG/DL (ref 70–99)
HCO3 SERPL-SCNC: 24 MMOL/L (ref 22–29)
HCT VFR BLD AUTO: 29.6 % (ref 35–47)
HGB BLD-MCNC: 10.3 G/DL (ref 11.7–15.7)
IMM GRANULOCYTES # BLD: 0.3 10E3/UL
IMM GRANULOCYTES NFR BLD: 1 %
LYMPHOCYTES # BLD AUTO: 1 10E3/UL (ref 0.8–5.3)
LYMPHOCYTES # BLD MANUAL: 0.5 10E3/UL (ref 0.8–5.3)
LYMPHOCYTES NFR BLD AUTO: 4 %
LYMPHOCYTES NFR BLD MANUAL: 2 %
MCH RBC QN AUTO: 30.7 PG (ref 26.5–33)
MCHC RBC AUTO-ENTMCNC: 34.8 G/DL (ref 31.5–36.5)
MCV RBC AUTO: 88 FL (ref 78–100)
MONOCYTES # BLD AUTO: 1.6 10E3/UL (ref 0–1.3)
MONOCYTES # BLD MANUAL: 0.5 10E3/UL (ref 0–1.3)
MONOCYTES NFR BLD AUTO: 6 %
MONOCYTES NFR BLD MANUAL: 2 %
MRSA DNA SPEC QL NAA+PROBE: NEGATIVE
NEUTROPHILS # BLD AUTO: 26 10E3/UL (ref 1.6–8.3)
NEUTROPHILS # BLD MANUAL: 27.9 10E3/UL (ref 1.6–8.3)
NEUTROPHILS NFR BLD AUTO: 90 %
NEUTROPHILS NFR BLD MANUAL: 97 %
NRBC # BLD AUTO: 0 10E3/UL
NRBC BLD AUTO-RTO: 0 /100
PLAT MORPH BLD: ABNORMAL
PLATELET # BLD AUTO: 200 10E3/UL (ref 150–450)
POTASSIUM SERPL-SCNC: 4.9 MMOL/L (ref 3.4–5.3)
PROT SERPL-MCNC: 6.5 G/DL (ref 6.4–8.3)
RBC # BLD AUTO: 3.35 10E6/UL (ref 3.8–5.2)
RBC MORPH BLD: ABNORMAL
SA TARGET DNA: NEGATIVE
SODIUM SERPL-SCNC: 135 MMOL/L (ref 135–145)
WBC # BLD AUTO: 28.9 10E3/UL (ref 4–11)

## 2024-10-12 PROCEDURE — 250N000011 HC RX IP 250 OP 636: Performed by: INTERNAL MEDICINE

## 2024-10-12 PROCEDURE — 250N000011 HC RX IP 250 OP 636: Performed by: EMERGENCY MEDICINE

## 2024-10-12 PROCEDURE — 86140 C-REACTIVE PROTEIN: CPT | Performed by: INTERNAL MEDICINE

## 2024-10-12 PROCEDURE — 36415 COLL VENOUS BLD VENIPUNCTURE: CPT | Performed by: INTERNAL MEDICINE

## 2024-10-12 PROCEDURE — 99232 SBSQ HOSP IP/OBS MODERATE 35: CPT | Performed by: INTERNAL MEDICINE

## 2024-10-12 PROCEDURE — 82310 ASSAY OF CALCIUM: CPT | Performed by: INTERNAL MEDICINE

## 2024-10-12 PROCEDURE — 85025 COMPLETE CBC W/AUTO DIFF WBC: CPT | Performed by: INTERNAL MEDICINE

## 2024-10-12 PROCEDURE — 250N000013 HC RX MED GY IP 250 OP 250 PS 637: Performed by: INTERNAL MEDICINE

## 2024-10-12 PROCEDURE — 85007 BL SMEAR W/DIFF WBC COUNT: CPT | Performed by: INTERNAL MEDICINE

## 2024-10-12 PROCEDURE — 258N000003 HC RX IP 258 OP 636: Performed by: INTERNAL MEDICINE

## 2024-10-12 PROCEDURE — 120N000002 HC R&B MED SURG/OB UMMC

## 2024-10-12 RX ORDER — SODIUM CHLORIDE 9 MG/ML
INJECTION, SOLUTION INTRAVENOUS
Status: COMPLETED
Start: 2024-10-12 | End: 2024-10-12

## 2024-10-12 RX ADMIN — PIPERACILLIN AND TAZOBACTAM 4.5 G: 4; .5 INJECTION, POWDER, LYOPHILIZED, FOR SOLUTION INTRAVENOUS at 07:58

## 2024-10-12 RX ADMIN — OXYCODONE HYDROCHLORIDE 5 MG: 5 TABLET ORAL at 12:05

## 2024-10-12 RX ADMIN — HYDROMORPHONE HYDROCHLORIDE 0.5 MG: 1 INJECTION, SOLUTION INTRAMUSCULAR; INTRAVENOUS; SUBCUTANEOUS at 16:00

## 2024-10-12 RX ADMIN — OXYCODONE HYDROCHLORIDE 5 MG: 5 TABLET ORAL at 23:32

## 2024-10-12 RX ADMIN — PIPERACILLIN AND TAZOBACTAM 4.5 G: 4; .5 INJECTION, POWDER, LYOPHILIZED, FOR SOLUTION INTRAVENOUS at 21:02

## 2024-10-12 RX ADMIN — HYDROMORPHONE HYDROCHLORIDE 0.5 MG: 1 INJECTION, SOLUTION INTRAMUSCULAR; INTRAVENOUS; SUBCUTANEOUS at 14:58

## 2024-10-12 RX ADMIN — HYDROMORPHONE HYDROCHLORIDE 0.5 MG: 1 INJECTION, SOLUTION INTRAMUSCULAR; INTRAVENOUS; SUBCUTANEOUS at 17:49

## 2024-10-12 RX ADMIN — OXYCODONE HYDROCHLORIDE 5 MG: 5 TABLET ORAL at 07:53

## 2024-10-12 RX ADMIN — HYDROMORPHONE HYDROCHLORIDE 0.5 MG: 1 INJECTION, SOLUTION INTRAMUSCULAR; INTRAVENOUS; SUBCUTANEOUS at 21:19

## 2024-10-12 RX ADMIN — VANCOMYCIN HYDROCHLORIDE 1000 MG: 1 INJECTION, SOLUTION INTRAVENOUS at 06:00

## 2024-10-12 RX ADMIN — SODIUM CHLORIDE 1000 ML: 9 INJECTION, SOLUTION INTRAVENOUS at 04:43

## 2024-10-12 RX ADMIN — PIPERACILLIN AND TAZOBACTAM 4.5 G: 4; .5 INJECTION, POWDER, LYOPHILIZED, FOR SOLUTION INTRAVENOUS at 14:47

## 2024-10-12 RX ADMIN — PIPERACILLIN AND TAZOBACTAM 4.5 G: 4; .5 INJECTION, POWDER, LYOPHILIZED, FOR SOLUTION INTRAVENOUS at 02:41

## 2024-10-12 RX ADMIN — VANCOMYCIN HYDROCHLORIDE 1000 MG: 1 INJECTION, SOLUTION INTRAVENOUS at 20:10

## 2024-10-12 ASSESSMENT — ACTIVITIES OF DAILY LIVING (ADL)
ADLS_ACUITY_SCORE: 37
ADLS_ACUITY_SCORE: 38
ADLS_ACUITY_SCORE: 37
ADLS_ACUITY_SCORE: 37
ADLS_ACUITY_SCORE: 38
ADLS_ACUITY_SCORE: 37
ADLS_ACUITY_SCORE: 38
ADLS_ACUITY_SCORE: 37
ADLS_ACUITY_SCORE: 38
ADLS_ACUITY_SCORE: 37
ADLS_ACUITY_SCORE: 38
ADLS_ACUITY_SCORE: 37
ADLS_ACUITY_SCORE: 38
ADLS_ACUITY_SCORE: 38
ADLS_ACUITY_SCORE: 37

## 2024-10-12 NOTE — PHARMACY-ADMISSION MEDICATION HISTORY
Pharmacist Admission Medication History    Admission medication history is complete. The information provided in this note is only as accurate as the sources available at the time of the update.    Information Source(s): Patient and CareEverywhere/SureScripts via in-person    Pertinent Information: Patient was on the phone so I didn't talk extensively but patient reported that she is only taking gabapentin and prn albuterol HFA    Changes made to PTA medication list:  Added: None  Deleted: albuterol neb, vitamin D3, melatnonin, methadone, MVI, omeprazole  Changed: None    Allergies reviewed with patient and updates made in EHR: no    Medication History Completed By: Mitesh Resendez RPH 10/12/2024 4:18 PM    PTA Med List   Medication Sig Last Dose    albuterol (PROAIR HFA/PROVENTIL HFA/VENTOLIN HFA) 108 (90 Base) MCG/ACT inhaler Inhale 1-2 puffs into the lungs every 4 hours as needed for shortness of breath or wheezing. Unknown    gabapentin (NEURONTIN) 300 MG capsule Take 300 mg by mouth 4 times daily  10/10/2024

## 2024-10-12 NOTE — PLAN OF CARE
"Goal Outcome Evaluation:  /53 (BP Location: Left arm)   Pulse 80   Temp 98.1  F (36.7  C) (Oral)   Resp 16   Ht 1.626 m (5' 4\")   Wt 62.4 kg (137 lb 9.6 oz)   SpO2 100%   BMI 23.62 kg/m        Plan of Care Reviewed With: patient, spouse    Overall Patient Progress: no changeOverall Patient Progress: no change    Outcome Evaluation: pt is alert and oriented x4, able to make needs known  Med for pain 8-10/10 with PRN oxycodone and IV dilaudid, CMS intact to right fingers. Rec'd call from ID, pt right had wound cultures growing 4+ strep pyrogenes  (Group A Strep) updated providers. Pt left with partner to get snacks, when pt  returned, forearm noted to be more edematous, fingers cool, pt c/o numbness to thumb and , updated ortho and hospitalist. Cap refill on fingers less that 3 sec, enc pt to elevate arm above head. Rechecked fingers warm with good cap refill. Disposition TBD continue plan of care     "

## 2024-10-12 NOTE — PROGRESS NOTES
[facility]    Medicine Progress Note - Hospitalist Service, GOLD TEAM 19    Date of Admission:  10/11/2024    Assessment & Plan   Shelly Cha is a 54 year old female admitted on 10/11/2024. She has a past medical history of ulcerative colitis (on infliximab) pancolitis presenting to the ER with acute-subacute onset of worsening right hand pain concerning for complicated soft tissue infection involving right hand/wrist.  S/p I&D in the OR with orthopedic surgery on 10/11/2024.      # Sepsis secondary to complicated soft tissue infection  # Concern for septic joint involving right upper extremity  -Reported about 2 weeks ago was clearing her garden and may have scraped her hand  -Gets every 6 weeks infliximab given history of ulcerative colitis  -Right hand swelling worsened 1 day prior to admission.  Pain became severe and so she come to the hospital.  -Found to have leukocytosis, elevated inflammatory markers  -Denies any fever in the outpatient setting.  -MR right hand shows loculated fluid collection/soft tissue abscess within the dorsal ulnar wrist measuring 3.6 x 1.7 x 3.4 cm  -Patient was in the OR on 10/11/2024 for I&D right hand.  She was found to have gross purulence of abscess, tenosynovitis.  -Ailey noting that MRSA nares also negative.  - Orthopedic surgery consulted, s/p OR  - Infectious disease consulted, appreciate recs.  For now continue IV vancomycin and Zosyn  -Follow-up culture  - Multimodal pain control with oral and IV opioids as needed.  - Per infectious disease, should receive tetanus vaccination booster    # Preop eval-no history of CHF, CKD, CVA.  Patient METS >4.  Denies any chest pain.  At this time, patient is fully optimized to proceed to surgery no further workup indicated prior to surgery.    # History of ulcerative colitis-no concerns for flare at this time.  Recommend continue outpatient follow-up.            Diet: Regular Diet Adult     DVT Prophylaxis: Pneumatic Compression Devices  Kyle Catheter: Not present  Lines: None     Cardiac Monitoring: None  Code Status: Full Code      Clinically Significant Risk Factors         # Hyponatremia: Lowest Na = 132 mmol/L in last 2 days, will monitor as appropriate       # Hypoalbuminemia: Lowest albumin = 2.9 g/dL at 10/12/2024  5:23 AM, will monitor as appropriate  # Coagulation Defect: INR = 1.21 (Ref range: 0.85 - 1.15) and/or PTT = 33 Seconds (Ref range: 22 - 38 Seconds), will monitor for bleeding                         Disposition Plan     Medically Ready for Discharge: Anticipated in 2-4 Days             IGOR ALMENDAREZ MD  Hospitalist Service, GOLD TEAM 19  [facility]  Securely message with [employer] (more info)  Text page via [employer] Paging/Directory   See signed in provider for up to date coverage information  ______________________________________________________________________    Interval History   -Afebrile and hemodynamically stable  -Reports adequate pain control at this time  -Possible OR again pending improvement and Ortho evaluation on 10/13/2024.    Physical Exam   Vital Signs: Temp: (P) 97.5  F (36.4  C) Temp src: (P) Oral BP: (P) 101/72 Pulse: (P) 65   Resp: (P) 18 SpO2: (P) 100 % O2 Device: (P) None (Room air) Oxygen Delivery: 6 LPM  Weight: 137 lbs 9.6 oz    General Appearance: Lying comfortably in bed, in no acute distress or discomfort.  Respiratory: Normal work of breathing, clear to auscultation bilaterally  Cardiovascular: Normal S1-S2, normal heart rate.  GI: Abdomen soft nontender nondistended  MSK: Right hand wrapped in dressing.  Other: Will follow extremities spontaneously    Medical Decision Making       45 MINUTES SPENT BY ME on the date of service doing chart review, history, exam, documentation & further activities per the note.      Data   ------------------------- PAST 24 HR DATA REVIEWED  -----------------------------------------------    I have personally reviewed the following data over the past 24 hrs:    28.9 (H)  \   10.3 (L)   / 200     135 103 14.8 /  197 (H)   4.9 24 0.65 \     ALT: 16 AST: 26 AP: 100 TBILI: 0.4   ALB: 2.9 (L) TOT PROTEIN: 6.5 LIPASE: N/A     Procal: N/A CRP: 192.35 (H) Lactic Acid: N/A       INR:  1.21 (H) PTT:  33   D-dimer:  N/A Fibrinogen:  N/A       Imaging results reviewed over the past 24 hrs:   Recent Results (from the past 24 hour(s))   MR Hand Right w/o Contrast    Narrative    MR right hand  without contrast 10/11/2024 3:23 PM    Techniques: Multiplanar multisequence imaging of the right hand was  obtained without  administration of intra-articular or intravenous  contrast using routine protocol.    History: evaluate for dorsal/volar hand abscess as well as distal  forearm    Comparison: Radiograph same day, ultrasound same day    Findings:    Extensive subcutaneous edema throughout the visualized forearm,  wrists, and hands. Evaluation of potential loculated fluid collection  is suboptimal without IV contrast. Given that limitation, there is a  partially loculated appearing fluid collection over the dorsal ulnar  wrist measuring approximately 3.6 x 1.7 x 3.4 cm on series 10 image 24  and series 5 image 35. This likely corresponds to ultrasound findings  same-day. Apparent overlying bulla measuring up to 1.8 cm. This  appears to be centered within subcutaneous fat dorsal to the fourth  and fifth extensor compartments/tendons.    Osseous structures  Osseous structures: No fracture, stress reaction, avascular necrosis,  or focal osseous lesion is seen.    Joint and periarticular soft tissue    Internal derangement of joints are not well assessed owing to chosen  field of view.      Muscles and tendons  Muscles: Visualized muscles are unremarkable without evidence of  muscle strain, atrophy or mass.     Tendons: The visualized tendons are intact. Subcutaneous edema  extends  to flexor and extensor tendons, difficult to evaluate for potential  tenosynovitis.    Nerves:  Grossly normal median nerve.    Other Findings:  None.      Impression    Impression: Suboptimal soft tissue evaluation without IV contrast.    1. Suspected loculated fluid collection/soft tissue abscess within the  dorsal ulnar wrist measuring 3.6 x 1.7 x 3.4 cm.    2. Extensive subcutaneous edema. Subcutaneous edema extends to flexor  and extensor tendons and superimposed tenosynovitis is difficult to  exclude.    EMILY LOPEZ MD (Joe)         SYSTEM ID:  [id number]

## 2024-10-12 NOTE — PLAN OF CARE
"Goal Outcome Evaluation:  BP (P) 101/72 (BP Location: Left arm)   Pulse (P) 65   Temp (P) 97.5  F (36.4  C) (Oral)   Resp (P) 18   Ht 1.626 m (5' 4\")   Wt 62.4 kg (137 lb 9.6 oz)   SpO2 (P) 100%   BMI 23.62 kg/m        Plan of Care Reviewed With: patient, spouse    Overall Patient Progress: improvingOverall Patient Progress: improving  Patient has been educated on potential risks of choosing to leave the unit and that the responsibility for patient well-being will belong to the patient. Pt has been informed that admission to hospital is due to need for medical treatment. Education given to the patient on some of the potential risks included but are not limited to:      - lack of access to nursing intervention      - possible missed appointments with MD, therapies, tests      - possible missed medications, antibiotics, management of IV's    Patient Response:I'm just going to the bank on [address]    Patient notified staff prior to leaving unit: yes  Coban wrap placed over IV prior to pt leaving unit yes          "

## 2024-10-12 NOTE — PROGRESS NOTES
"Shift 0715-3621:Pt admitted with increased pain,redness and swelling to her right third digit Pt had increased swelling,redness from her fingers to her hand into her forearm    Summary:Pt had a I&D Has had a pain level of a \"2\" prior while in PACU was given Dilaudid and Fentanyl at 2000  VS:       Pt A/O X 4. Afebrile. VSS. Lungs- Clear bilaterally with both       anterior and posterior. IS encouraged. Denies nausea, shortness of breath, and chest pain.     Output:       Bowels- + in all four quadrants. Voids spontaneously without   difficulty in the toilet.      Activity:       Pt up and independent with cares.     Skin:   Intact .     Pain:       Has pain in the Right hand and given pain meds while in PACU States her pain level is a \"2\".      CMS:       CMS and Neuro's are intact. Denies numbness and tingling in all extremities.   Pt elevated arm on pillows   Dressing:       Right hand incisional dressing is C/DI.      Diet:       Pt is on a Regular diet and appetite was good this shift.       LDA:       PIV is patent in the Left Arm      Equipment:       Refused PCD's on BLE's. Bilateral heels are elevated off the bed.     Plan:       Pt is able to make needs known and the call light is within the pt's reach. Continue to monitor.       Additional Info:        .       "

## 2024-10-12 NOTE — PROGRESS NOTES
Orthopaedic Surgery Progress Note 10/12/2024    Subjective  No acute events overnight.  Pain well-controlled and significantly improved after operative intervention. Denies new numbness, tingling, or weakness.  Tolerating diet without nausea or vomiting.  Voiding spontaneously.  Passing flatus, no BM.   Denies fevers, chills, chest pain, SOB.  Ambulating, working with PT    Objective  Temp: (P) 97.5  F (36.4  C) Temp src: (P) Oral BP: (P) 101/72 Pulse: (P) 65   Resp: (P) 18 SpO2: (P) 100 % O2 Device: (P) None (Room air) Oxygen Delivery: 6 LPM    Exam:  Gen: No acute distress, resting comfortably in bed.  Resp: Non-labored breathing  Cardio: Regular rate via peripheral pulse  MSK:  RUE:  - Dressings c/d/I  - Fires EPL, FPL, Intrinsics  - SILT medial/radial/ulnar/axillary nerves  - Radial pulse 2+, hand wwp    Recent Labs   Lab 10/12/24  0523 10/11/24  0624   WBC 28.9* 22.9*   HGB 10.3* 10.6*    292     Culture results: OR cultures from 10/11 pending    Assessment: Shelly Cha is a 54 year old female s/p right hand I&D on 10/11/24 with Dr. Summers. Doing well, clinically improved with less pain and swelling.    Today:  - Monitor for systemic signs of infection  - Continue to monitor cultures for growth  - Plan for dressing change and drain removal tomorrow  - Repeat inflammatory labs postop day 2, would expect increase in inflammatory labs on postop day 1 due to the physiologic stress of surgery    Plan:  Medicine Primary  Activity: Up as tolerated. Keep right upper extremity elevated on pillows  Pain management: Oral pain medications with IV for breakthrough. Wean IV as able.    Antibiotics: Continue broad spectrum antibiotics pending culture results  Diet: Begin with clear fluids and progress diet as tolerated. Louie nutritional supplement twice daily  DVT prophylaxis: Mechanical prophylaxis with SCD  Labs: Will trend CRP/WBC every 72 hours  Bracing/Splinting: None.   Dressings: Keep clean, dry and  intact. Please change or reinforce as necessary for strikethrough or saturation.   Drains: Penrose drain to be removed by orthopedic surgery 48 hours post, then plan to initiate soaks with hibiclens soap twice daily followed by dry dressings  Physical Therapy/Occupational Therapy: Eval and treat.  Consults: IM, SW, ID  Follow-up: Hand Surgery clinic in 2 weeks    Disposition: Pending progress with therapies, pain control on orals, medical stability, improved exam and labs anticipate discharge to home on POD #2-3.    Jonny Pang MD  Orthopaedic Surgery Resident  [facility]    Future Appointments   Date Time Provider Department Center   10/30/2024  3:30 PM [facility]

## 2024-10-12 NOTE — ANESTHESIA POSTPROCEDURE EVALUATION
Patient: Shelly Cha    Procedure: Procedure(s):  Incision and drainage hand, combined       Anesthesia Type:  General    Note:  Disposition: Inpatient   Postop Pain Control: Uneventful            Sign Out: Well controlled pain   PONV: No   Neuro/Psych: Uneventful            Sign Out: Acceptable/Baseline neuro status   Airway/Respiratory: Uneventful            Sign Out: Acceptable/Baseline resp. status   CV/Hemodynamics: Uneventful            Sign Out: Acceptable CV status; No obvious hypovolemia; No obvious fluid overload   Other NRE:    DID A NON-ROUTINE EVENT OCCUR? No           Last vitals:  Vitals Value Taken Time   BP 95/74 10/11/24 2016   Temp     Pulse 73 10/11/24 1849   Resp 14 10/11/24 1932   SpO2 99 % 10/11/24 2029   Vitals shown include unfiled device data.    Electronically Signed By: Real Gan MD  October 11, 2024  8:45 PM

## 2024-10-13 LAB
ALBUMIN SERPL BCG-MCNC: 2.8 G/DL (ref 3.5–5.2)
ALP SERPL-CCNC: 184 U/L (ref 40–150)
ALT SERPL W P-5'-P-CCNC: 20 U/L (ref 0–50)
ANION GAP SERPL CALCULATED.3IONS-SCNC: 10 MMOL/L (ref 7–15)
AST SERPL W P-5'-P-CCNC: 23 U/L (ref 0–45)
BACTERIA ABSC ANAEROBE+AEROBE CULT: ABNORMAL
BACTERIA TISS BX CULT: ABNORMAL
BACTERIA TISS BX CULT: ABNORMAL
BASOPHILS # BLD AUTO: 0 10E3/UL (ref 0–0.2)
BASOPHILS NFR BLD AUTO: 0 %
BILIRUB SERPL-MCNC: 0.2 MG/DL
BUN SERPL-MCNC: 13.8 MG/DL (ref 6–20)
CALCIUM SERPL-MCNC: 8.6 MG/DL (ref 8.8–10.4)
CHLORIDE SERPL-SCNC: 107 MMOL/L (ref 98–107)
CREAT SERPL-MCNC: 0.66 MG/DL (ref 0.51–0.95)
CRP SERPL-MCNC: 104.92 MG/L
EGFRCR SERPLBLD CKD-EPI 2021: >90 ML/MIN/1.73M2
EOSINOPHIL # BLD AUTO: 0.3 10E3/UL (ref 0–0.7)
EOSINOPHIL NFR BLD AUTO: 2 %
ERYTHROCYTE [DISTWIDTH] IN BLOOD BY AUTOMATED COUNT: 13.4 % (ref 10–15)
GLUCOSE SERPL-MCNC: 133 MG/DL (ref 70–99)
HCO3 SERPL-SCNC: 25 MMOL/L (ref 22–29)
HCT VFR BLD AUTO: 30 % (ref 35–47)
HGB BLD-MCNC: 9.8 G/DL (ref 11.7–15.7)
IMM GRANULOCYTES # BLD: 0.1 10E3/UL
IMM GRANULOCYTES NFR BLD: 1 %
LYMPHOCYTES # BLD AUTO: 2.6 10E3/UL (ref 0.8–5.3)
LYMPHOCYTES NFR BLD AUTO: 16 %
MCH RBC QN AUTO: 30.2 PG (ref 26.5–33)
MCHC RBC AUTO-ENTMCNC: 32.7 G/DL (ref 31.5–36.5)
MCV RBC AUTO: 93 FL (ref 78–100)
MONOCYTES # BLD AUTO: 1.3 10E3/UL (ref 0–1.3)
MONOCYTES NFR BLD AUTO: 8 %
NEUTROPHILS # BLD AUTO: 11.8 10E3/UL (ref 1.6–8.3)
NEUTROPHILS NFR BLD AUTO: 73 %
NRBC # BLD AUTO: 0 10E3/UL
NRBC BLD AUTO-RTO: 0 /100
PLATELET # BLD AUTO: 383 10E3/UL (ref 150–450)
POTASSIUM SERPL-SCNC: 3.4 MMOL/L (ref 3.4–5.3)
PROT SERPL-MCNC: 6.3 G/DL (ref 6.4–8.3)
RBC # BLD AUTO: 3.24 10E6/UL (ref 3.8–5.2)
SODIUM SERPL-SCNC: 142 MMOL/L (ref 135–145)
VANCOMYCIN SERPL-MCNC: 5 UG/ML
WBC # BLD AUTO: 16.1 10E3/UL (ref 4–11)

## 2024-10-13 PROCEDURE — 85025 COMPLETE CBC W/AUTO DIFF WBC: CPT

## 2024-10-13 PROCEDURE — 82040 ASSAY OF SERUM ALBUMIN: CPT

## 2024-10-13 PROCEDURE — 250N000011 HC RX IP 250 OP 636: Performed by: EMERGENCY MEDICINE

## 2024-10-13 PROCEDURE — 250N000013 HC RX MED GY IP 250 OP 250 PS 637

## 2024-10-13 PROCEDURE — 250N000013 HC RX MED GY IP 250 OP 250 PS 637: Performed by: INTERNAL MEDICINE

## 2024-10-13 PROCEDURE — 250N000011 HC RX IP 250 OP 636: Performed by: INTERNAL MEDICINE

## 2024-10-13 PROCEDURE — 250N000011 HC RX IP 250 OP 636

## 2024-10-13 PROCEDURE — 80202 ASSAY OF VANCOMYCIN: CPT | Performed by: INTERNAL MEDICINE

## 2024-10-13 PROCEDURE — 120N000002 HC R&B MED SURG/OB UMMC

## 2024-10-13 PROCEDURE — 99232 SBSQ HOSP IP/OBS MODERATE 35: CPT | Performed by: INTERNAL MEDICINE

## 2024-10-13 PROCEDURE — 36415 COLL VENOUS BLD VENIPUNCTURE: CPT | Performed by: INTERNAL MEDICINE

## 2024-10-13 PROCEDURE — 99233 SBSQ HOSP IP/OBS HIGH 50: CPT | Performed by: STUDENT IN AN ORGANIZED HEALTH CARE EDUCATION/TRAINING PROGRAM

## 2024-10-13 PROCEDURE — 86140 C-REACTIVE PROTEIN: CPT

## 2024-10-13 RX ORDER — ALBUTEROL SULFATE 90 UG/1
1-2 INHALANT RESPIRATORY (INHALATION) EVERY 4 HOURS PRN
Status: DISCONTINUED | OUTPATIENT
Start: 2024-10-13 | End: 2024-10-14 | Stop reason: HOSPADM

## 2024-10-13 RX ORDER — GABAPENTIN 300 MG/1
300 CAPSULE ORAL 4 TIMES DAILY
Status: DISCONTINUED | OUTPATIENT
Start: 2024-10-13 | End: 2024-10-14 | Stop reason: HOSPADM

## 2024-10-13 RX ADMIN — OXYCODONE HYDROCHLORIDE 5 MG: 5 TABLET ORAL at 17:16

## 2024-10-13 RX ADMIN — GABAPENTIN 300 MG: 300 CAPSULE ORAL at 15:58

## 2024-10-13 RX ADMIN — HYDROMORPHONE HYDROCHLORIDE 0.5 MG: 1 INJECTION, SOLUTION INTRAMUSCULAR; INTRAVENOUS; SUBCUTANEOUS at 08:22

## 2024-10-13 RX ADMIN — SENNOSIDES AND DOCUSATE SODIUM 2 TABLET: 50; 8.6 TABLET ORAL at 08:22

## 2024-10-13 RX ADMIN — HYDROMORPHONE HYDROCHLORIDE 0.5 MG: 1 INJECTION, SOLUTION INTRAMUSCULAR; INTRAVENOUS; SUBCUTANEOUS at 00:52

## 2024-10-13 RX ADMIN — PIPERACILLIN AND TAZOBACTAM 4.5 G: 4; .5 INJECTION, POWDER, LYOPHILIZED, FOR SOLUTION INTRAVENOUS at 14:26

## 2024-10-13 RX ADMIN — PIPERACILLIN AND TAZOBACTAM 4.5 G: 4; .5 INJECTION, POWDER, LYOPHILIZED, FOR SOLUTION INTRAVENOUS at 03:14

## 2024-10-13 RX ADMIN — HYDROMORPHONE HYDROCHLORIDE 0.5 MG: 1 INJECTION, SOLUTION INTRAMUSCULAR; INTRAVENOUS; SUBCUTANEOUS at 21:33

## 2024-10-13 RX ADMIN — GABAPENTIN 300 MG: 300 CAPSULE ORAL at 19:44

## 2024-10-13 RX ADMIN — PIPERACILLIN AND TAZOBACTAM 4.5 G: 4; .5 INJECTION, POWDER, LYOPHILIZED, FOR SOLUTION INTRAVENOUS at 19:43

## 2024-10-13 RX ADMIN — GABAPENTIN 300 MG: 300 CAPSULE ORAL at 08:22

## 2024-10-13 RX ADMIN — VANCOMYCIN HYDROCHLORIDE 1000 MG: 1 INJECTION, SOLUTION INTRAVENOUS at 09:40

## 2024-10-13 RX ADMIN — GABAPENTIN 300 MG: 300 CAPSULE ORAL at 12:01

## 2024-10-13 RX ADMIN — HYDROMORPHONE HYDROCHLORIDE 0.5 MG: 1 INJECTION, SOLUTION INTRAMUSCULAR; INTRAVENOUS; SUBCUTANEOUS at 03:23

## 2024-10-13 RX ADMIN — PIPERACILLIN AND TAZOBACTAM 4.5 G: 4; .5 INJECTION, POWDER, LYOPHILIZED, FOR SOLUTION INTRAVENOUS at 08:22

## 2024-10-13 ASSESSMENT — ACTIVITIES OF DAILY LIVING (ADL)
ADLS_ACUITY_SCORE: 38
ADLS_ACUITY_SCORE: 37
ADLS_ACUITY_SCORE: 38
ADLS_ACUITY_SCORE: 37
ADLS_ACUITY_SCORE: 38
ADLS_ACUITY_SCORE: 37
ADLS_ACUITY_SCORE: 38
ADLS_ACUITY_SCORE: 37
ADLS_ACUITY_SCORE: 38
ADLS_ACUITY_SCORE: 37
ADLS_ACUITY_SCORE: 38
ADLS_ACUITY_SCORE: 38
ADLS_ACUITY_SCORE: 37
ADLS_ACUITY_SCORE: 38
ADLS_ACUITY_SCORE: 37
ADLS_ACUITY_SCORE: 38
ADLS_ACUITY_SCORE: 38
ADLS_ACUITY_SCORE: 37
ADLS_ACUITY_SCORE: 38
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
## 2024-10-13 NOTE — PROGRESS NOTES
[facility]    Medicine Progress Note - Hospitalist Service, GOLD TEAM 19    Date of Admission:  10/11/2024    Assessment & Plan   Shelly Cha is a 54 year old female admitted on 10/11/2024. She has a past medical history of ulcerative colitis (on infliximab) pancolitis presenting to the ER with acute-subacute onset of worsening right hand pain concerning for complicated soft tissue infection involving right hand/wrist.  S/p I&D in the OR with orthopedic surgery on 10/11/2024.      Addendum: IV Vancomycin discontinued per ID recs.     # Sepsis secondary to complicated soft tissue infection  # Concern for septic joint involving right upper extremity  -Reported about 2 weeks ago was clearing her garden and may have scraped her hand  -Gets every 6 weeks infliximab given history of ulcerative colitis  -Right hand swelling worsened 1 day prior to admission.  Pain became severe and so she come to the hospital.  -Found to have leukocytosis, elevated inflammatory markers  -Denies any fever in the outpatient setting.  -MR right hand shows loculated fluid collection/soft tissue abscess within the dorsal ulnar wrist measuring 3.6 x 1.7 x 3.4 cm  -Patient was in the OR on 10/11/2024 for I&D right hand.  She was found to have gross purulence of abscess, tenosynovitis.  -Paron noting that MRSA nares also negative.  Oral culture currently growing strep pyogenes  - Orthopedic surgery consulted, s/p OR on 10/11/2024  - Infectious disease consulted, appreciate recs.  For now continue IV vancomycin and Zosyn  -Follow-up culture  - Multimodal pain control with oral and IV opioids as needed.  - Per infectious disease, should receive tetanus vaccination booster  -Continue PTA gabapentin.    # History of ulcerative colitis-no concerns for flare at this time.  Recommend continue outpatient follow-up.            Diet: Regular Diet Adult    DVT Prophylaxis: Pneumatic Compression Devices  Anabella  Catheter: Not present  Lines: None     Cardiac Monitoring: None  Code Status: Full Code      Clinically Significant Risk Factors               # Hypoalbuminemia: Lowest albumin = 2.9 g/dL at 10/12/2024  5:23 AM, will monitor as appropriate    # Coagulation Defect: INR = 1.21 (Ref range: 0.85 - 1.15) and/or PTT = 33 Seconds (Ref range: 22 - 38 Seconds), will monitor for bleeding                         Disposition Plan     Medically Ready for Discharge: Anticipated in 2-4 Days             IGOR ALMENDAREZ MD  Hospitalist Service, GOLD TEAM [address] [place] [address]  Securely message with [employer] (more info)  Text page via [employer] Paging/Directory   See signed in provider for up to date coverage information  ______________________________________________________________________    Interval History   -Afebrile and hemodynamically stable  -No acute events overnight  -Denies any acute complaints today    Physical Exam   Vital Signs: Temp: 98.6  F (37  C) Temp src: Oral BP: 107/80 Pulse: 75   Resp: 17 SpO2: 99 % O2 Device: None (Room air)    Weight: 137 lbs 9.6 oz    General Appearance: Lying comfortably in bed, in no acute distress or discomfort.  Respiratory: Normal work of breathing, clear to auscultation bilaterally  Cardiovascular: Normal S1-S2, normal heart rate.  GI: Abdomen soft nontender nondistended  MSK: Right hand wrapped in dressing.  Other: Will follow extremities spontaneously    Medical Decision Making       45 MINUTES SPENT BY ME on the date of service doing chart review, history, exam, documentation & further activities per the note.      Data   ------------------------- PAST 24 HR DATA REVIEWED -----------------------------------------------        Imaging results reviewed over the past 24 hrs:   No results found for this or any previous visit (from the past 24 hour(s)).

## 2024-10-13 NOTE — PLAN OF CARE
"Goal Outcome Evaluation:  /80 (BP Location: Left arm)   Pulse 75   Temp 98.6  F (37  C) (Oral)   Resp 17   Ht 1.626 m (5' 4\")   Wt 62.4 kg (137 lb 9.6 oz)   SpO2 99%   BMI 23.62 kg/m        Plan of Care Reviewed With: patient    Pt progressing PIV left arm intact, wrapped in COBAN     Outcome Evaluation: pt is alert and oriented x4, able to make needs known, on room air  Pt amb ad masoud, med for 9/10 right hand pain with prn dilaudid and oxycodone, enc pt to elevate to reduce swelling, CMS intact, pt had shower, soaked right hand in hibiclens and redressed, swelling decreased, amb ad masoud, appetite good, adequate fluid intake, disposition TBD, continue plan of care     "

## 2024-10-13 NOTE — PROGRESS NOTES
"Shift 5060-1918:Pt admitted with increased pain,redness and swelling to her right third digit Pt had increased swelling,redness from her fingers to her hand into her forearm    Summary:Pt had a I&D Has had a pain level of a \"8-10\"  VS:       Pt A/O X 4. Afebrile. VSS. Lungs- Clear bilaterally  IS encouraged. Denies nausea, shortness of breath, and chest pain.     Output:       Bowels- + in all four quadrants. Voids spontaneously without   difficulty in the toilet.      Activity:       Pt up and independent with cares.     Skin:   Intact .     Pain:       Has pain in the Right hand and given pain meds Pt is asking for hourly Dilaudid and Oxycodone States her pain level is a \"10\"  Pt appears to be sleeping between doses   CMS:       CMS and Neuro's are intact. Denies tingling  but continues to have numbness of the thumb Pt has good cap refill of her fingers of the Right Hand  Pt elevated arm on pillows/above her head   Dressing:       Right hand incisional dressing is C/DI.      Diet:       Pt is on a Regular diet and appetite was good this shift.       LDA:       PIV is patent in the Left Arm      Equipment:       Refused PCD's on BLE's. Bilateral heels are elevated off the bed.     Plan:       Pt is able to make needs known and the call light is within the pt's reach. Continue to monitor.       Additional Info:        .       "

## 2024-10-13 NOTE — PHARMACY-VANCOMYCIN DOSING SERVICE
"Pharmacy Vancomycin Note  Date of Service 2024  Patient's  1970   54 year old, female    Indication: Skin and Soft Tissue Infection  Day of Therapy: Since 10/11/24  Current vancomycin regimen:  1000 mg IV q12h  Current vancomycin monitoring method: AUC  Current vancomycin therapeutic monitoring goal: 400-600 mg*h/L    InsightRX Prediction of Current Vancomycin Regimen  Loading dose: N/A  Regimen: 1000 mg IV every 12 hours.  Start time: 21:40 on 10/13/2024  Exposure target: AUC24 (range)400-600 mg/L.hr   AUC24,ss: 340 mg/L.hr  Probability of AUC24 > 400: 16 %  Ctrough,ss: 7.5 mg/L  Probability of Ctrough,ss > 20: 0 %  Probability of nephrotoxicity (Lodise DEVENDRA ): 4 %      Current estimated CrCl = Estimated Creatinine Clearance: 98.2 mL/min (based on SCr of 0.66 mg/dL).    Creatinine for last 3 days  10/11/2024:  6:24 AM Creatinine 0.60 mg/dL;  6:24 AM Creatinine 0.60 mg/dL  10/12/2024:  5:23 AM Creatinine 0.65 mg/dL  10/13/2024:  8:09 AM Creatinine 0.66 mg/dL    Recent Vancomycin Levels (past 3 days)  10/13/2024:  8:09 AM Vancomycin 5.0 ug/mL    Vancomycin IV Administrations (past 72 hours)                     vancomycin (VANCOCIN) 1,000 mg in 200 mL dextrose intermittent infusion (mg) 1,000 mg New Bag 10/13/24 0940     1,000 mg New Bag 10/12/24 2010     1,000 mg New Bag  0600    vancomycin (VANCOCIN) 1,250 mg in 0.9% NaCl 262.5 mL intermittent infusion (mg) 1,250 mg New Bag 10/11/24 0640                    Nephrotoxins and other renal medications (From now, onward)      Start     Dose/Rate Route Frequency Ordered Stop    10/13/24 1930  vancomycin (VANCOCIN) 1,500 mg in 0.9% NaCl 265 mL intermittent infusion         1,500 mg  over 90 Minutes Intravenous EVERY 12 HOURS 10/13/24 1348      10/11/24 0835  piperacillin-tazobactam (ZOSYN) 4.5 g vial to attach to  mL bag        Note to Pharmacy: For [facility], [facility] and [facility]: For Zosyn-naive patients, use the \"Zosyn initial dose + extended infusion\" order " panel.    4.5 g  over 30 Minutes Intravenous EVERY 6 HOURS 10/11/24 0834                 Contrast Orders - past 72 hours (72h ago, onward)      None            Interpretation of levels and current regimen:  Vancomycin level is reflective of AUC less than 400    Has serum creatinine changed greater than 50% in last 72 hours: No    Urine output:  unable to determine    Renal Function: Stable    InsightRX Prediction of Planned New Vancomycin Regimen  Loading dose: N/A  Regimen: 1500 mg IV every 12 hours.  Start time: 21:40 on 10/13/2024  Exposure target: AUC24 (range)400-600 mg/L.hr   AUC24,ss: 510 mg/L.hr  Probability of AUC24 > 400: 94 %  Ctrough,ss: 11.4 mg/L  Probability of Ctrough,ss > 20: 1 %  Probability of nephrotoxicity (Lodise DEVENDRA 2009): 7 %      Plan:  Increase Dose to  1500 mg IV every 12 hours.   Vancomycin monitoring method: AUC  Vancomycin therapeutic monitoring goal: 400-600 mg*h/L  Pharmacy will check vancomycin levels as appropriate in 1-3 Days.  Serum creatinine levels will be ordered a minimum of twice weekly.    Tonia Velazquez RPH

## 2024-10-13 NOTE — PROGRESS NOTES
Orthopaedic Surgery Progress Note 10/13/2024    Subjective  No acute events overnight.  Pain well-controlled and significantly improved after operative intervention. Denies new numbness, tingling, or weakness.  Tolerating diet without nausea or vomiting.  Voiding spontaneously.  Passing flatus, no BM.   Denies fevers, chills, chest pain, SOB.  Ambulating, working with PT    Objective  Temp: 97.1  F (36.2  C) Temp src: Oral BP: 117/80 Pulse: 70   Resp: 16 SpO2: 97 % O2 Device: None (Room air)      Exam:  Gen: No acute distress, resting comfortably in bed.  Resp: Non-labored breathing  Cardio: Regular rate via peripheral pulse  MSK:  RUE:  - Dressings c/d/I - removed and wound examined, small area of fibrinous necrosis at edge of wound, penrose removed without outward evidence of purulence  - Fires EPL, FPL, Intrinsics  - SILT medial/radial/ulnar/axillary nerves  - Radial pulse 2+, hand wwp    Recent Labs   Lab 10/12/24  0523 10/11/24  0624   WBC 28.9* 22.9*   HGB 10.3* 10.6*    292     Culture results: OR cultures with group A strep    Assessment: Shelly Cha is a 54 year old female s/p right hand I&D on 10/11/24 with Dr. Summers. Doing well, clinically improved with less pain and swelling, wound     Today:  - Start BID hibiclens soaks (15 minutes each and re-dress after each soak)  - WOC consult   - Repeat inflammatory labs   - Transition to orals if labs improving  - Continue to monitor for clinical worsening    Plan:  Medicine Primary  Activity: Up as tolerated. Keep right upper extremity elevated on pillows  Pain management: Oral pain medications with IV for breakthrough. Wean IV as able.    Antibiotics: Continue broad spectrum antibiotics pending culture results  Diet: Begin with clear fluids and progress diet as tolerated. Louie nutritional supplement twice daily  DVT prophylaxis: Mechanical prophylaxis with SCD  Labs: Will trend CRP/WBC every 72 hours  Bracing/Splinting: None.   Dressings: Keep  clean, dry and intact. Please change or reinforce as necessary for strikethrough or saturation.   Drains: Penrose drain to be removed by orthopedic surgery 48 hours post, then plan to initiate soaks with hibiclens soap twice daily followed by dry dressings  Physical Therapy/Occupational Therapy: Eval and treat.  Consults: IM, SW, ID  Follow-up: Hand Surgery clinic in 2 weeks    Disposition: Pending progress with therapies, pain control on orals, medical stability, improved exam and labs anticipate discharge to home on POD #2-3.    Jonny Pang MD  Orthopaedic Surgery Resident  [facility]    Future Appointments   Date Time Provider Department Center   10/30/2024  3:30 PM [facility]

## 2024-10-14 VITALS
RESPIRATION RATE: 18 BRPM | SYSTOLIC BLOOD PRESSURE: 98 MMHG | HEART RATE: 75 BPM | HEIGHT: 64 IN | OXYGEN SATURATION: 99 % | TEMPERATURE: 98.3 F | DIASTOLIC BLOOD PRESSURE: 61 MMHG | WEIGHT: 137.6 LBS | BODY MASS INDEX: 23.49 KG/M2

## 2024-10-14 LAB
ALBUMIN SERPL BCG-MCNC: 2.8 G/DL (ref 3.5–5.2)
ALP SERPL-CCNC: 98 U/L (ref 40–150)
ALT SERPL W P-5'-P-CCNC: 39 U/L (ref 0–50)
ANION GAP SERPL CALCULATED.3IONS-SCNC: 12 MMOL/L (ref 7–15)
AST SERPL W P-5'-P-CCNC: 50 U/L (ref 0–45)
BASOPHILS # BLD AUTO: 0.1 10E3/UL (ref 0–0.2)
BASOPHILS NFR BLD AUTO: 1 %
BILIRUB SERPL-MCNC: <0.2 MG/DL
BUN SERPL-MCNC: 10.4 MG/DL (ref 6–20)
CALCIUM SERPL-MCNC: 8.7 MG/DL (ref 8.8–10.4)
CHLORIDE SERPL-SCNC: 107 MMOL/L (ref 98–107)
CREAT SERPL-MCNC: 0.61 MG/DL (ref 0.51–0.95)
CRP SERPL-MCNC: 58.59 MG/L
EGFRCR SERPLBLD CKD-EPI 2021: >90 ML/MIN/1.73M2
EOSINOPHIL # BLD AUTO: 0.3 10E3/UL (ref 0–0.7)
EOSINOPHIL NFR BLD AUTO: 3 %
ERYTHROCYTE [DISTWIDTH] IN BLOOD BY AUTOMATED COUNT: 13.6 % (ref 10–15)
GLUCOSE SERPL-MCNC: 97 MG/DL (ref 70–99)
HCO3 SERPL-SCNC: 22 MMOL/L (ref 22–29)
HCT VFR BLD AUTO: 35.1 % (ref 35–47)
HGB BLD-MCNC: 11.4 G/DL (ref 11.7–15.7)
IMM GRANULOCYTES # BLD: 0.2 10E3/UL
IMM GRANULOCYTES NFR BLD: 2 %
LYMPHOCYTES # BLD AUTO: 3.1 10E3/UL (ref 0.8–5.3)
LYMPHOCYTES NFR BLD AUTO: 28 %
MCH RBC QN AUTO: 30.9 PG (ref 26.5–33)
MCHC RBC AUTO-ENTMCNC: 32.5 G/DL (ref 31.5–36.5)
MCV RBC AUTO: 95 FL (ref 78–100)
MONOCYTES # BLD AUTO: 1.3 10E3/UL (ref 0–1.3)
MONOCYTES NFR BLD AUTO: 11 %
NEUTROPHILS # BLD AUTO: 6.2 10E3/UL (ref 1.6–8.3)
NEUTROPHILS NFR BLD AUTO: 55 %
NRBC # BLD AUTO: 0 10E3/UL
NRBC BLD AUTO-RTO: 0 /100
PLATELET # BLD AUTO: 429 10E3/UL (ref 150–450)
POTASSIUM SERPL-SCNC: 4.1 MMOL/L (ref 3.4–5.3)
PROT SERPL-MCNC: 6.8 G/DL (ref 6.4–8.3)
RBC # BLD AUTO: 3.69 10E6/UL (ref 3.8–5.2)
SODIUM SERPL-SCNC: 141 MMOL/L (ref 135–145)
WBC # BLD AUTO: 11.2 10E3/UL (ref 4–11)

## 2024-10-14 PROCEDURE — 99232 SBSQ HOSP IP/OBS MODERATE 35: CPT | Performed by: INTERNAL MEDICINE

## 2024-10-14 PROCEDURE — 97602 WOUND(S) CARE NON-SELECTIVE: CPT

## 2024-10-14 PROCEDURE — 85004 AUTOMATED DIFF WBC COUNT: CPT

## 2024-10-14 PROCEDURE — 250N000011 HC RX IP 250 OP 636

## 2024-10-14 PROCEDURE — 86140 C-REACTIVE PROTEIN: CPT

## 2024-10-14 PROCEDURE — G0463 HOSPITAL OUTPT CLINIC VISIT: HCPCS | Mod: 25

## 2024-10-14 PROCEDURE — 80053 COMPREHEN METABOLIC PANEL: CPT

## 2024-10-14 PROCEDURE — 36415 COLL VENOUS BLD VENIPUNCTURE: CPT

## 2024-10-14 PROCEDURE — 250N000013 HC RX MED GY IP 250 OP 250 PS 637

## 2024-10-14 PROCEDURE — 85014 HEMATOCRIT: CPT

## 2024-10-14 PROCEDURE — 250N000013 HC RX MED GY IP 250 OP 250 PS 637: Performed by: INTERNAL MEDICINE

## 2024-10-14 RX ADMIN — GABAPENTIN 300 MG: 300 CAPSULE ORAL at 08:40

## 2024-10-14 RX ADMIN — PIPERACILLIN AND TAZOBACTAM 4.5 G: 4; .5 INJECTION, POWDER, LYOPHILIZED, FOR SOLUTION INTRAVENOUS at 08:40

## 2024-10-14 RX ADMIN — PIPERACILLIN AND TAZOBACTAM 4.5 G: 4; .5 INJECTION, POWDER, LYOPHILIZED, FOR SOLUTION INTRAVENOUS at 02:26

## 2024-10-14 RX ADMIN — OXYCODONE HYDROCHLORIDE 5 MG: 5 TABLET ORAL at 08:45

## 2024-10-14 RX ADMIN — HYDROMORPHONE HYDROCHLORIDE 0.5 MG: 1 INJECTION, SOLUTION INTRAMUSCULAR; INTRAVENOUS; SUBCUTANEOUS at 09:28

## 2024-10-14 ASSESSMENT — ACTIVITIES OF DAILY LIVING (ADL)
ADLS_ACUITY_SCORE: 38

## 2024-10-14 NOTE — PROGRESS NOTES
"Shift 4488-0153:Pt admitted with increased pain,redness and swelling to her right third digit Pt had increased swelling,redness from her fingers to her hand into her forearm  Pt has a history of pancolitis  Summary:Pt had a I&D Has had a pain level of a \"8-10\"  VS:       Pt A/O X 4. Afebrile. VSS. Lungs- Clear bilaterally  IS encouraged. Denies nausea, shortness of breath, and chest pain.     Output:       Bowels- + in all four quadrants. Voids spontaneously without   difficulty in the toilet.      Activity:       Pt up and independent with cares.     Skin:   Intact .     Pain:       Has pain in the Right hand and given pain meds Pt is asking for hourly Dilaudid and Oxycodone States her pain level is a \"10\"  Pt appears to be sleeping between doses   CMS:       CMS and Neuro's are intact. Denies tingling  but continues to have numbness of the thumb Pt has good cap refill of her fingers of the Right Hand  Pt elevated arm on pillows/above her head   Dressing:       Right hand incisional dressing is C/DI.      Diet:       Pt is on a Regular diet and appetite was good this shift.       LDA:       PIV is patent in the Left Arm      Equipment:       Refused PCD's on BLE's. Bilateral heels are elevated off the bed.     Plan:       Pt is able to make needs known and the call light is within the pt's reach. Continue to monitor.       Additional Info:        .       "

## 2024-10-14 NOTE — CONSULTS
[facility]  WO Nurse Inpatient Assessment     Consulted for: Right hand wound    Summary: Patient followed by Ortho for I&D on 10/11/2024 with Dr Summers.     Patient History (according to provider note(s):      Per Dr Morgan Hernandez with Medicine on 10/13/2024: Shelly Cha is a 54 year old female admitted on 10/11/2024. She has a past medical history of ulcerative colitis (on infliximab) pancolitis presenting to the ER with acute-subacute onset of worsening right hand pain concerning for complicated soft tissue infection involving right hand/wrist. S/p I&D in the OR with orthopedic surgery on 10/11/2024.     Assessment:      Areas visualized during today's visit: Right hand    Wound location: Right hand    10/14    Last photo: 10/14/2024  Wound due to: Surgical Wound  Wound history/plan of care: see history above  Wound base: mix of fibrinous slough and granulation tissue     Palpation of the wound bed: firm due to underlying edema     Drainage: scant     Description of drainage: serosanguinous     Measurements (length x width x depth, in cm): 2  x 1.2  x  1.5 cm      Tunneling: N/A     Undermining: N/A  Periwound skin: unroofed blister with denuded dermis      Color: purple and red      Temperature: normal   Odor: none  Pain: mild, stinging  Pain interventions prior to dressing change: patient tolerated well  Treatment goal: Increase granulation and Soften necrotic tissue  STATUS: initial assessment  Supplies ordered: gathered       Treatment Plan:     Right hand wound(s): Daily : Remove all dressings, wash wound and surrounding skin with Hibicleanse. Rinse well with saline. Cut a strip of Hydofera Blue (order #[id number]), moisten with saline, squeeze out excess moisture and tuck into wound bed. Cover with 4x4 and secure with Kerlix and ACE wrap.      Orders: Written    RECOMMEND PRIMARY TEAM ORDER: None, at this time  Education provided: plan of care  Discussed  plan of care with: Patient and Nurse  [facility] nurse follow-up plan: weekly  Notify WO if wound(s) deteriorate.  Nursing to notify the Provider(s) and re-consult the [facility] Nurse if new skin concern.    DATA:     Current support surface: Standard  Standard gel mattress (Isoflex)  Containment of urine/stool: Continent of bladder and Continent of bowel  BMI: Body mass index is 23.62 kg/m .   Active diet order: Orders Placed This Encounter      Regular Diet Adult     Output: I/O last 3 completed shifts:  In: 400 [P.O.:400]  Out: -      Labs:   Recent Labs   Lab 10/13/24  0809 10/12/24  0523 10/11/24  1543   ALBUMIN 2.8*   < >  --    HGB 9.8*   < >  --    INR  --   --  1.21*   WBC 16.1*   < >  --     < > = values in this interval not displayed.     Pressure injury risk assessment:   Sensory Perception: 4-->no impairment  Moisture: 4-->rarely moist  Activity: 4-->walks frequently  Mobility: 4-->no limitation  Nutrition: 3-->adequate  Friction and Shear: 3-->no apparent problem  Edwar Score: 22    Kortney Almendarez RN CWOCN  Pager no longer is use, please contact through [employer] group: [facility]  Dept. Office Number: [phone]

## 2024-10-14 NOTE — PROGRESS NOTES
[facility]    Medicine Progress Note - Hospitalist Service, GOLD TEAM 19    Date of Admission:  10/11/2024    Assessment & Plan   Shelly Cha is a 54 year old female admitted on 10/11/2024. She has a past medical history of ulcerative colitis (on infliximab) pancolitis presenting to the ER with acute-subacute onset of worsening right hand pain concerning for complicated soft tissue infection involving right hand/wrist.  S/p I&D in the OR with orthopedic surgery on 10/11/2024.  Currently on IV Zosyn.  Undergoing wound care with assistance from orthopedic surgery and wound care team.    # Sepsis secondary to complicated soft tissue infection  # Concern for septic joint involving right upper extremity  # leukocytosis-resolving  -Reported about 2 weeks PTA was clearing her garden and may have scraped her hand  -Gets every 6 weeks infliximab given history of ulcerative colitis  -Right hand swelling worsened 1 day prior to admission.  Pain became severe and so she come to the hospital.  -On admission, found to have leukocytosis, elevated inflammatory markers  -Denies any fever in the outpatient setting.  -MR right hand shows loculated fluid collection/soft tissue abscess within the dorsal ulnar wrist measuring 3.6 x 1.7 x 3.4 cm  -Patient was in the OR on 10/11/2024 for I&D right hand.  She was found to have gross purulence of abscess, tenosynovitis.  -[place] noting that MRSA nares also negative.  Oral culture currently growing strep pyogenes  -Initially on IV vancomycin, this was discontinued on 10/13/2024.  - Orthopedic surgery consulted, s/p OR on 10/11/2024  -Wound care following, appreciate recs.  - Infectious disease consulted, appreciate recs.  Continue IV Zosyn for now  -Follow-up culture for final speciation.  -S/p Tdap vaccine x 1  - Multimodal pain control with oral and IV opioids as needed.  - Per infectious disease, should receive tetanus vaccination  booster  -Continue PTA gabapentin.    # History of ulcerative colitis-no concerns for flare at this time.  Recommend continue outpatient follow-up.            Diet: Regular Diet Adult    DVT Prophylaxis: Pneumatic Compression Devices  Kyle Catheter: Not present  Lines: None     Cardiac Monitoring: None  Code Status: Full Code      Clinically Significant Risk Factors               # Hypoalbuminemia: Lowest albumin = 2.8 g/dL at 10/13/2024  8:09 AM, will monitor as appropriate                            Disposition Plan     Medically Ready for Discharge: Anticipated Tomorrow; pending final recommendations from infectious disease and orthopedic surgery.  Anticipate discharge on 10/15/2024.             IGOR ALMENDAREZ MD  Hospitalist Service, GOLD TEAM [address] [place] [address]  Securely message with [employer] (more info)  Text page via [employer] Paging/Directory   See signed in provider for up to date coverage information  ______________________________________________________________________    Interval History   -Afebrile and hemodynamically stable  -No acute events overnight  -Denies any acute complaints today    Physical Exam   Vital Signs: Temp: 98.3  F (36.8  C) Temp src: Oral BP: 98/61 Pulse: 75   Resp: 18 SpO2: 99 % O2 Device: None (Room air)    Weight: 137 lbs 9.6 oz    General Appearance: Sitting comfortably in bed, in no acute distress or discomfort.  Respiratory: Normal work of breathing, clear to auscultation bilaterally  Cardiovascular: Normal S1-S2, normal heart rate.  GI: Abdomen soft nontender nondistended  MSK: Right hand wrapped in dressing.  See wound care nurse notes for image of wound.    Medical Decision Making       45 MINUTES SPENT BY ME on the date of service doing chart review, history, exam, documentation & further activities per the note.      Data   ------------------------- PAST 24 HR DATA REVIEWED -----------------------------------------------    I have  personally reviewed the following data over the past 24 hrs:    11.2 (H)  \   11.4 (L)   / 429     N/A N/A N/A /  N/A   N/A N/A N/A \       Imaging results reviewed over the past 24 hrs:   No results found for this or any previous visit (from the past 24 hour(s)).

## 2024-10-14 NOTE — PROGRESS NOTES
Attempted to see pt this afternoon, appears she left on her own accord before I arrived. Please let us know if we can be of any help.    Eros Melchor MD    Division of Infectious Diseases and International Medicine  Contact via [place] [employer] [place]

## 2024-10-14 NOTE — PLAN OF CARE
A room search was conducted with Security and this RN due to concern of use of illicit substances. Nothing was found or seized by Security. Pt appeared to have left AMA shortly after search as patient removed all of her belongings. She was not in her room or other areas of the floor, pt was considered discharged AMA after not returning for 2 hours.     Halina Flores RN

## 2024-10-16 LAB — BACTERIA BLD CULT: NO GROWTH

## 2024-10-18 LAB
BACTERIA ABSC ANAEROBE+AEROBE CULT: NORMAL
BACTERIA TISS BX CULT: NORMAL
BACTERIA TISS BX CULT: NORMAL

## 2024-10-18 NOTE — DISCHARGE SUMMARY
Shelly Cha is a 54 year old female admitted on 10/11/2024. She has a past medical history of ulcerative colitis (on infliximab) pancolitis presenting to the ER with acute-subacute onset of worsening right hand pain concerning for complicated soft tissue infection involving right hand/wrist.  S/p I&D in the OR with orthopedic surgery on 10/11/2024.  Patient was on IV Zosyn.  On 10/14/2024, patient left the hospital AGAINST MEDICAL ADVICE after she left the hospital floor without returning back.      IGOR ALMENDAREZ MD  Hospitalist Service  [facility]

## 2024-10-28 RX ORDER — HEPARIN SODIUM,PORCINE 10 UNIT/ML
5-20 VIAL (ML) INTRAVENOUS DAILY PRN
Status: CANCELLED | OUTPATIENT
Start: 2024-10-28

## 2024-10-28 RX ORDER — METHYLPREDNISOLONE SODIUM SUCCINATE 40 MG/ML
40 INJECTION INTRAMUSCULAR; INTRAVENOUS
Start: 2024-10-28

## 2024-10-28 RX ORDER — ALBUTEROL SULFATE 0.83 MG/ML
2.5 SOLUTION RESPIRATORY (INHALATION)
Status: CANCELLED | OUTPATIENT
Start: 2024-10-28

## 2024-10-28 RX ORDER — HEPARIN SODIUM (PORCINE) LOCK FLUSH IV SOLN 100 UNIT/ML 100 UNIT/ML
5 SOLUTION INTRAVENOUS
Status: CANCELLED | OUTPATIENT
Start: 2024-10-28

## 2024-10-28 RX ORDER — ALBUTEROL SULFATE 0.83 MG/ML
2.5 SOLUTION RESPIRATORY (INHALATION)
OUTPATIENT
Start: 2024-10-28

## 2024-10-28 RX ORDER — HEPARIN SODIUM (PORCINE) LOCK FLUSH IV SOLN 100 UNIT/ML 100 UNIT/ML
5 SOLUTION INTRAVENOUS
OUTPATIENT
Start: 2024-10-28

## 2024-10-28 RX ORDER — ALBUTEROL SULFATE 90 UG/1
1-2 INHALANT RESPIRATORY (INHALATION)
Start: 2024-10-28

## 2024-10-28 RX ORDER — METHYLPREDNISOLONE SODIUM SUCCINATE 40 MG/ML
40 INJECTION INTRAMUSCULAR; INTRAVENOUS
Status: CANCELLED
Start: 2024-10-28

## 2024-10-28 RX ORDER — MEPERIDINE HYDROCHLORIDE 25 MG/ML
25 INJECTION INTRAMUSCULAR; INTRAVENOUS; SUBCUTANEOUS
OUTPATIENT
Start: 2024-10-28

## 2024-10-28 RX ORDER — EPINEPHRINE 1 MG/ML
0.3 INJECTION, SOLUTION, CONCENTRATE INTRAVENOUS EVERY 5 MIN PRN
OUTPATIENT
Start: 2024-10-28

## 2024-10-28 RX ORDER — DIPHENHYDRAMINE HYDROCHLORIDE 50 MG/ML
50 INJECTION INTRAMUSCULAR; INTRAVENOUS
Status: CANCELLED
Start: 2024-10-28

## 2024-10-28 RX ORDER — ALBUTEROL SULFATE 90 UG/1
1-2 INHALANT RESPIRATORY (INHALATION)
Status: CANCELLED
Start: 2024-10-28

## 2024-10-28 RX ORDER — DIPHENHYDRAMINE HYDROCHLORIDE 50 MG/ML
25 INJECTION INTRAMUSCULAR; INTRAVENOUS
Status: CANCELLED
Start: 2024-10-28

## 2024-10-28 RX ORDER — HEPARIN SODIUM,PORCINE 10 UNIT/ML
5-20 VIAL (ML) INTRAVENOUS DAILY PRN
OUTPATIENT
Start: 2024-10-28

## 2024-10-28 RX ORDER — DIPHENHYDRAMINE HYDROCHLORIDE 50 MG/ML
25 INJECTION INTRAMUSCULAR; INTRAVENOUS
Start: 2024-10-28

## 2024-10-28 RX ORDER — EPINEPHRINE 1 MG/ML
0.3 INJECTION, SOLUTION, CONCENTRATE INTRAVENOUS EVERY 5 MIN PRN
Status: CANCELLED | OUTPATIENT
Start: 2024-10-28

## 2024-10-28 RX ORDER — MEPERIDINE HYDROCHLORIDE 25 MG/ML
25 INJECTION INTRAMUSCULAR; INTRAVENOUS; SUBCUTANEOUS
Status: CANCELLED | OUTPATIENT
Start: 2024-10-28

## 2024-10-28 RX ORDER — DIPHENHYDRAMINE HYDROCHLORIDE 50 MG/ML
50 INJECTION INTRAMUSCULAR; INTRAVENOUS
Start: 2024-10-28

## 2024-11-08 LAB
BACTERIA ABSC ANAEROBE+AEROBE CULT: NO GROWTH
BACTERIA TISS BX CULT: NO GROWTH
BACTERIA TISS BX CULT: NO GROWTH

## 2024-11-12 ENCOUNTER — PATIENT OUTREACH (OUTPATIENT)
Dept: GASTROENTEROLOGY | Facility: CLINIC | Age: 54
End: 2024-11-12
Payer: COMMERCIAL

## 2024-12-09 NOTE — PROGRESS NOTES
Removed IAR from therapy plan, as [facility] scheduling has been unable to reach the patient. Writer has also been unsuccessful with contacting the patient. Patient is overdue for an infusion and clinic appointment.

## 2025-03-22 ENCOUNTER — HEALTH MAINTENANCE LETTER (OUTPATIENT)
Age: 55
End: 2025-03-22

## 2025-03-27 ENCOUNTER — MYC MEDICAL ADVICE (OUTPATIENT)
Dept: GASTROENTEROLOGY | Facility: CLINIC | Age: 55
End: 2025-03-27
Payer: COMMERCIAL

## 2025-04-09 ENCOUNTER — LAB REQUISITION (OUTPATIENT)
Dept: LAB | Facility: CLINIC | Age: 55
End: 2025-04-09
Payer: COMMERCIAL

## 2025-04-09 DIAGNOSIS — Z12.4 ENCOUNTER FOR SCREENING FOR MALIGNANT NEOPLASM OF CERVIX: ICD-10-CM

## 2025-04-09 PROCEDURE — 88141 CYTOPATH C/V INTERPRET: CPT | Performed by: PATHOLOGY

## 2025-04-09 PROCEDURE — G0145 SCR C/V CYTO,THINLAYER,RESCR: HCPCS | Mod: ORL | Performed by: FAMILY MEDICINE

## 2025-04-09 PROCEDURE — 87624 HPV HI-RISK TYP POOLED RSLT: CPT | Mod: ORL | Performed by: FAMILY MEDICINE

## 2025-04-11 LAB
HPV HR 12 DNA CVX QL NAA+PROBE: POSITIVE
HPV16 DNA CVX QL NAA+PROBE: NEGATIVE
HPV18 DNA CVX QL NAA+PROBE: NEGATIVE
HUMAN PAPILLOMA VIRUS FINAL DIAGNOSIS: ABNORMAL

## 2025-04-15 ENCOUNTER — PATIENT OUTREACH (OUTPATIENT)
Dept: CARE COORDINATION | Facility: CLINIC | Age: 55
End: 2025-04-15
Payer: COMMERCIAL

## 2025-04-17 LAB
BKR AP ASSOCIATED HPV REPORT: ABNORMAL
BKR LAB AP GYN ADEQUACY: ABNORMAL
BKR LAB AP GYN INTERPRETATION: ABNORMAL
BKR LAB AP LMP: ABNORMAL
BKR LAB AP PREVIOUS ABNL DX: ABNORMAL
BKR LAB AP PREVIOUS ABNORMAL: ABNORMAL
PATH REPORT.COMMENTS IMP SPEC: ABNORMAL
PATH REPORT.COMMENTS IMP SPEC: ABNORMAL
PATH REPORT.RELEVANT HX SPEC: ABNORMAL

## 2025-05-24 ENCOUNTER — HEALTH MAINTENANCE LETTER (OUTPATIENT)
Age: 55
End: 2025-05-24

## (undated) DEVICE — SU PDS II 2-0 SH 27" Z317H

## (undated) DEVICE — DRSG GAUZE 4X8" NON21842

## (undated) DEVICE — DRSG XEROFORM 5X9" 8884431605

## (undated) DEVICE — DRSG ABDOMINAL 07 1/2X8" 7197D

## (undated) DEVICE — PREP CHLORAPREP 26ML TINTED HI-LITE ORANGE 930815

## (undated) DEVICE — LINEN TOWEL PACK X5 5464

## (undated) DEVICE — SOL NACL 0.9% IRRIG 1000ML BOTTLE 2F7124

## (undated) DEVICE — SU VICRYL 2-0 SH 27" UND J417H

## (undated) DEVICE — LINEN ORTHO PACK 5446

## (undated) DEVICE — SOL WATER IRRIG 1000ML BOTTLE 2F7114

## (undated) DEVICE — SOL NACL 0.9% IRRIG 3000ML BAG 2B7477

## (undated) DEVICE — TUBING IRRIG CYSTO/BLADDER SET 81" LF 2C4040

## (undated) DEVICE — CAST PADDING 4" COTTON WEBRIL UNSTERILE 9084

## (undated) DEVICE — SU ETHILON 3-0 PS-1 18" 1663H

## (undated) DEVICE — CONTAINER SPECIMEN 4OZ STERILE 17099

## (undated) DEVICE — SUCTION MANIFOLD NEPTUNE 2 SYS 4 PORT 0702-020-000

## (undated) DEVICE — GOWN XLG DISP 9545

## (undated) DEVICE — DRAPE STERI TOWEL LG 1010

## (undated) DEVICE — DECANTER TRANSFER DEVICE 2008S

## (undated) DEVICE — DRAIN PENROSE 1/4"X18" LATEX  30416-025

## (undated) DEVICE — TOURNIQUET CUFF 18" REPRO RED 60-7070-103

## (undated) DEVICE — PREP POVIDONE-IODINE 7.5% SCRUB 4OZ BOTTLE MDS093945

## (undated) DEVICE — GLOVE BIOGEL PI MICRO SZ 7.0 48570

## (undated) DEVICE — STRAP KNEE/BODY 31143004

## (undated) DEVICE — ESU GROUND PAD UNIVERSAL W/O CORD

## (undated) DEVICE — DRSG KERLIX FLUFFS X5

## (undated) DEVICE — SPONGE LAP 18X18" X8435

## (undated) RX ORDER — HYDROMORPHONE HYDROCHLORIDE 1 MG/ML
INJECTION, SOLUTION INTRAMUSCULAR; INTRAVENOUS; SUBCUTANEOUS
Status: DISPENSED
Start: 2024-10-11

## (undated) RX ORDER — FENTANYL CITRATE 50 UG/ML
INJECTION, SOLUTION INTRAMUSCULAR; INTRAVENOUS
Status: DISPENSED
Start: 2024-10-11

## (undated) RX ORDER — DEXAMETHASONE SODIUM PHOSPHATE 4 MG/ML
INJECTION, SOLUTION INTRA-ARTICULAR; INTRALESIONAL; INTRAMUSCULAR; INTRAVENOUS; SOFT TISSUE
Status: DISPENSED
Start: 2024-10-11

## (undated) RX ORDER — PROPOFOL 10 MG/ML
INJECTION, EMULSION INTRAVENOUS
Status: DISPENSED
Start: 2024-10-11

## (undated) RX ORDER — ONDANSETRON 2 MG/ML
INJECTION INTRAMUSCULAR; INTRAVENOUS
Status: DISPENSED
Start: 2024-10-11